# Patient Record
Sex: FEMALE | Race: WHITE | Employment: OTHER | URBAN - NONMETROPOLITAN AREA
[De-identification: names, ages, dates, MRNs, and addresses within clinical notes are randomized per-mention and may not be internally consistent; named-entity substitution may affect disease eponyms.]

---

## 2017-01-16 ENCOUNTER — OFFICE VISIT (OUTPATIENT)
Dept: PRIMARY CARE CLINIC | Age: 37
End: 2017-01-16
Payer: COMMERCIAL

## 2017-01-16 VITALS
WEIGHT: 228 LBS | HEIGHT: 66 IN | OXYGEN SATURATION: 97 % | TEMPERATURE: 97.3 F | DIASTOLIC BLOOD PRESSURE: 90 MMHG | BODY MASS INDEX: 36.64 KG/M2 | RESPIRATION RATE: 16 BRPM | SYSTOLIC BLOOD PRESSURE: 160 MMHG | HEART RATE: 106 BPM

## 2017-01-16 DIAGNOSIS — R10.9 ABDOMINAL PAIN, UNSPECIFIED LOCATION: Primary | ICD-10-CM

## 2017-01-16 DIAGNOSIS — R50.9 FEVER, UNSPECIFIED FEVER CAUSE: ICD-10-CM

## 2017-01-16 DIAGNOSIS — R11.0 NAUSEA: ICD-10-CM

## 2017-01-16 LAB
ALBUMIN SERPL-MCNC: 4.7 G/DL (ref 3.5–5.2)
ALP BLD-CCNC: 98 U/L (ref 35–104)
ALT SERPL-CCNC: 22 U/L (ref 5–33)
AMYLASE: 44 U/L (ref 28–100)
ANION GAP SERPL CALCULATED.3IONS-SCNC: 16 MMOL/L (ref 7–19)
AST SERPL-CCNC: 18 U/L (ref 5–32)
BASOPHILS ABSOLUTE: 0 K/UL (ref 0–0.2)
BASOPHILS RELATIVE PERCENT: 0.5 % (ref 0–1)
BILIRUB SERPL-MCNC: 0.5 MG/DL (ref 0.2–1.2)
BILIRUBIN, POC: NORMAL
BLOOD URINE, POC: NORMAL
BUN BLDV-MCNC: 16 MG/DL (ref 6–20)
CALCIUM SERPL-MCNC: 9.5 MG/DL (ref 8.6–10)
CHLORIDE BLD-SCNC: 107 MMOL/L (ref 98–111)
CLARITY, POC: CLEAR
CO2: 19 MMOL/L (ref 22–29)
COLOR, POC: YELLOW
CREAT SERPL-MCNC: 0.8 MG/DL (ref 0.5–0.9)
EOSINOPHILS ABSOLUTE: 0.1 K/UL (ref 0–0.6)
EOSINOPHILS RELATIVE PERCENT: 2 % (ref 0–5)
GFR NON-AFRICAN AMERICAN: >60
GLOBULIN: 2.4 G/DL
GLUCOSE BLD-MCNC: 102 MG/DL (ref 74–109)
GLUCOSE URINE, POC: NORMAL
HCT VFR BLD CALC: 38 % (ref 37–47)
HEMOGLOBIN: 13.4 G/DL (ref 12–16)
KETONES, POC: NORMAL
LEUKOCYTE EST, POC: NORMAL
LIPASE: 40 U/L (ref 13–60)
LYMPHOCYTES ABSOLUTE: 0.3 K/UL (ref 1.1–4.5)
LYMPHOCYTES RELATIVE PERCENT: 6 % (ref 20–40)
MCH RBC QN AUTO: 32 PG (ref 27–31)
MCHC RBC AUTO-ENTMCNC: 35.3 G/DL (ref 33–37)
MCV RBC AUTO: 90.7 FL (ref 81–99)
MONOCYTES ABSOLUTE: 0.4 K/UL (ref 0–0.9)
MONOCYTES RELATIVE PERCENT: 7.6 % (ref 0–10)
NEUTROPHILS ABSOLUTE: 4.7 K/UL (ref 1.5–7.5)
NEUTROPHILS RELATIVE PERCENT: 83.9 % (ref 50–65)
NITRITE, POC: NORMAL
PDW BLD-RTO: 13.1 % (ref 11.5–14.5)
PH, POC: 5
PLATELET # BLD: 288 K/UL (ref 130–400)
PMV BLD AUTO: 9.8 FL (ref 7.4–10.4)
POTASSIUM SERPL-SCNC: 3.9 MMOL/L (ref 3.5–5)
PROTEIN, POC: NORMAL
RBC # BLD: 4.19 M/UL (ref 4.2–5.4)
SODIUM BLD-SCNC: 142 MMOL/L (ref 136–145)
SPECIFIC GRAVITY, POC: 1010
TOTAL PROTEIN: 7.1 G/DL (ref 6.6–8.7)
UROBILINOGEN, POC: 0.2
WBC # BLD: 5.5 K/UL (ref 4.8–10.8)

## 2017-01-16 PROCEDURE — 81002 URINALYSIS NONAUTO W/O SCOPE: CPT | Performed by: NURSE PRACTITIONER

## 2017-01-16 PROCEDURE — G8419 CALC BMI OUT NRM PARAM NOF/U: HCPCS | Performed by: NURSE PRACTITIONER

## 2017-01-16 PROCEDURE — 1036F TOBACCO NON-USER: CPT | Performed by: NURSE PRACTITIONER

## 2017-01-16 PROCEDURE — G8484 FLU IMMUNIZE NO ADMIN: HCPCS | Performed by: NURSE PRACTITIONER

## 2017-01-16 PROCEDURE — G8427 DOCREV CUR MEDS BY ELIG CLIN: HCPCS | Performed by: NURSE PRACTITIONER

## 2017-01-16 PROCEDURE — 99214 OFFICE O/P EST MOD 30 MIN: CPT | Performed by: NURSE PRACTITIONER

## 2017-01-16 PROCEDURE — 96372 THER/PROPH/DIAG INJ SC/IM: CPT | Performed by: NURSE PRACTITIONER

## 2017-01-16 PROCEDURE — 36415 COLL VENOUS BLD VENIPUNCTURE: CPT | Performed by: NURSE PRACTITIONER

## 2017-01-16 RX ORDER — SUCRALFATE 1 G/1
1 TABLET ORAL 3 TIMES DAILY
COMMUNITY
End: 2017-06-09

## 2017-01-16 RX ORDER — DICYCLOMINE HYDROCHLORIDE 10 MG/1
10 CAPSULE ORAL
COMMUNITY
End: 2017-06-09

## 2017-01-16 RX ORDER — PROMETHAZINE HYDROCHLORIDE 25 MG/ML
25 INJECTION, SOLUTION INTRAMUSCULAR; INTRAVENOUS ONCE
Status: COMPLETED | OUTPATIENT
Start: 2017-01-16 | End: 2017-01-16

## 2017-01-16 RX ORDER — KETOROLAC TROMETHAMINE 30 MG/ML
60 INJECTION, SOLUTION INTRAMUSCULAR; INTRAVENOUS ONCE
Status: COMPLETED | OUTPATIENT
Start: 2017-01-16 | End: 2017-01-16

## 2017-01-16 RX ORDER — PANTOPRAZOLE SODIUM 40 MG/1
40 TABLET, DELAYED RELEASE ORAL DAILY
COMMUNITY
End: 2017-06-09

## 2017-01-16 RX ADMIN — PROMETHAZINE HYDROCHLORIDE 25 MG: 25 INJECTION, SOLUTION INTRAMUSCULAR; INTRAVENOUS at 15:23

## 2017-01-16 RX ADMIN — KETOROLAC TROMETHAMINE 60 MG: 30 INJECTION, SOLUTION INTRAMUSCULAR; INTRAVENOUS at 15:22

## 2017-01-17 ASSESSMENT — ENCOUNTER SYMPTOMS
BLOOD IN STOOL: 0
ABDOMINAL PAIN: 1
RECTAL PAIN: 0
DIARRHEA: 1
ANAL BLEEDING: 0
NAUSEA: 1
CONSTIPATION: 0
ABDOMINAL DISTENTION: 0
VOMITING: 0

## 2017-02-13 ENCOUNTER — OFFICE VISIT (OUTPATIENT)
Dept: UROLOGY | Facility: CLINIC | Age: 37
End: 2017-02-13

## 2017-02-13 VITALS — WEIGHT: 223.8 LBS | HEIGHT: 66 IN | TEMPERATURE: 97.9 F | BODY MASS INDEX: 35.97 KG/M2

## 2017-02-13 DIAGNOSIS — R35.0 URINARY FREQUENCY: ICD-10-CM

## 2017-02-13 DIAGNOSIS — N31.9 NEUROGENIC BLADDER: Primary | ICD-10-CM

## 2017-02-13 LAB
BILIRUB BLD-MCNC: NEGATIVE MG/DL
CLARITY, POC: CLEAR
COLOR UR: YELLOW
GLUCOSE UR STRIP-MCNC: NEGATIVE MG/DL
KETONES UR QL: NEGATIVE
LEUKOCYTE EST, POC: NEGATIVE
NITRITE UR-MCNC: NEGATIVE MG/ML
PH UR: 7 [PH] (ref 5–8)
PROT UR STRIP-MCNC: NEGATIVE MG/DL
RBC # UR STRIP: NEGATIVE /UL
SP GR UR: 1.02 (ref 1–1.03)
UROBILINOGEN UR QL: NORMAL

## 2017-02-13 PROCEDURE — 51798 US URINE CAPACITY MEASURE: CPT | Performed by: UROLOGY

## 2017-02-13 PROCEDURE — 99213 OFFICE O/P EST LOW 20 MIN: CPT | Performed by: UROLOGY

## 2017-02-13 PROCEDURE — 81003 URINALYSIS AUTO W/O SCOPE: CPT | Performed by: UROLOGY

## 2017-02-13 NOTE — PROGRESS NOTES
Ms. Iniguez is 36 y.o. female    Chief Complaint   Patient presents with   • Urinary Frequency       Urinary Frequency    This is a chronic problem. The current episode started more than 1 year ago. The problem has been gradually worsening. The pain is mild. There has been no fever. There is no history of pyelonephritis. Associated symptoms include frequency. Pertinent negatives include no chills, flank pain or hematuria. She has tried nothing for the symptoms. The treatment provided no relief. Her past medical history is significant for kidney stones. There is no history of recurrent UTIs.       The following portions of the patient's history were reviewed and updated as appropriate: allergies, current medications, past family history, past medical history, past social history, past surgical history and problem list.    Review of Systems   Constitutional: Negative for chills and fever.   Gastrointestinal: Negative for abdominal pain, anal bleeding and blood in stool.   Genitourinary: Positive for frequency. Negative for flank pain and hematuria.         Current Outpatient Prescriptions:   •  acyclovir (ZOVIRAX) 400 MG tablet, Take 400 mg by mouth 2 (two) times a day. Take no more than 5 doses a day., Disp: , Rfl:   •  atorvastatin (LIPITOR) 10 MG tablet, Take 10 mg by mouth daily., Disp: , Rfl:   •  cholecalciferol (VITAMIN D3) 1000 UNITS tablet, Take 5,000 Units by mouth Daily., Disp: , Rfl:   •  cloNIDine (CATAPRES) 0.1 MG tablet, Take 0.1 mg by mouth as needed for high blood pressure., Disp: , Rfl:   •  Corticotropin (ACTHAR HP IJ), Inject  as directed., Disp: , Rfl:   •  DULoxetine (CYMBALTA) 60 MG capsule, Take 60 mg by mouth daily., Disp: , Rfl:   •  gabapentin (NEURONTIN) 600 MG tablet, Take 600 mg by mouth 2 (two) times a day., Disp: , Rfl:   •  metaxalone (SKELAXIN) 800 MG tablet, Take 800 mg by mouth 3 (three) times a day as needed for muscle spasms., Disp: , Rfl:   •  naproxen sodium (ALEVE) 220 MG  "tablet, Take 220 mg by mouth 2 (two) times a day as needed for mild pain (1-3)., Disp: , Rfl:   •  phenazopyridine (PYRIDIUM) 100 MG tablet, Take 100 mg by mouth 3 (three) times a day as needed for bladder spasms., Disp: , Rfl:   •  SUMAtriptan (IMITREX) 20 MG/ACT nasal spray, 1 spray into each nostril every 2 (two) hours as needed for migraine., Disp: , Rfl:   •  tamsulosin (FLOMAX) 0.4 MG capsule 24 hr capsule, Take 1 capsule by mouth every night., Disp: , Rfl:   •  tiZANidine (ZANAFLEX) 4 MG tablet, Take 4 mg by mouth at night as needed for muscle spasms., Disp: , Rfl:   •  topiramate (TOPAMAX) 100 MG tablet, Take 100 mg by mouth 2 (two) times a day., Disp: , Rfl:   •  vitamin B-12 (CYANOCOBALAMIN) 1000 MCG tablet, Take 1,000 mcg by mouth every 30 (thirty) days., Disp: , Rfl:     Past Medical History   Diagnosis Date   • Depression    • Hyperlipidemia    • MS (multiple sclerosis)    • POTS (postural orthostatic tachycardia syndrome)        Past Surgical History   Procedure Laterality Date   • Hysterectomy     • Appendectomy     • Cholecystectomy     • Back surgery         Social History     Social History   • Marital status:      Spouse name: N/A   • Number of children: N/A   • Years of education: N/A     Social History Main Topics   • Smoking status: Never Smoker   • Smokeless tobacco: None   • Alcohol use No   • Drug use: No   • Sexual activity: Not Asked     Other Topics Concern   • None     Social History Narrative       Family History   Problem Relation Age of Onset   • No Known Problems Father    • No Known Problems Mother        Objective    Visit Vitals   • Temp 97.9 °F (36.6 °C)   • Ht 66\" (167.6 cm)   • Wt 223 lb 12.8 oz (102 kg)   • BMI 36.12 kg/m2       Physical Exam    Office Visit on 12/28/2016   Component Date Value Ref Range Status   • Color 12/28/2016 Yellow  Yellow, Straw, Dark Yellow, Mary Final   • Clarity, UA 12/28/2016 Clear  Clear Final   • Glucose, UA 12/28/2016 Negative  Negative, " 1000 mg/dL (3+) mg/dL Final   • Bilirubin 12/28/2016 Negative  Negative Final   • Ketones, UA 12/28/2016 Negative  Negative Final   • Specific Gravity  12/28/2016 1.025  1.005 - 1.030 Final   • Blood, UA 12/28/2016 Trace* Negative Final   • pH, Urine 12/28/2016 7.0  5.0 - 8.0 Final   • Protein, POC 12/28/2016 Negative  Negative mg/dL Final   • Urobilinogen, UA 12/28/2016 Normal  Normal Final   • Leukocytes 12/28/2016 Negative  Negative Final   • Nitrite, UA 12/28/2016 Negative  Negative Final       Results for orders placed or performed in visit on 02/13/17   POC Urinalysis Dipstick, Automated   Result Value Ref Range    Color Yellow Yellow, Straw, Dark Yellow, Mary    Clarity, UA Clear Clear    Glucose, UA Negative Negative, 1000 mg/dL (3+) mg/dL    Bilirubin Negative Negative    Ketones, UA Negative Negative    Specific Gravity  1.025 1.005 - 1.030    Blood, UA Negative Negative    pH, Urine 7.0 5.0 - 8.0    Protein, POC Negative Negative mg/dL    Urobilinogen, UA Normal Normal    Leukocytes Negative Negative    Nitrite, UA Negative Negative     Estimation of residual urine via abdominal ultrasound  Residual Urine: 0 ml  Indication: neurogenic bladder  Position: Supine  Examination: Incremental scanning of the suprapubic area using 3 MHz transducer using copious amounts of acoustic gel.   Findings: An anechoic area was demonstrated which represented the bladder, with measurement of residual urine as noted. I inspected this myself. In that the residual urine was stable or insignificant, no treatment will be necessary at this time.             Assessment and Plan    Nurys was seen today for urinary frequency.    Diagnoses and all orders for this visit:    Neurogenic bladder  -     POC Urinalysis Dipstick, Automated    Urinary frequency  -     Urine Culture   she states that she did initially have some improvement when stopping the bethanechol.  However this was short-lived and she is having a relapsing episode  her multiple sclerosis.  She is currently stating that she is having more frequency.  In addition she has some lower abdominal pressure.  I sent a urine culture today.  Her urine does not appear infected.  She is emptying her bladder well today with a postvoid residual of 0.  I think some of her symptoms may be due to a an acute relapse of multiple sclerosis.  We discussed starting a medication at this point I will not start this during this acute flare.  I will see her back in a few months.  If she has no improvement over the next few months and I think she will need to be started on anticholinergic.    EMR Dragon/Transcription disclaimer:  Much of this encounter note is an electronic transcription/translation of spoken language to printed text. The electronic translation of spoken language may permit erroneous, or at times, nonsensical words or phrases to be inadvertently transcribed; although I have reviewed the note for such errors, some may still exist.     Bladder scan    Post void residuEstimation of residual urine via abdominal ultrasound  Residual Urine: 0 ml  Indication: Lower urinary tract symptoms  Position: Supine  Examination: Incremental scanning of the suprapubic area using 3 MHz transducer using copious amounts of acoustic gel.   Findings: An anechoic area was demonstrated which represented the bladder, with measurement of residual urine as noted. I inspected this myself. In that the residual urine was stable or insignificant, no treatment will be necessary at this time.

## 2017-02-15 ENCOUNTER — TELEPHONE (OUTPATIENT)
Dept: UROLOGY | Facility: CLINIC | Age: 37
End: 2017-02-15

## 2017-02-15 LAB
BACTERIA UR CULT: NORMAL
BACTERIA UR CULT: NORMAL

## 2017-02-15 NOTE — TELEPHONE ENCOUNTER
----- Message from Alok Albarran MD sent at 2/15/2017  6:31 AM CST -----  Please let her know her urine culture was negative    ----- Message -----     From: Shelby Salmeron MA     Sent: 2/13/2017   9:42 AM       To: Alok Albarran MD

## 2017-02-16 ENCOUNTER — TRANSCRIBE ORDERS (OUTPATIENT)
Dept: LAB | Facility: HOSPITAL | Age: 37
End: 2017-02-16

## 2017-02-16 ENCOUNTER — LAB (OUTPATIENT)
Dept: LAB | Facility: HOSPITAL | Age: 37
End: 2017-02-16
Attending: PEDIATRICS

## 2017-02-16 DIAGNOSIS — G35 MULTIPLE SCLEROSIS (HCC): Primary | ICD-10-CM

## 2017-02-16 DIAGNOSIS — G35 MULTIPLE SCLEROSIS (HCC): ICD-10-CM

## 2017-02-16 LAB
ALBUMIN SERPL-MCNC: 5.1 G/DL (ref 3.5–5)
ALBUMIN/GLOB SERPL: 1.3 G/DL (ref 1.1–2.5)
ALP SERPL-CCNC: 102 U/L (ref 24–120)
ALT SERPL W P-5'-P-CCNC: 46 U/L (ref 0–54)
ANION GAP SERPL CALCULATED.3IONS-SCNC: 17 MMOL/L (ref 4–13)
AST SERPL-CCNC: 24 U/L (ref 7–45)
AUTO MIXED CELLS #: 0.7 10*3/UL (ref 0.1–2.6)
AUTO MIXED CELLS %: 9.2 % (ref 0.1–24)
BILIRUB SERPL-MCNC: 0.7 MG/DL (ref 0.1–1)
BILIRUB UR QL STRIP: NEGATIVE
BUN BLD-MCNC: 22 MG/DL (ref 5–21)
BUN/CREAT SERPL: 21.2
CALCIUM SPEC-SCNC: 9.3 MG/DL (ref 8.4–10.4)
CHLORIDE SERPL-SCNC: 103 MMOL/L (ref 98–110)
CLARITY UR: CLEAR
CO2 SERPL-SCNC: 25 MMOL/L (ref 24–31)
COLOR UR: YELLOW
CREAT BLD-MCNC: 1.04 MG/DL (ref 0.5–1.4)
ERYTHROCYTE [DISTWIDTH] IN BLOOD BY AUTOMATED COUNT: 12.8 % (ref 12–15)
GFR SERPL CREATININE-BSD FRML MDRD: 59 ML/MIN/1.73
GLOBULIN UR ELPH-MCNC: 3.9 GM/DL
GLUCOSE BLD-MCNC: 92 MG/DL (ref 70–100)
GLUCOSE UR STRIP-MCNC: NEGATIVE MG/DL
HCT VFR BLD AUTO: 43 % (ref 37–47)
HGB BLD-MCNC: 15.3 G/DL (ref 12–16)
HGB UR QL STRIP.AUTO: NEGATIVE
KETONES UR QL STRIP: NEGATIVE
LEUKOCYTE ESTERASE UR QL STRIP.AUTO: NEGATIVE
LYMPHOCYTES # BLD AUTO: 0.8 10*3/MM3 (ref 0.8–7)
LYMPHOCYTES NFR BLD AUTO: 10.9 % (ref 15–45)
MCH RBC QN AUTO: 31.5 PG (ref 28–32)
MCHC RBC AUTO-ENTMCNC: 35.6 G/DL (ref 33–36)
MCV RBC AUTO: 88.7 FL (ref 82–98)
NEUTROPHILS # BLD AUTO: 6.2 10*3/MM3 (ref 1.5–8.3)
NEUTROPHILS NFR BLD AUTO: 79.9 % (ref 39–78)
NITRITE UR QL STRIP: NEGATIVE
PH UR STRIP.AUTO: 6 [PH] (ref 5–8)
PLATELET # BLD AUTO: 326 10*3/MM3 (ref 130–400)
PMV BLD AUTO: 8.6 FL (ref 6–12)
POTASSIUM BLD-SCNC: 4.1 MMOL/L (ref 3.5–5.3)
PROT SERPL-MCNC: 9 G/DL (ref 6.3–8.7)
PROT UR QL STRIP: NEGATIVE
RBC # BLD AUTO: 4.85 10*6/MM3 (ref 4.2–5.4)
SODIUM BLD-SCNC: 145 MMOL/L (ref 135–145)
SP GR UR STRIP: 1.02 (ref 1–1.03)
UROBILINOGEN UR QL STRIP: NORMAL
WBC NRBC COR # BLD: 7.7 10*3/MM3 (ref 4.8–10.8)

## 2017-02-16 PROCEDURE — 81003 URINALYSIS AUTO W/O SCOPE: CPT

## 2017-02-16 PROCEDURE — 36415 COLL VENOUS BLD VENIPUNCTURE: CPT

## 2017-02-16 PROCEDURE — 85025 COMPLETE CBC W/AUTO DIFF WBC: CPT

## 2017-02-16 PROCEDURE — 80053 COMPREHEN METABOLIC PANEL: CPT

## 2017-04-21 PROBLEM — R07.9 CHEST PAIN: Status: ACTIVE | Noted: 2017-04-21

## 2017-04-24 ENCOUNTER — OFFICE VISIT (OUTPATIENT)
Dept: CARDIOLOGY | Facility: CLINIC | Age: 37
End: 2017-04-24

## 2017-04-24 VITALS
HEIGHT: 66 IN | DIASTOLIC BLOOD PRESSURE: 100 MMHG | BODY MASS INDEX: 37.03 KG/M2 | WEIGHT: 230.4 LBS | SYSTOLIC BLOOD PRESSURE: 150 MMHG | HEART RATE: 96 BPM

## 2017-04-24 DIAGNOSIS — R07.2 PRECORDIAL PAIN: Primary | ICD-10-CM

## 2017-04-24 DIAGNOSIS — R60.9 SWELLING: ICD-10-CM

## 2017-04-24 DIAGNOSIS — R06.02 SHORTNESS OF BREATH: ICD-10-CM

## 2017-04-24 DIAGNOSIS — I10 ESSENTIAL HYPERTENSION: ICD-10-CM

## 2017-04-24 PROBLEM — G90.A POTS (POSTURAL ORTHOSTATIC TACHYCARDIA SYNDROME): Status: ACTIVE | Noted: 2017-04-24

## 2017-04-24 PROCEDURE — 99214 OFFICE O/P EST MOD 30 MIN: CPT | Performed by: NURSE PRACTITIONER

## 2017-04-24 PROCEDURE — 93000 ELECTROCARDIOGRAM COMPLETE: CPT | Performed by: NURSE PRACTITIONER

## 2017-04-24 RX ORDER — METOPROLOL TARTRATE 50 MG/1
50 TABLET, FILM COATED ORAL 2 TIMES DAILY
COMMUNITY
End: 2017-04-27

## 2017-04-24 RX ORDER — LEFLUNOMIDE 20 MG/1
20 TABLET ORAL DAILY
COMMUNITY
End: 2017-09-22

## 2017-04-24 RX ORDER — DALFAMPRIDINE 10 MG/1
10 TABLET, FILM COATED, EXTENDED RELEASE ORAL 2 TIMES DAILY
COMMUNITY
End: 2017-09-22

## 2017-04-24 RX ORDER — SUMATRIPTAN 6 MG/.5ML
6 INJECTION, SOLUTION SUBCUTANEOUS AS NEEDED
COMMUNITY

## 2017-04-24 RX ORDER — PANTOPRAZOLE SODIUM 40 MG/1
40 TABLET, DELAYED RELEASE ORAL DAILY
COMMUNITY
End: 2017-04-27

## 2017-04-24 RX ORDER — LISINOPRIL 10 MG/1
10 TABLET ORAL DAILY
Qty: 30 TABLET | Refills: 11 | Status: SHIPPED | OUTPATIENT
Start: 2017-04-24 | End: 2017-05-30

## 2017-04-24 NOTE — PROGRESS NOTES
Subjective:     Encounter Date:04/24/2017      Patient ID: Nurys Iniguez is a 36 y.o. female.    Chief Complaint:  History of Present Illness  Patient is here today for chest pain, elevated blood pressure and swelling. Patient is followed by Dr. Valdivia for POTS and HTN. Patient for the last couple months has been experiencing some persistently elevated blood pressure. A review of daily blood pressures reveals few low end blood pressures and significantly elevated blood pressures. Patient int he past has taken Clonidine for fluctuating high blood pressures. She recently saw her neurologist who told patient to take Clonidine twice a day and as needed and increased her metoprolol. Despite this patient continues to have elevated blood pressures. Patient quite frequently experiences headaches. Patient complaints of swelling in legs, face and abdomen. Complains of shortness of breath. States no recent syncopal episodes. Chest pain comes and goes and is in the center of chest. Radiates in to back at times.   The following portions of the patient's history were reviewed and updated as appropriate: allergies, current medications, past family history, past medical history, past social history, past surgical history and problem list.   Past Medical History:   Diagnosis Date   • Depression    • Hyperlipidemia    • MS (multiple sclerosis)    • POTS (postural orthostatic tachycardia syndrome)      Allergies   Allergen Reactions   • Ropinirole Hcl    • Simvastatin      Prior to Admission medications    Medication Sig Start Date End Date Taking? Authorizing Provider   acyclovir (ZOVIRAX) 400 MG tablet Take 400 mg by mouth 2 (two) times a day. Take no more than 5 doses a day.   Yes Historical Provider, MD   Ascorbic Acid (VITAMIN C ER PO) Take  by mouth.   Yes Historical Provider, MD   atorvastatin (LIPITOR) 10 MG tablet Take 10 mg by mouth daily.   Yes Historical Provider, MD   cholecalciferol (VITAMIN D3) 1000 UNITS  tablet Take 5,000 Units by mouth Daily.   Yes Historical Provider, MD   cloNIDine (CATAPRES) 0.1 MG tablet Take 0.1 mg by mouth as needed for high blood pressure.   Yes Historical Provider, MD   Corticotropin (ACTHAR HP IJ) Inject  as directed.   Yes Historical Provider, MD   Dalfampridine ER (AMPYRA) 10 MG tablet sustained-release 12 hour Take 10 mg by mouth 2 (Two) Times a Day.   Yes Historical Provider, MD   DULoxetine (CYMBALTA) 60 MG capsule Take 60 mg by mouth daily.   Yes Historical Provider, MD   gabapentin (NEURONTIN) 600 MG tablet Take 600 mg by mouth 2 (two) times a day.   Yes Historical Provider, MD   leflunomide (ARAVA) 20 MG tablet Take 20 mg by mouth Daily.   Yes Historical Provider, MD   metoprolol tartrate (LOPRESSOR) 50 MG tablet Take 50 mg by mouth 2 (Two) Times a Day.   Yes Historical Provider, MD   naproxen sodium (ALEVE) 220 MG tablet Take 220 mg by mouth 2 (two) times a day as needed for mild pain (1-3).   Yes Historical Provider, MD   pantoprazole (PROTONIX) 40 MG EC tablet Take 40 mg by mouth Daily.   Yes Historical Provider, MD   phenazopyridine (PYRIDIUM) 100 MG tablet Take 100 mg by mouth 3 (three) times a day as needed for bladder spasms.   Yes Historical Provider, MD   SUMAtriptan (IMITREX) 6 MG/0.5ML solution injection Inject 6 mg under the skin 1 (One) Time.   Yes Historical Provider, MD   tamsulosin (FLOMAX) 0.4 MG capsule 24 hr capsule Take 1 capsule by mouth every night.   Yes Historical Provider, MD   tiZANidine (ZANAFLEX) 4 MG tablet Take 4 mg by mouth at night as needed for muscle spasms.   Yes Historical Provider, MD   topiramate (TOPAMAX) 100 MG tablet Take 100 mg by mouth 2 (two) times a day.   Yes Historical Provider, MD   vitamin B-12 (CYANOCOBALAMIN) 1000 MCG tablet Take 1,000 mcg by mouth every 30 (thirty) days.   Yes Historical Provider, MD   lisinopril (PRINIVIL,ZESTRIL) 10 MG tablet Take 1 tablet by mouth Daily. 4/24/17   SID Sharma   metaxalone (SKELAXIN)  800 MG tablet Take 800 mg by mouth 3 (three) times a day as needed for muscle spasms.  4/24/17  Historical Provider, MD   SUMAtriptan (IMITREX) 20 MG/ACT nasal spray 1 spray into each nostril every 2 (two) hours as needed for migraine.  4/24/17  Historical Provider, MD     Allergies   Allergen Reactions   • Ropinirole Hcl    • Simvastatin          Review of Systems   Constitution: Positive for malaise/fatigue. Negative for chills, decreased appetite, fever, weight gain and weight loss.   HENT: Negative for headaches and nosebleeds.    Eyes: Negative for visual disturbance.   Cardiovascular: Positive for chest pain, leg swelling and palpitations. Negative for dyspnea on exertion, near-syncope, orthopnea, paroxysmal nocturnal dyspnea and syncope.   Respiratory: Positive for shortness of breath. Negative for cough, hemoptysis and snoring.    Endocrine: Negative for cold intolerance and heat intolerance.   Hematologic/Lymphatic: Negative for bleeding problem. Does not bruise/bleed easily.   Skin: Negative for rash.   Musculoskeletal: Negative for back pain and falls.   Gastrointestinal: Negative for abdominal pain, constipation, diarrhea, heartburn, melena, nausea and vomiting.   Genitourinary: Negative for hematuria.   Neurological: Negative for dizziness and light-headedness.   Psychiatric/Behavioral: Negative for altered mental status.   Allergic/Immunologic: Negative for persistent infections.         ECG 12 Lead  Date/Time: 4/24/2017 2:13 PM  Performed by: ARJUN HERNANDEZ  Authorized by: ARJUN HERNANDEZ   Comparison: compared with previous ECG from 6/20/2016  Similar to previous ECG  Rhythm: sinus tachycardia               Objective:     Physical Exam   Constitutional: She is oriented to person, place, and time. She appears well-developed and well-nourished.   HENT:   Head: Normocephalic and atraumatic.   Eyes: Pupils are equal, round, and reactive to light.   Neck: Normal range of motion. Neck supple. No JVD  "present. Carotid bruit is not present.   Cardiovascular: Normal rate, regular rhythm, normal heart sounds and intact distal pulses.    Pulmonary/Chest: Effort normal and breath sounds normal.   Abdominal: Soft. Bowel sounds are normal.   Musculoskeletal: Normal range of motion.   Neurological: She is alert and oriented to person, place, and time. She has normal reflexes.   Skin: Skin is warm and dry.   Psychiatric: She has a normal mood and affect. Her behavior is normal. Judgment and thought content normal.     Blood pressure 150/100, pulse 96, height 66\" (167.6 cm), weight 230 lb 6.4 oz (105 kg).      Lab Review:       Assessment:          Diagnosis Plan   1. Precordial pain  Adult Stress Echo Only    Adult Transthoracic Echo Complete With Contrast   2. Essential hypertension  lisinopril (PRINIVIL,ZESTRIL) 10 MG tablet   3. Swelling  Adult Stress Echo Only   4. Shortness of breath  Adult Stress Echo Only    Adult Transthoracic Echo Complete With Contrast          Plan:       1. Patient complains of chest pain. Will check dobutamine stress echo.  2. Blood Pressures running high, added low dose lisinopril. Patient to hold if blood pressure low. Patient was told to take Clonidine twice a day by her neurologist but she is uncomfortable with this because it drops her blood pressure. Discussed risks of worsening POTS but patient is an agreeance for the need for better blood pressure control. If becomes symptomatic she is to call and stop taking Lisinopril   3. Swelling to legs and some shortness of breath. Will check an echo.  4. Will have patient follow up in 1 month.          "

## 2017-04-27 ENCOUNTER — HOSPITAL ENCOUNTER (EMERGENCY)
Facility: HOSPITAL | Age: 37
Discharge: HOME | End: 2017-04-27
Admitting: EMERGENCY MEDICINE

## 2017-04-27 ENCOUNTER — APPOINTMENT (OUTPATIENT)
Dept: CARDIOLOGY | Facility: HOSPITAL | Age: 37
End: 2017-04-27

## 2017-04-27 ENCOUNTER — APPOINTMENT (OUTPATIENT)
Dept: GENERAL RADIOLOGY | Facility: HOSPITAL | Age: 37
End: 2017-04-27

## 2017-04-27 VITALS
HEIGHT: 66 IN | BODY MASS INDEX: 35.36 KG/M2 | OXYGEN SATURATION: 96 % | TEMPERATURE: 97 F | RESPIRATION RATE: 16 BRPM | SYSTOLIC BLOOD PRESSURE: 164 MMHG | DIASTOLIC BLOOD PRESSURE: 88 MMHG | HEART RATE: 94 BPM | WEIGHT: 220 LBS

## 2017-04-27 DIAGNOSIS — R07.9 CHEST PAIN, UNSPECIFIED TYPE: Primary | ICD-10-CM

## 2017-04-27 DIAGNOSIS — R00.0 TACHYCARDIA: ICD-10-CM

## 2017-04-27 LAB
ALBUMIN SERPL-MCNC: 4.3 G/DL (ref 3.5–5)
ALBUMIN/GLOB SERPL: 1.3 G/DL (ref 1.1–2.5)
ALP SERPL-CCNC: 117 U/L (ref 24–120)
ALT SERPL W P-5'-P-CCNC: 50 U/L (ref 0–54)
ANION GAP SERPL CALCULATED.3IONS-SCNC: 15 MMOL/L (ref 4–13)
AST SERPL-CCNC: 44 U/L (ref 7–45)
BASOPHILS # BLD AUTO: 0.04 10*3/MM3 (ref 0–0.2)
BASOPHILS NFR BLD AUTO: 0.8 % (ref 0–2)
BH CV STRESS BP STAGE 1: NORMAL
BH CV STRESS BP STAGE 2: NORMAL
BH CV STRESS DOSE DOBUTAMINE STAGE 1: 10
BH CV STRESS DOSE DOBUTAMINE STAGE 2: 20
BH CV STRESS DURATION MIN STAGE 1: 3
BH CV STRESS DURATION MIN STAGE 2: 3
BH CV STRESS DURATION SEC STAGE 1: 0
BH CV STRESS DURATION SEC STAGE 2: 24
BH CV STRESS HR STAGE 1: 131
BH CV STRESS HR STAGE 2: 153
BH CV STRESS PROTOCOL 1: NORMAL
BH CV STRESS RECOVERY BP: NORMAL MMHG
BH CV STRESS RECOVERY HR: 109 BPM
BH CV STRESS STAGE 1: 1
BH CV STRESS STAGE 2: 2
BILIRUB SERPL-MCNC: 0.9 MG/DL (ref 0.1–1)
BUN BLD-MCNC: 10 MG/DL (ref 5–21)
BUN/CREAT SERPL: 11.9 (ref 7–25)
CALCIUM SPEC-SCNC: 9.7 MG/DL (ref 8.4–10.4)
CHLORIDE SERPL-SCNC: 107 MMOL/L (ref 98–110)
CO2 SERPL-SCNC: 20 MMOL/L (ref 24–31)
CREAT BLD-MCNC: 0.84 MG/DL (ref 0.5–1.4)
D DIMER PPP FEU-MCNC: 0.3 MG/L (FEU) (ref 0–0.5)
DEPRECATED RDW RBC AUTO: 43.2 FL (ref 40–54)
EOSINOPHIL # BLD AUTO: 0.15 10*3/MM3 (ref 0–0.7)
EOSINOPHIL NFR BLD AUTO: 2.9 % (ref 0–4)
ERYTHROCYTE [DISTWIDTH] IN BLOOD BY AUTOMATED COUNT: 13.6 % (ref 12–15)
GFR SERPL CREATININE-BSD FRML MDRD: 76 ML/MIN/1.73
GLOBULIN UR ELPH-MCNC: 3.2 GM/DL
GLUCOSE BLD-MCNC: 119 MG/DL (ref 70–100)
HCT VFR BLD AUTO: 41.3 % (ref 37–47)
HGB BLD-MCNC: 14.6 G/DL (ref 12–16)
IMM GRANULOCYTES # BLD: 0.01 10*3/MM3 (ref 0–0.03)
IMM GRANULOCYTES NFR BLD: 0.2 % (ref 0–5)
LV EF 2D ECHO EST: 55 %
LYMPHOCYTES # BLD AUTO: 0.77 10*3/MM3 (ref 0.72–4.86)
LYMPHOCYTES NFR BLD AUTO: 14.9 % (ref 15–45)
MAXIMAL PREDICTED HEART RATE: 182 BPM
MCH RBC QN AUTO: 31 PG (ref 28–32)
MCHC RBC AUTO-ENTMCNC: 35.4 G/DL (ref 33–36)
MCV RBC AUTO: 87.7 FL (ref 82–98)
MONOCYTES # BLD AUTO: 0.6 10*3/MM3 (ref 0.19–1.3)
MONOCYTES NFR BLD AUTO: 11.6 % (ref 4–12)
NEUTROPHILS # BLD AUTO: 3.6 10*3/MM3 (ref 1.87–8.4)
NEUTROPHILS NFR BLD AUTO: 69.6 % (ref 39–78)
NT-PROBNP SERPL-MCNC: 40.5 PG/ML (ref 0–450)
PERCENT MAX PREDICTED HR: 84.07 %
PLATELET # BLD AUTO: 264 10*3/MM3 (ref 130–400)
PMV BLD AUTO: 10 FL (ref 6–12)
POTASSIUM BLD-SCNC: 3.9 MMOL/L (ref 3.5–5.3)
PROT SERPL-MCNC: 7.5 G/DL (ref 6.3–8.7)
RBC # BLD AUTO: 4.71 10*6/MM3 (ref 4.2–5.4)
SODIUM BLD-SCNC: 142 MMOL/L (ref 135–145)
STRESS BASELINE BP: NORMAL MMHG
STRESS BASELINE HR: 102 BPM
STRESS PERCENT HR: 99 %
STRESS POST EXERCISE DUR MIN: 6 MIN
STRESS POST EXERCISE DUR SEC: 24 SEC
STRESS POST PEAK BP: NORMAL MMHG
STRESS POST PEAK HR: 153 BPM
STRESS TARGET HR: 155 BPM
T3FREE SERPL-MCNC: 3.71 PG/ML (ref 2.77–5.27)
T4 FREE SERPL-MCNC: 0.89 NG/DL (ref 0.78–2.19)
TROPONIN I SERPL-MCNC: 0 NG/ML (ref 0–0.07)
TROPONIN I SERPL-MCNC: 0 NG/ML (ref 0–0.07)
TSH SERPL DL<=0.05 MIU/L-ACNC: 1.31 MIU/ML (ref 0.47–4.68)
WBC NRBC COR # BLD: 5.17 10*3/MM3 (ref 4.8–10.8)

## 2017-04-27 PROCEDURE — 84481 FREE ASSAY (FT-3): CPT | Performed by: PHYSICIAN ASSISTANT

## 2017-04-27 PROCEDURE — 80053 COMPREHEN METABOLIC PANEL: CPT | Performed by: PHYSICIAN ASSISTANT

## 2017-04-27 PROCEDURE — 93017 CV STRESS TEST TRACING ONLY: CPT

## 2017-04-27 PROCEDURE — 99284 EMERGENCY DEPT VISIT MOD MDM: CPT

## 2017-04-27 PROCEDURE — 93010 ELECTROCARDIOGRAM REPORT: CPT | Performed by: INTERNAL MEDICINE

## 2017-04-27 PROCEDURE — 93005 ELECTROCARDIOGRAM TRACING: CPT | Performed by: PHYSICIAN ASSISTANT

## 2017-04-27 PROCEDURE — 85379 FIBRIN DEGRADATION QUANT: CPT | Performed by: PHYSICIAN ASSISTANT

## 2017-04-27 PROCEDURE — 96374 THER/PROPH/DIAG INJ IV PUSH: CPT

## 2017-04-27 PROCEDURE — 84439 ASSAY OF FREE THYROXINE: CPT | Performed by: PHYSICIAN ASSISTANT

## 2017-04-27 PROCEDURE — 93350 STRESS TTE ONLY: CPT | Performed by: INTERNAL MEDICINE

## 2017-04-27 PROCEDURE — 96361 HYDRATE IV INFUSION ADD-ON: CPT

## 2017-04-27 PROCEDURE — 25010000002 ONDANSETRON PER 1 MG: Performed by: PHYSICIAN ASSISTANT

## 2017-04-27 PROCEDURE — 84484 ASSAY OF TROPONIN QUANT: CPT

## 2017-04-27 PROCEDURE — 96375 TX/PRO/DX INJ NEW DRUG ADDON: CPT

## 2017-04-27 PROCEDURE — 71010 HC CHEST PA OR AP: CPT

## 2017-04-27 PROCEDURE — 84443 ASSAY THYROID STIM HORMONE: CPT | Performed by: PHYSICIAN ASSISTANT

## 2017-04-27 PROCEDURE — C8928 TTE W OR W/O FOL W/CON,STRES: HCPCS

## 2017-04-27 PROCEDURE — 85025 COMPLETE CBC W/AUTO DIFF WBC: CPT | Performed by: PHYSICIAN ASSISTANT

## 2017-04-27 PROCEDURE — 93005 ELECTROCARDIOGRAM TRACING: CPT

## 2017-04-27 PROCEDURE — 25010000003 DOBUTAMINE PER 250 MG: Performed by: INTERNAL MEDICINE

## 2017-04-27 PROCEDURE — 93018 CV STRESS TEST I&R ONLY: CPT | Performed by: INTERNAL MEDICINE

## 2017-04-27 PROCEDURE — 93352 ADMIN ECG CONTRAST AGENT: CPT | Performed by: INTERNAL MEDICINE

## 2017-04-27 PROCEDURE — 25010000002 PERFLUTREN (DEFINITY) 8.476 MG IN SODIUM CHLORIDE 10 ML INJECTION: Performed by: INTERNAL MEDICINE

## 2017-04-27 PROCEDURE — 83880 ASSAY OF NATRIURETIC PEPTIDE: CPT | Performed by: PHYSICIAN ASSISTANT

## 2017-04-27 RX ORDER — DOBUTAMINE HYDROCHLORIDE 100 MG/100ML
10-50 INJECTION INTRAVENOUS
Status: DISCONTINUED | OUTPATIENT
Start: 2017-04-27 | End: 2017-04-27 | Stop reason: HOSPADM

## 2017-04-27 RX ORDER — METOPROLOL TARTRATE 5 MG/5ML
5 INJECTION INTRAVENOUS ONCE
Status: COMPLETED | OUTPATIENT
Start: 2017-04-27 | End: 2017-04-27

## 2017-04-27 RX ORDER — PROMETHAZINE HYDROCHLORIDE 25 MG/1
25 TABLET ORAL EVERY 6 HOURS PRN
COMMUNITY
End: 2022-03-28

## 2017-04-27 RX ORDER — GABAPENTIN 300 MG/1
100 CAPSULE ORAL
COMMUNITY

## 2017-04-27 RX ORDER — SODIUM CHLORIDE 9 MG/ML
125 INJECTION, SOLUTION INTRAVENOUS CONTINUOUS
Status: DISCONTINUED | OUTPATIENT
Start: 2017-04-27 | End: 2017-04-27 | Stop reason: HOSPADM

## 2017-04-27 RX ORDER — CYANOCOBALAMIN 1000 UG/ML
1000 INJECTION, SOLUTION INTRAMUSCULAR; SUBCUTANEOUS
COMMUNITY
End: 2020-09-03

## 2017-04-27 RX ORDER — GABAPENTIN 300 MG/1
600 CAPSULE ORAL 2 TIMES DAILY
COMMUNITY
End: 2017-05-03 | Stop reason: SDUPTHER

## 2017-04-27 RX ORDER — ONDANSETRON 2 MG/ML
4 INJECTION INTRAMUSCULAR; INTRAVENOUS ONCE
Status: COMPLETED | OUTPATIENT
Start: 2017-04-27 | End: 2017-04-27

## 2017-04-27 RX ORDER — ASPIRIN 325 MG
325 TABLET ORAL ONCE
Status: COMPLETED | OUTPATIENT
Start: 2017-04-27 | End: 2017-04-27

## 2017-04-27 RX ORDER — NITROGLYCERIN 0.4 MG/1
0.4 TABLET SUBLINGUAL
Status: DISCONTINUED | OUTPATIENT
Start: 2017-04-27 | End: 2017-04-27 | Stop reason: HOSPADM

## 2017-04-27 RX ORDER — ASCORBIC ACID 500 MG
500 TABLET ORAL DAILY
COMMUNITY
End: 2019-09-25

## 2017-04-27 RX ORDER — LIDOCAINE 50 MG/G
1 PATCH TOPICAL DAILY PRN
COMMUNITY
End: 2017-09-22

## 2017-04-27 RX ADMIN — SODIUM CHLORIDE 10 ML: 9 INJECTION INTRAMUSCULAR; INTRAVENOUS; SUBCUTANEOUS at 15:24

## 2017-04-27 RX ADMIN — DOBUTAMINE HYDROCHLORIDE 20 MCG/KG/MIN: 100 INJECTION INTRAVENOUS at 15:25

## 2017-04-27 RX ADMIN — SODIUM CHLORIDE 125 ML/HR: 9 INJECTION, SOLUTION INTRAVENOUS at 11:18

## 2017-04-27 RX ADMIN — METOROPROLOL TARTRATE 5 MG: 5 INJECTION, SOLUTION INTRAVENOUS at 11:28

## 2017-04-27 RX ADMIN — ONDANSETRON HYDROCHLORIDE 4 MG: 2 SOLUTION INTRAMUSCULAR; INTRAVENOUS at 16:17

## 2017-04-27 RX ADMIN — NITROGLYCERIN 0.4 MG: 0.4 TABLET SUBLINGUAL at 11:33

## 2017-04-27 RX ADMIN — SODIUM CHLORIDE 500 ML: 0.9 INJECTION, SOLUTION INTRAVENOUS at 11:39

## 2017-04-27 RX ADMIN — ASPIRIN 325 MG: 325 TABLET, COATED ORAL at 11:39

## 2017-04-27 RX ADMIN — NITROGLYCERIN 0.4 MG: 0.4 TABLET SUBLINGUAL at 11:27

## 2017-05-01 ENCOUNTER — APPOINTMENT (OUTPATIENT)
Dept: CARDIOLOGY | Facility: HOSPITAL | Age: 37
End: 2017-05-01

## 2017-05-01 ENCOUNTER — HOSPITAL ENCOUNTER (OUTPATIENT)
Dept: CARDIOLOGY | Facility: HOSPITAL | Age: 37
Discharge: HOME | End: 2017-05-01
Admitting: NURSE PRACTITIONER

## 2017-05-01 LAB
BH CV ECHO MEAS - AO MAX PG (FULL): -0.01 MMHG
BH CV ECHO MEAS - AO MAX PG: 12.5 MMHG
BH CV ECHO MEAS - AO MEAN PG (FULL): -2.7 MMHG
BH CV ECHO MEAS - AO MEAN PG: 7.3 MMHG
BH CV ECHO MEAS - AO ROOT AREA (BSA CORRECTED): 1.5
BH CV ECHO MEAS - AO ROOT AREA: 8 CM^2
BH CV ECHO MEAS - AO ROOT DIAM: 3.2 CM
BH CV ECHO MEAS - AO V2 MAX: 177 CM/SEC
BH CV ECHO MEAS - AO V2 MEAN: 130 CM/SEC
BH CV ECHO MEAS - AO V2 VTI: 34.8 CM
BH CV ECHO MEAS - AVA(I,A): 3.2 CM^2
BH CV ECHO MEAS - AVA(I,D): 3.2 CM^2
BH CV ECHO MEAS - AVA(V,A): 3.1 CM^2
BH CV ECHO MEAS - AVA(V,D): 3.1 CM^2
BH CV ECHO MEAS - BSA(HAYCOCK): 2.2 M^2
BH CV ECHO MEAS - BSA: 2.1 M^2
BH CV ECHO MEAS - BZI_BMI: 35.5 KILOGRAMS/M^2
BH CV ECHO MEAS - BZI_METRIC_HEIGHT: 167.6 CM
BH CV ECHO MEAS - BZI_METRIC_WEIGHT: 99.8 KG
BH CV ECHO MEAS - CONTRAST EF 4CH: 64.1 ML/M^2
BH CV ECHO MEAS - EDV(MOD-SP4): 70.1 ML
BH CV ECHO MEAS - EF(MOD-SP4): 0 %
BH CV ECHO MEAS - ESV(MOD-SP4): 25.2 ML
BH CV ECHO MEAS - LA DIMENSION: 3.4 CM
BH CV ECHO MEAS - LA/AO: 1.1
BH CV ECHO MEAS - LV DIASTOLIC VOL/BSA (35-75): 33.7 ML/M^2
BH CV ECHO MEAS - LV MAX PG: 12.5 MMHG
BH CV ECHO MEAS - LV MEAN PG: 10 MMHG
BH CV ECHO MEAS - LV SYSTOLIC VOL/BSA (12-30): 12.1 ML/M^2
BH CV ECHO MEAS - LV V1 MAX: 177 CM/SEC
BH CV ECHO MEAS - LV V1 MEAN: 157 CM/SEC
BH CV ECHO MEAS - LV V1 VTI: 35.4 CM
BH CV ECHO MEAS - LVLD AP4: 6.8 CM
BH CV ECHO MEAS - LVLS AP4: 5.6 CM
BH CV ECHO MEAS - LVOT AREA (M): 3.1 CM^2
BH CV ECHO MEAS - LVOT AREA: 3.1 CM^2
BH CV ECHO MEAS - LVOT DIAM: 2 CM
BH CV ECHO MEAS - MV A MAX VEL: 96.1 CM/SEC
BH CV ECHO MEAS - MV DEC TIME: 0.17 SEC
BH CV ECHO MEAS - MV E MAX VEL: 59.4 CM/SEC
BH CV ECHO MEAS - MV E/A: 0.62
BH CV ECHO MEAS - RAP SYSTOLE: 10 MMHG
BH CV ECHO MEAS - RVSP: 16.6 MMHG
BH CV ECHO MEAS - SI(AO): 134.5 ML/M^2
BH CV ECHO MEAS - SI(LVOT): 53.4 ML/M^2
BH CV ECHO MEAS - SI(MOD-SP4): 21.6 ML/M^2
BH CV ECHO MEAS - SV(AO): 280.1 ML
BH CV ECHO MEAS - SV(LVOT): 111.2 ML
BH CV ECHO MEAS - SV(MOD-SP4): 44.9 ML
BH CV ECHO MEAS - TR MAX VEL: 128 CM/SEC
E/E' RATIO: 7.3
LEFT ATRIUM VOLUME INDEX: 13.6 ML/M2
LEFT ATRIUM VOLUME: 28.2 CM3

## 2017-05-01 PROCEDURE — 25010000002 PERFLUTREN (DEFINITY) 8.476 MG IN SODIUM CHLORIDE 10 ML INJECTION: Performed by: INTERNAL MEDICINE

## 2017-05-01 PROCEDURE — 93306 TTE W/DOPPLER COMPLETE: CPT | Performed by: INTERNAL MEDICINE

## 2017-05-01 PROCEDURE — C8929 TTE W OR WO FOL WCON,DOPPLER: HCPCS

## 2017-05-01 RX ADMIN — SODIUM CHLORIDE 5 ML: 9 INJECTION INTRAMUSCULAR; INTRAVENOUS; SUBCUTANEOUS at 08:45

## 2017-05-03 ENCOUNTER — OFFICE VISIT (OUTPATIENT)
Dept: CARDIOLOGY | Facility: CLINIC | Age: 37
End: 2017-05-03

## 2017-05-03 VITALS
WEIGHT: 228 LBS | HEART RATE: 130 BPM | HEIGHT: 66 IN | DIASTOLIC BLOOD PRESSURE: 90 MMHG | BODY MASS INDEX: 36.64 KG/M2 | SYSTOLIC BLOOD PRESSURE: 160 MMHG | RESPIRATION RATE: 20 BRPM

## 2017-05-03 DIAGNOSIS — R07.9 CHEST PAIN, UNSPECIFIED TYPE: ICD-10-CM

## 2017-05-03 DIAGNOSIS — E78.2 MIXED HYPERLIPIDEMIA: ICD-10-CM

## 2017-05-03 DIAGNOSIS — I10 ESSENTIAL HYPERTENSION: ICD-10-CM

## 2017-05-03 DIAGNOSIS — G90.A POTS (POSTURAL ORTHOSTATIC TACHYCARDIA SYNDROME): Primary | ICD-10-CM

## 2017-05-03 DIAGNOSIS — R06.02 SHORTNESS OF BREATH: ICD-10-CM

## 2017-05-03 DIAGNOSIS — R00.0 SINUS TACHYCARDIA: ICD-10-CM

## 2017-05-03 PROCEDURE — 93000 ELECTROCARDIOGRAM COMPLETE: CPT | Performed by: PHYSICIAN ASSISTANT

## 2017-05-03 PROCEDURE — 99214 OFFICE O/P EST MOD 30 MIN: CPT | Performed by: PHYSICIAN ASSISTANT

## 2017-05-03 RX ORDER — METOPROLOL SUCCINATE 50 MG/1
50 TABLET, EXTENDED RELEASE ORAL DAILY
COMMUNITY
End: 2017-05-03 | Stop reason: SDUPTHER

## 2017-05-03 RX ORDER — METOPROLOL SUCCINATE 50 MG/1
100 TABLET, EXTENDED RELEASE ORAL DAILY
Start: 2017-05-03 | End: 2020-09-03

## 2017-05-04 ENCOUNTER — TELEPHONE (OUTPATIENT)
Dept: CARDIOLOGY | Facility: CLINIC | Age: 37
End: 2017-05-04

## 2017-05-17 ENCOUNTER — OFFICE VISIT (OUTPATIENT)
Dept: UROLOGY | Facility: CLINIC | Age: 37
End: 2017-05-17

## 2017-05-17 VITALS
HEIGHT: 66 IN | BODY MASS INDEX: 36.77 KG/M2 | TEMPERATURE: 97.1 F | DIASTOLIC BLOOD PRESSURE: 84 MMHG | WEIGHT: 228.8 LBS | SYSTOLIC BLOOD PRESSURE: 115 MMHG

## 2017-05-17 DIAGNOSIS — R35.0 URINARY FREQUENCY: Primary | ICD-10-CM

## 2017-05-17 LAB
BILIRUB BLD-MCNC: NEGATIVE MG/DL
CLARITY, POC: CLEAR
COLOR UR: YELLOW
GLUCOSE UR STRIP-MCNC: NEGATIVE MG/DL
KETONES UR QL: NEGATIVE
LEUKOCYTE EST, POC: NEGATIVE
NITRITE UR-MCNC: NEGATIVE MG/ML
PH UR: 5 [PH] (ref 5–8)
PROT UR STRIP-MCNC: NEGATIVE MG/DL
RBC # UR STRIP: NEGATIVE /UL
SP GR UR: 1.02 (ref 1–1.03)
UROBILINOGEN UR QL: NORMAL

## 2017-05-17 PROCEDURE — 81003 URINALYSIS AUTO W/O SCOPE: CPT | Performed by: UROLOGY

## 2017-05-17 PROCEDURE — 51798 US URINE CAPACITY MEASURE: CPT | Performed by: UROLOGY

## 2017-05-17 PROCEDURE — 99213 OFFICE O/P EST LOW 20 MIN: CPT | Performed by: UROLOGY

## 2017-05-30 ENCOUNTER — OFFICE VISIT (OUTPATIENT)
Dept: CARDIOLOGY | Facility: CLINIC | Age: 37
End: 2017-05-30

## 2017-05-30 VITALS
SYSTOLIC BLOOD PRESSURE: 212 MMHG | BODY MASS INDEX: 36.96 KG/M2 | WEIGHT: 230 LBS | HEIGHT: 66 IN | DIASTOLIC BLOOD PRESSURE: 100 MMHG | HEART RATE: 106 BPM

## 2017-05-30 DIAGNOSIS — I10 ESSENTIAL HYPERTENSION: ICD-10-CM

## 2017-05-30 DIAGNOSIS — G90.A POTS (POSTURAL ORTHOSTATIC TACHYCARDIA SYNDROME): Primary | ICD-10-CM

## 2017-05-30 PROCEDURE — 99213 OFFICE O/P EST LOW 20 MIN: CPT | Performed by: INTERNAL MEDICINE

## 2017-05-30 PROCEDURE — 93000 ELECTROCARDIOGRAM COMPLETE: CPT | Performed by: INTERNAL MEDICINE

## 2017-05-30 RX ORDER — CLONIDINE HYDROCHLORIDE 0.1 MG/1
0.1 TABLET ORAL EVERY 12 HOURS SCHEDULED
Status: DISCONTINUED | OUTPATIENT
Start: 2017-05-30 | End: 2017-06-07 | Stop reason: HOSPADM

## 2017-06-06 ENCOUNTER — HOSPITAL ENCOUNTER (OUTPATIENT)
Facility: HOSPITAL | Age: 37
Setting detail: OBSERVATION
Discharge: HOME | End: 2017-06-07
Attending: FAMILY MEDICINE | Admitting: INTERNAL MEDICINE

## 2017-06-06 DIAGNOSIS — R07.9 CHEST PAIN, UNSPECIFIED TYPE: Primary | ICD-10-CM

## 2017-06-06 DIAGNOSIS — R13.13 PHARYNGEAL DYSPHAGIA: ICD-10-CM

## 2017-06-06 PROBLEM — R07.89 ATYPICAL CHEST PAIN: Status: ACTIVE | Noted: 2017-06-06

## 2017-06-06 LAB
HOLD SPECIMEN: NORMAL
HOLD SPECIMEN: NORMAL
TROPONIN I SERPL-MCNC: 0 NG/ML (ref 0–0.07)
WHOLE BLOOD HOLD SPECIMEN: NORMAL
WHOLE BLOOD HOLD SPECIMEN: NORMAL

## 2017-06-06 PROCEDURE — 93010 ELECTROCARDIOGRAM REPORT: CPT | Performed by: INTERNAL MEDICINE

## 2017-06-06 PROCEDURE — 99285 EMERGENCY DEPT VISIT HI MDM: CPT

## 2017-06-06 PROCEDURE — G0378 HOSPITAL OBSERVATION PER HR: HCPCS

## 2017-06-06 PROCEDURE — 93005 ELECTROCARDIOGRAM TRACING: CPT

## 2017-06-06 PROCEDURE — 84484 ASSAY OF TROPONIN QUANT: CPT

## 2017-06-06 RX ORDER — ONDANSETRON 2 MG/ML
4 INJECTION INTRAMUSCULAR; INTRAVENOUS EVERY 6 HOURS PRN
Status: DISCONTINUED | OUTPATIENT
Start: 2017-06-06 | End: 2017-06-07 | Stop reason: HOSPADM

## 2017-06-06 RX ORDER — ASPIRIN 81 MG/1
81 TABLET ORAL DAILY
Status: DISCONTINUED | OUTPATIENT
Start: 2017-06-07 | End: 2017-06-07 | Stop reason: HOSPADM

## 2017-06-06 RX ORDER — IPRATROPIUM BROMIDE AND ALBUTEROL SULFATE 2.5; .5 MG/3ML; MG/3ML
3 SOLUTION RESPIRATORY (INHALATION) EVERY 4 HOURS PRN
Status: DISCONTINUED | OUTPATIENT
Start: 2017-06-06 | End: 2017-06-07 | Stop reason: HOSPADM

## 2017-06-06 RX ORDER — ACETAMINOPHEN 325 MG/1
650 TABLET ORAL EVERY 6 HOURS PRN
Status: DISCONTINUED | OUTPATIENT
Start: 2017-06-06 | End: 2017-06-07 | Stop reason: HOSPADM

## 2017-06-06 RX ORDER — DULOXETIN HYDROCHLORIDE 60 MG/1
60 CAPSULE, DELAYED RELEASE ORAL DAILY
COMMUNITY
End: 2017-09-22

## 2017-06-06 RX ORDER — NITROGLYCERIN 0.4 MG/1
0.4 TABLET SUBLINGUAL
Status: DISCONTINUED | OUTPATIENT
Start: 2017-06-06 | End: 2017-06-07 | Stop reason: HOSPADM

## 2017-06-06 RX ORDER — MAGNESIUM HYDROXIDE/ALUMINUM HYDROXICE/SIMETHICONE 120; 1200; 1200 MG/30ML; MG/30ML; MG/30ML
30 SUSPENSION ORAL ONCE
Status: COMPLETED | OUTPATIENT
Start: 2017-06-06 | End: 2017-06-06

## 2017-06-06 RX ORDER — PANTOPRAZOLE SODIUM 40 MG/1
40 TABLET, DELAYED RELEASE ORAL
Status: DISCONTINUED | OUTPATIENT
Start: 2017-06-07 | End: 2017-06-07 | Stop reason: HOSPADM

## 2017-06-06 RX ORDER — MAGNESIUM HYDROXIDE/ALUMINUM HYDROXICE/SIMETHICONE 120; 1200; 1200 MG/30ML; MG/30ML; MG/30ML
30 SUSPENSION ORAL EVERY 6 HOURS PRN
Status: DISCONTINUED | OUTPATIENT
Start: 2017-06-06 | End: 2017-06-07 | Stop reason: HOSPADM

## 2017-06-06 RX ADMIN — ALUMINUM HYDROXIDE, MAGNESIUM HYDROXIDE, AND SIMETHICONE 30 ML: 200; 200; 20 SUSPENSION ORAL at 22:26

## 2017-06-06 RX ADMIN — LIDOCAINE HYDROCHLORIDE 15 ML: 20 SOLUTION ORAL; TOPICAL at 22:26

## 2017-06-07 ENCOUNTER — APPOINTMENT (OUTPATIENT)
Dept: GENERAL RADIOLOGY | Facility: HOSPITAL | Age: 37
End: 2017-06-07

## 2017-06-07 VITALS
RESPIRATION RATE: 18 BRPM | DIASTOLIC BLOOD PRESSURE: 93 MMHG | SYSTOLIC BLOOD PRESSURE: 146 MMHG | BODY MASS INDEX: 35.44 KG/M2 | HEIGHT: 66 IN | TEMPERATURE: 98 F | HEART RATE: 71 BPM | OXYGEN SATURATION: 96 % | WEIGHT: 220.5 LBS

## 2017-06-07 LAB
ALBUMIN SERPL-MCNC: 3.9 G/DL (ref 3.5–5)
ALBUMIN/GLOB SERPL: 1.4 G/DL (ref 1.1–2.5)
ALP SERPL-CCNC: 94 U/L (ref 24–120)
ALT SERPL W P-5'-P-CCNC: 63 U/L (ref 0–54)
ANION GAP SERPL CALCULATED.3IONS-SCNC: 11 MMOL/L (ref 4–13)
APTT PPP: 25.8 SECONDS (ref 24.1–34.8)
AST SERPL-CCNC: 47 U/L (ref 7–45)
BACTERIA UR QL AUTO: ABNORMAL /HPF
BILIRUB SERPL-MCNC: 0.7 MG/DL (ref 0.1–1)
BILIRUB UR QL STRIP: NEGATIVE
BUN BLD-MCNC: 14 MG/DL (ref 5–21)
BUN/CREAT SERPL: 15.9 (ref 7–25)
CALCIUM SPEC-SCNC: 9 MG/DL (ref 8.4–10.4)
CHLORIDE SERPL-SCNC: 111 MMOL/L (ref 98–110)
CLARITY UR: CLEAR
CO2 SERPL-SCNC: 22 MMOL/L (ref 24–31)
COLOR UR: ABNORMAL
CREAT BLD-MCNC: 0.88 MG/DL (ref 0.5–1.4)
DEPRECATED RDW RBC AUTO: 44.8 FL (ref 40–54)
ERYTHROCYTE [DISTWIDTH] IN BLOOD BY AUTOMATED COUNT: 13.6 % (ref 12–15)
GFR SERPL CREATININE-BSD FRML MDRD: 72 ML/MIN/1.73
GLOBULIN UR ELPH-MCNC: 2.8 GM/DL
GLUCOSE BLD-MCNC: 89 MG/DL (ref 70–100)
GLUCOSE UR STRIP-MCNC: NEGATIVE MG/DL
HCT VFR BLD AUTO: 37.8 % (ref 37–47)
HGB BLD-MCNC: 12.8 G/DL (ref 12–16)
HGB UR QL STRIP.AUTO: NEGATIVE
HYALINE CASTS UR QL AUTO: ABNORMAL /LPF
INR PPP: 0.93 (ref 0.91–1.09)
KETONES UR QL STRIP: NEGATIVE
LEUKOCYTE ESTERASE UR QL STRIP.AUTO: ABNORMAL
MCH RBC QN AUTO: 30.7 PG (ref 28–32)
MCHC RBC AUTO-ENTMCNC: 33.9 G/DL (ref 33–36)
MCV RBC AUTO: 90.6 FL (ref 82–98)
NITRITE UR QL STRIP: POSITIVE
PH UR STRIP.AUTO: <=5 [PH] (ref 5–8)
PLATELET # BLD AUTO: 208 10*3/MM3 (ref 130–400)
PMV BLD AUTO: 10.2 FL (ref 6–12)
POTASSIUM BLD-SCNC: 4 MMOL/L (ref 3.5–5.3)
PROT SERPL-MCNC: 6.7 G/DL (ref 6.3–8.7)
PROT UR QL STRIP: NEGATIVE
PROTHROMBIN TIME: 12.7 SECONDS (ref 11.9–14.6)
RBC # BLD AUTO: 4.17 10*6/MM3 (ref 4.2–5.4)
RBC # UR: ABNORMAL /HPF
REF LAB TEST METHOD: ABNORMAL
SODIUM BLD-SCNC: 144 MMOL/L (ref 135–145)
SP GR UR STRIP: >1.03 (ref 1–1.03)
SQUAMOUS #/AREA URNS HPF: ABNORMAL /HPF
TROPONIN I SERPL-MCNC: <0.012 NG/ML (ref 0–0.03)
TSH SERPL DL<=0.05 MIU/L-ACNC: 4.06 MIU/ML (ref 0.47–4.68)
UROBILINOGEN UR QL STRIP: ABNORMAL
WBC NRBC COR # BLD: 3.91 10*3/MM3 (ref 4.8–10.8)
WBC UR QL AUTO: ABNORMAL /HPF

## 2017-06-07 PROCEDURE — 80053 COMPREHEN METABOLIC PANEL: CPT | Performed by: INTERNAL MEDICINE

## 2017-06-07 PROCEDURE — 85610 PROTHROMBIN TIME: CPT | Performed by: INTERNAL MEDICINE

## 2017-06-07 PROCEDURE — 84484 ASSAY OF TROPONIN QUANT: CPT | Performed by: INTERNAL MEDICINE

## 2017-06-07 PROCEDURE — 87186 SC STD MICRODIL/AGAR DIL: CPT | Performed by: INTERNAL MEDICINE

## 2017-06-07 PROCEDURE — G0378 HOSPITAL OBSERVATION PER HR: HCPCS

## 2017-06-07 PROCEDURE — 81001 URINALYSIS AUTO W/SCOPE: CPT | Performed by: INTERNAL MEDICINE

## 2017-06-07 PROCEDURE — 87086 URINE CULTURE/COLONY COUNT: CPT | Performed by: INTERNAL MEDICINE

## 2017-06-07 PROCEDURE — G8996 SWALLOW CURRENT STATUS: HCPCS | Performed by: SPEECH-LANGUAGE PATHOLOGIST

## 2017-06-07 PROCEDURE — 84443 ASSAY THYROID STIM HORMONE: CPT | Performed by: INTERNAL MEDICINE

## 2017-06-07 PROCEDURE — 25010000002 ENOXAPARIN PER 10 MG: Performed by: INTERNAL MEDICINE

## 2017-06-07 PROCEDURE — G8997 SWALLOW GOAL STATUS: HCPCS | Performed by: SPEECH-LANGUAGE PATHOLOGIST

## 2017-06-07 PROCEDURE — 25010000002 ONDANSETRON PER 1 MG: Performed by: INTERNAL MEDICINE

## 2017-06-07 PROCEDURE — 74230 X-RAY XM SWLNG FUNCJ C+: CPT

## 2017-06-07 PROCEDURE — 96372 THER/PROPH/DIAG INJ SC/IM: CPT

## 2017-06-07 PROCEDURE — 94799 UNLISTED PULMONARY SVC/PX: CPT

## 2017-06-07 PROCEDURE — 85027 COMPLETE CBC AUTOMATED: CPT | Performed by: INTERNAL MEDICINE

## 2017-06-07 PROCEDURE — 87077 CULTURE AEROBIC IDENTIFY: CPT | Performed by: INTERNAL MEDICINE

## 2017-06-07 PROCEDURE — 25010000002 MORPHINE SULFATE (PF) 2 MG/ML SOLUTION: Performed by: INTERNAL MEDICINE

## 2017-06-07 PROCEDURE — 92611 MOTION FLUOROSCOPY/SWALLOW: CPT | Performed by: SPEECH-LANGUAGE PATHOLOGIST

## 2017-06-07 PROCEDURE — 96376 TX/PRO/DX INJ SAME DRUG ADON: CPT

## 2017-06-07 PROCEDURE — 85730 THROMBOPLASTIN TIME PARTIAL: CPT | Performed by: INTERNAL MEDICINE

## 2017-06-07 PROCEDURE — 96374 THER/PROPH/DIAG INJ IV PUSH: CPT

## 2017-06-07 PROCEDURE — 92610 EVALUATE SWALLOWING FUNCTION: CPT

## 2017-06-07 RX ORDER — MORPHINE SULFATE 2 MG/ML
2 INJECTION, SOLUTION INTRAMUSCULAR; INTRAVENOUS EVERY 4 HOURS PRN
Status: DISCONTINUED | OUTPATIENT
Start: 2017-06-07 | End: 2017-06-07 | Stop reason: HOSPADM

## 2017-06-07 RX ORDER — SODIUM CHLORIDE 0.9 % (FLUSH) 0.9 %
1-10 SYRINGE (ML) INJECTION AS NEEDED
Status: DISCONTINUED | OUTPATIENT
Start: 2017-06-07 | End: 2017-06-07 | Stop reason: HOSPADM

## 2017-06-07 RX ORDER — METOPROLOL SUCCINATE 100 MG/1
100 TABLET, EXTENDED RELEASE ORAL DAILY
Status: DISCONTINUED | OUTPATIENT
Start: 2017-06-07 | End: 2017-06-07 | Stop reason: HOSPADM

## 2017-06-07 RX ORDER — TOPIRAMATE 100 MG/1
100 TABLET, FILM COATED ORAL EVERY 12 HOURS SCHEDULED
Status: DISCONTINUED | OUTPATIENT
Start: 2017-06-07 | End: 2017-06-07 | Stop reason: HOSPADM

## 2017-06-07 RX ORDER — ATORVASTATIN CALCIUM 10 MG/1
10 TABLET, FILM COATED ORAL NIGHTLY
Status: DISCONTINUED | OUTPATIENT
Start: 2017-06-07 | End: 2017-06-07 | Stop reason: HOSPADM

## 2017-06-07 RX ORDER — DULOXETIN HYDROCHLORIDE 30 MG/1
60 CAPSULE, DELAYED RELEASE ORAL DAILY
Status: DISCONTINUED | OUTPATIENT
Start: 2017-06-07 | End: 2017-06-07

## 2017-06-07 RX ORDER — GABAPENTIN 300 MG/1
600 CAPSULE ORAL 2 TIMES DAILY
Status: DISCONTINUED | OUTPATIENT
Start: 2017-06-07 | End: 2017-06-07 | Stop reason: HOSPADM

## 2017-06-07 RX ADMIN — ONDANSETRON 4 MG: 2 SOLUTION INTRAMUSCULAR; INTRAVENOUS at 12:38

## 2017-06-07 RX ADMIN — MORPHINE SULFATE 2 MG: 2 INJECTION, SOLUTION INTRAMUSCULAR; INTRAVENOUS at 08:54

## 2017-06-07 RX ADMIN — MORPHINE SULFATE 2 MG: 2 INJECTION, SOLUTION INTRAMUSCULAR; INTRAVENOUS at 13:05

## 2017-06-07 RX ADMIN — METOPROLOL SUCCINATE 100 MG: 100 TABLET, FILM COATED, EXTENDED RELEASE ORAL at 08:48

## 2017-06-07 RX ADMIN — MORPHINE SULFATE 2 MG: 2 INJECTION, SOLUTION INTRAMUSCULAR; INTRAVENOUS at 04:36

## 2017-06-07 RX ADMIN — TOPIRAMATE 100 MG: 100 TABLET, FILM COATED ORAL at 08:49

## 2017-06-07 RX ADMIN — GABAPENTIN 600 MG: 300 CAPSULE ORAL at 08:49

## 2017-06-07 RX ADMIN — ENOXAPARIN SODIUM 40 MG: 40 INJECTION SUBCUTANEOUS at 08:48

## 2017-06-07 RX ADMIN — ASPIRIN 81 MG: 81 TABLET ORAL at 08:48

## 2017-06-07 NOTE — THERAPY EVALUATION
Acute Care - Speech Language Pathology   Swallow Initial Evaluation [facility]     Patient Name: Nurys Iniguez  : 1980  MRN: [id number]  Today's Date: 2017        Referring Physician: Dr. Ghotra      Admit Date: 2017    SPEECH-LANGUAGE PATHOLOGY EVALUATION - SWALLOW  Subjective: The patient was seen on this date for a Clinical Swallow evaluation.  Patient was alert and cooperative.    Objective: Textures given included thin liquid, nectar thick liquid, honey thick liquid, puree consistency and regular consistency.  Assessment: Difficulties were noted with thin liquid, nectar thick liquid, honey thick liquid, puree consistency and regular consistency. Pt presented with a full range of consistencies excetpt for mechanical soft solids. Pt was noted to exhbit 1x delayed and 1x immediate throat clear following trials of pureed consistencies. 2x delayed throat clear exhibited with honey thick and nectar thick liquids. 3x immediate throat clears with thin liquids. ST notes 1x throat clear was rather significant with questionable vocal quality change. Pt completed regular solid trials with adequate mastication, a timely swallow, and bolus clearance. However, pt once again exhibited a 2x delayed throat clear. ST cannot fully r/o aspiration with PO trials completed. .  SLP Findings:  Patient presents with moderate to severe pharyngeal dysphagia, without esophageal component.   Recommendations: Diet Textures: NPO.  Medications should be taken whole with puree. May have water and ice between meals after oral care, under staff or family supervision and with the recommended strategies for safe swallowing.   Recommended Strategies: Upright for PO. Oral care before breakfast, after all meals and PRN.  Other Recommended Evaluations: VFSS    Dysphagia therapy is recommended.   Papito Ortega MS, CFY-SLP 2017 2:19 PM    Visit Dx:     ICD-10-CM ICD-9-CM   1. Chest pain, unspecified type R07.9 786.50   2.  Pharyngeal dysphagia R13.13 787.23     Patient Active Problem List   Diagnosis   • Right kidney stone   • Urinary frequency   • Urinary incontinence   • Chest pain   • POTS (postural orthostatic tachycardia syndrome)   • Essential hypertension   • Swelling   • Shortness of breath   • Mixed hyperlipidemia   • Sinus tachycardia   • Atypical chest pain     Past Medical History:   Diagnosis Date   • Depression    • Hyperlipidemia    • Hypertension    • MS (multiple sclerosis)    • POTS (postural orthostatic tachycardia syndrome)      Past Surgical History:   Procedure Laterality Date   • APPENDECTOMY     • BACK SURGERY     • CHOLECYSTECTOMY     • HYSTERECTOMY            SWALLOW EVALUATION (last 72 hours)      Swallow Evaluation       06/07/17 0812                Rehab Evaluation    Document Type evaluation  -CS        Subjective Information agree to therapy;complains of;pain  -CS        General Information    Patient Profile Review yes  -CS        Onset of Illness/Injury 06/06/17  -CS        Referring Physician Dr. Ghotra  -        Pertinent History Of Current Problem Multiple sclerosis  -CS        Current Diet Limitations NPO  -CS        Precautions/Limitations, Vision WFL  -CS        Precautions/Limitations, Hearing WFL  -CS        Prior Level of Function- Communication functional in all spheres  -CS        Prior Level of Function- Swallowing no diet consistency restrictions  -CS        Plans/Goals Discussed With patient  -CS        Barriers to Rehab none identified  -CS        Clinical Impression    Patient's Goals For Discharge patient did not state  -CS        SLP Swallowing Diagnosis moderate dysphagia;pharyngeal dysfunction;severe dysphagia  -CS        Rehab Potential/Prognosis, Swallowing good, to achieve stated therapy goals  -CS        Criteria for Skilled Therapeutic Interventions Met skilled criteria for dysphagia intervention met  -CS        FCM, Swallowing 2-->Level 2  -CS        Therapy Frequency 3-5  times/wk  -CS        Predicted Duration Therapy Interv (days) until discharge  -CS        Expected Duration Therapy Session (min) 15-30 minutes  -CS        SLP Diet Recommendation NPO: unsafe for food/liquid intake  -CS        Recommended Diagnostics VFSS (MBS)  -CS        Recommended Feeding/Eating Techniques alternate between small bites and sips of food/liquid;maintain upright posture during/after eating for 30 mins  -CS        SLP Rec. for Method of Medication Administration meds whole in pudding/applesauce  -CS        Monitor For Signs Of Aspiration cough;gurgly voice;throat clearing  -CS        Anticipated Discharge Disposition home  -CS        Demonstrates Need for Referral to Another Service physical therapy  -CS        Pain Assessment    Pain Assessment 0-10  -CS        Pain Score 5  -CS        Oral Motor Structure and Function    Oral Motor Anatomy and Physiology patient demonstrates anatomy and physiology that is WNL  -CS        Dentition Assessment present and adequate  -CS        Secretion Management WNL/WFL  -CS        Mucosal Quality moist, healthy  -CS        Velar Elevation WFL (within functional limits)  -CS        Volitional Swallow no difficulties initiating volitional swallow  -CS        Volitional Cough no difficulties initiating volitional cough  -CS        Oral Musculature General Assessment WFL (within functional limits)  -CS        Clinical Swallow Exam    Mode of Presentation self fed;spoon;straw  -CS        Oral Phase Results intact oral phase without signs of dysfunction  -CS        Pharyngeal Phase Results throat clear;multiple swallows;susupected impaired pharyngeal phase of swallowing  -CS        Summary of Clinical Exam Pt presented with a full range of consistencies excetpt for mechanical soft solids. Pt was noted to exhbit 1x delayed and 1x immediate throat clear following trials of pureed consistencies. 2x delayed throat clear exhibited with honey thick and nectar thick liquids.  3x immediate throat clears with thin liquids. ST notes 1x throat clear was rather significant with questionable vocal quality change. Pt completed regular solid trials with adequate mastication, a timely swallow, and bolus clearance. However, pt once again exhibited a 2x delayed throat clear. ST cannot fully r/o aspiration with PO trials completed.   -CS        Swallow Recommendations    Eating Assistance no assistance needed with self eating  -CS        Oral Care oral care with toothbrush and dentifrice BID and PRN  -CS        Other Recommendations water after oral care;meds whole;instrumental exam  -CS        Recommended Diet NPO: unsafe for food/liquid intake  -CS          User Key  (r) = Recorded By, (t) = Taken By, (c) = Cosigned By    Initials Name Effective Dates    CS Papito Ortega MS, CFY-SLP 08/02/16 -         EDUCATION  The patient has been educated in the following areas:   Dysphagia (Swallowing Impairment).    SLP Recommendation and Plan  SLP Swallowing Diagnosis: moderate dysphagia, pharyngeal dysfunction, severe dysphagia  SLP Diet Recommendation: NPO: unsafe for food/liquid intake  Recommended Feeding/Eating Techniques: alternate between small bites and sips of food/liquid, maintain upright posture during/after eating for 30 mins  SLP Rec. for Method of Medication Administration: meds whole in pudding/applesauce  Monitor For Signs Of Aspiration: cough, gurgly voice, throat clearing  Recommended Diagnostics: VFSS (MBS)  Criteria for Skilled Therapeutic Interventions Met: skilled criteria for dysphagia intervention met  Anticipated Discharge Disposition: home  Rehab Potential/Prognosis, Swallowing: good, to achieve stated therapy goals  Therapy Frequency: 3-5 times/wk        Demonstrates Need for Referral to Another Service: physical therapy    Plan of Care Review  Plan Of Care Reviewed With: patient  Progress: no change (Initial evaluation)  Outcome Summary/Follow up Plan: CBSE completed. ST  recommends a VFSS to be completed to objectively determine the safest form of PO intake. Pt ok for water and meds to be administered whole in pudding/applesauce until VFSS completed.          IP SLP Goals       06/07/17 0911          Safely Consume Diet    Safely Consume Diet- SLP, Date Established 06/07/17  -CS      Safely Consume Diet- SLP, Time to Achieve by discharge  -CS      Safely Consume Diet- SLP, Additional Goal Pt will tolerate recommended diet as well as trials of upgraded diet consistencies w/o any overt s/s of aspiration.  -CS      Safely Consume Diet- SLP, Outcome goal ongoing  -CS        User Key  (r) = Recorded By, (t) = Taken By, (c) = Cosigned By    Initials Name Provider Type    ALISSA Ortega MS, CFY-SLP Speech and Language Pathologist             SLP Outcome Measures (last 72 hours)      SLP Outcome Measures       06/07/17 0900          SLP Outcome Measures    Outcome Measure Used? Adult NOMS  -CS      OTHER    SLP Diagnoses --  -CS      FCM Scores    FCM Chosen --  -CS      Swallowing FCM Score --  -CS        User Key  (r) = Recorded By, (t) = Taken By, (c) = Cosigned By    Initials Name Effective Dates     Papito Ortega MS, CFY-SLP 08/02/16 -            Time Calculation:         Time Calculation- SLP       06/07/17 0917          Time Calculation- SLP    SLP Start Time 0812  -      SLP Stop Time 0917  -      SLP Time Calculation (min) 65 min  -      SLP Received On 06/07/17  -      SLP Goal Re-Cert Due Date 06/17/17  -        User Key  (r) = Recorded By, (t) = Taken By, (c) = Cosigned By    Initials Name Provider Type    ALISSA Ortega MS, CFY-SLP Speech and Language Pathologist          Therapy Charges for Today     Code Description Service Date Service Provider Modifiers Qty    39114429652 HC ST EVAL ORAL PHARYNG SWALLOW 4 6/7/2017 Papito Ortega MS, EMMANUEL-SLP GN 1               Papito Ortega MS, EMMANUEL-SLP  6/7/2017

## 2017-06-07 NOTE — PLAN OF CARE
Problem: Patient Care Overview (Adult)  Goal: Plan of Care Review  Outcome: Ongoing (interventions implemented as appropriate)    Problem: Acute Coronary Syndrome (ACS) (Adult)  Goal: Signs and Symptoms of Listed Potential Problems Will be Absent or Manageable (Acute Coronary Syndrome)  Outcome: Ongoing (interventions implemented as appropriate)    06/07/17 1433   Acute Coronary Syndrome (ACS)   Problems Assessed (Acute Coronary Syndrome (ACS)) all   Problems Present (Acute Coronary Syndrome (ACS)) chest pain (angina)

## 2017-06-07 NOTE — ED PROVIDER NOTES
Subjective   Patient is a 36 y.o. female presenting with chest pain.   Chest Pain   Pain location:  Substernal area  Pain quality: dull    Pain radiates to:  Does not radiate  Pain severity:  Mild  Onset quality:  Gradual  Duration:  3 days  Timing:  Intermittent  Progression:  Worsening  Chronicity:  New  Context: at rest    Context: not breathing    Relieved by: dilaudid.  Worsened by:  Movement  Ineffective treatments:  Rest  Associated symptoms: abdominal pain, anxiety, cough, dysphagia (for 1 week, choking episodes), fatigue, heartburn, nausea, numbness (left arm for 1 or 2 weeks) and shortness of breath    Associated symptoms: no anorexia, no back pain, no claudication, no diaphoresis, no dizziness, no fever, no headache, no lower extremity edema, no near-syncope, no orthopnea, no palpitations, no PND, no syncope, no vomiting and no weakness        Review of Systems   Constitutional: Positive for fatigue. Negative for activity change, appetite change, chills, diaphoresis, fever and unexpected weight change.   HENT: Positive for trouble swallowing (for 1 week, choking episodes). Negative for congestion, dental problem, ear pain, hearing loss, mouth sores, nosebleeds, postnasal drip, rhinorrhea, sinus pressure, sneezing, sore throat and voice change.    Eyes: Negative for photophobia, pain, redness and visual disturbance.   Respiratory: Positive for cough and shortness of breath.    Cardiovascular: Positive for chest pain. Negative for palpitations, orthopnea, claudication, leg swelling, syncope, PND and near-syncope.   Gastrointestinal: Positive for abdominal pain, heartburn and nausea. Negative for abdominal distention, anorexia, blood in stool, constipation and vomiting.   Endocrine: Negative for cold intolerance and heat intolerance.   Genitourinary: Negative for decreased urine volume, difficulty urinating, dysuria, flank pain, hematuria, pelvic pain and urgency.   Musculoskeletal: Negative for arthralgias,  back pain, neck pain and neck stiffness.   Skin: Negative for color change, pallor, rash and wound.   Allergic/Immunologic: Negative for environmental allergies.   Neurological: Positive for numbness (left arm for 1 or 2 weeks). Negative for dizziness, seizures, syncope, facial asymmetry, speech difficulty, weakness, light-headedness and headaches.   Hematological: Negative for adenopathy. Does not bruise/bleed easily.   Psychiatric/Behavioral: Negative for confusion and sleep disturbance. The patient is not nervous/anxious.    All other systems reviewed and are negative.      Past Medical History:   Diagnosis Date   • Depression    • Hyperlipidemia    • Hypertension    • MS (multiple sclerosis)    • POTS (postural orthostatic tachycardia syndrome)        Allergies   Allergen Reactions   • Ambien [Zolpidem] Mental Status Change   • Ropinirole Hcl Mental Status Change   • Simvastatin      cramps       Past Surgical History:   Procedure Laterality Date   • APPENDECTOMY     • BACK SURGERY     • CHOLECYSTECTOMY     • HYSTERECTOMY         Family History   Problem Relation Age of Onset   • Heart disease Father    • Hypertension Father    • Kidney disease Father    • Diabetes type II Mother    • Hypertension Mother    • Hypertension Sister    • Hypertension Brother        Social History     Social History   • Marital status:      Spouse name: N/A   • Number of children: N/A   • Years of education: N/A     Social History Main Topics   • Smoking status: Never Smoker   • Smokeless tobacco: Never Used   • Alcohol use No   • Drug use: No   • Sexual activity: Defer     Other Topics Concern   • None     Social History Narrative               Objective   Physical Exam   Constitutional: She is oriented to person, place, and time. She appears well-developed and well-nourished.   HENT:   Head: Normocephalic.   Nose: Nose normal.   Mouth/Throat: Oropharynx is clear and moist.   Eyes: Conjunctivae and EOM are normal.  Pupils are equal, round, and reactive to light.   Neck: Normal range of motion. Neck supple. No JVD present. No thyromegaly present.   Cardiovascular: Normal rate, regular rhythm, normal heart sounds and intact distal pulses.    Pulmonary/Chest: Effort normal and breath sounds normal.   Abdominal: Soft. Bowel sounds are normal. She exhibits no distension and no mass. There is tenderness. There is no rebound and no guarding.       Musculoskeletal: Normal range of motion.   Lymphadenopathy:     She has no cervical adenopathy.   Neurological: She is alert and oriented to person, place, and time.   Skin: Skin is warm and dry. No rash noted. No erythema.   Psychiatric: She has a normal mood and affect. Her behavior is normal. Judgment and thought content normal.   Nursing note and vitals reviewed.      Procedures         ED Course  ED Course            HEART Score  History: Moderately suspicious (+1)  ECG: Normal (+0)  Age: Less than 45 (+0)  Risk Factors: 1 - 2 risk factors (+1)  Troponin: Normal limit or lower (+0)  Total: 2         MDM  Number of Diagnoses or Management Options  Chest pain, unspecified type: new and requires workup     Amount and/or Complexity of Data Reviewed  Clinical lab tests: ordered and reviewed  Tests in the radiology section of CPT®: reviewed  Decide to obtain previous medical records or to obtain history from someone other than the patient: yes  Obtain history from someone other than the patient: yes  Review and summarize past medical records: yes  Discuss the patient with other providers: yes  Independent visualization of images, tracings, or specimens: yes    Risk of Complications, Morbidity, and/or Mortality  Presenting problems: high  Diagnostic procedures: high  Management options: high    Patient Progress  Patient progress: improved      Final diagnoses:   Chest pain, unspecified type            Fernando Portillo MD  06/06/17 1498

## 2017-06-07 NOTE — H&P
[facility] Medicine Services  DISCHARGE SUMMARY       Date of Admission: 6/6/2017  Date of Discharge:  6/7/2017  Primary Care Physician: Toyin De La Vega MD    Discharge Diagnoses:  Hospital Problem List     Chest pain    Atypical chest pain   Dysphagia  Multiple sclerosis  History of HTN  Abnormal urinalysis - no symptoms related to UTI  An episode of urinary incontinence - ? Progression of MS         Presenting Problem/History of Present Illness:  Atypical chest pain [R07.89]  Chest pain, unspecified type [R07.9]         Hospital Course  36-year-old  woman who has known history of multiple sclerosis diagnosed approximately 5 years ago admitted last night by Dr. Ghotra for further evaluation of atypical chest pain midsternal in location.  Serial troponins were negative.  EKG normal sinus rhythm with no acute ST-T wave changes.  He had normal dobutamine stress echocardiogram without echocardiographic evidence of myocardial ischemia in April 2017.    She was recently started few months back on Arava.  She is on Ampyra.  Both are used for her multiple sclerosis.  I reviewed with her that in about 5-8% GI symptoms such as abdominal pain, dyspepsia can occur.  I did not particularly see any dysphagia among the side effects of medications mentioned above.  I am uncertain if MS has something to do with her dysphagia at this time.    She underwent dysphgia study.  The ST cleared her for a regular diet with thin liquid.  I cautioned her n aspiration.  I mentioned to her I know what she williamson not have but could not point exactly to a diagnosis.   I am requesting record from transferring facility.  I saw a CD of Chest CT which was not mentioned in Dr. Ghotra's H&P.  Overall, patient is doing better.    I reviewed the CT scan scan of her chest with contrast this was read as minimal left lower lobe atelectasis.  Otherwise unremarkable examination with no evidence of pulmonary embolism or  other acute abnormality.  Procedures Performed:   Dysphagia study      Consults:   Speech therapist      Pertinent Test Results:   Lab Results (last 24 hours)     Procedure Component Value Units Date/Time    POC Troponin, Rapid [[id number]]  (Normal) Collected:  06/06/17 2156    Specimen:  Blood Updated:  06/06/17 2210     Troponin I 0.00 ng/mL       Serial Number: [id number]    : [id number]       [place] Draw [[id number]] Collected:  06/06/17 2153    Specimen:  Blood Updated:  06/06/17 2303    Narrative:       The following orders were created for panel order [place] Draw.  Procedure                               Abnormality         Status                     ---------                               -----------         ------                     Light Blue Top[[id number]]                                   Final result               Green Top (Gel)[[id number]]                                  Final result               Lavender Top[[id number]]                                     Final result               Red Top[[id number]]                                          Final result               Green Top (No Gel)[[id number]]                                                            Please view results for these tests on the individual orders.    Light Blue Top [[id number]] Collected:  06/06/17 2153    Specimen:  Blood Updated:  06/06/17 2303     Extra Tube hold for add-on      Auto resulted       Green Top (Gel) [[id number]] Collected:  06/06/17 2153    Specimen:  Blood Updated:  06/06/17 2303     Extra Tube Hold for add-ons.      Auto resulted.       Lavender Top [[id number]] Collected:  06/06/17 2153    Specimen:  Blood Updated:  06/06/17 2303     Extra Tube hold for add-on      Auto resulted       Red Top [[id number]] Collected:  06/06/17 2153    Specimen:  Blood Updated:  06/06/17 2303     Extra Tube Hold for add-ons.      Auto resulted.       Troponin [[id number]]  (Normal) Collected:  06/07/17 0008    Specimen:  Blood  Updated:  06/07/17 0040     Troponin I <0.012 ng/mL     CBC (No Diff) [[id number]]  (Abnormal) Collected:  06/07/17 0530    Specimen:  Blood Updated:  06/07/17 0545     WBC 3.91 (L) 10*3/mm3      RBC 4.17 (L) 10*6/mm3      Hemoglobin 12.8 g/dL      Hematocrit 37.8 %      MCV 90.6 fL      MCH 30.7 pg      MCHC 33.9 g/dL      RDW 13.6 %      RDW-SD 44.8 fl      MPV 10.2 fL      Platelets 208 10*3/mm3     Protime-INR [[id number]]  (Normal) Collected:  06/07/17 0530    Specimen:  Blood Updated:  06/07/17 0549     Protime 12.7 Seconds      INR 0.93    aPTT [[id number]]  (Normal) Collected:  06/07/17 0530    Specimen:  Blood Updated:  06/07/17 0549     PTT 25.8 seconds     Comprehensive Metabolic Panel [[id number]]  (Abnormal) Collected:  06/07/17 0530    Specimen:  Blood Updated:  06/07/17 0553     Glucose 89 mg/dL      BUN 14 mg/dL      Creatinine 0.88 mg/dL      Sodium 144 mmol/L      Potassium 4.0 mmol/L      Chloride 111 (H) mmol/L      CO2 22.0 (L) mmol/L      Calcium 9.0 mg/dL      Total Protein 6.7 g/dL      Albumin 3.90 g/dL      ALT (SGPT) 63 (H) U/L      AST (SGOT) 47 (H) U/L      Alkaline Phosphatase 94 U/L      Total Bilirubin 0.7 mg/dL      eGFR Non African Amer 72 mL/min/1.73      Globulin 2.8 gm/dL      A/G Ratio 1.4 g/dL      BUN/Creatinine Ratio 15.9     Anion Gap 11.0 mmol/L     Troponin [[id number]]  (Normal) Collected:  06/07/17 0530    Specimen:  Blood Updated:  06/07/17 0605     Troponin I <0.012 ng/mL     TSH [[id number]]  (Normal) Collected:  06/07/17 0530    Specimen:  Blood Updated:  06/07/17 0623     TSH 4.060 mIU/mL     Urine Culture [[id number]] Collected:  06/07/17 0905    Specimen:  Urine from Urine, Clean Catch Updated:  06/07/17 0915    Urinalysis With / Culture If Indicated [[id number]]  (Abnormal) Collected:  06/07/17 0905    Specimen:  Urine from Urine, Clean Catch Updated:  06/07/17 0926     Color, UA Dark Yellow (A)     Appearance, UA Clear     pH, UA <=5.0     Specific Gravity, UA >1.030  "(H)     Glucose, UA Negative     Ketones, UA Negative     Bilirubin, UA Negative     Blood, UA Negative     Protein, UA Negative     Leuk Esterase, UA Trace (A)     Nitrite, UA Positive (A)     Urobilinogen, UA 0.2 E.U./dL    Urinalysis, Microscopic Only [[id number]]  (Abnormal) Collected:  06/07/17 0905    Specimen:  Urine from Urine, Clean Catch Updated:  06/07/17 0926     RBC, UA 3-5 (A) /HPF      WBC, UA 13-20 (A) /HPF      Bacteria, UA 4+ (A) /HPF      Squamous Epithelial Cells, UA 0-2 /HPF      Hyaline Casts, UA 3-6 /LPF      Methodology Automated Microscopy    Troponin [[id number]]  (Normal) Collected:  06/07/17 1023    Specimen:  Blood Updated:  06/07/17 1103     Troponin I <0.012 ng/mL         Imaging Results (last 72 hours)     Procedure Component Value Units Date/Time    FL Video Swallow With Speech [[id number]] Collected:  06/07/17 1349     Updated:  06/07/17 1349    Narrative:       HISTORY: Dysphagia.     VIDEO SWALLOW: The patient swallow is observed in lateral projection  under fluoroscopy utilizing different consistencies of barium mixed  material.     This study is performed in conjunction with speech pathology.     Fluoroscopy time 2.2 minutes.     There is a delay in initiation of the swallow reflux with retention of  the barium contents in the oral cavity. With swallowing, no laryngeal  penetration or aspiration is identified. There is no stasis of material  within the hypopharynx after swallowing. There is inversion of the  epiglottis.     Please refer to speech pathology report for further details.  This report was finalized on  by Dr. Rody Quinteros MD.        Condition on Discharge:  Stable    Physical Exam on Discharge:  /93 (BP Location: Right arm, Patient Position: Lying)  Pulse 71  Temp 98 °F (36.7 °C) (Temporal Artery )   Resp 18  Ht 66\" (167.6 cm)  Wt 220 lb 8 oz (100 kg)  SpO2 96%  BMI 35.59 kg/m2  Physical Exam   Constitutional: She is oriented to person, place, and time. " She appears well-developed and well-nourished.   HENT:   Head: Normocephalic and atraumatic.   Nose: Nose normal.   Mouth/Throat: Oropharynx is clear and moist. No oropharyngeal exudate.   Eyes: EOM are normal. Pupils are equal, round, and reactive to light. No scleral icterus.   Neck: Normal range of motion. Neck supple. No JVD present. No tracheal deviation present. No thyromegaly present.   Cardiovascular: Normal rate, regular rhythm and normal heart sounds.    Pulmonary/Chest: Effort normal and breath sounds normal.   Abdominal: Soft. Bowel sounds are normal.   Musculoskeletal: She exhibits no edema.   Lymphadenopathy:     She has no cervical adenopathy.   Neurological: She is alert and oriented to person, place, and time.   Skin: Skin is warm and dry.   Psychiatric: She has a normal mood and affect. Her behavior is normal. Judgment and thought content normal.   Vitals reviewed.        Discharge Disposition: Home or Self Care    Discharge Medications:   Nurys Iniguez   Home Medication Instructions ARCHIE:[id number]    Printed on:06/07/17 2996   Medication Information                      acyclovir (ZOVIRAX) 400 MG tablet  Take 400 mg by mouth 2 (two) times a day. Take no more than 5 doses a day.             atorvastatin (LIPITOR) 10 MG tablet  Take 10 mg by mouth Every Night.             Cholecalciferol 5000 UNITS tablet  Take 1-2 tablets by mouth Daily.             cyanocobalamin 1000 MCG/ML injection  Inject 1,000 mcg into the shoulder, thigh, or buttocks Every 30 (Thirty) Days.             Dalfampridine ER (AMPYRA) 10 MG tablet sustained-release 12 hour  Take 10 mg by mouth 2 (Two) Times a Day.             DULoxetine (CYMBALTA) 60 MG capsule  Take 60 mg by mouth Daily.             gabapentin (NEURONTIN) 300 MG capsule  Take 600 mg by mouth 2 (Two) Times a Day. 600 mg BID and 900mg at bedtime             leflunomide (ARAVA) 20 MG tablet  Take 20 mg by mouth Daily.             lidocaine (LIDODERM) 5  %  Place 1 patch on the skin Daily As Needed for Mild Pain (1-3). Remove & Discard patch within 12 hours or as directed by MD             metoprolol succinate XL (TOPROL-XL) 50 MG 24 hr tablet  Take 2 tablets by mouth Daily.             naproxen sodium (ALEVE) 220 MG tablet  Take 220 mg by mouth As Needed for Mild Pain (1-3).             phenazopyridine (PYRIDIUM) 100 MG tablet  Take 100 mg by mouth As Needed for bladder spasms.             promethazine (PHENERGAN) 25 MG tablet  Take 25 mg by mouth Every 6 (Six) Hours As Needed for Nausea or Vomiting.             SUMAtriptan (IMITREX) 6 MG/0.5ML solution injection  Inject 6 mg under the skin 1 (One) Time As Needed for migraine.             tiZANidine (ZANAFLEX) 4 MG tablet  Take 4 mg by mouth Take As Directed.             topiramate (TOPAMAX) 100 MG tablet  Take 100 mg by mouth 2 (two) times a day.             vitamin C (ASCORBIC ACID) 500 MG tablet  Take 500 mg by mouth Daily.                 Discharge Diet:   Diet Instructions     Diet: Regular; Thin Liquids, No Restrictions       Discharge Diet:  Regular   Fluid Consistency:  Thin Liquids, No Restrictions                 Discharge Care Plan / Instructions:  Call me tomorrow to follow culture result.    Activity at Discharge:   Activity Instructions     Activity as Tolerated                     Follow-up Appointments: PCP in 1 wk; keep follow up appt. with Neurologist   Test Results Pending at Discharge:      Chip Locke MD  06/07/17  5:18 PM    Time: > 30 mins    Please note that portions of this note may have been completed with a voice recognition program. Efforts were made to edit the dictations, but occasionally words are mistranscribed.

## 2017-06-07 NOTE — PLAN OF CARE
Problem: Patient Care Overview (Adult)  Goal: Plan of Care Review  Outcome: Ongoing (interventions implemented as appropriate)    06/07/17 0911   Coping/Psychosocial Response Interventions   Plan Of Care Reviewed With patient   Patient Care Overview   Progress no change  (Initial evaluation)   Outcome Evaluation   Outcome Summary/Follow up Plan CBSE completed. ST recommends a VFSS to be completed to objectively determine the safest form of PO intake. Pt ok for water and meds to be administered whole in pudding/applesauce until VFSS completed.         Problem: Inpatient SLP  Goal: Dysphagia- Patient will safely consume diet as per recommendation with no signs/symptoms of aspiration  Outcome: Ongoing (interventions implemented as appropriate)    06/07/17 0911   Safely Consume Diet   Safely Consume Diet- SLP, Date Established 06/07/17   Safely Consume Diet- SLP, Time to Achieve by discharge   Safely Consume Diet- SLP, Additional Goal Pt will tolerate recommended diet as well as trials of upgraded diet consistencies w/o any overt s/s of aspiration.   Safely Consume Diet- SLP, Outcome goal ongoing

## 2017-06-07 NOTE — PLAN OF CARE
Problem: Patient Care Overview (Adult)  Goal: Plan of Care Review  Outcome: Ongoing (interventions implemented as appropriate)    06/07/17 0543   Coping/Psychosocial Response Interventions   Plan Of Care Reviewed With patient   Patient Care Overview   Progress no change   Outcome Evaluation   Outcome Summary/Follow up Plan Patient A &O x , stable on RA. Patient put on fall precautions due to Hx. of falls, uses cane to ambulate , HX. Of MS. Patient admits to having difficulty with swallowing foods/ liquids that has gotten worse as of late. MD. Ordered bedside Dysphagia screen, patient failed. NPO, speech to see in AM. Patient C/O of CP in the LT. Chest that radiates to back. PRN medication ordered and given.         Problem: Acute Coronary Syndrome (ACS) (Adult)  Goal: Signs and Symptoms of Listed Potential Problems Will be Absent or Manageable (Acute Coronary Syndrome)  Outcome: Ongoing (interventions implemented as appropriate)    Problem: Fall Risk (Adult)  Goal: Absence of Falls  Outcome: Ongoing (interventions implemented as appropriate)

## 2017-06-07 NOTE — MBS/VFSS/FEES
Acute Care - Speech Language Pathology   Swallow Dysphagia Study [facility]     Patient Name: Nurys Iniguez  : 1980  MRN: [id number]  Today's Date: 2017        Referring Physician: Dr. Ghotra      Admit Date: 2017  SPEECH-LANGUAGE PATHOLOGY EVALUTION - VFSS  Subjective: The patient was seen on this date for a VFSS(Videofluoroscopic Swallowing Study).  Patient was alert and cooperative.    The patient's history is significant for concerns with swallow and failed swallow screening.  Objective: Risks/benefits were reviewed with the patient, and consent was obtained. The study was completed with SLP and Radiologist present. The patient was seen in lateral view(s). Textures given included thin liquid, nectar thick liquid, honey thick liquid, puree consistency, mechanical soft consistency and regular consistency.  Assessment: Difficulties were noted with none of the above consistencies, characterized by no penetration, aspiration, or pharyngeal residue.  Patient did display oral holding and attempts to swallow.  Swallow attempts resulted in pharyngeal movement but no movement of bolus.  Maintained good control of bolus.  Suspect her behavior not related to any physiological deficit.  She reports it is inconsistent.       Silent Thin Nectar Honey Puree Fork Mashed Mech Soft Regular Liquid Wash   Penetration             Aspiration               Residue              SLP Findings: Patient presents with functional swallow.   Comments: Results discussed with patient and RN.  Recommendations: Diet Textures: thin liquid, regular consistency food. Medications should be taken whole with thin liquids. May have water and Ice between meals after oral care, under staff or family supervision and with the recommended strategies for safe swallowing.  Recommended Strategies: Upright for PO and small bites and sips. Oral care before breakfast, after all meals and PRN.  Other Recommended Evaluations: none     Dysphagia therapy is recommended.   Inga Girard, MS CCC-SLP 6/7/2017 3:49 PM    Visit Dx:     ICD-10-CM ICD-9-CM   1. Chest pain, unspecified type R07.9 786.50   2. Pharyngeal dysphagia R13.13 787.23     Patient Active Problem List   Diagnosis   • Right kidney stone   • Urinary frequency   • Urinary incontinence   • Chest pain   • POTS (postural orthostatic tachycardia syndrome)   • Essential hypertension   • Swelling   • Shortness of breath   • Mixed hyperlipidemia   • Sinus tachycardia   • Atypical chest pain     Past Medical History:   Diagnosis Date   • Depression    • Hyperlipidemia    • Hypertension    • MS (multiple sclerosis)    • POTS (postural orthostatic tachycardia syndrome)      Past Surgical History:   Procedure Laterality Date   • APPENDECTOMY     • BACK SURGERY     • CHOLECYSTECTOMY     • HYSTERECTOMY            SWALLOW EVALUATION (last 72 hours)      Swallow Evaluation       06/07/17 1245 06/07/17 0812             Rehab Evaluation    Document Type evaluation  -KW evaluation  -CS       Subjective Information no complaints  -KW agree to therapy;complains of;pain  -CS       General Information    Patient Profile Review yes  -KW yes  -CS       Onset of Illness/Injury  06/06/17  -CS       Referring Physician  Dr. Ghotra  -CS       Pertinent History Of Current Problem  Multiple sclerosis  -CS       Current Diet Limitations regular solid;thin liquids  -KW NPO  -CS       Precautions/Limitations, Vision WFL  -KW WFL  -CS       Precautions/Limitations, Hearing WFL  -KW WFL  -CS       Prior Level of Function- Communication functional in all spheres  -KW functional in all spheres  -CS       Prior Level of Function- Swallowing no diet consistency restrictions  -KW no diet consistency restrictions  -CS       Plans/Goals Discussed With patient  -KW patient  -CS       Barriers to Rehab none identified  -KW none identified  -CS       Clinical Impression    Patient's Goals For Discharge return home  -KW patient  did not state  -CS       SLP Swallowing Diagnosis mild dysphagia  -KW moderate dysphagia;pharyngeal dysfunction;severe dysphagia  -CS       Rehab Potential/Prognosis, Swallowing good, to achieve stated therapy goals  -KW good, to achieve stated therapy goals  -CS       Criteria for Skilled Therapeutic Interventions Met demonstrates skilled criteria for intervention  -KW skilled criteria for dysphagia intervention met  -CS       FCM, Swallowing 6-->Level 6  -KW 2-->Level 2  -CS       Therapy Frequency PRN  -KW 3-5 times/wk  -CS       Predicted Duration Therapy Interv (days) until discharge  -KW until discharge  -CS       Expected Duration Therapy Session (min) 15-30 minutes  -KW 15-30 minutes  -CS       SLP Diet Recommendation regular textures;thin liquids  -KW NPO: unsafe for food/liquid intake  -CS       Recommended Diagnostics  VFSS (MBS)  -CS       Recommended Feeding/Eating Techniques maintain upright posture during/after eating for 30 mins;small sips/bites  -KW alternate between small bites and sips of food/liquid;maintain upright posture during/after eating for 30 mins  -CS       SLP Rec. for Method of Medication Administration meds whole with thin liquid  -KW meds whole in pudding/applesauce  -CS       Monitor For Signs Of Aspiration  cough;gurgly voice;throat clearing  -CS       Anticipated Discharge Disposition home  -KW home  -CS       Demonstrates Need for Referral to Another Service  physical therapy  -CS       Pain Assessment    Pain Assessment No/denies pain  -KW 0-10  -CS       Pain Score 0  -KW 5  -CS       Oral Motor Structure and Function    Oral Motor Anatomy and Physiology  patient demonstrates anatomy and physiology that is WNL  -CS       Dentition Assessment present and adequate  -KW present and adequate  -CS       Secretion Management WNL/WFL  -KW WNL/WFL  -CS       Mucosal Quality moist, healthy  -KW moist, healthy  -CS       Velar Elevation WFL (within functional limits)  -KW WFL (within  functional limits)  -CS       Gag Response WFL (within functional limits)  -KW        Volitional Swallow no difficulties initiating volitional swallow  -KW no difficulties initiating volitional swallow  -CS       Volitional Cough no difficulties initiating volitional cough  -KW no difficulties initiating volitional cough  -CS       Oral Musculature General Assessment WFL (within functional limits)  -KW WFL (within functional limits)  -CS       Clinical Swallow Exam    Mode of Presentation  self fed;spoon;straw  -CS       Oral Phase Results  intact oral phase without signs of dysfunction  -CS       Pharyngeal Phase Results  throat clear;multiple swallows;susupected impaired pharyngeal phase of swallowing  -CS       Summary of Clinical Exam  Pt presented with a full range of consistencies excetpt for mechanical soft solids. Pt was noted to exhbit 1x delayed and 1x immediate throat clear following trials of pureed consistencies. 2x delayed throat clear exhibited with honey thick and nectar thick liquids. 3x immediate throat clears with thin liquids. ST notes 1x throat clear was rather significant with questionable vocal quality change. Pt completed regular solid trials with adequate mastication, a timely swallow, and bolus clearance. However, pt once again exhibited a 2x delayed throat clear. ST cannot fully r/o aspiration with PO trials completed.   -CS       VFSS    Mode of Presentation thin:;nectar:;straw;honey:;pudding:;mixed:;cohesive solid:;spoon  -KW        Oral Transit Impairment prolonged oral transit time  -KW        Pharyngeal Phase Results swallow WNL, no aspiration, penetration, or residue  -KW        Summary of VFSS No penetration or aspiration seen with full range of consistencies.  Patient did have oral holding of honey and pudding consistencies and acted like she was swallowing however would not initiate swallow.  Swallow eventually initiated.  No penetration or aspiration seen.  Feel oral holding was  more related to psychogenic deficit and not swallow dysfunction.  Ok for regulare diet with thin liquids.  ST will follow up x1 to ensure safety and d/c.  -KW        VFSS Swallow Recommendations    Recommended Diet regular textures;thin liquids  -KW        Swallow Recommendations    Eating Assistance  no assistance needed with self eating  -CS       Oral Care  oral care with toothbrush and dentifrice BID and PRN  -CS       Other Recommendations  water after oral care;meds whole;instrumental exam  -CS       Recommended Diet  NPO: unsafe for food/liquid intake  -CS         User Key  (r) = Recorded By, (t) = Taken By, (c) = Cosigned By    Initials Name Effective Dates    ESTEFANY Inga Girard, MS [facility]-SLP 08/02/16 -     CS Papito Ortega MS, CFY-SLP 08/02/16 -         EDUCATION  The patient has been educated in the following areas:   Dysphagia (Swallowing Impairment).    SLP Recommendation and Plan  SLP Swallowing Diagnosis: mild dysphagia  SLP Diet Recommendation: regular textures, thin liquids  Recommended Feeding/Eating Techniques: maintain upright posture during/after eating for 30 mins, small sips/bites  SLP Rec. for Method of Medication Administration: meds whole with thin liquid        Criteria for Skilled Therapeutic Interventions Met: demonstrates skilled criteria for intervention  Anticipated Discharge Disposition: home  Rehab Potential/Prognosis, Swallowing: good, to achieve stated therapy goals  Therapy Frequency: PRN                        IP SLP Goals       06/07/17 0911          Safely Consume Diet    Safely Consume Diet- SLP, Date Established 06/07/17  -CS      Safely Consume Diet- SLP, Time to Achieve by discharge  -CS      Safely Consume Diet- SLP, Additional Goal Pt will tolerate recommended diet as well as trials of upgraded diet consistencies w/o any overt s/s of aspiration.  -CS      Safely Consume Diet- SLP, Outcome goal ongoing  -CS        User Key  (r) = Recorded By, (t) = Taken By, (c) = Cosigned By     Initials Name Provider Type    ALISSA Ortega MS, CFY-SLP Speech and Language Pathologist             SLP Outcome Measures (last 72 hours)      SLP Outcome Measures       06/07/17 0900          SLP Outcome Measures    Outcome Measure Used? Adult NOMS  -CS      OTHER    SLP Diagnoses --  -CS      FCM Scores    FCM Chosen --  -CS      Swallowing FCM Score --  -CS        User Key  (r) = Recorded By, (t) = Taken By, (c) = Cosigned By    Initials Name Effective Dates    CS Papito Ortega MS, CFY-SLP 08/02/16 -            Time Calculation:         Time Calculation- SLP       06/07/17 1544 06/07/17 0917       Time Calculation- SLP    SLP Start Time 1245  -KW 0812  -CS     SLP Stop Time 1415  -KW 0917  -CS     SLP Time Calculation (min) 90 min  -KW 65 min  -CS     SLP Received On 06/07/17  -KW 06/07/17  -CS     SLP Goal Re-Cert Due Date  06/17/17  -CS       User Key  (r) = Recorded By, (t) = Taken By, (c) = Cosigned By    Initials Name Provider Type    ESTEFANY Girard MS CCC-SLP Speech and Language Pathologist     Papito Ortega MS, CFY-SLP Speech and Language Pathologist          Therapy Charges for Today     Code Description Service Date Service Provider Modifiers Qty    40865754296 HC ST SWALLOWING CURRENT STATUS 6/7/2017 Inga Girard MS CCC-SLP GN, CI 1    92488967607 HC ST SWALLOWING PROJECTED 6/7/2017 Inga Girard MS CCC-SLP GN, CH 1    33891620098 HC ST MOTION FLUORO EVAL SWALLOW 6 6/7/2017 Inga Girard MS CCC-SLP GN 1          SLP G-Codes  SLP NOMS Used?: Yes  Functional Limitations: Swallowing  Swallow Current Status (): At least 1 percent but less than 20 percent impaired, limited or restricted  Swallow Goal Status (): 0 percent impaired, limited or restricted    Inga Girard MS CCC-SLP  6/7/2017

## 2017-06-07 NOTE — H&P
TIME: 3:58 a.m.     PRIMARY CARE PHYSICIAN: Toyin De La Vega MD    HISTORY OF PRESENT ILLNESS: Mrs. Iniguez is a 36-year-old  female who was transferred from [facility] due to a chief complaint of chest pain. Mrs. Iniguez describes her chest pain as a squeezing sensation which tends to localize to the left side of her chest. She relates the pain tends to radiate to her back. She describes the intensity of her pain as a 5/10. Her symptoms started abruptly at 3 p.m. yesterday afternoon while she was resting. In addition to above she also relates experiencing sweats and questionable  shortness of breath. Mrs. Iniguez has a history of multiple sclerosis. Her MS was diagnosed approximately 5 years ago. She relates that she has been experiencing worsening dysphagia and speculates that her chest pain may be due to such. She relates that she has difficulty with thin liquids. She failed a bedside swallow evaluation. Otherwise, she is resting comfortably at this time, is in no apparent distress.     REVIEW OF SYSTEMS: Otherwise unremarkable from a cardiovascular, pulmonary, gastrointestinal, genitourinary, neurologic, psychiatric, metabolic, and constitutional standpoint except as noted. She relates no unusual fatigue or weakness. Of note, she has some residual left-sided weakness and uses a cane to ambulate. She has had no definite fevers or chills. As noted, she has had sweats. Her appetite is good and her weight is stable. She has had no chest pain except as noted. She has had no chest palpitations. She is uncertain regarding shortness of breath. She has had no lower extremity edema, orthopnea, cough, wheezing, or hemoptysis. She has had no abdominal pain. She relates experiencing some nausea but no vomiting. She relates recent difficulty with diarrhea. She has had no constipation. As noted, she has had some difficulty swallowing, especially with thin liquids. She has had no odynophagia. She  has had no hematemesis, hematochezia, or melena. She has had no flank pain, pelvic pain, hematuria, or dysuria. She has had no arthralgias, myalgias, swollen joints or skin rashes. She has had no unusual headaches, confusion, memory deficits, or loss of consciousness. She has had no recent changes in her vision or hearing. She has had no acute motor, sensory, or gait deficits. She relates an episode of dizziness earlier today.     PAST MEDICAL HISTORY:  1.  Hypertension.   2.  Dyslipidemia.   3.  Multiple sclerosis.   4.  Migraine headache.   5.  Fatty liver.   6.  Gait impairment due to MS.  7.  Degenerative disk disease.     PAST SURGICAL HISTORY:  1.  Status post cholecystectomy.   2.  Status post appendectomy.   3.  Status post hysterectomy.   4.  Status post lumbosacral laminectomy with fusion.     ALLERGIES:  1.  AMBIEN.    2.  ROPINIROLE.    3.  SIMVASTATIN.     USUAL HOME MEDICATIONS:  1.  Acyclovir 400 mg p.o. b.i.d.   2.  Lipitor 10 mg p.o. daily.   3.  Vitamin D 5000 units p.o. daily.   4.  Catapres 0.1 mg p.o. b.i.d.   5.  Vitamin B12 at 1 mcg IM monthly.   6.  Ampyra 10 mg p.o. b.i.d.   7.  Neurontin 600 mg p.o. b.i.d. and 900 mg p.o. at bedtime.   8.  Arava 20 mg p.o. daily.   9.  Lidoderm topical patch applied as directed.   10.  Metoprolol XL 50 mg p.o. b.i.d.   11.  Naprosyn 220 mg p.o. p.r.n. for pain.   12.  Imitrex 6 mg subcutaneous p.r.n. for migraine headache.   13.  Topamax 100 mg p.o. b.i.d.   14.  Zanaflex 4 mg p.o. t.i.d. p.r.n. for muscle spasm.     The medical record indicates she also uses duloxetine; however, the patient denies such.  Vincent medications.     SOCIAL HISTORY: Significant for being a resident of [place], [place]. She is . Has a daughter in good health. She is disabled. She has a high school education. She is a registered nurse. She has no history of tobacco, alcohol, or drug use. She is [employer]ism. She has no recent history of travel outside this region.     CODE  STATUS: She is a FULL CODE.     She designates her , Khoa, to serve as a SURROGATE FOR HEALTHCARE MATTERS should such become necessary.     FAMILY HISTORY: Significant for having a twin sister in good health. She has a brother in good health, although he has a history of seizure and hypertension. Her father has a history of coronary artery disease and vascular disease. Her mother has a history of hypertension and diabetes. Her parents appear to be doing well at this time.     PHYSICAL EXAMINATION:  VITAL SIGNS: Temperature is 98.1, pulse 75, respirations are 18 and unlabored, blood pressure is 127/69, O2 saturation is 98% breathing ambient air, weight is 220 pounds.     GENERAL: This is a 36-year-old  female appearing her documented age. She is resting comfortably in bed. She is in no apparent distress. She is articulate in her speech. She is interactive and cooperative. She proves to be a good historian.     HEAD AND NECK: Essentially unremarkable except as noted. I see no signs of acute trauma. Eyes, nose, and throat appear grossly unremarkable. Sclerae are clear. There is no discharge from the nostrils. Mucous membranes are moist. Neck appears supple. She has no cervical or clavicular adenopathy. She has no definite carotid bruits. There are no masses of the head or neck. Neck veins do not appear pathologically distended.     CARDIAC: Reveals S1 and S2 with a regular rhythm. She has no definite murmurs, rubs, or gallops.     LUNGS: Reveals bilateral breath sounds that are clear to auscultation throughout. She has no rales, wheezes, or rhonchi.     ABDOMEN: Reveals bowel sounds to be present. Her abdomen is nontender, nondistended, soft, and obese.     EXTREMITIES: Reveals no lower extremity edema, erythema, or calf tenderness.     NEUROLOGIC: Reveals the patient to be awake and alert. Cranial nerves II through XII appear grossly intact. She exhibits no definite focal, motor, or sensory deficits.  She seems able to move all extremities without difficulty and at will. Her gait was not tested.     PSYCHIATRIC: Reveals her mood to be stable. Affect seems appropriate. Thought processes are organized in that she is able to answer questions appropriately and provide a coherent history. Speech is fluent. There is no flight of ideas. There are no obvious short-term or long-term memory deficits.     DIAGNOSTIC DATA: Sodium 142, potassium 3.5, chloride 106, CO2 of 24.7, BUN 12, creatinine 0.9, glucose 113, total calcium 8.8, albumin 3.7.     CBC demonstrates a white blood cell count of 4.3, hemoglobin 14.0, hematocrit 41.3, platelet count of 256,000.     Troponin level is less than 0.02.     EKG demonstrates sinus rhythm of 92 beats per minute.     IMPRESSION:  1.  Atypical chest pain of uncertain etiology.   2.  Dysphagia.   3.  Multiple sclerosis.   4.  Hypertension.   5.  Dyslipidemia.    PLAN: At this time Mrs. Iniguez will be admitted to [facility] for further evaluation and treatment. Her admitting diagnoses are as noted. Her condition at this time is judged to be stable. She will be placed on telemetry.     I have asked the nursing staff to obtain vital signs per protocol. She will be confined to bedrest. Her allergies are as noted above. I have asked the nursing staff to monitor input and output. Daily weights will be obtained. She will be held n.p.o. IV fluids will be saline locked. Oxygen will be used as needed to maintain her O2 saturations greater than 92%. She is a FULL CODE. Fall precautions are to be instituted. I will consult Speech Therapy for formal swallow evaluation.     INITIAL ADMITTING MEDICATIONS:   1.  Aspirin 81 mg p.o. daily.    2.  Lipitor 10 mg p.o. daily.   3.  Lovenox 40 mg subcutaneous daily.   4.  Neurontin 600 mg p.o. b.i.d.   5.  Metoprolol  mg p.o. daily.   6.  Protonix 40 mg p.o. daily.   7.  Topamax 100 mg p.o. q.12 h.     Additional p.r.n. medications will  include Tylenol, Mylanta, DuoNeb, Nitrostat, and Zofran.     As noted above, her oral medications will be placed on hold pending a swallow evaluation in the morning.     Followup troponin levels are pending.     I will continue to follow Mrs. Iniguez closely through the night pending return of the hospitalist team in the morning. Nursing staff may call should they have any questions or concerns. Please refer to the medical record for additional information, orders, and/or comments.     cc:  JASMIN BLAS M.D. [id number]  D:  06/07/2017 05:12:02(Eastern Time)  T:  06/07/2017 05:54:26(Eastern Time)  Voice ID:  [id number]/Document ID:  [id number]

## 2017-06-07 NOTE — PLAN OF CARE
Problem: Fall Risk (Adult)  Goal: Identify Related Risk Factors and Signs and Symptoms  Outcome: Outcome(s) achieved Date Met:  06/07/17 06/07/17 0259   Fall Risk   Fall Risk: Related Risk Factors gait/mobility problems;history of falls;environment unfamiliar  (Patient has MS)   Fall Risk: Signs and Symptoms presence of risk factors

## 2017-06-08 ENCOUNTER — CLINICAL SUPPORT (OUTPATIENT)
Dept: FAMILY MEDICINE CLINIC | Facility: CLINIC | Age: 37
End: 2017-06-08

## 2017-06-08 VITALS — DIASTOLIC BLOOD PRESSURE: 100 MMHG | HEART RATE: 90 BPM | SYSTOLIC BLOOD PRESSURE: 152 MMHG

## 2017-06-08 NOTE — PROGRESS NOTES
Patient presents today for orthostatics for Dr. Valdivia. First measurement was done at the hospital. Second in office and recorded in vitals information.

## 2017-06-09 ENCOUNTER — CLINICAL SUPPORT (OUTPATIENT)
Dept: FAMILY MEDICINE CLINIC | Facility: CLINIC | Age: 37
End: 2017-06-09

## 2017-06-09 ENCOUNTER — OFFICE VISIT (OUTPATIENT)
Dept: PRIMARY CARE CLINIC | Age: 37
End: 2017-06-09
Payer: COMMERCIAL

## 2017-06-09 VITALS — SYSTOLIC BLOOD PRESSURE: 150 MMHG | HEART RATE: 96 BPM | DIASTOLIC BLOOD PRESSURE: 104 MMHG

## 2017-06-09 VITALS
SYSTOLIC BLOOD PRESSURE: 138 MMHG | HEART RATE: 121 BPM | BODY MASS INDEX: 36.48 KG/M2 | WEIGHT: 227 LBS | TEMPERATURE: 97.7 F | HEIGHT: 66 IN | DIASTOLIC BLOOD PRESSURE: 80 MMHG | OXYGEN SATURATION: 98 %

## 2017-06-09 DIAGNOSIS — R00.0 TACHYCARDIA: ICD-10-CM

## 2017-06-09 DIAGNOSIS — R10.11 RIGHT UPPER QUADRANT ABDOMINAL PAIN: ICD-10-CM

## 2017-06-09 DIAGNOSIS — G35 MS (MULTIPLE SCLEROSIS) (HCC): Primary | ICD-10-CM

## 2017-06-09 DIAGNOSIS — N30.01 ACUTE CYSTITIS WITH HEMATURIA: ICD-10-CM

## 2017-06-09 LAB
ALBUMIN SERPL-MCNC: 4.7 G/DL (ref 3.5–5.2)
ALP BLD-CCNC: 115 U/L (ref 35–104)
ALT SERPL-CCNC: 109 U/L (ref 5–33)
AMYLASE: 47 U/L (ref 28–100)
AST SERPL-CCNC: 60 U/L (ref 5–32)
BACTERIA SPEC AEROBE CULT: ABNORMAL
BILIRUB SERPL-MCNC: 0.4 MG/DL (ref 0.2–1.2)
BILIRUBIN DIRECT: 0.1 MG/DL (ref 0–0.3)
BILIRUBIN, INDIRECT: 0.3 MG/DL (ref 0.1–1)
LIPASE: 44 U/L (ref 13–60)
TOTAL PROTEIN: 8 G/DL (ref 6.6–8.7)

## 2017-06-09 PROCEDURE — 1036F TOBACCO NON-USER: CPT | Performed by: FAMILY MEDICINE

## 2017-06-09 PROCEDURE — G8427 DOCREV CUR MEDS BY ELIG CLIN: HCPCS | Performed by: FAMILY MEDICINE

## 2017-06-09 PROCEDURE — 99214 OFFICE O/P EST MOD 30 MIN: CPT | Performed by: FAMILY MEDICINE

## 2017-06-09 PROCEDURE — 36415 COLL VENOUS BLD VENIPUNCTURE: CPT | Performed by: FAMILY MEDICINE

## 2017-06-09 PROCEDURE — G8417 CALC BMI ABV UP PARAM F/U: HCPCS | Performed by: FAMILY MEDICINE

## 2017-06-09 RX ORDER — PROMETHAZINE HYDROCHLORIDE 25 MG/1
25 TABLET ORAL
COMMUNITY

## 2017-06-09 RX ORDER — METOPROLOL SUCCINATE 50 MG/1
100 TABLET, EXTENDED RELEASE ORAL 2 TIMES DAILY
COMMUNITY
Start: 2017-05-03 | End: 2017-10-24 | Stop reason: CLARIF

## 2017-06-09 RX ORDER — NAPROXEN SODIUM 220 MG
220 TABLET ORAL
Status: ON HOLD | COMMUNITY
End: 2018-01-29 | Stop reason: ALTCHOICE

## 2017-06-09 RX ORDER — ASCORBIC ACID 500 MG
500 TABLET ORAL
COMMUNITY
End: 2019-08-09

## 2017-06-09 RX ORDER — LEFLUNOMIDE 20 MG/1
20 TABLET ORAL
COMMUNITY
End: 2017-07-25

## 2017-06-09 RX ORDER — DALFAMPRIDINE 10 MG/1
10 TABLET, FILM COATED, EXTENDED RELEASE ORAL
COMMUNITY
End: 2017-07-25

## 2017-06-09 RX ORDER — SULFAMETHOXAZOLE AND TRIMETHOPRIM 800; 160 MG/1; MG/1
1 TABLET ORAL 2 TIMES DAILY
Qty: 20 TABLET | Refills: 0 | Status: SHIPPED | OUTPATIENT
Start: 2017-06-09 | End: 2017-06-19

## 2017-06-09 RX ORDER — CLONIDINE HYDROCHLORIDE 0.1 MG/1
0.1 TABLET ORAL PRN
COMMUNITY
Start: 2017-03-24

## 2017-06-09 RX ORDER — METOPROLOL TARTRATE 50 MG/1
100 TABLET, FILM COATED ORAL 2 TIMES DAILY
COMMUNITY
Start: 2017-04-25 | End: 2017-06-09

## 2017-06-09 NOTE — PROGRESS NOTES
Patient presents for orthostatic BP for Dr. Valdivia to refer to [facility]. Patient  Had no complaints or concerns.

## 2017-06-10 ASSESSMENT — ENCOUNTER SYMPTOMS
SHORTNESS OF BREATH: 1
ABDOMINAL PAIN: 1
BACK PAIN: 1

## 2017-06-12 ENCOUNTER — TELEPHONE (OUTPATIENT)
Dept: PRIMARY CARE CLINIC | Age: 37
End: 2017-06-12

## 2017-06-14 ENCOUNTER — TELEPHONE (OUTPATIENT)
Dept: PRIMARY CARE CLINIC | Age: 37
End: 2017-06-14

## 2017-06-14 DIAGNOSIS — D70.2 OTHER DRUG-INDUCED NEUTROPENIA (HCC): ICD-10-CM

## 2017-06-14 DIAGNOSIS — M54.9 UPPER BACK PAIN: ICD-10-CM

## 2017-06-14 DIAGNOSIS — R07.89 ATYPICAL CHEST PAIN: Primary | ICD-10-CM

## 2017-06-14 DIAGNOSIS — R05.9 COUGH: Primary | ICD-10-CM

## 2017-06-14 DIAGNOSIS — R06.02 SOB (SHORTNESS OF BREATH): ICD-10-CM

## 2017-06-14 DIAGNOSIS — R09.81 SINUS CONGESTION: ICD-10-CM

## 2017-06-15 NOTE — THERAPY DISCHARGE NOTE
Acute Care - Speech Language Pathology Discharge Summary  [facility]       Patient Name: Nurys Iniguez  : 1980  MRN: [id number]    Today's Date: 6/15/2017          Referring Physician: Dr. Ghotra        Admit Date: 2017      SLP Recommendation and Plan  Regular solids and thin liquids    Visit Dx:    ICD-10-CM ICD-9-CM   1. Chest pain, unspecified type R07.9 786.50   2. Pharyngeal dysphagia R13.13 787.23                     IP SLP Goals       06/15/17 1405 17 0911       Safely Consume Diet    Safely Consume Diet- SLP, Date Established  17  -CS     Safely Consume Diet- SLP, Time to Achieve  by discharge  -CS     Safely Consume Diet- SLP, Additional Goal  Pt will tolerate recommended diet as well as trials of upgraded diet consistencies w/o any overt s/s of aspiration.  -CS     Safely Consume Diet- SLP, Outcome goal partially met  -MB goal ongoing  -CS     Safely Consume Diet- SLP, Reason Goal Not Met discharged from facility  -MB        User Key  (r) = Recorded By, (t) = Taken By, (c) = Cosigned By    Initials Name Provider Type    JACINTO Flores, CCC-SLP Speech and Language Pathologist    CS Papito Ortega MS, CFY-SLP Speech and Language Pathologist                  SLP Discharge Summary  Anticipated Discharge Disposition: home  Reason for Discharge: Discharge from facility  Outcomes Achieved: Patient able to partially acheive established goals  Discharge Destination: Home      Darin Flores CCC-SLP  6/15/2017

## 2017-06-15 NOTE — PLAN OF CARE
Problem: Inpatient SLP  Goal: Dysphagia- Patient will safely consume diet as per recommendation with no signs/symptoms of aspiration  Outcome: Unable to achieve outcome(s) by discharge Date Met:  06/15/17    06/07/17 0911 06/15/17 1405   Safely Consume Diet   Safely Consume Diet- SLP, Date Established 06/07/17 --    Safely Consume Diet- SLP, Time to Achieve by discharge --    Safely Consume Diet- SLP, Additional Goal Pt will tolerate recommended diet as well as trials of upgraded diet consistencies w/o any overt s/s of aspiration. --    Safely Consume Diet- SLP, Outcome --  goal partially met   Safely Consume Diet- SLP, Reason Goal Not Met --  discharged from facility

## 2017-06-27 ENCOUNTER — NURSE ONLY (OUTPATIENT)
Dept: PRIMARY CARE CLINIC | Age: 37
End: 2017-06-27
Payer: COMMERCIAL

## 2017-06-27 DIAGNOSIS — R30.0 DYSURIA: Primary | ICD-10-CM

## 2017-06-27 LAB
BILIRUBIN, POC: NORMAL
BLOOD URINE, POC: NORMAL
CLARITY, POC: CLEAR
COLOR, POC: YELLOW
GLUCOSE URINE, POC: NORMAL
KETONES, POC: NORMAL
LEUKOCYTE EST, POC: NORMAL
NITRITE, POC: NORMAL
PH, POC: 7.5
PROTEIN, POC: NORMAL
SPECIFIC GRAVITY, POC: 1
UROBILINOGEN, POC: 0.2

## 2017-06-27 PROCEDURE — 81002 URINALYSIS NONAUTO W/O SCOPE: CPT | Performed by: FAMILY MEDICINE

## 2017-07-07 ENCOUNTER — NURSE ONLY (OUTPATIENT)
Dept: PRIMARY CARE CLINIC | Age: 37
End: 2017-07-07
Payer: COMMERCIAL

## 2017-07-07 DIAGNOSIS — R79.89 ELEVATED LIVER FUNCTION TESTS: Primary | ICD-10-CM

## 2017-07-07 LAB
ALBUMIN SERPL-MCNC: 4.1 G/DL (ref 3.5–5.2)
ALP BLD-CCNC: 77 U/L (ref 35–104)
ALT SERPL-CCNC: 55 U/L (ref 5–33)
ANION GAP SERPL CALCULATED.3IONS-SCNC: 15 MMOL/L (ref 7–19)
AST SERPL-CCNC: 40 U/L (ref 5–32)
BILIRUB SERPL-MCNC: 0.3 MG/DL (ref 0.2–1.2)
BUN BLDV-MCNC: 12 MG/DL (ref 6–20)
CALCIUM SERPL-MCNC: 8.9 MG/DL (ref 8.6–10)
CHLORIDE BLD-SCNC: 108 MMOL/L (ref 98–111)
CO2: 20 MMOL/L (ref 22–29)
CREAT SERPL-MCNC: 0.9 MG/DL (ref 0.5–0.9)
GFR NON-AFRICAN AMERICAN: >60
GLUCOSE BLD-MCNC: 97 MG/DL (ref 74–109)
POTASSIUM SERPL-SCNC: 4.4 MMOL/L (ref 3.5–5)
SODIUM BLD-SCNC: 143 MMOL/L (ref 136–145)
TOTAL PROTEIN: 7.1 G/DL (ref 6.6–8.7)

## 2017-07-07 PROCEDURE — 36415 COLL VENOUS BLD VENIPUNCTURE: CPT | Performed by: FAMILY MEDICINE

## 2017-07-25 ENCOUNTER — OFFICE VISIT (OUTPATIENT)
Dept: OBGYN | Age: 37
End: 2017-07-25
Payer: COMMERCIAL

## 2017-07-25 VITALS
DIASTOLIC BLOOD PRESSURE: 103 MMHG | SYSTOLIC BLOOD PRESSURE: 152 MMHG | HEART RATE: 89 BPM | TEMPERATURE: 98 F | WEIGHT: 230 LBS | BODY MASS INDEX: 36.96 KG/M2 | HEIGHT: 66 IN

## 2017-07-25 DIAGNOSIS — Z12.31 ENCOUNTER FOR SCREENING MAMMOGRAM FOR BREAST CANCER: Primary | ICD-10-CM

## 2017-07-25 DIAGNOSIS — N64.4 BREAST PAIN, RIGHT: ICD-10-CM

## 2017-07-25 DIAGNOSIS — Z12.72 SCREENING FOR VAGINAL CANCER: ICD-10-CM

## 2017-07-25 PROCEDURE — 99395 PREV VISIT EST AGE 18-39: CPT | Performed by: NURSE PRACTITIONER

## 2017-07-25 RX ORDER — GLATIRAMER ACETATE 40 MG/ML
INJECTION, SOLUTION SUBCUTANEOUS
COMMUNITY
Start: 2017-07-05 | End: 2018-01-09

## 2017-07-25 ASSESSMENT — ENCOUNTER SYMPTOMS
NAUSEA: 0
WHEEZING: 0
SHORTNESS OF BREATH: 0
CONSTIPATION: 0
DIARRHEA: 0
ABDOMINAL PAIN: 0
APNEA: 0
SORE THROAT: 0
TROUBLE SWALLOWING: 0

## 2017-07-31 DIAGNOSIS — Z12.31 ENCOUNTER FOR SCREENING MAMMOGRAM FOR BREAST CANCER: ICD-10-CM

## 2017-08-02 ENCOUNTER — HOSPITAL ENCOUNTER (OUTPATIENT)
Dept: WOMENS IMAGING | Age: 37
Discharge: HOME | End: 2017-08-02
Payer: COMMERCIAL

## 2017-08-02 DIAGNOSIS — N64.4 BREAST PAIN: ICD-10-CM

## 2017-08-02 DIAGNOSIS — Z12.31 ENCOUNTER FOR SCREENING MAMMOGRAM FOR BREAST CANCER: ICD-10-CM

## 2017-08-02 PROCEDURE — 77063 BREAST TOMOSYNTHESIS BI: CPT

## 2017-08-03 ENCOUNTER — TELEPHONE (OUTPATIENT)
Dept: WOMENS IMAGING | Age: 37
End: 2017-08-03

## 2017-08-08 ENCOUNTER — HOSPITAL ENCOUNTER (OUTPATIENT)
Dept: WOMENS IMAGING | Age: 37
Discharge: HOME | End: 2017-08-08
Payer: COMMERCIAL

## 2017-08-08 DIAGNOSIS — N63.0 BREAST NODULE: ICD-10-CM

## 2017-08-08 DIAGNOSIS — R92.8 ABNORMAL FINDING ON MAMMOGRAPHY: Primary | ICD-10-CM

## 2017-08-08 PROCEDURE — 76642 ULTRASOUND BREAST LIMITED: CPT

## 2017-08-14 ENCOUNTER — TELEPHONE (OUTPATIENT)
Dept: CARDIOLOGY | Facility: CLINIC | Age: 37
End: 2017-08-14

## 2017-08-22 ENCOUNTER — TELEPHONE (OUTPATIENT)
Dept: CARDIOLOGY | Facility: CLINIC | Age: 37
End: 2017-08-22

## 2017-08-23 DIAGNOSIS — R55 SYNCOPE, UNSPECIFIED SYNCOPE TYPE: ICD-10-CM

## 2017-08-23 DIAGNOSIS — G90.A POTS (POSTURAL ORTHOSTATIC TACHYCARDIA SYNDROME): Primary | ICD-10-CM

## 2017-09-22 ENCOUNTER — APPOINTMENT (OUTPATIENT)
Dept: CT IMAGING | Facility: HOSPITAL | Age: 37
End: 2017-09-22

## 2017-09-22 ENCOUNTER — HOSPITAL ENCOUNTER (EMERGENCY)
Facility: HOSPITAL | Age: 37
Discharge: HOME | End: 2017-09-22
Attending: EMERGENCY MEDICINE | Admitting: EMERGENCY MEDICINE

## 2017-09-22 ENCOUNTER — OFFICE VISIT (OUTPATIENT)
Dept: FAMILY MEDICINE CLINIC | Facility: CLINIC | Age: 37
End: 2017-09-22

## 2017-09-22 VITALS
BODY MASS INDEX: 36.65 KG/M2 | WEIGHT: 220 LBS | TEMPERATURE: 98.2 F | OXYGEN SATURATION: 98 % | HEIGHT: 65 IN | HEART RATE: 74 BPM | SYSTOLIC BLOOD PRESSURE: 136 MMHG | RESPIRATION RATE: 18 BRPM | DIASTOLIC BLOOD PRESSURE: 80 MMHG

## 2017-09-22 VITALS
HEART RATE: 87 BPM | HEIGHT: 65 IN | WEIGHT: 220 LBS | OXYGEN SATURATION: 97 % | RESPIRATION RATE: 19 BRPM | DIASTOLIC BLOOD PRESSURE: 102 MMHG | TEMPERATURE: 97.6 F | SYSTOLIC BLOOD PRESSURE: 159 MMHG | BODY MASS INDEX: 36.65 KG/M2

## 2017-09-22 DIAGNOSIS — M54.41 ACUTE BILATERAL LOW BACK PAIN WITH BILATERAL SCIATICA: ICD-10-CM
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
## 2017-09-22 DIAGNOSIS — M54.42 ACUTE BILATERAL LOW BACK PAIN WITH BILATERAL SCIATICA: ICD-10-CM

## 2017-09-22 DIAGNOSIS — R10.84 GENERALIZED ABDOMINAL PAIN: ICD-10-CM

## 2017-09-22 DIAGNOSIS — G43.101 MIGRAINE WITH AURA AND WITH STATUS MIGRAINOSUS, NOT INTRACTABLE: ICD-10-CM
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
## 2017-09-22 DIAGNOSIS — G35 MS (MULTIPLE SCLEROSIS) (HCC): ICD-10-CM

## 2017-09-22 LAB
ALBUMIN SERPL-MCNC: 4.8 G/DL (ref 3.5–5)
ALBUMIN/GLOB SERPL: 1.5 G/DL (ref 1.1–2.5)
ALP SERPL-CCNC: 97 U/L (ref 24–120)
ALT SERPL W P-5'-P-CCNC: 40 U/L (ref 0–54)
AMYLASE SERPL-CCNC: 60 U/L (ref 30–110)
ANION GAP SERPL CALCULATED.3IONS-SCNC: 16 MMOL/L (ref 4–13)
AST SERPL-CCNC: 31 U/L (ref 7–45)
B-HCG UR QL: NEGATIVE
BASOPHILS # BLD AUTO: 0.04 10*3/MM3 (ref 0–0.2)
BASOPHILS NFR BLD AUTO: 0.6 % (ref 0–2)
BILIRUB SERPL-MCNC: 0.8 MG/DL (ref 0.1–1)
BILIRUB UR QL STRIP: ABNORMAL
BUN BLD-MCNC: 29 MG/DL (ref 5–21)
BUN/CREAT SERPL: 27.6 (ref 7–25)
CALCIUM SPEC-SCNC: 9.7 MG/DL (ref 8.4–10.4)
CHLORIDE SERPL-SCNC: 107 MMOL/L (ref 98–110)
CLARITY UR: ABNORMAL
CO2 SERPL-SCNC: 21 MMOL/L (ref 24–31)
COLOR UR: ABNORMAL
CREAT BLD-MCNC: 1.05 MG/DL (ref 0.5–1.4)
DEPRECATED RDW RBC AUTO: 40 FL (ref 40–54)
EOSINOPHIL # BLD AUTO: 0.12 10*3/MM3 (ref 0–0.7)
EOSINOPHIL NFR BLD AUTO: 1.8 % (ref 0–4)
ERYTHROCYTE [DISTWIDTH] IN BLOOD BY AUTOMATED COUNT: 12.5 % (ref 12–15)
GFR SERPL CREATININE-BSD FRML MDRD: 59 ML/MIN/1.73
GLOBULIN UR ELPH-MCNC: 3.3 GM/DL
GLUCOSE BLD-MCNC: 87 MG/DL (ref 70–100)
GLUCOSE UR STRIP-MCNC: NEGATIVE MG/DL
HCT VFR BLD AUTO: 38.2 % (ref 37–47)
HGB BLD-MCNC: 13.4 G/DL (ref 12–16)
HGB UR QL STRIP.AUTO: NEGATIVE
IMM GRANULOCYTES # BLD: 0.02 10*3/MM3 (ref 0–0.03)
IMM GRANULOCYTES NFR BLD: 0.3 % (ref 0–5)
KETONES UR QL STRIP: ABNORMAL
LEUKOCYTE ESTERASE UR QL STRIP.AUTO: NEGATIVE
LIPASE SERPL-CCNC: 93 U/L (ref 23–203)
LYMPHOCYTES # BLD AUTO: 0.73 10*3/MM3 (ref 0.72–4.86)
LYMPHOCYTES NFR BLD AUTO: 11 % (ref 15–45)
MCH RBC QN AUTO: 30.7 PG (ref 28–32)
MCHC RBC AUTO-ENTMCNC: 35.1 G/DL (ref 33–36)
MCV RBC AUTO: 87.6 FL (ref 82–98)
MONOCYTES # BLD AUTO: 0.48 10*3/MM3 (ref 0.19–1.3)
MONOCYTES NFR BLD AUTO: 7.3 % (ref 4–12)
NEUTROPHILS # BLD AUTO: 5.22 10*3/MM3 (ref 1.87–8.4)
NEUTROPHILS NFR BLD AUTO: 79 % (ref 39–78)
NITRITE UR QL STRIP: NEGATIVE
PH UR STRIP.AUTO: <=5 [PH] (ref 5–8)
PLATELET # BLD AUTO: 258 10*3/MM3 (ref 130–400)
PMV BLD AUTO: 10.2 FL (ref 6–12)
POTASSIUM BLD-SCNC: 4.4 MMOL/L (ref 3.5–5.3)
PROT SERPL-MCNC: 8.1 G/DL (ref 6.3–8.7)
PROT UR QL STRIP: NEGATIVE
RBC # BLD AUTO: 4.36 10*6/MM3 (ref 4.2–5.4)
SODIUM BLD-SCNC: 144 MMOL/L (ref 135–145)
SP GR UR STRIP: >1.03 (ref 1–1.03)
UROBILINOGEN UR QL STRIP: ABNORMAL
WBC NRBC COR # BLD: 6.61 10*3/MM3 (ref 4.8–10.8)

## 2017-09-22 PROCEDURE — 83690 ASSAY OF LIPASE: CPT | Performed by: EMERGENCY MEDICINE

## 2017-09-22 PROCEDURE — 25010000002 ONDANSETRON PER 1 MG: Performed by: EMERGENCY MEDICINE

## 2017-09-22 PROCEDURE — 80053 COMPREHEN METABOLIC PANEL: CPT | Performed by: EMERGENCY MEDICINE

## 2017-09-22 PROCEDURE — 70450 CT HEAD/BRAIN W/O DYE: CPT

## 2017-09-22 PROCEDURE — 99284 EMERGENCY DEPT VISIT MOD MDM: CPT

## 2017-09-22 PROCEDURE — 96374 THER/PROPH/DIAG INJ IV PUSH: CPT

## 2017-09-22 PROCEDURE — 81003 URINALYSIS AUTO W/O SCOPE: CPT | Performed by: FAMILY MEDICINE

## 2017-09-22 PROCEDURE — 99205 OFFICE O/P NEW HI 60 MIN: CPT | Performed by: NURSE PRACTITIONER

## 2017-09-22 PROCEDURE — 82150 ASSAY OF AMYLASE: CPT | Performed by: EMERGENCY MEDICINE

## 2017-09-22 PROCEDURE — 85025 COMPLETE CBC W/AUTO DIFF WBC: CPT | Performed by: EMERGENCY MEDICINE

## 2017-09-22 PROCEDURE — 96375 TX/PRO/DX INJ NEW DRUG ADDON: CPT

## 2017-09-22 PROCEDURE — 81025 URINE PREGNANCY TEST: CPT | Performed by: EMERGENCY MEDICINE

## 2017-09-22 PROCEDURE — 25010000002 HYDROMORPHONE PER 4 MG: Performed by: EMERGENCY MEDICINE

## 2017-09-22 RX ORDER — CLONIDINE HYDROCHLORIDE 0.1 MG/1
0.1 TABLET ORAL AS NEEDED
COMMUNITY

## 2017-09-22 RX ORDER — OXYCODONE AND ACETAMINOPHEN 7.5; 325 MG/1; MG/1
1 TABLET ORAL ONCE
Status: COMPLETED | OUTPATIENT
Start: 2017-09-22 | End: 2017-09-22

## 2017-09-22 RX ORDER — ONDANSETRON 2 MG/ML
4 INJECTION INTRAMUSCULAR; INTRAVENOUS ONCE
Status: COMPLETED | OUTPATIENT
Start: 2017-09-22 | End: 2017-09-22

## 2017-09-22 RX ORDER — OXYCODONE AND ACETAMINOPHEN 10; 325 MG/1; MG/1
1 TABLET ORAL EVERY 4 HOURS PRN
Qty: 21 TABLET | Refills: 0 | Status: SHIPPED | OUTPATIENT
Start: 2017-09-22 | End: 2018-09-11

## 2017-09-22 RX ADMIN — HYDROMORPHONE HYDROCHLORIDE 1 MG: 1 INJECTION, SOLUTION INTRAMUSCULAR; INTRAVENOUS; SUBCUTANEOUS at 18:28

## 2017-09-22 RX ADMIN — ONDANSETRON 4 MG: 2 INJECTION, SOLUTION INTRAMUSCULAR; INTRAVENOUS at 18:28

## 2017-09-22 RX ADMIN — OXYCODONE HYDROCHLORIDE AND ACETAMINOPHEN 1 TABLET: 7.5; 325 TABLET ORAL at 17:00

## 2017-09-22 NOTE — DISCHARGE INSTRUCTIONS
Advil 200 mg take 2 tablets every 6 hours.  Percocet to be taken only for pain when necessary maximum 4 per day.  Ice packs to the affected area.  Follow-up with PMD.  If symptoms increase or surgeries change return back to the ER.
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
## 2017-09-22 NOTE — ED NOTES
PT PROVIDED NURSE WITH RESULTS FROM [facility].  COPIES MADE & PLACED IN CHART.     Amparo Hunter, SCOTTY  09/22/17 [id number]
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
## 2017-10-02 ENCOUNTER — PATIENT MESSAGE (OUTPATIENT)
Dept: PRIMARY CARE CLINIC | Age: 37
End: 2017-10-02
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
## 2017-10-24 ENCOUNTER — OFFICE VISIT (OUTPATIENT)
Dept: PRIMARY CARE CLINIC | Age: 37
End: 2017-10-24
Payer: COMMERCIAL

## 2017-10-24 VITALS
RESPIRATION RATE: 20 BRPM | BODY MASS INDEX: 34.55 KG/M2 | DIASTOLIC BLOOD PRESSURE: 90 MMHG | SYSTOLIC BLOOD PRESSURE: 142 MMHG | WEIGHT: 215 LBS | HEIGHT: 66 IN | TEMPERATURE: 98.9 F | HEART RATE: 88 BPM

## 2017-10-24 DIAGNOSIS — R03.0 ELEVATED BLOOD PRESSURE READING WITHOUT DIAGNOSIS OF HYPERTENSION: ICD-10-CM

## 2017-10-24 DIAGNOSIS — Z23 NEED FOR INFLUENZA VACCINATION: ICD-10-CM

## 2017-10-24 DIAGNOSIS — H53.9 VISUAL CHANGES: ICD-10-CM

## 2017-10-24 DIAGNOSIS — G44.89 OTHER HEADACHE SYNDROME: Primary | ICD-10-CM

## 2017-10-24 DIAGNOSIS — R09.89 RIGHT CAROTID BRUIT: ICD-10-CM

## 2017-10-24 PROCEDURE — 90688 IIV4 VACCINE SPLT 0.5 ML IM: CPT | Performed by: FAMILY MEDICINE

## 2017-10-24 PROCEDURE — 99213 OFFICE O/P EST LOW 20 MIN: CPT | Performed by: FAMILY MEDICINE

## 2017-10-24 PROCEDURE — 90471 IMMUNIZATION ADMIN: CPT | Performed by: FAMILY MEDICINE

## 2017-10-24 RX ORDER — BACLOFEN 20 MG/1
20 TABLET ORAL 2 TIMES DAILY
COMMUNITY
End: 2022-02-11

## 2017-10-24 RX ORDER — TAMSULOSIN HYDROCHLORIDE 0.4 MG/1
0.4 CAPSULE ORAL DAILY
COMMUNITY
End: 2018-10-19 | Stop reason: ALTCHOICE

## 2017-10-24 ASSESSMENT — ENCOUNTER SYMPTOMS
RESPIRATORY NEGATIVE: 1
GASTROINTESTINAL NEGATIVE: 1

## 2017-10-25 ENCOUNTER — TELEPHONE (OUTPATIENT)
Dept: PRIMARY CARE CLINIC | Age: 37
End: 2017-10-25

## 2017-10-25 DIAGNOSIS — H53.9 VISUAL CHANGES: ICD-10-CM

## 2017-10-25 DIAGNOSIS — R51.9 SCALP PAIN: ICD-10-CM

## 2017-10-25 DIAGNOSIS — S06.0X0D CONCUSSION WITHOUT LOSS OF CONSCIOUSNESS, SUBSEQUENT ENCOUNTER: Primary | ICD-10-CM
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
## 2017-11-02 ENCOUNTER — HOSPITAL ENCOUNTER (OUTPATIENT)
Dept: MRI IMAGING | Age: 37
Discharge: HOME | End: 2017-11-02
Payer: COMMERCIAL

## 2017-11-02 DIAGNOSIS — R51.9 SCALP PAIN: ICD-10-CM

## 2017-11-02 DIAGNOSIS — S06.0X0D CONCUSSION WITHOUT LOSS OF CONSCIOUSNESS, SUBSEQUENT ENCOUNTER: ICD-10-CM

## 2017-11-02 DIAGNOSIS — H53.9 VISUAL CHANGES: ICD-10-CM

## 2017-11-02 PROCEDURE — 70551 MRI BRAIN STEM W/O DYE: CPT

## 2017-11-06 ENCOUNTER — TELEPHONE (OUTPATIENT)
Dept: PRIMARY CARE CLINIC | Age: 37
End: 2017-11-06

## 2017-11-08 ENCOUNTER — HOSPITAL ENCOUNTER (OUTPATIENT)
Dept: NON INVASIVE DIAGNOSTICS | Age: 37
Discharge: HOME | End: 2017-11-08
Payer: COMMERCIAL

## 2017-11-08 DIAGNOSIS — R09.89 RIGHT CAROTID BRUIT: ICD-10-CM

## 2017-11-08 PROCEDURE — 93880 EXTRACRANIAL BILAT STUDY: CPT

## 2017-11-10 ENCOUNTER — HOSPITAL ENCOUNTER (OUTPATIENT)
Dept: ULTRASOUND IMAGING | Age: 37
Discharge: HOME | End: 2017-11-10
Payer: COMMERCIAL

## 2017-11-10 ENCOUNTER — OFFICE VISIT (OUTPATIENT)
Dept: OBGYN | Age: 37
End: 2017-11-10
Payer: COMMERCIAL

## 2017-11-10 ENCOUNTER — HOSPITAL ENCOUNTER (OUTPATIENT)
Dept: WOMENS IMAGING | Age: 37
Discharge: HOME | End: 2017-11-10
Payer: COMMERCIAL

## 2017-11-10 VITALS
SYSTOLIC BLOOD PRESSURE: 144 MMHG | DIASTOLIC BLOOD PRESSURE: 84 MMHG | HEART RATE: 101 BPM | BODY MASS INDEX: 34.39 KG/M2 | HEIGHT: 66 IN | WEIGHT: 214 LBS

## 2017-11-10 DIAGNOSIS — N63.0 BREAST LUMP: ICD-10-CM

## 2017-11-10 DIAGNOSIS — N64.4 BREAST TENDERNESS IN FEMALE: ICD-10-CM

## 2017-11-10 DIAGNOSIS — N63.15 BREAST LUMP ON RIGHT SIDE AT 9 O'CLOCK POSITION: Primary | ICD-10-CM

## 2017-11-10 DIAGNOSIS — N63.15 BREAST LUMP ON RIGHT SIDE AT 9 O'CLOCK POSITION: ICD-10-CM

## 2017-11-10 PROCEDURE — 99213 OFFICE O/P EST LOW 20 MIN: CPT | Performed by: NURSE PRACTITIONER

## 2017-11-10 PROCEDURE — 76642 ULTRASOUND BREAST LIMITED: CPT

## 2017-11-10 PROCEDURE — G8417 CALC BMI ABV UP PARAM F/U: HCPCS | Performed by: NURSE PRACTITIONER

## 2017-11-10 PROCEDURE — G8484 FLU IMMUNIZE NO ADMIN: HCPCS | Performed by: NURSE PRACTITIONER

## 2017-11-10 PROCEDURE — G8427 DOCREV CUR MEDS BY ELIG CLIN: HCPCS | Performed by: NURSE PRACTITIONER

## 2017-11-10 PROCEDURE — G0279 TOMOSYNTHESIS, MAMMO: HCPCS

## 2017-11-10 PROCEDURE — 1036F TOBACCO NON-USER: CPT | Performed by: NURSE PRACTITIONER

## 2017-11-10 ASSESSMENT — ENCOUNTER SYMPTOMS
EYES NEGATIVE: 1
GASTROINTESTINAL NEGATIVE: 1
RESPIRATORY NEGATIVE: 1

## 2017-11-10 NOTE — PATIENT INSTRUCTIONS
Patient Education        Breast Pain: Care Instructions  Your Care Instructions  Breast tenderness and pain may come and go with your monthly periods (cyclic), or it may not follow any pattern (noncyclic). Breast pain is rarely caused by a serious health problem. You may need tests to find the cause. Follow-up care is a key part of your treatment and safety. Be sure to make and go to all appointments, and call your doctor if you are having problems. Its also a good idea to know your test results and keep a list of the medicines you take. How can you care for yourself at home? · If your doctor gave you medicine, take it exactly as prescribed. Call your doctor if you think you are having a problem with your medicine. · Take an over-the-counter pain medicine, such as acetaminophen (Tylenol), ibuprofen (Advil, Motrin), or naproxen (Aleve), to relieve pain and swelling. Read and follow all instructions on the label. · Do not take two or more pain medicines at the same time unless the doctor told you to. Many pain medicines have acetaminophen, which is Tylenol. Too much acetaminophen (Tylenol) can be harmful. · Wear a supportive bra, such as a sports bra or a jog bra. · Cut down on the amount of fat in your diet. If you need help planning healthy meals, see a dietitian. · Get at least 30 minutes of exercise on most days of the week. Walking is a good choice. You also may want to do other activities, such as running, swimming, cycling, or playing tennis or team sports. · Keep a healthy sleep pattern. Go to bed at the same time every night, and get up at the same time every day. When should you call for help? Call your doctor now or seek immediate medical care if:  · You have new changes in a breast, such as:  ¨ A lump or thickening in your breast or armpit. ¨ A change in the breast's size or shape. ¨ Skin changes, such as dimples or puckers. ¨ Nipple discharge.   ¨ A change in the color or feel of the skin of

## 2017-11-10 NOTE — PROGRESS NOTES
Jumana Briggs is a 43 y.o. female who presents today for her medical conditions/ complaints as noted below. Jumana Briggs is c/o of Breast Problem        HPI  Pt here for what seems to be a new lump to right breast. Her right breast is more tender as well. She is nervous about it. Had mammogram and bilateral US in August and is to have repeat right breast US in February. Right breast had a \"pimple\" burst about 1 week ago. Last mammogram :   Last pap : 2017  Contraception : Hysterectomy  Last bone density : none  Last colonoscopy : none    No LMP recorded. Patient has had a hysterectomy.       Past Medical History:   Diagnosis Date    B12 deficiency     Cauda equina syndrome (HCC)     Complicated migraine     Depression     Hematuria     wih abdominal pain    Hyperlipidemia     Hypertension     MS (multiple sclerosis) (HCC)     Myofascial pain     Nerve damage     Pelvic floor dysfunction     Postmenopausal HRT (hormone replacement therapy)     POTS (postural orthostatic tachycardia syndrome)      Past Surgical History:   Procedure Laterality Date    APPENDECTOMY      BACK SURGERY      CHOLECYSTECTOMY, LAPAROSCOPIC      HYSTERECTOMY       Family History   Problem Relation Age of Onset    Diabetes Mother     High Blood Pressure Mother      Social History   Substance Use Topics    Smoking status: Never Smoker    Smokeless tobacco: Never Used    Alcohol use No       Current Outpatient Prescriptions   Medication Sig Dispense Refill    baclofen (LIORESAL) 20 MG tablet Take 20 mg by mouth 2 times daily      tamsulosin (FLOMAX) 0.4 MG capsule Take 0.4 mg by mouth daily      COPAXONE 40 MG/ML SOSY       promethazine (PHENERGAN) 25 MG tablet Take 25 mg by mouth      naproxen sodium (ANAPROX) 220 MG tablet Take 220 mg by mouth      Ascorbic Acid (VITAMIN C) 500 MG tablet Take 500 mg by mouth      cloNIDine (CATAPRES) 0.1 MG tablet 0.1 mg as needed       atorvastatin (LIPITOR) 10 MG tablet 1 tablet by mouth at bedtime for high cholesterol 90 tablet 3    SUMAtriptan Succinate 6 MG/0.5ML SOAJ       acyclovir (ZOVIRAX) 400 MG tablet       cyanocobalamin 1000 MCG/ML injection INJECT 1 ML INTRAMUSCULARLY EVERY MONTH 10 vial 1    phenazopyridine (PYRIDIUM) 200 MG tablet Take 1 tablet by mouth 3 times daily as needed for Pain. 24 tablet 2    Cholecalciferol (VITAMIN D-3) 5000 UNITS TABS Take 5,000 Units by mouth daily.  topiramate (TOPAMAX) 100 MG tablet Take 1 tablet by mouth 2 times daily. 180 tablet 3    tiZANidine (ZANAFLEX) 4 MG tablet Take 4 mg by mouth every 8 hours as needed. No current facility-administered medications for this visit. Allergies   Allergen Reactions    Ropinirole Hcl     Simvastatin     Zolpidem Other (See Comments)     Vitals:    11/10/17 1044   BP: (!) 144/84   Pulse: 101     Body mass index is 34.54 kg/m². Review of Systems   Constitutional: Negative. HENT: Negative. Eyes: Negative. Respiratory: Negative. Cardiovascular: Negative. Gastrointestinal: Negative. Genitourinary: Negative. Negative for dysuria, frequency, menstrual problem, pelvic pain, urgency and vaginal discharge. Pt c/o bilateral breast pain   Skin: Negative. Neurological: Negative. Psychiatric/Behavioral: Negative. Physical Exam   Constitutional: She is oriented to person, place, and time. She appears well-developed and well-nourished. HENT:   Head: Normocephalic and atraumatic. Eyes: Pupils are equal, round, and reactive to light. Neck: Normal range of motion. Pulmonary/Chest:       Fibrocystic changes noted to right breast as well   Musculoskeletal: Normal range of motion. Neurological: She is alert and oriented to person, place, and time. Skin: Skin is warm and dry. Psychiatric: She has a normal mood and affect. Her behavior is normal.   Nursing note and vitals reviewed.       1. Breast lump on right side at 9 sports. · Keep a healthy sleep pattern. Go to bed at the same time every night, and get up at the same time every day. When should you call for help? Call your doctor now or seek immediate medical care if:  · You have new changes in a breast, such as:  ¨ A lump or thickening in your breast or armpit. ¨ A change in the breast's size or shape. ¨ Skin changes, such as dimples or puckers. ¨ Nipple discharge. ¨ A change in the color or feel of the skin of your breast or the darker area around the nipple (areola). ¨ A change in the shape of the nipple (it may look like it's being pulled into the breast). · You have symptoms of a breast infection, such as:  ¨ Increased pain, swelling, redness, or warmth around a breast.  ¨ Red streaks extending from the breast.  ¨ Pus draining from a breast.  ¨ A fever. Watch closely for changes in your health, and be sure to contact your doctor if:  · Your breast pain does not get better after 1 week. · You have a lump or thickening in your breast or armpit. Where can you learn more? Go to [email or web address]://[email or web address].[employer]. org and sign in to your [employer] account. Enter G768 in the W[employer]Mail box to learn more about \"Breast Pain: Care Instructions. \"     If you do not have an account, please click on the \"Sign Up Now\" link. Current as of: March 20, 2017  Content Version: 11.3  © 6778-9161 [employer]. Care instructions adapted under license by [facility] ([facility]). If you have questions about a medical condition or this instruction, always ask your healthcare professional. [place] [address] any warranty or liability for your use of this information.

## 2017-11-13 ENCOUNTER — TELEPHONE (OUTPATIENT)
Dept: OBGYN | Age: 37
End: 2017-11-13

## 2017-11-13 DIAGNOSIS — N63.10 BREAST MASS, RIGHT: Primary | ICD-10-CM

## 2017-11-13 NOTE — TELEPHONE ENCOUNTER
Called pt and informed her she needs a follow up diagnostic mammogram of her right breast.  She was told of her results, and will make an appointment for mid February.   Order is in [facility]

## 2017-11-17 ENCOUNTER — TELEPHONE (OUTPATIENT)
Dept: PRIMARY CARE CLINIC | Age: 37
End: 2017-11-17

## 2017-11-17 RX ORDER — AMOXICILLIN AND CLAVULANATE POTASSIUM 875; 125 MG/1; MG/1
TABLET, FILM COATED ORAL
Qty: 20 TABLET | Refills: 0 | Status: SHIPPED | OUTPATIENT
Start: 2017-11-17 | End: 2017-12-07

## 2017-11-27 ENCOUNTER — OFFICE VISIT (OUTPATIENT)
Dept: PRIMARY CARE CLINIC | Age: 37
End: 2017-11-27
Payer: COMMERCIAL

## 2017-11-27 VITALS
OXYGEN SATURATION: 98 % | HEART RATE: 86 BPM | WEIGHT: 207 LBS | HEIGHT: 66 IN | DIASTOLIC BLOOD PRESSURE: 86 MMHG | SYSTOLIC BLOOD PRESSURE: 144 MMHG | TEMPERATURE: 97.5 F | BODY MASS INDEX: 33.27 KG/M2

## 2017-11-27 DIAGNOSIS — G35 MS (MULTIPLE SCLEROSIS) (HCC): ICD-10-CM

## 2017-11-27 DIAGNOSIS — R53.83 FATIGUE, UNSPECIFIED TYPE: ICD-10-CM

## 2017-11-27 DIAGNOSIS — I10 ESSENTIAL HYPERTENSION: ICD-10-CM

## 2017-11-27 DIAGNOSIS — R10.13 EPIGASTRIC PAIN: ICD-10-CM

## 2017-11-27 DIAGNOSIS — R07.9 CHEST PAIN, UNSPECIFIED TYPE: Primary | ICD-10-CM

## 2017-11-27 DIAGNOSIS — R82.998 DARK URINE: ICD-10-CM

## 2017-11-27 LAB
ALBUMIN SERPL-MCNC: 4.8 G/DL (ref 3.5–5.2)
ALP BLD-CCNC: 84 U/L (ref 35–104)
ALT SERPL-CCNC: 15 U/L (ref 5–33)
ANION GAP SERPL CALCULATED.3IONS-SCNC: 14 MMOL/L (ref 7–19)
AST SERPL-CCNC: 19 U/L (ref 5–32)
BASOPHILS ABSOLUTE: 0.1 K/UL (ref 0–0.2)
BASOPHILS RELATIVE PERCENT: 1 % (ref 0–1)
BILIRUB SERPL-MCNC: 0.7 MG/DL (ref 0.2–1.2)
BILIRUBIN, POC: NORMAL
BLOOD URINE, POC: NORMAL
BUN BLDV-MCNC: 22 MG/DL (ref 6–20)
C-REACTIVE PROTEIN: 0.09 MG/DL (ref 0–0.5)
CALCIUM SERPL-MCNC: 9.6 MG/DL (ref 8.6–10)
CHLORIDE BLD-SCNC: 106 MMOL/L (ref 98–111)
CLARITY, POC: NORMAL
CO2: 22 MMOL/L (ref 22–29)
COLOR, POC: NORMAL
CREAT SERPL-MCNC: 0.9 MG/DL (ref 0.5–0.9)
EOSINOPHILS ABSOLUTE: 0.1 K/UL (ref 0–0.6)
EOSINOPHILS RELATIVE PERCENT: 2.5 % (ref 0–5)
GFR NON-AFRICAN AMERICAN: >60
GLUCOSE BLD-MCNC: 87 MG/DL (ref 74–109)
GLUCOSE URINE, POC: NORMAL
HCT VFR BLD CALC: 43.6 % (ref 37–47)
HEMOGLOBIN: 14.5 G/DL (ref 12–16)
KETONES, POC: NORMAL
LEUKOCYTE EST, POC: NORMAL
LYMPHOCYTES ABSOLUTE: 0.8 K/UL (ref 1.1–4.5)
LYMPHOCYTES RELATIVE PERCENT: 17.4 % (ref 20–40)
MCH RBC QN AUTO: 30.3 PG (ref 27–31)
MCHC RBC AUTO-ENTMCNC: 33.3 G/DL (ref 33–37)
MCV RBC AUTO: 91 FL (ref 81–99)
MONOCYTES ABSOLUTE: 0.3 K/UL (ref 0–0.9)
MONOCYTES RELATIVE PERCENT: 7 % (ref 0–10)
NEUTROPHILS ABSOLUTE: 3.5 K/UL (ref 1.5–7.5)
NEUTROPHILS RELATIVE PERCENT: 71.7 % (ref 50–65)
NITRITE, POC: NORMAL
PDW BLD-RTO: 13.2 % (ref 11.5–14.5)
PH, POC: NORMAL
PLATELET # BLD: 257 K/UL (ref 130–400)
PMV BLD AUTO: 10.3 FL (ref 9.4–12.3)
POTASSIUM SERPL-SCNC: 4.6 MMOL/L (ref 3.5–5)
PROTEIN, POC: NORMAL
RBC # BLD: 4.79 M/UL (ref 4.2–5.4)
SEDIMENTATION RATE, ERYTHROCYTE: 12 MM/HR (ref 0–20)
SODIUM BLD-SCNC: 142 MMOL/L (ref 136–145)
SPECIFIC GRAVITY, POC: 1.03
TOTAL PROTEIN: 7.6 G/DL (ref 6.6–8.7)
TSH SERPL DL<=0.05 MIU/L-ACNC: 2.5 UIU/ML (ref 0.27–4.2)
UROBILINOGEN, POC: 0.2
WBC # BLD: 4.8 K/UL (ref 4.8–10.8)

## 2017-11-27 PROCEDURE — 1036F TOBACCO NON-USER: CPT | Performed by: FAMILY MEDICINE

## 2017-11-27 PROCEDURE — 81002 URINALYSIS NONAUTO W/O SCOPE: CPT | Performed by: FAMILY MEDICINE

## 2017-11-27 PROCEDURE — G8427 DOCREV CUR MEDS BY ELIG CLIN: HCPCS | Performed by: FAMILY MEDICINE

## 2017-11-27 PROCEDURE — G8484 FLU IMMUNIZE NO ADMIN: HCPCS | Performed by: FAMILY MEDICINE

## 2017-11-27 PROCEDURE — 93000 ELECTROCARDIOGRAM COMPLETE: CPT | Performed by: FAMILY MEDICINE

## 2017-11-27 PROCEDURE — G8417 CALC BMI ABV UP PARAM F/U: HCPCS | Performed by: FAMILY MEDICINE

## 2017-11-27 PROCEDURE — 36415 COLL VENOUS BLD VENIPUNCTURE: CPT | Performed by: FAMILY MEDICINE

## 2017-11-27 PROCEDURE — 99214 OFFICE O/P EST MOD 30 MIN: CPT | Performed by: FAMILY MEDICINE

## 2017-11-27 RX ORDER — GABAPENTIN 300 MG/1
600 CAPSULE ORAL DAILY
COMMUNITY
Start: 2017-11-21 | End: 2018-02-02 | Stop reason: DRUGHIGH

## 2017-11-30 ASSESSMENT — ENCOUNTER SYMPTOMS
VOMITING: 0
ABDOMINAL PAIN: 1
EYE DISCHARGE: 0
BACK PAIN: 0
COUGH: 0
NAUSEA: 0
COLOR CHANGE: 0
DIARRHEA: 1
WHEEZING: 0

## 2017-11-30 NOTE — PROGRESS NOTES
Subjective:      Patient ID: Michelle Diez is a 42 y.o. female. HPI   Patient presents today for complaints of just not feeling well. She states that she's been feeling bad for a couple months and is very tired and rundown from it. She does have a history of MS and season MS doctor in [facility]. She states that she saw them a few weeks ago and they told her that they thought she had a viral illness which was causing a flare of her MS but did not change anything. They encouraged her to make sure that she is drinking plenty of fluids but did not change any of her medications. She states that at the time she was having some nausea and vomiting but that has since resolved. She does have a chronic history of pain. She states that she sees Dr. Triana Feeling in [facility] but she feels like they haven't gotten anywhere with her abdominal pain. She states that she has a chronic epigastric pain. She states that there is nothing that really seems to make it better or worse. She states that it is nagging. She denies any hematemesis. She does have some occasional nausea and vomiting. She states that she is also had some intermittent chest pain over the last few months. She states that it will last for a few minutes and then will go away on its own. She states that she is concerned because there is a family history of heart issues. She denies any shortness of breath with the chest pain.   She has never had a stress test.    Past Medical History:   Diagnosis Date    B12 deficiency     Cauda equina syndrome (HCC)     Complicated migraine     Depression     Hematuria     wih abdominal pain    Hyperlipidemia     Hypertension     MS (multiple sclerosis) (HCC)     Myofascial pain     Nerve damage     Pelvic floor dysfunction     Postmenopausal HRT (hormone replacement therapy)     POTS (postural orthostatic tachycardia syndrome)      Current Outpatient Prescriptions on File Prior to Visit   Medication Sig Dispense Refill    baclofen (LIORESAL) 20 MG tablet Take 20 mg by mouth 2 times daily      tamsulosin (FLOMAX) 0.4 MG capsule Take 0.4 mg by mouth daily      COPAXONE 40 MG/ML SOSY       promethazine (PHENERGAN) 25 MG tablet Take 25 mg by mouth      naproxen sodium (ANAPROX) 220 MG tablet Take 220 mg by mouth      Ascorbic Acid (VITAMIN C) 500 MG tablet Take 500 mg by mouth      cloNIDine (CATAPRES) 0.1 MG tablet 0.1 mg as needed       atorvastatin (LIPITOR) 10 MG tablet 1 tablet by mouth at bedtime for high cholesterol 90 tablet 3    SUMAtriptan Succinate 6 MG/0.5ML SOAJ       acyclovir (ZOVIRAX) 400 MG tablet       cyanocobalamin 1000 MCG/ML injection INJECT 1 ML INTRAMUSCULARLY EVERY MONTH 10 vial 1    phenazopyridine (PYRIDIUM) 200 MG tablet Take 1 tablet by mouth 3 times daily as needed for Pain. 24 tablet 2    Cholecalciferol (VITAMIN D-3) 5000 UNITS TABS Take 5,000 Units by mouth daily.  topiramate (TOPAMAX) 100 MG tablet Take 1 tablet by mouth 2 times daily. 180 tablet 3    tiZANidine (ZANAFLEX) 4 MG tablet Take 4 mg by mouth every 8 hours as needed.  amoxicillin-clavulanate (AUGMENTIN) 875-125 MG per tablet 1 tablet by mouth with food twice a day for sinus infection 20 tablet 0    [DISCONTINUED] bethanechol (URECHOLINE) 25 MG tablet Take 1 tablet by mouth 3 times daily. 270 tablet 3     No current facility-administered medications on file prior to visit. Allergies   Allergen Reactions    Ropinirole Hcl     Simvastatin     Zolpidem Other (See Comments)       Review of Systems   Constitutional: Positive for fatigue. Negative for activity change, appetite change and fever. HENT: Negative for congestion and nosebleeds. Eyes: Negative for discharge. Respiratory: Negative for cough and wheezing. Cardiovascular: Positive for chest pain. Negative for leg swelling. Gastrointestinal: Positive for abdominal pain and diarrhea. Negative for nausea and vomiting. Genitourinary: Negative for difficulty urinating, frequency and urgency. Dark urine     Musculoskeletal: Negative for back pain and gait problem. Skin: Negative for color change and rash. Neurological: Positive for weakness and numbness (left sided). Negative for dizziness and headaches. Hematological: Does not bruise/bleed easily. Psychiatric/Behavioral: Negative for sleep disturbance and suicidal ideas. Objective:   Physical Exam   Constitutional: She is oriented to person, place, and time. She appears well-developed and well-nourished. HENT:   Head: Normocephalic and atraumatic. Neck: Normal range of motion. Neck supple. Carotid bruit is not present. No thyroid mass and no thyromegaly present. Cardiovascular: Normal rate, regular rhythm and intact distal pulses. Exam reveals no gallop and no friction rub. No murmur heard. Pulmonary/Chest: Effort normal and breath sounds normal. No respiratory distress. Lymphadenopathy:     She has no cervical adenopathy. Neurological: She is alert and oriented to person, place, and time. Skin: Skin is warm and dry. No rash noted. Psychiatric: She has a normal mood and affect. Her behavior is normal. Judgment and thought content normal.     BP (!) 144/86 (Site: Right Arm)   Pulse 86   Temp 97.5 °F (36.4 °C) (Temporal)   Ht 5' 6\" (1.676 m)   Wt 207 lb (93.9 kg)   SpO2 98%   BMI 33.41 kg/m²     Assessment:        ICD-10-CM ICD-9-CM    1. Chest pain, unspecified type R07.9 786.50 EKG 12 Lead      CBC Auto Differential      Comprehensive Metabolic Panel      TSH without Reflex      Sedimentation Rate      C-Reactive Protein   2. Essential hypertension I10 401.9 CBC Auto Differential      Comprehensive Metabolic Panel      TSH without Reflex      Sedimentation Rate      C-Reactive Protein   3.  Fatigue, unspecified type R53.83 780.79 CBC Auto Differential      Comprehensive Metabolic Panel      TSH without Reflex      Sedimentation Rate

## 2017-12-05 ENCOUNTER — PATIENT MESSAGE (OUTPATIENT)
Dept: PRIMARY CARE CLINIC | Age: 37
End: 2017-12-05

## 2017-12-05 NOTE — TELEPHONE ENCOUNTER
From: Trevin Ortez  To: Shankar Garzon MD  Sent: 12/5/2017 1:57 PM CST  Subject: Non-Urgent Medical Question    Dr Lamin Sears office was going to fax some labs to your office from my monthly [employer] monitoring. They wanted you all to address the UTI. Im having burning, pain, and frequency. I feel terrible overall. [address]   [address] please.

## 2017-12-06 ENCOUNTER — PATIENT MESSAGE (OUTPATIENT)
Dept: PRIMARY CARE CLINIC | Age: 37
End: 2017-12-06

## 2017-12-06 RX ORDER — CIPROFLOXACIN 250 MG/1
TABLET, FILM COATED ORAL
Qty: 14 TABLET | Refills: 0 | Status: SHIPPED | OUTPATIENT
Start: 2017-12-06 | End: 2018-01-09

## 2017-12-06 NOTE — TELEPHONE ENCOUNTER
From: Myesha Wright  To: Soolmon Espinoza MD  Sent: 12/6/2017 8:12 AM CST  Subject: Non-Urgent Medical Question    I know they probably did not culture it but you can call and check. I have to come to [place] on Friday for an appointment I can stop by for a urinalysis. ----- Message -----  From: Solomon Espinoza MD  Sent: 12/6/2017 8:44 AM EST  To: Leslie Zavala  Subject: RE: Non-Urgent Medical Question  I cannot read what they sent. We will call them. I hope they cultured it because there are so many drug resistant bacteria out there that I give you may not treat it. Is very you could come in for a urinalysis here are somewhat have one to send for culture? I just hate for you to wait several more days and then we find out it was a wrong antibiotic    ----- Message -----   From: Leslie Zavala   Sent: 12/5/2017 1:57 PM CST   To: Solomon Espinoza MD  Subject: Non-Urgent Medical Question    Dr Dylon Huertas office was going to fax some labs to your office from my monthly [employer] monitoring. They wanted you all to address the UTI. Im having burning, pain, and frequency. I feel terrible overall. [address]   [address] please.

## 2017-12-07 ENCOUNTER — OFFICE VISIT (OUTPATIENT)
Dept: PRIMARY CARE CLINIC | Age: 37
End: 2017-12-07
Payer: COMMERCIAL

## 2017-12-07 VITALS
WEIGHT: 207.2 LBS | HEIGHT: 66 IN | TEMPERATURE: 96.9 F | BODY MASS INDEX: 33.3 KG/M2 | DIASTOLIC BLOOD PRESSURE: 82 MMHG | SYSTOLIC BLOOD PRESSURE: 112 MMHG | HEART RATE: 64 BPM | RESPIRATION RATE: 16 BRPM

## 2017-12-07 DIAGNOSIS — R42 DIZZINESS: ICD-10-CM

## 2017-12-07 DIAGNOSIS — R55 VASOVAGAL ATTACK: Primary | ICD-10-CM

## 2017-12-07 DIAGNOSIS — K92.1 BLOOD IN STOOL: ICD-10-CM

## 2017-12-07 DIAGNOSIS — R68.2 DRY MOUTH: ICD-10-CM

## 2017-12-07 LAB
GLUCOSE BLD-MCNC: 136 MG/DL
HCT VFR BLD CALC: 41.2 % (ref 37–47)
HEMOGLOBIN: 13.6 G/DL (ref 12–16)
MCH RBC QN AUTO: 30.6 PG (ref 27–31)
MCHC RBC AUTO-ENTMCNC: 33 G/DL (ref 33–37)
MCV RBC AUTO: 92.6 FL (ref 81–99)
PDW BLD-RTO: 13.2 % (ref 11.5–14.5)
PLATELET # BLD: 254 K/UL (ref 130–400)
PMV BLD AUTO: 10.2 FL (ref 9.4–12.3)
RBC # BLD: 4.45 M/UL (ref 4.2–5.4)
WBC # BLD: 4.9 K/UL (ref 4.8–10.8)

## 2017-12-07 PROCEDURE — G8484 FLU IMMUNIZE NO ADMIN: HCPCS | Performed by: FAMILY MEDICINE

## 2017-12-07 PROCEDURE — 36415 COLL VENOUS BLD VENIPUNCTURE: CPT | Performed by: FAMILY MEDICINE

## 2017-12-07 PROCEDURE — 1036F TOBACCO NON-USER: CPT | Performed by: FAMILY MEDICINE

## 2017-12-07 PROCEDURE — 82962 GLUCOSE BLOOD TEST: CPT | Performed by: FAMILY MEDICINE

## 2017-12-07 PROCEDURE — 99214 OFFICE O/P EST MOD 30 MIN: CPT | Performed by: FAMILY MEDICINE

## 2017-12-07 PROCEDURE — G8417 CALC BMI ABV UP PARAM F/U: HCPCS | Performed by: FAMILY MEDICINE

## 2017-12-07 PROCEDURE — G8427 DOCREV CUR MEDS BY ELIG CLIN: HCPCS | Performed by: FAMILY MEDICINE

## 2017-12-07 ASSESSMENT — ENCOUNTER SYMPTOMS
BLOOD IN STOOL: 1
ABDOMINAL PAIN: 0
EYE DISCHARGE: 0
EYE REDNESS: 0
VOMITING: 0
EYE ITCHING: 0
COUGH: 0
NAUSEA: 0
SINUS PRESSURE: 0
RHINORRHEA: 0
WHEEZING: 0
COLOR CHANGE: 0
DIARRHEA: 0
CHEST TIGHTNESS: 0

## 2017-12-07 NOTE — PROGRESS NOTES
mouth daily      COPAXONE 40 MG/ML SOSY       promethazine (PHENERGAN) 25 MG tablet Take 25 mg by mouth      naproxen sodium (ANAPROX) 220 MG tablet Take 220 mg by mouth      Ascorbic Acid (VITAMIN C) 500 MG tablet Take 500 mg by mouth      cloNIDine (CATAPRES) 0.1 MG tablet 0.1 mg as needed       atorvastatin (LIPITOR) 10 MG tablet 1 tablet by mouth at bedtime for high cholesterol 90 tablet 3    SUMAtriptan Succinate 6 MG/0.5ML SOAJ       acyclovir (ZOVIRAX) 400 MG tablet       phenazopyridine (PYRIDIUM) 200 MG tablet Take 1 tablet by mouth 3 times daily as needed for Pain. 24 tablet 2    Cholecalciferol (VITAMIN D-3) 5000 UNITS TABS Take 5,000 Units by mouth daily.  topiramate (TOPAMAX) 100 MG tablet Take 1 tablet by mouth 2 times daily. 180 tablet 3    tiZANidine (ZANAFLEX) 4 MG tablet Take 4 mg by mouth every 8 hours as needed.  [DISCONTINUED] bethanechol (URECHOLINE) 25 MG tablet Take 1 tablet by mouth 3 times daily. 270 tablet 3     No current facility-administered medications on file prior to visit. Allergies   Allergen Reactions    Ropinirole Hcl     Simvastatin     Zolpidem Other (See Comments)       Review of Systems   Constitutional: Negative for activity change, appetite change, chills and fever. HENT: Negative for congestion, nosebleeds, postnasal drip, rhinorrhea and sinus pressure. Eyes: Negative for discharge, redness and itching. Respiratory: Negative for cough, chest tightness and wheezing. Cardiovascular: Negative for chest pain. Gastrointestinal: Positive for blood in stool (on toilet paper). Negative for abdominal pain, diarrhea, nausea and vomiting. Genitourinary: Negative for decreased urine volume and difficulty urinating. Skin: Negative for color change and rash. Neurological: Negative for dizziness and headaches. Hematological: Does not bruise/bleed easily.        Objective:   Physical Exam   Constitutional: She is oriented to person, place, and time. She appears well-developed and well-nourished. HENT:   Head: Normocephalic and atraumatic. Neck: Normal range of motion. Neck supple. Cardiovascular: Normal rate, regular rhythm and intact distal pulses. Exam reveals no gallop and no friction rub. No murmur heard. Pulmonary/Chest: Effort normal and breath sounds normal. No respiratory distress. Lymphadenopathy:     She has no cervical adenopathy. Neurological: She is alert and oriented to person, place, and time. Skin: Skin is warm and dry. No rash noted. Psychiatric: She has a normal mood and affect. Her behavior is normal. Judgment and thought content normal.     /82 (Site: Left Arm, Position: Sitting, Cuff Size: Medium Adult)   Pulse 64   Temp 96.9 °F (36.1 °C) (Temporal)   Resp 16   Ht 5' 6\" (1.676 m)   Wt 207 lb 3.2 oz (94 kg)   BMI 33.44 kg/m²     Assessment:        ICD-10-CM ICD-9-CM    1. Vasovagal attack R55 780.2    2. Dry mouth R68.2 527.7 POCT glucose      CBC   3. Dizziness R42 780.4 POCT glucose      CBC   4. Blood in stool K92.1 578.1 CBC            Plan:    see orders. We'll notify results. Discussed that this is likely a vasovagal attack. Encouraged her to make sure she is drinking plenty of fluids and to take it easy today. Follow-up if she has another episode like this.   Orders Placed This Encounter   Procedures    CBC    POCT glucose

## 2017-12-11 ENCOUNTER — PATIENT MESSAGE (OUTPATIENT)
Dept: PRIMARY CARE CLINIC | Age: 37
End: 2017-12-11

## 2017-12-11 NOTE — TELEPHONE ENCOUNTER
From: Nelda Valerio  To: Maite Mac MD  Sent: 12/11/2017 7:10 AM CST  Subject: Visit Follow-Up Question    Sorry to bother you but I wanted to let you know that I had 2 episodes of the same near fainting . I had walked up about 10 stairs with one episode and walked up an incline with the other. I didnt have a way to check my blood pressure during the episodes but I know I could not hear anything and the room was spinning both times.

## 2017-12-14 ENCOUNTER — NURSE ONLY (OUTPATIENT)
Dept: PRIMARY CARE CLINIC | Age: 37
End: 2017-12-14
Payer: COMMERCIAL

## 2017-12-14 ENCOUNTER — PATIENT MESSAGE (OUTPATIENT)
Dept: PRIMARY CARE CLINIC | Age: 37
End: 2017-12-14

## 2017-12-14 DIAGNOSIS — R05.9 COUGH: ICD-10-CM

## 2017-12-14 DIAGNOSIS — M54.50 LOW BACK PAIN WITHOUT SCIATICA, UNSPECIFIED BACK PAIN LATERALITY, UNSPECIFIED CHRONICITY: ICD-10-CM

## 2017-12-14 DIAGNOSIS — M54.9 UPPER BACK PAIN: ICD-10-CM

## 2017-12-14 DIAGNOSIS — R52 BODY ACHES: Primary | ICD-10-CM

## 2017-12-14 LAB
BILIRUBIN, POC: NORMAL
BLOOD URINE, POC: NORMAL
CLARITY, POC: CLEAR
COLOR, POC: YELLOW
GLUCOSE URINE, POC: NORMAL
INFLUENZA A ANTIBODY: NORMAL
INFLUENZA B ANTIBODY: NORMAL
KETONES, POC: NORMAL
LEUKOCYTE EST, POC: NORMAL
NITRITE, POC: NORMAL
PH, POC: 5
PROTEIN, POC: NORMAL
SPECIFIC GRAVITY, POC: 1.02
UROBILINOGEN, POC: NORMAL

## 2017-12-14 PROCEDURE — 81002 URINALYSIS NONAUTO W/O SCOPE: CPT | Performed by: FAMILY MEDICINE

## 2017-12-14 PROCEDURE — 87804 INFLUENZA ASSAY W/OPTIC: CPT | Performed by: FAMILY MEDICINE

## 2017-12-15 ENCOUNTER — PATIENT MESSAGE (OUTPATIENT)
Dept: PRIMARY CARE CLINIC | Age: 37
End: 2017-12-15

## 2017-12-16 LAB — URINE CULTURE, ROUTINE: NORMAL

## 2017-12-29 ENCOUNTER — PATIENT MESSAGE (OUTPATIENT)
Dept: PRIMARY CARE CLINIC | Age: 37
End: 2017-12-29

## 2017-12-29 NOTE — TELEPHONE ENCOUNTER
From: Mauricio Blake  To: Lenard Barthel, MD  Sent: 12/29/2017 1:57 AM CST  Subject: Non-Urgent Medical Question    *Dr Temo Pond, I saw Dr Emily Philip at [facility] on [address]. she started me on low-dose Inderal to TRY to stabilize my BPs. She requested I have a rental artery ultrasound due to my history and my dads current vascular situation with 2 aneurysms with 1 pressing on the renal artery. She said she could have ordered the Ultrasound but I wanted to have it done locally (either [facility] or [place]). Thank you!

## 2018-01-03 RX ORDER — SCOLOPAMINE TRANSDERMAL SYSTEM 1 MG/1
1 PATCH, EXTENDED RELEASE TRANSDERMAL
Qty: 5 PATCH | Refills: 0 | Status: SHIPPED | OUTPATIENT
Start: 2018-01-03 | End: 2018-01-09

## 2018-01-04 DIAGNOSIS — I10 UNCONTROLLED HYPERTENSION: Primary | ICD-10-CM

## 2018-01-09 ENCOUNTER — OFFICE VISIT (OUTPATIENT)
Dept: GASTROENTEROLOGY | Age: 38
End: 2018-01-09
Payer: COMMERCIAL

## 2018-01-09 VITALS
BODY MASS INDEX: 33.46 KG/M2 | DIASTOLIC BLOOD PRESSURE: 80 MMHG | HEART RATE: 66 BPM | HEIGHT: 66 IN | WEIGHT: 208.2 LBS | SYSTOLIC BLOOD PRESSURE: 120 MMHG | OXYGEN SATURATION: 98 %

## 2018-01-09 DIAGNOSIS — R11.0 NAUSEA: ICD-10-CM

## 2018-01-09 DIAGNOSIS — K76.89 HEPATIC CYST: ICD-10-CM

## 2018-01-09 DIAGNOSIS — R63.4 UNINTENTIONAL WEIGHT LOSS: ICD-10-CM

## 2018-01-09 DIAGNOSIS — R10.13 EPIGASTRIC PAIN: Primary | ICD-10-CM

## 2018-01-09 DIAGNOSIS — R19.7 DIARRHEA, UNSPECIFIED TYPE: ICD-10-CM

## 2018-01-09 DIAGNOSIS — Z90.49 HISTORY OF CHOLECYSTECTOMY: ICD-10-CM

## 2018-01-09 PROCEDURE — G8417 CALC BMI ABV UP PARAM F/U: HCPCS | Performed by: NURSE PRACTITIONER

## 2018-01-09 PROCEDURE — 99215 OFFICE O/P EST HI 40 MIN: CPT | Performed by: NURSE PRACTITIONER

## 2018-01-09 PROCEDURE — G8427 DOCREV CUR MEDS BY ELIG CLIN: HCPCS | Performed by: NURSE PRACTITIONER

## 2018-01-09 PROCEDURE — G8484 FLU IMMUNIZE NO ADMIN: HCPCS | Performed by: NURSE PRACTITIONER

## 2018-01-09 PROCEDURE — 1036F TOBACCO NON-USER: CPT | Performed by: NURSE PRACTITIONER

## 2018-01-09 RX ORDER — PROPRANOLOL HYDROCHLORIDE 60 MG/1
60 TABLET ORAL 3 TIMES DAILY
COMMUNITY
End: 2019-03-18 | Stop reason: DRUGHIGH

## 2018-01-09 ASSESSMENT — ENCOUNTER SYMPTOMS
SHORTNESS OF BREATH: 0
SORE THROAT: 0
BLOOD IN STOOL: 0
VOMITING: 0
DIARRHEA: 1
VOICE CHANGE: 0
ABDOMINAL PAIN: 1
COUGH: 0
CHEST TIGHTNESS: 0
CONSTIPATION: 0
BACK PAIN: 0
NAUSEA: 1
ABDOMINAL DISTENTION: 0
RECTAL PAIN: 0

## 2018-01-09 NOTE — PATIENT INSTRUCTIONS
Patient Education        Upper GI Endoscopy: Before Your Procedure  What is an upper GI endoscopy? An upper gastrointestinal (or GI) endoscopy is a test that allows your doctor to look at the inside of your esophagus, stomach, and the first part of your small intestine, called the duodenum. The esophagus is the tube that carries food to your stomach. The doctor uses a thin, lighted tube that bends. It is called an endoscope, or scope. The doctor puts the tip of the scope in your mouth and gently moves it down your throat. The scope is a flexible video camera. The doctor looks at a monitor (like a TV set or a computer screen) as he or she moves the scope. A doctor may do this test, which is also called a procedure, to look for ulcers, tumors, infection, or bleeding. It also can be used to look for signs of acid backing up into your esophagus. This is called gastroesophageal reflux disease, or GERD. The doctor can use the scope to take a sample of tissue for study (a biopsy). The doctor also can use the scope to take out growths or stop bleeding. Follow-up care is a key part of your treatment and safety. Be sure to make and go to all appointments, and call your doctor if you are having problems. It's also a good idea to know your test results and keep a list of the medicines you take. What happens before the procedure? ?Preparing for the procedure  ? · Understand exactly what procedure is planned, along with the risks, benefits, and other options. · Tell your doctors ALL the medicines, vitamins, supplements, and herbal remedies you take. Some of these can increase the risk of bleeding or interact with anesthesia. ? · If you take blood thinners, such as warfarin (Coumadin), clopidogrel (Plavix), or aspirin, be sure to talk to your doctor. He or she will tell you if you should stop taking these medicines before your procedure.  Make sure that you understand exactly what your doctor wants you to do.   ? · Your doctor will tell you which medicines to take or stop before your procedure. You may need to stop taking certain medicines a week or more before the procedure. So talk to your doctor as soon as you can.   ? · If you have an advance directive, let your doctor know. It may include a living will and a durable power of  for health care. Bring a copy to the hospital. If you don't have one, you may want to prepare one. It lets your doctor and loved ones know your health care wishes. Doctors advise that everyone prepare these papers before any type of surgery or procedure. Procedures can be stressful. This information will help you understand what you can expect. And it will help you safely prepare for your procedure. What happens on the day of the procedure? · Follow the instructions exactly about when to stop eating and drinking. If you don't, your procedure may be canceled. If your doctor told you to take your medicines on the day of the procedure, take them with only a sip of water. ? · Take a bath or shower before you come in for your procedure. Do not apply lotions, perfumes, deodorants, or nail polish. ? · Take off all jewelry and piercings. And take out contact lenses, if you wear them. ? At the hospital or surgery center   · Bring a picture ID. ? · The test may take 15 to 30 minutes. ? · The doctor may spray medicine on the back of your throat to numb it. You also will get medicine to prevent pain and to relax you. ? · You will lie on your left side. The doctor will put the scope in your mouth and toward the back of your throat. The doctor will tell you when to swallow. This helps the scope move down your throat. You will be able to breathe normally. The doctor will move the scope down your esophagus into your stomach. The doctor also may look at the duodenum.    ? · If your doctor wants to take a sample of tissue for a biopsy, he or she may use small surgical tools, which are put into the scope, to cut off some tissue. You will not feel a biopsy, if one is taken. The doctor also can use the tools to stop bleeding or to do other treatments, if needed. ? · You will stay at the hospital or surgery center for 1 to 2 hours until the medicine you were given wears off. Going home   · Be sure you have someone to drive you home. Anesthesia and pain medicine make it unsafe for you to drive. ? · You will be given more specific instructions about recovering from your procedure. They will cover things like diet, wound care, follow-up care, driving, and getting back to your normal routine. When should you call your doctor? · You have questions or concerns. ? · You don't understand how to prepare for your procedure. ? · You become ill before the procedure (such as fever, flu, or a cold). ? · You need to reschedule or have changed your mind about having the procedure. Where can you learn more? Go to https://[email or web address].FFWD. org and sign in to your [employer] account. Enter P790 in the ImpactFlo box to learn more about \"Upper GI Endoscopy: Before Your Procedure. \"     If you do not have an account, please click on the \"Sign Up Now\" link. Current as of: May 12, 2017  Content Version: 11.5  © 3327-3549 Healthwise, Incorporated. Care instructions adapted under license by [facility] If you have questions about a medical condition or this instruction, always ask your healthcare professional. [place] [address] any warranty or liability for your use of this information.

## 2018-01-09 NOTE — PROGRESS NOTES
POTS (postural orthostatic tachycardia syndrome)       Past Surgical History:   Procedure Laterality Date    APPENDECTOMY      BACK SURGERY      CHOLECYSTECTOMY, LAPAROSCOPIC      HYSTERECTOMY        Family History   Problem Relation Age of Onset    Diabetes Mother     High Blood Pressure Mother     Colon Polyps Mother     Colon Cancer Neg Hx     Liver Cancer Neg Hx     Liver Disease Neg Hx     Esophageal Cancer Neg Hx     Rectal Cancer Neg Hx     Stomach Cancer Neg Hx       Current Outpatient Prescriptions   Medication Sig Dispense Refill    propranolol (INDERAL) 10 MG tablet Take 10 mg by mouth 3 times daily      Probiotic Product (PROBIOTIC DAILY PO) Take by mouth      gabapentin (NEURONTIN) 300 MG capsule Take 600 mg by mouth daily once in the morning and 3 at bedtime      baclofen (LIORESAL) 20 MG tablet Take 20 mg by mouth 2 times daily      tamsulosin (FLOMAX) 0.4 MG capsule Take 0.4 mg by mouth daily      promethazine (PHENERGAN) 25 MG tablet Take 25 mg by mouth      naproxen sodium (ANAPROX) 220 MG tablet Take 220 mg by mouth      Ascorbic Acid (VITAMIN C) 500 MG tablet Take 500 mg by mouth      atorvastatin (LIPITOR) 10 MG tablet 1 tablet by mouth at bedtime for high cholesterol 90 tablet 3    SUMAtriptan Succinate 6 MG/0.5ML SOAJ       acyclovir (ZOVIRAX) 400 MG tablet       phenazopyridine (PYRIDIUM) 200 MG tablet Take 1 tablet by mouth 3 times daily as needed for Pain. 24 tablet 2    Cholecalciferol (VITAMIN D-3) 5000 UNITS TABS Take 5,000 Units by mouth daily.  topiramate (TOPAMAX) 100 MG tablet Take 1 tablet by mouth 2 times daily. 180 tablet 3    tiZANidine (ZANAFLEX) 4 MG tablet Take 4 mg by mouth every 8 hours as needed.  cloNIDine (CATAPRES) 0.1 MG tablet 0.1 mg as needed        No current facility-administered medications for this visit.        Allergies   Allergen Reactions    Ropinirole Hcl     Simvastatin     Zolpidem Other (See Comments)      Social

## 2018-01-19 ENCOUNTER — HOSPITAL ENCOUNTER (OUTPATIENT)
Dept: MRI IMAGING | Age: 38
Discharge: HOME | End: 2018-01-19
Payer: COMMERCIAL

## 2018-01-19 DIAGNOSIS — K76.89 HEPATIC CYST: ICD-10-CM

## 2018-01-19 DIAGNOSIS — Z90.49 HISTORY OF CHOLECYSTECTOMY: ICD-10-CM

## 2018-01-19 DIAGNOSIS — R63.4 UNINTENTIONAL WEIGHT LOSS: ICD-10-CM

## 2018-01-19 DIAGNOSIS — R11.0 NAUSEA: ICD-10-CM

## 2018-01-19 PROCEDURE — A9577 INJ MULTIHANCE: HCPCS | Performed by: NURSE PRACTITIONER

## 2018-01-19 PROCEDURE — 6360000004 HC RX CONTRAST MEDICATION: Performed by: NURSE PRACTITIONER

## 2018-01-19 PROCEDURE — 74183 MRI ABD W/O CNTR FLWD CNTR: CPT

## 2018-01-19 RX ADMIN — GADOBENATE DIMEGLUMINE 20 ML: 529 INJECTION, SOLUTION INTRAVENOUS at 15:02

## 2018-01-29 ENCOUNTER — ANESTHESIA (OUTPATIENT)
Dept: ENDOSCOPY | Age: 38
End: 2018-01-29
Payer: COMMERCIAL

## 2018-01-29 ENCOUNTER — ANESTHESIA EVENT (OUTPATIENT)
Dept: ENDOSCOPY | Age: 38
End: 2018-01-29
Payer: COMMERCIAL

## 2018-01-29 ENCOUNTER — HOSPITAL ENCOUNTER (OUTPATIENT)
Age: 38
Setting detail: OUTPATIENT SURGERY
Discharge: HOME | End: 2018-01-29
Attending: INTERNAL MEDICINE | Admitting: INTERNAL MEDICINE
Payer: COMMERCIAL

## 2018-01-29 VITALS
WEIGHT: 198 LBS | RESPIRATION RATE: 18 BRPM | OXYGEN SATURATION: 100 % | TEMPERATURE: 97.6 F | HEART RATE: 68 BPM | SYSTOLIC BLOOD PRESSURE: 112 MMHG | HEIGHT: 66 IN | DIASTOLIC BLOOD PRESSURE: 73 MMHG | BODY MASS INDEX: 31.82 KG/M2

## 2018-01-29 VITALS
RESPIRATION RATE: 17 BRPM | OXYGEN SATURATION: 92 % | SYSTOLIC BLOOD PRESSURE: 118 MMHG | DIASTOLIC BLOOD PRESSURE: 80 MMHG

## 2018-01-29 PROCEDURE — 7100000011 HC PHASE II RECOVERY - ADDTL 15 MIN: Performed by: INTERNAL MEDICINE

## 2018-01-29 PROCEDURE — 3609012400 HC EGD TRANSORAL BIOPSY SINGLE/MULTIPLE: Performed by: INTERNAL MEDICINE

## 2018-01-29 PROCEDURE — 3700000000 HC ANESTHESIA ATTENDED CARE: Performed by: INTERNAL MEDICINE

## 2018-01-29 PROCEDURE — 88305 TISSUE EXAM BY PATHOLOGIST: CPT

## 2018-01-29 PROCEDURE — 43239 EGD BIOPSY SINGLE/MULTIPLE: CPT | Performed by: INTERNAL MEDICINE

## 2018-01-29 PROCEDURE — 2500000003 HC RX 250 WO HCPCS: Performed by: NURSE ANESTHETIST, CERTIFIED REGISTERED

## 2018-01-29 PROCEDURE — 3700000001 HC ADD 15 MINUTES (ANESTHESIA): Performed by: INTERNAL MEDICINE

## 2018-01-29 PROCEDURE — 2580000003 HC RX 258: Performed by: INTERNAL MEDICINE

## 2018-01-29 PROCEDURE — 7100000010 HC PHASE II RECOVERY - FIRST 15 MIN: Performed by: INTERNAL MEDICINE

## 2018-01-29 PROCEDURE — 6360000002 HC RX W HCPCS: Performed by: NURSE ANESTHETIST, CERTIFIED REGISTERED

## 2018-01-29 PROCEDURE — 88312 SPECIAL STAINS GROUP 1: CPT

## 2018-01-29 RX ORDER — LIDOCAINE HYDROCHLORIDE 10 MG/ML
1 INJECTION, SOLUTION EPIDURAL; INFILTRATION; INTRACAUDAL; PERINEURAL ONCE
Status: DISCONTINUED | OUTPATIENT
Start: 2018-01-29 | End: 2018-01-29 | Stop reason: HOSPADM

## 2018-01-29 RX ORDER — LIDOCAINE HYDROCHLORIDE 10 MG/ML
INJECTION, SOLUTION INFILTRATION; PERINEURAL PRN
Status: DISCONTINUED | OUTPATIENT
Start: 2018-01-29 | End: 2018-01-29 | Stop reason: SDUPTHER

## 2018-01-29 RX ORDER — MIDAZOLAM HYDROCHLORIDE 1 MG/ML
INJECTION INTRAMUSCULAR; INTRAVENOUS PRN
Status: DISCONTINUED | OUTPATIENT
Start: 2018-01-29 | End: 2018-01-29 | Stop reason: SDUPTHER

## 2018-01-29 RX ORDER — PROPOFOL 10 MG/ML
INJECTION, EMULSION INTRAVENOUS PRN
Status: DISCONTINUED | OUTPATIENT
Start: 2018-01-29 | End: 2018-01-29 | Stop reason: SDUPTHER

## 2018-01-29 RX ORDER — SODIUM CHLORIDE, SODIUM LACTATE, POTASSIUM CHLORIDE, CALCIUM CHLORIDE 600; 310; 30; 20 MG/100ML; MG/100ML; MG/100ML; MG/100ML
INJECTION, SOLUTION INTRAVENOUS CONTINUOUS
Status: DISCONTINUED | OUTPATIENT
Start: 2018-01-29 | End: 2018-01-29 | Stop reason: HOSPADM

## 2018-01-29 RX ADMIN — SODIUM CHLORIDE, SODIUM LACTATE, POTASSIUM CHLORIDE, AND CALCIUM CHLORIDE: 600; 310; 30; 20 INJECTION, SOLUTION INTRAVENOUS at 09:43

## 2018-01-29 RX ADMIN — LIDOCAINE HYDROCHLORIDE 50 MG: 10 INJECTION, SOLUTION INFILTRATION; PERINEURAL at 09:49

## 2018-01-29 RX ADMIN — PROPOFOL 200 MG: 10 INJECTION, EMULSION INTRAVENOUS at 09:49

## 2018-01-29 RX ADMIN — MIDAZOLAM HYDROCHLORIDE 2 MG: 1 INJECTION, SOLUTION INTRAMUSCULAR; INTRAVENOUS at 09:46

## 2018-01-29 ASSESSMENT — PAIN SCALES - GENERAL
PAINLEVEL_OUTOF10: 0
PAINLEVEL_OUTOF10: 0

## 2018-01-29 ASSESSMENT — PAIN - FUNCTIONAL ASSESSMENT: PAIN_FUNCTIONAL_ASSESSMENT: 0-10

## 2018-02-02 ENCOUNTER — OFFICE VISIT (OUTPATIENT)
Dept: PRIMARY CARE CLINIC | Age: 38
End: 2018-02-02
Payer: COMMERCIAL

## 2018-02-02 VITALS
RESPIRATION RATE: 20 BRPM | DIASTOLIC BLOOD PRESSURE: 78 MMHG | OXYGEN SATURATION: 99 % | WEIGHT: 199 LBS | SYSTOLIC BLOOD PRESSURE: 110 MMHG | HEIGHT: 66 IN | HEART RATE: 68 BPM | BODY MASS INDEX: 31.98 KG/M2 | TEMPERATURE: 98.3 F

## 2018-02-02 DIAGNOSIS — G83.4 CAUDA EQUINA SYNDROME (HCC): ICD-10-CM

## 2018-02-02 DIAGNOSIS — J06.9 URI WITH COUGH AND CONGESTION: Primary | ICD-10-CM

## 2018-02-02 PROCEDURE — G8417 CALC BMI ABV UP PARAM F/U: HCPCS | Performed by: FAMILY MEDICINE

## 2018-02-02 PROCEDURE — 1036F TOBACCO NON-USER: CPT | Performed by: FAMILY MEDICINE

## 2018-02-02 PROCEDURE — 99213 OFFICE O/P EST LOW 20 MIN: CPT | Performed by: FAMILY MEDICINE

## 2018-02-02 PROCEDURE — 96372 THER/PROPH/DIAG INJ SC/IM: CPT | Performed by: FAMILY MEDICINE

## 2018-02-02 PROCEDURE — G8484 FLU IMMUNIZE NO ADMIN: HCPCS | Performed by: FAMILY MEDICINE

## 2018-02-02 PROCEDURE — G8427 DOCREV CUR MEDS BY ELIG CLIN: HCPCS | Performed by: FAMILY MEDICINE

## 2018-02-02 RX ORDER — GABAPENTIN 300 MG/1
CAPSULE ORAL
Qty: 90 CAPSULE | Refills: 3 | Status: SHIPPED | OUTPATIENT
Start: 2018-02-02 | End: 2018-08-08 | Stop reason: SDUPTHER

## 2018-02-02 RX ORDER — AMOXICILLIN AND CLAVULANATE POTASSIUM 875; 125 MG/1; MG/1
TABLET, FILM COATED ORAL
Qty: 20 TABLET | Refills: 0 | Status: SHIPPED | OUTPATIENT
Start: 2018-02-02 | End: 2018-07-27 | Stop reason: ALTCHOICE

## 2018-02-02 RX ORDER — FLUCONAZOLE 150 MG/1
TABLET ORAL
Qty: 1 TABLET | Refills: 1 | Status: SHIPPED | OUTPATIENT
Start: 2018-02-02 | End: 2018-07-27 | Stop reason: ALTCHOICE

## 2018-02-02 RX ORDER — TRIAMCINOLONE ACETONIDE 40 MG/ML
40 INJECTION, SUSPENSION INTRA-ARTICULAR; INTRAMUSCULAR ONCE
Status: COMPLETED | OUTPATIENT
Start: 2018-02-02 | End: 2018-02-02

## 2018-02-02 RX ADMIN — TRIAMCINOLONE ACETONIDE 40 MG: 40 INJECTION, SUSPENSION INTRA-ARTICULAR; INTRAMUSCULAR at 11:30

## 2018-02-04 ASSESSMENT — ENCOUNTER SYMPTOMS
COUGH: 1
BACK PAIN: 1
RHINORRHEA: 1
SINUS PRESSURE: 1
SINUS PAIN: 1

## 2018-02-04 NOTE — PROGRESS NOTES
Subjective:      Patient ID: Willi Powell is a 42 y.o. female who comes in complaining of increasing head and chest congestion for 2 weeks. HPI. This started while she was on a cruise with her . She developed a clear runny nose and then it moved down into her chest. Now she has a deep cough. She says she just isn't getting better. She has tried over-the-counter medications. She is running a fever at night. She would also like me to start prescribing her gabapentin which she uses for her cauda equina syndrome. She has been seeing Dr. Paul Fam in [place] but it's very difficult to get down there to get the medication. She is not abusing it. Review of Systems   Constitutional: Positive for fatigue. Negative for chills, diaphoresis and fever. HENT: Positive for congestion, postnasal drip, rhinorrhea, sinus pain and sinus pressure. Respiratory: Positive for cough. Musculoskeletal: Positive for back pain and myalgias. Neurological: Positive for numbness. Hematological: Negative. Objective:   Physical Exam   Constitutional: She is oriented to person, place, and time. She appears well-developed and well-nourished. No distress. She sounds very congested. HENT:   Head: Normocephalic. Right Ear: Tympanic membrane, external ear and ear canal normal.   Left Ear: Tympanic membrane, external ear and ear canal normal.   Mouth/Throat: Oropharynx is clear and moist.   Oral pharynx is erythematous with whitish postnasal drainage. Left TM is dull. Eyes: Conjunctivae are normal. Pupils are equal, round, and reactive to light. Neck: Normal range of motion. Neck supple. Carotid bruit is not present. Cardiovascular: Normal rate, regular rhythm and normal heart sounds. No murmur heard. Pulmonary/Chest: Effort normal and breath sounds normal. No respiratory distress. Musculoskeletal: Normal range of motion. She exhibits edema (trace edema in lower legs).    Lymphadenopathy: She has no cervical adenopathy. Neurological: She is alert and oriented to person, place, and time. Skin: Skin is warm, dry and intact. Psychiatric: She has a normal mood and affect. Her speech is normal and behavior is normal. Judgment and thought content normal. Cognition and memory are normal.   Vitals reviewed. Assessment:      1. URI with cough and congestion    2. Cauda equina syndrome (HCC)            Plan:      MEDICATIONS:  Orders Placed This Encounter   Medications    triamcinolone acetonide (KENALOG-40) injection 40 mg    amoxicillin-clavulanate (AUGMENTIN) 875-125 MG per tablet     Si tablet by mouth with a meal twice a day for infection     Dispense:  20 tablet     Refill:  0    fluconazole (DIFLUCAN) 150 MG tablet     Si tablet by mouth 1 dose for yeast infection     Dispense:  1 tablet     Refill:  1    gabapentin (NEURONTIN) 300 MG capsule     Sig: 3 capsules by mouth at bedtime for chronic pain. Dispense:  90 capsule     Refill:  3       ORDERS:  No orders of the defined types were placed in this encounter. Rest, increase fluids. I will start riding her gabapentin that she needs to sign a medication agreement. She is aware of this. Follow-up:  Return if symptoms worsen or fail to improve. PATIENT INSTRUCTIONS:  Patient Instructions       Patient Education        Cough: Care Instructions  Your Care Instructions    A cough is your body's response to something that bothers your throat or airways. Many things can cause a cough. You might cough because of a cold or the flu, bronchitis, or asthma. Smoking, postnasal drip, allergies, and stomach acid that backs up into your throat also can cause coughs. A cough is a symptom, not a disease. Most coughs stop when the cause, such as a cold, goes away. You can take a few steps at home to cough less and feel better. Follow-up care is a key part of your treatment and safety.  Be sure to make and go to all appointments, and call your doctor if you are having problems. It's also a good idea to know your test results and keep a list of the medicines you take. How can you care for yourself at home? · Drink lots of water and other fluids. This helps thin the mucus and soothes a dry or sore throat. Honey or lemon juice in hot water or tea may ease a dry cough. · Take cough medicine as directed by your doctor. · Prop up your head on pillows to help you breathe and ease a dry cough. · Try cough drops to soothe a dry or sore throat. Cough drops don't stop a cough. Medicine-flavored cough drops are no better than candy-flavored drops or hard candy. · Do not smoke. Avoid secondhand smoke. If you need help quitting, talk to your doctor about stop-smoking programs and medicines. These can increase your chances of quitting for good. When should you call for help? Call 911 anytime you think you may need emergency care. For example, call if:  ? · You have severe trouble breathing. ?Call your doctor now or seek immediate medical care if:  ? · You cough up blood. ? · You have new or worse trouble breathing. ? · You have a new or higher fever. ? · You have a new rash. ? Watch closely for changes in your health, and be sure to contact your doctor if:  ? · You cough more deeply or more often, especially if you notice more mucus or a change in the color of your mucus. ? · You have new symptoms, such as a sore throat, an earache, or sinus pain. ? · You do not get better as expected. Where can you learn more? Go to https://AtheroMedterenceAURSOS.GroundedPower. org and sign in to your [employer] account. Enter D279 in the TurboTranslations box to learn more about \"Cough: Care Instructions. \"     If you do not have an account, please click on the \"Sign Up Now\" link. Current as of: May 12, 2017  Content Version: 11.5  © 4153-8753 Healthwise, Incorporated. Care instructions adapted under license by [facility] ([facility]).  If you have questions about

## 2018-02-05 ENCOUNTER — HOSPITAL ENCOUNTER (OUTPATIENT)
Dept: WOMENS IMAGING | Age: 38
Discharge: HOME | End: 2018-02-05
Payer: COMMERCIAL

## 2018-02-05 DIAGNOSIS — R93.89 ABNORMAL ULTRASOUND: ICD-10-CM

## 2018-02-05 DIAGNOSIS — N63.10 BREAST MASS, RIGHT: ICD-10-CM

## 2018-02-05 PROCEDURE — 77065 DX MAMMO INCL CAD UNI: CPT

## 2018-02-05 PROCEDURE — 76642 ULTRASOUND BREAST LIMITED: CPT

## 2018-02-07 ENCOUNTER — OFFICE VISIT (OUTPATIENT)
Dept: VASCULAR SURGERY | Age: 38
End: 2018-02-07
Payer: COMMERCIAL

## 2018-02-07 VITALS
RESPIRATION RATE: 18 BRPM | HEART RATE: 78 BPM | TEMPERATURE: 96 F | DIASTOLIC BLOOD PRESSURE: 94 MMHG | SYSTOLIC BLOOD PRESSURE: 144 MMHG

## 2018-02-07 DIAGNOSIS — I10 ESSENTIAL HYPERTENSION: Primary | ICD-10-CM

## 2018-02-07 PROCEDURE — G8427 DOCREV CUR MEDS BY ELIG CLIN: HCPCS | Performed by: NURSE PRACTITIONER

## 2018-02-07 PROCEDURE — G8484 FLU IMMUNIZE NO ADMIN: HCPCS | Performed by: NURSE PRACTITIONER

## 2018-02-07 PROCEDURE — G8417 CALC BMI ABV UP PARAM F/U: HCPCS | Performed by: NURSE PRACTITIONER

## 2018-02-07 PROCEDURE — 99203 OFFICE O/P NEW LOW 30 MIN: CPT | Performed by: NURSE PRACTITIONER

## 2018-02-07 PROCEDURE — 1036F TOBACCO NON-USER: CPT | Performed by: NURSE PRACTITIONER

## 2018-02-07 NOTE — PROGRESS NOTES
dose for yeast infection 1 tablet 1    gabapentin (NEURONTIN) 300 MG capsule 3 capsules by mouth at bedtime for chronic pain. 90 capsule 3    propranolol (INDERAL) 10 MG tablet Take 10 mg by mouth 3 times daily      Probiotic Product (PROBIOTIC DAILY PO) Take by mouth      baclofen (LIORESAL) 20 MG tablet Take 20 mg by mouth 2 times daily      tamsulosin (FLOMAX) 0.4 MG capsule Take 0.4 mg by mouth daily      promethazine (PHENERGAN) 25 MG tablet Take 25 mg by mouth      Ascorbic Acid (VITAMIN C) 500 MG tablet Take 500 mg by mouth      cloNIDine (CATAPRES) 0.1 MG tablet 0.1 mg as needed       atorvastatin (LIPITOR) 10 MG tablet 1 tablet by mouth at bedtime for high cholesterol 90 tablet 3    SUMAtriptan Succinate 6 MG/0.5ML SOAJ       phenazopyridine (PYRIDIUM) 200 MG tablet Take 1 tablet by mouth 3 times daily as needed for Pain. 24 tablet 2    Cholecalciferol (VITAMIN D-3) 5000 UNITS TABS Take 5,000 Units by mouth daily.  topiramate (TOPAMAX) 100 MG tablet Take 1 tablet by mouth 2 times daily. 180 tablet 3    tiZANidine (ZANAFLEX) 4 MG tablet Take 4 mg by mouth every 8 hours as needed. No current facility-administered medications for this visit.       Allergies: Ropinirole hcl; Simvastatin; and Zolpidem  Past Medical History:   Diagnosis Date    B12 deficiency     Cauda equina syndrome (HCC)     Complicated migraine     Depression     Hematuria     wih abdominal pain    Hyperlipidemia     Hypertension     MS (multiple sclerosis) (HCC)     Myofascial pain     Nerve damage     Pelvic floor dysfunction     Postmenopausal HRT (hormone replacement therapy)     POTS (postural orthostatic tachycardia syndrome)      Past Surgical History:   Procedure Laterality Date    APPENDECTOMY      BACK SURGERY      CHOLECYSTECTOMY, LAPAROSCOPIC      HYSTERECTOMY      NM EGD TRANSORAL BIOPSY SINGLE/MULTIPLE N/A 1/29/2018    Dr Angelo Abdul-Active duodenitis, gastritis/gastropathy     Family History Right external ear canal appears normal.  Left external ear canal appears normal.  Septum appears midline. Eyes  conjunctiva normal.  EOMS normal.  No exudate. No icterus. Neck- ROM appears normal, no tracheal deviation. Cardiovascular  Regular rate and rhythm. Heart sounds are normal.  No murmur, rub, or gallop. Carotid pulses are 2+ to palpation bilaterally without bruit. Extremities - Radial and brachial pulses are 2+ to palpation bilaterally. Right femoral pulse: present 2+; Right popliteal pulse: absent Right DP: absent; Right PT absent; Left femoral pulse: present 2+; Left popliteal pulse: absent; Left DP: absent; Left PT: absent No cyanosis, clubbing, or significant edema. No signs atheroembolic event. Pulmonary  effort appears normal.  No respiratory distress. Lungs - Breath sounds normal. No wheezes or rales. GI - Abdomen  soft, non tender, bowel sounds X 4 quadrants. No guarding or rebound tenderness. No distension or palpable mass. Genitourinary  deferred. Musculoskeletal  ROM appears normal.  No significant edema. Neurologic  alert and oriented X 3. Physiologic. Skin  warm, dry, and intact. No rash, erythema, or pallor. Psychiatric  mood, affect, and behavior appear normal.  Judgment and thought processes appear normal.    CT reviewed from 2 years prior and no stenosis identified    Options have been discussed with the patient including continued medical management. Patient has opted to proceed with continued medical management. Assessment    1.  Essential hypertension          Plan    Will get renal u/s with doppler - shows normal kidney size bilaterally and no signs of renal artery stenosis  Recommend continued follow up with Dr. Raudel Fishman

## 2018-02-08 ENCOUNTER — HOSPITAL ENCOUNTER (OUTPATIENT)
Dept: ULTRASOUND IMAGING | Age: 38
Discharge: HOME | End: 2018-02-08
Payer: COMMERCIAL

## 2018-02-08 DIAGNOSIS — I10 ESSENTIAL HYPERTENSION: ICD-10-CM

## 2018-02-08 PROCEDURE — 93976 VASCULAR STUDY: CPT

## 2018-03-26 ENCOUNTER — NURSE ONLY (OUTPATIENT)
Dept: PRIMARY CARE CLINIC | Age: 38
End: 2018-03-26
Payer: COMMERCIAL

## 2018-03-26 DIAGNOSIS — M54.9 BACK PAIN, UNSPECIFIED BACK LOCATION, UNSPECIFIED BACK PAIN LATERALITY, UNSPECIFIED CHRONICITY: ICD-10-CM

## 2018-03-26 DIAGNOSIS — R30.0 BURNING WITH URINATION: Primary | ICD-10-CM

## 2018-03-26 LAB
BILIRUBIN, POC: NORMAL
BLOOD URINE, POC: NORMAL
CLARITY, POC: CLEAR
COLOR, POC: YELLOW
GLUCOSE URINE, POC: NORMAL
KETONES, POC: NORMAL
LEUKOCYTE EST, POC: NORMAL
NITRITE, POC: NORMAL
PH, POC: 5
PROTEIN, POC: NORMAL
SPECIFIC GRAVITY, POC: 1.02
UROBILINOGEN, POC: 0.2

## 2018-03-26 PROCEDURE — 81002 URINALYSIS NONAUTO W/O SCOPE: CPT | Performed by: FAMILY MEDICINE

## 2018-03-26 RX ORDER — ATORVASTATIN CALCIUM 10 MG/1
TABLET, FILM COATED ORAL
Qty: 90 TABLET | Refills: 3 | Status: SHIPPED | OUTPATIENT
Start: 2018-03-26 | End: 2019-03-20 | Stop reason: SDUPTHER

## 2018-04-24 ENCOUNTER — OFFICE VISIT (OUTPATIENT)
Dept: UROLOGY | Facility: CLINIC | Age: 38
End: 2018-04-24

## 2018-04-24 VITALS
HEIGHT: 65 IN | DIASTOLIC BLOOD PRESSURE: 82 MMHG | WEIGHT: 220 LBS | BODY MASS INDEX: 36.65 KG/M2 | SYSTOLIC BLOOD PRESSURE: 124 MMHG

## 2018-04-24 DIAGNOSIS — N39.0 URINARY TRACT INFECTION WITHOUT HEMATURIA, SITE UNSPECIFIED: ICD-10-CM

## 2018-04-24 DIAGNOSIS — R30.0 DYSURIA: Primary | ICD-10-CM

## 2018-04-24 LAB
BILIRUB BLD-MCNC: NEGATIVE MG/DL
CLARITY, POC: CLEAR
COLOR UR: YELLOW
GLUCOSE UR STRIP-MCNC: NEGATIVE MG/DL
KETONES UR QL: NEGATIVE
LEUKOCYTE EST, POC: ABNORMAL
NITRITE UR-MCNC: NEGATIVE MG/ML
PH UR: 7 [PH] (ref 5–8)
PROT UR STRIP-MCNC: NEGATIVE MG/DL
RBC # UR STRIP: NEGATIVE /UL
SP GR UR: 1.02 (ref 1–1.03)
UROBILINOGEN UR QL: NORMAL

## 2018-04-24 PROCEDURE — 99213 OFFICE O/P EST LOW 20 MIN: CPT | Performed by: NURSE PRACTITIONER

## 2018-04-24 PROCEDURE — 87086 URINE CULTURE/COLONY COUNT: CPT | Performed by: NURSE PRACTITIONER

## 2018-04-24 PROCEDURE — 81001 URINALYSIS AUTO W/SCOPE: CPT | Performed by: NURSE PRACTITIONER

## 2018-04-24 PROCEDURE — 51701 INSERT BLADDER CATHETER: CPT | Performed by: NURSE PRACTITIONER

## 2018-04-24 RX ORDER — BACLOFEN 20 MG/1
TABLET ORAL NIGHTLY
COMMUNITY
Start: 2017-11-27 | End: 2021-10-26

## 2018-04-24 RX ORDER — GLATIRAMER 40 MG/ML
40 INJECTION, SOLUTION SUBCUTANEOUS
COMMUNITY
End: 2019-09-25

## 2018-04-24 RX ORDER — PROPRANOLOL HYDROCHLORIDE 60 MG/1
60 TABLET ORAL DAILY
COMMUNITY
Start: 2018-03-16

## 2018-04-24 RX ORDER — TAMSULOSIN HYDROCHLORIDE 0.4 MG/1
CAPSULE ORAL
COMMUNITY
Start: 2018-01-16 | End: 2018-10-02

## 2018-04-24 NOTE — PATIENT INSTRUCTIONS

## 2018-04-24 NOTE — PROGRESS NOTES
Subjective    Ms. Iniguez is 37 y.o. female    Chief Complaint: UTI    History of Present Illness    Dysuria    This is a chronic problem. The current episode started more than 1 year ago. The problem has been rapidly improving. The patient is experiencing no pain. There has been no fever. There is no history of pyelonephritis. Associated symptoms include nausea. Pertinent negatives include no chills, flank pain, or hematuria . She has tried Pyridium that provided minimal relief. Her past medical history is significant for kidney stones and MS. There is no history of recurrent UTIs.     The following portions of the patient's history were reviewed and updated as appropriate: allergies, current medications, past family history, past medical history, past social history, past surgical history and problem list.    Review of Systems   Constitutional: Negative for chills and fever.   Gastrointestinal: Negative for abdominal pain, anal bleeding and blood in stool.   Genitourinary: Positive for dysuria, flank pain, frequency and urgency. Negative for hematuria.         Current Outpatient Prescriptions:   •  atorvastatin (LIPITOR) 10 MG tablet, Take 10 mg by mouth Every Night., Disp: , Rfl:   •  baclofen (LIORESAL) 20 MG tablet, , Disp: , Rfl:   •  Cholecalciferol 5000 UNITS tablet, Take 1-2 tablets by mouth Daily., Disp: , Rfl:   •  CloNIDine (CATAPRES) 0.1 MG tablet, Take 0.1 mg by mouth As Needed for High Blood Pressure., Disp: , Rfl:   •  cyanocobalamin 1000 MCG/ML injection, Inject 1,000 mcg into the shoulder, thigh, or buttocks Every 30 (Thirty) Days., Disp: , Rfl:   •  gabapentin (NEURONTIN) 300 MG capsule, Take 600 mg by mouth 2 (Two) Times a Day. 600 mg BID and 900mg at bedtime, Disp: , Rfl:   •  phenazopyridine (PYRIDIUM) 100 MG tablet, Take 100 mg by mouth As Needed for bladder spasms., Disp: , Rfl:   •  promethazine (PHENERGAN) 25 MG tablet, Take 25 mg by mouth Every 6 (Six) Hours As Needed for Nausea or  Vomiting., Disp: , Rfl:   •  SUMAtriptan (IMITREX) 6 MG/0.5ML solution injection, Inject 6 mg under the skin 1 (One) Time As Needed for migraine., Disp: , Rfl:   •  tiZANidine (ZANAFLEX) 4 MG tablet, Take 4 mg by mouth Take As Directed., Disp: , Rfl:   •  topiramate (TOPAMAX) 100 MG tablet, Take 100 mg by mouth 2 (two) times a day., Disp: , Rfl:   •  vitamin C (ASCORBIC ACID) 500 MG tablet, Take 500 mg by mouth Daily., Disp: , Rfl:   •  acyclovir (ZOVIRAX) 400 MG tablet, Take 400 mg by mouth 2 (two) times a day. Take no more than 5 doses a day., Disp: , Rfl:   •  Glatiramer Acetate 40 MG/ML solution prefilled syringe, Inject 40 mg under the skin., Disp: , Rfl:   •  metoprolol succinate XL (TOPROL-XL) 50 MG 24 hr tablet, Take 2 tablets by mouth Daily. (Patient taking differently: Take 50 mg by mouth 2 (Two) Times a Day.), Disp: , Rfl:   •  naproxen sodium (ALEVE) 220 MG tablet, Take 220 mg by mouth As Needed for Mild Pain (1-3)., Disp: , Rfl:   •  oxyCODONE-acetaminophen (PERCOCET)  MG per tablet, Take 1 tablet by mouth Every 4 (Four) Hours As Needed for Moderate Pain ., Disp: 21 tablet, Rfl: 0  •  propranolol (INDERAL) 10 MG tablet, , Disp: , Rfl:   •  tamsulosin (FLOMAX) 0.4 MG capsule 24 hr capsule, , Disp: , Rfl:     Past Medical History:   Diagnosis Date   • Depression    • Hyperlipidemia    • Hypertension    • Migraine    • MS (multiple sclerosis)    • POTS (postural orthostatic tachycardia syndrome)        Past Surgical History:   Procedure Laterality Date   • APPENDECTOMY     • BACK SURGERY     • CHOLECYSTECTOMY     • HYSTERECTOMY         Social History     Social History   • Marital status:      Social History Main Topics   • Smoking status: Never Smoker   • Smokeless tobacco: Never Used   • Alcohol use No   • Drug use: No   • Sexual activity: Defer     Other Topics Concern   • Not on file     Social History Narrative           Family History   Problem Relation Age of Onset   • Heart  "disease Father    • Hypertension Father    • Kidney disease Father    • Diabetes type II Mother    • Hypertension Mother    • Hypertension Sister    • Hypertension Brother        Objective    /82   Ht 165.1 cm (65\")   Wt 99.8 kg (220 lb)   BMI 36.61 kg/m²     Physical Exam   Constitutional: She is oriented to person, place, and time. She appears well-developed and well-nourished.   Pulmonary/Chest: Effort normal.   Abdominal: Soft. She exhibits no distension and no mass. There is no tenderness. There is no rebound and no guarding. No hernia.   Genitourinary: Pelvic exam was performed with patient supine. There is no rash, tenderness, lesion or injury on the right labia. There is no rash, tenderness, lesion or injury on the left labia.   Neurological: She is alert and oriented to person, place, and time.   Skin: Skin is warm and dry.   Psychiatric: She has a normal mood and affect.   Vitals reviewed.          Results for orders placed or performed in visit on 04/24/18   Urine Culture - Urine, Urine, Catheter   Result Value Ref Range    Urine Culture No growth at 2 days    POC Urinalysis Dipstick, Automated   Result Value Ref Range    Color Yellow Yellow, Straw, Dark Yellow, Mary    Clarity, UA Clear Clear    Glucose, UA Negative Negative, 1000 mg/dL (3+) mg/dL    Bilirubin Negative Negative    Ketones, UA Negative Negative    Specific Gravity  1.025 1.005 - 1.030    Blood, UA Negative Negative    pH, Urine 7.0 5.0 - 8.0    Protein, POC Negative Negative mg/dL    Urobilinogen, UA Normal Normal    Leukocytes Small (1+) (A) Negative    Nitrite, UA Negative Negative     Assessment and Plan    Nurys was seen today for urinary tract infection.    Diagnoses and all orders for this visit:    Dysuria  -     Urine Culture - Urine, Urine, Catheter    Urinary tract infection without hematuria, site unspecified  -     POC Urinalysis Dipstick, Automated  -     Urine Culture - Urine, Urine, Catheter          She is " not having trouble urinating.  she has had urological symptoms in the past due to multiple sclerosis.  Her symptoms include urgency, frequency and dysuria.  Ua showed small leukocytes and with the continued symptoms after pyridium I will send culture per in and cath performed by me for culture for further evaluation.  Culture was negative, symptoms are most likely due to her multiple sclerosis.    Procedure note for in and out catheterization  She is placed in the dorsal lithotomy position.  She is carefully prepped with Betadine around the urethra.  A pediatric catheterization kit is used which contains an approximate 5 [place] catheter.this is introduced into the urinary bladder.  Approximately 30 mL of urine is obtained and sent to the lab for culture and sensitivity.

## 2018-04-26 LAB — BACTERIA SPEC AEROBE CULT: NORMAL

## 2018-05-16 LAB — MISCELLANEOUS LAB TEST ORDER: NORMAL

## 2018-06-06 LAB
Lab: NORMAL
REPORT: NORMAL
THIS TEST SENT TO: NORMAL

## 2018-07-09 ENCOUNTER — TELEPHONE (OUTPATIENT)
Dept: GASTROENTEROLOGY | Age: 38
End: 2018-07-09

## 2018-07-09 NOTE — TELEPHONE ENCOUNTER
Last OV with RUBENS nam on 1-9-18. Egd with  dated 1-29-18. No Colonoscopy here. History of MS and follows a Doctor in [place] and will be there to see him from 7-18-/7-20.  is her PCP    The patient called here states she has MS and has to be in [place] to see her Doctor there for this from 7-18/7-20, she said she has pretty bad diarrhea about 3-4 times in a 24 hour period, has noticed BR blood and this seems more frequent lately, she also has mentioned some weight loss and not eating very much, she is worried about being in [place] with this diarrhea, I was going to schedule her an OV with BG nam but the first available is 8-8-18, she said she needs something before she goes to [place] and wants to see if the APRN here can make any suggestions, she is willing to schedule an OV but it will be after she returns from [place]. Please advise.   nury

## 2018-07-09 NOTE — TELEPHONE ENCOUNTER
I spoke to the patient, she will try the OTC Imodium and if she feels more urgent care is necessary she will call PCP and or go to Urgent Care.  I have the patient scheduled to see BG nam on 8-8-18 @ 9:00 am. Raúl arrington

## 2018-07-16 ENCOUNTER — OFFICE VISIT (OUTPATIENT)
Dept: PRIMARY CARE CLINIC | Age: 38
End: 2018-07-16
Payer: COMMERCIAL

## 2018-07-16 VITALS
SYSTOLIC BLOOD PRESSURE: 125 MMHG | HEIGHT: 66 IN | TEMPERATURE: 97.2 F | WEIGHT: 191 LBS | DIASTOLIC BLOOD PRESSURE: 84 MMHG | RESPIRATION RATE: 18 BRPM | HEART RATE: 74 BPM | OXYGEN SATURATION: 98 % | BODY MASS INDEX: 30.7 KG/M2

## 2018-07-16 DIAGNOSIS — M25.50 ARTHRALGIA, UNSPECIFIED JOINT: ICD-10-CM

## 2018-07-16 DIAGNOSIS — R19.7 DIARRHEA, UNSPECIFIED TYPE: Primary | ICD-10-CM

## 2018-07-16 DIAGNOSIS — R10.13 EPIGASTRIC PAIN: ICD-10-CM

## 2018-07-16 LAB
AMYLASE: 48 U/L (ref 28–100)
C-REACTIVE PROTEIN: 0.38 MG/DL (ref 0–0.5)
LIPASE: 41 U/L (ref 13–60)
RHEUMATOID FACTOR: <10 IU/ML
SEDIMENTATION RATE, ERYTHROCYTE: 20 MM/HR (ref 0–20)
TSH SERPL DL<=0.05 MIU/L-ACNC: 2.96 UIU/ML (ref 0.27–4.2)

## 2018-07-16 PROCEDURE — 1036F TOBACCO NON-USER: CPT | Performed by: FAMILY MEDICINE

## 2018-07-16 PROCEDURE — G8417 CALC BMI ABV UP PARAM F/U: HCPCS | Performed by: FAMILY MEDICINE

## 2018-07-16 PROCEDURE — 99214 OFFICE O/P EST MOD 30 MIN: CPT | Performed by: FAMILY MEDICINE

## 2018-07-16 PROCEDURE — G8427 DOCREV CUR MEDS BY ELIG CLIN: HCPCS | Performed by: FAMILY MEDICINE

## 2018-07-16 ASSESSMENT — PATIENT HEALTH QUESTIONNAIRE - PHQ9
SUM OF ALL RESPONSES TO PHQ9 QUESTIONS 1 & 2: 0
1. LITTLE INTEREST OR PLEASURE IN DOING THINGS: 0
SUM OF ALL RESPONSES TO PHQ QUESTIONS 1-9: 0
2. FEELING DOWN, DEPRESSED OR HOPELESS: 0

## 2018-07-18 DIAGNOSIS — R19.7 DIARRHEA, UNSPECIFIED TYPE: Primary | ICD-10-CM

## 2018-07-18 LAB
C DIFFICILE TOXIN, EIA: NORMAL
LYME, EIA: 0.38 LIV (ref 0–1.2)

## 2018-07-19 LAB
ANA IGG, ELISA: NORMAL
EHRLICHIA CHAFFEENSIS AB, IGG: NORMAL
EHRLICHIA CHAFFEENSIS AB, IGM: NORMAL
ROCKY MOUNTAIN SPOTTED FEVER AB IGM,: NORMAL
ROCKY MOUNTAIN SPOTTED FEVER ANTIBODY IGG: NORMAL

## 2018-07-21 LAB
CULTURE, STOOL: NORMAL
E COLI SHIGA TOXIN ASSAY: NORMAL

## 2018-07-24 ENCOUNTER — NURSE ONLY (OUTPATIENT)
Dept: PRIMARY CARE CLINIC | Age: 38
End: 2018-07-24
Payer: COMMERCIAL

## 2018-07-24 DIAGNOSIS — R31.9 URINARY TRACT INFECTION WITH HEMATURIA, SITE UNSPECIFIED: Primary | ICD-10-CM

## 2018-07-24 DIAGNOSIS — R31.9 HEMATURIA, UNSPECIFIED TYPE: ICD-10-CM

## 2018-07-24 DIAGNOSIS — R30.0 BURNING WITH URINATION: ICD-10-CM

## 2018-07-24 DIAGNOSIS — N39.0 URINARY TRACT INFECTION WITH HEMATURIA, SITE UNSPECIFIED: Primary | ICD-10-CM

## 2018-07-24 DIAGNOSIS — R30.9 PAIN WITH URINATION: Primary | ICD-10-CM

## 2018-07-24 DIAGNOSIS — R39.89 SENSATION OF PRESSURE IN BLADDER AREA: ICD-10-CM

## 2018-07-24 LAB
BILIRUBIN, POC: ABNORMAL
BLOOD URINE, POC: ABNORMAL
CLARITY, POC: ABNORMAL
COLOR, POC: YELLOW
GLUCOSE URINE, POC: ABNORMAL
KETONES, POC: ABNORMAL
LEUKOCYTE EST, POC: ABNORMAL
NITRITE, POC: ABNORMAL
PH, POC: 6
PROTEIN, POC: ABNORMAL
SPECIFIC GRAVITY, POC: 1030
UROBILINOGEN, POC: 0.2

## 2018-07-24 PROCEDURE — 81002 URINALYSIS NONAUTO W/O SCOPE: CPT | Performed by: FAMILY MEDICINE

## 2018-07-25 ENCOUNTER — TELEPHONE (OUTPATIENT)
Dept: PRIMARY CARE CLINIC | Age: 38
End: 2018-07-25

## 2018-07-25 NOTE — TELEPHONE ENCOUNTER
Abnormal but urine culture is pending. Can she wait till tomorrow when get the culture back so we know what antibiotic to use? If not we can call in something that we may have to change it tomorrow.

## 2018-07-25 NOTE — TELEPHONE ENCOUNTER
Pt notified,  She can wait but she is having a lot of pain. Pt is taking Pyridium.   She also needs something for yeast with the Antibiotic

## 2018-07-26 LAB — URINE CULTURE, ROUTINE: NORMAL

## 2018-07-27 ENCOUNTER — OFFICE VISIT (OUTPATIENT)
Dept: OBGYN | Age: 38
End: 2018-07-27
Payer: COMMERCIAL

## 2018-07-27 VITALS
BODY MASS INDEX: 31.34 KG/M2 | HEIGHT: 66 IN | SYSTOLIC BLOOD PRESSURE: 138 MMHG | HEART RATE: 88 BPM | DIASTOLIC BLOOD PRESSURE: 84 MMHG | WEIGHT: 195 LBS

## 2018-07-27 DIAGNOSIS — Z12.4 SCREENING FOR CERVICAL CANCER: ICD-10-CM

## 2018-07-27 DIAGNOSIS — Z01.419 ENCOUNTER FOR WELL WOMAN EXAM WITH ROUTINE GYNECOLOGICAL EXAM: Primary | ICD-10-CM

## 2018-07-27 DIAGNOSIS — R92.8 ABNORMAL MAMMOGRAM OF RIGHT BREAST: ICD-10-CM

## 2018-07-27 DIAGNOSIS — N64.4 MASTALGIA: ICD-10-CM

## 2018-07-27 PROCEDURE — 99395 PREV VISIT EST AGE 18-39: CPT | Performed by: NURSE PRACTITIONER

## 2018-07-27 ASSESSMENT — ENCOUNTER SYMPTOMS
BLOOD IN STOOL: 1
ALLERGIC/IMMUNOLOGIC NEGATIVE: 1
CONSTIPATION: 0
EYES NEGATIVE: 1
RESPIRATORY NEGATIVE: 1
DIARRHEA: 0

## 2018-07-27 NOTE — PROGRESS NOTES
Pt presents today for pap smear and breast exam.  She also complains of continued pain in the right breast-states it happens every day. Pt is having US and mammos q 6 months due to this.      Last mammogram:  2018  Last pap smear:  2017  Contraception:  Hysterectomy  :  1  Para:  1  AB:  0  Last bone density:  Never  Last colonoscopy: 2010

## 2018-07-27 NOTE — PROGRESS NOTES
Dominguez Briones is a 42 y.o. female who presents today for her medical conditions/ complaints as noted below. Dominguez Briones is c/o of Gynecologic Exam (Pt presents today for pap smear and breast exam )        HPI   Pt presents for annual exam and pap smear. Has been having mammogram and u/s every 6 months for persistent abnormality and pain in her right breast. Questions regarding if she should have any further testing. Last mammogram:  2018  Last pap smear:  2017  Contraception:  Hysterectomy  :  1  Para:  1  AB:  0  Last bone density:  Never  Last colonoscopy: 2010  No LMP recorded. Patient has had a hysterectomy.       Past Medical History:   Diagnosis Date    B12 deficiency     Cauda equina syndrome (HCC)     Complicated migraine     Depression     Hematuria     wih abdominal pain    Hyperlipidemia     Hypertension     MS (multiple sclerosis) (HCC)     Myofascial pain     Nerve damage     Pelvic floor dysfunction     Postmenopausal HRT (hormone replacement therapy)     POTS (postural orthostatic tachycardia syndrome)      Past Surgical History:   Procedure Laterality Date    APPENDECTOMY      BACK SURGERY      CHOLECYSTECTOMY, LAPAROSCOPIC      HYSTERECTOMY      [place] EGD TRANSORAL BIOPSY SINGLE/MULTIPLE N/A 2018    Dr Keren Abdul-Active duodenitis, gastritis/gastropathy     Family History   Problem Relation Age of Onset    Diabetes Mother     High Blood Pressure Mother     Colon Polyps Mother     Colon Cancer Neg Hx     Liver Cancer Neg Hx     Liver Disease Neg Hx     Esophageal Cancer Neg Hx     Rectal Cancer Neg Hx     Stomach Cancer Neg Hx      Social History   Substance Use Topics    Smoking status: Never Smoker    Smokeless tobacco: Never Used    Alcohol use No       Current Outpatient Prescriptions   Medication Sig Dispense Refill    atorvastatin (LIPITOR) 10 MG tablet TAKE 1 TABLET AT BEDTIME FOR HIGH CHOLESTEROL 90 tablet 3    doctor has checked your overall health and may have suggested ways to take good care of yourself. He or she also may have recommended tests. At home, you can help prevent illness with healthy eating, regular exercise, and other steps. Follow-up care is a key part of your treatment and safety. Be sure to make and go to all appointments, and call your doctor if you are having problems. It's also a good idea to know your test results and keep a list of the medicines you take. How can you care for yourself at home? · Reach and stay at a healthy weight. This will lower your risk for many problems, such as obesity, diabetes, heart disease, and high blood pressure. · Get at least 30 minutes of physical activity on most days of the week. Walking is a good choice. You also may want to do other activities, such as running, swimming, cycling, or playing tennis or team sports. Discuss any changes in your exercise program with your doctor. · Do not smoke or allow others to smoke around you. If you need help quitting, talk to your doctor about stop-smoking programs and medicines. These can increase your chances of quitting for good. · Talk to your doctor about whether you have any risk factors for sexually transmitted infections (STIs). Having one sex partner (who does not have STIs and does not have sex with anyone else) is a good way to avoid these infections. · Use birth control if you do not want to have children at this time. Talk with your doctor about the choices available and what might be best for you. · Protect your skin from too much sun. When you're outdoors from 10 a.m. to 4 p.m., stay in the shade or cover up with clothing and a hat with a wide brim. Wear sunglasses that block UV rays. Even when it's cloudy, put broad-spectrum sunscreen (SPF 30 or higher) on any exposed skin. · See a dentist one or two times a year for checkups and to have your teeth cleaned. · Wear a seat belt in the car.   · Drink alcohol in moderation, if at all. That means no more than 2 drinks a day for men and 1 drink a day for women. Follow your doctor's advice about when to have certain tests. These tests can spot problems early. For everyone  · Cholesterol. Have the fat (cholesterol) in your blood tested after age 21. Your doctor will tell you how often to have this done based on your age, family history, or other things that can increase your risk for heart disease. · Blood pressure. Have your blood pressure checked during a routine doctor visit. Your doctor will tell you how often to check your blood pressure based on your age, your blood pressure results, and other factors. · Vision. Talk with your doctor about how often to have a glaucoma test.  · Diabetes. Ask your doctor whether you should have tests for diabetes. · Colon cancer. Have a test for colon cancer at age 48. You may have one of several tests. If you are younger than 48, you may need a test earlier if you have any risk factors. Risk factors include whether you already had a precancerous polyp removed from your colon or whether your parent, brother, sister, or child has had colon cancer. For women  · Breast exam and mammogram. Talk to your doctor about when you should have a clinical breast exam and a mammogram. Medical experts differ on whether and how often women under 50 should have these tests. Your doctor can help you decide what is right for you. · Pap test and pelvic exam. Begin Pap tests at age 24. A Pap test is the best way to find cervical cancer. The test often is part of a pelvic exam. Ask how often to have this test.  · Tests for sexually transmitted infections (STIs). Ask whether you should have tests for STIs. You may be at risk if you have sex with more than one person, especially if your partners do not wear condoms. For men  · Tests for sexually transmitted infections (STIs). Ask whether you should have tests for STIs.  You may be at risk if you have sex

## 2018-07-30 DIAGNOSIS — R92.8 ABNORMAL MAMMOGRAM OF RIGHT BREAST: Primary | ICD-10-CM

## 2018-08-01 ASSESSMENT — ENCOUNTER SYMPTOMS
NAUSEA: 1
VOMITING: 0
COLOR CHANGE: 0
EYE DISCHARGE: 0
ABDOMINAL PAIN: 1
BACK PAIN: 0
COUGH: 0
DIARRHEA: 1
WHEEZING: 0

## 2018-08-03 ENCOUNTER — OFFICE VISIT (OUTPATIENT)
Dept: PRIMARY CARE CLINIC | Age: 38
End: 2018-08-03
Payer: COMMERCIAL

## 2018-08-03 VITALS
WEIGHT: 192.6 LBS | HEART RATE: 85 BPM | DIASTOLIC BLOOD PRESSURE: 82 MMHG | TEMPERATURE: 97.1 F | SYSTOLIC BLOOD PRESSURE: 112 MMHG | HEIGHT: 66 IN | OXYGEN SATURATION: 98 % | BODY MASS INDEX: 30.95 KG/M2 | RESPIRATION RATE: 18 BRPM

## 2018-08-03 DIAGNOSIS — M79.18 MYOFASCIAL PAIN: ICD-10-CM

## 2018-08-03 DIAGNOSIS — Z51.81 MEDICATION MONITORING ENCOUNTER: Primary | ICD-10-CM

## 2018-08-03 DIAGNOSIS — G83.4 CAUDA EQUINA SYNDROME (HCC): ICD-10-CM

## 2018-08-03 PROBLEM — E66.09 OTHER OBESITY DUE TO EXCESS CALORIES: Status: ACTIVE | Noted: 2018-05-01

## 2018-08-03 PROBLEM — E55.9 VITAMIN D DEFICIENCY: Status: ACTIVE | Noted: 2017-02-16

## 2018-08-03 PROBLEM — G47.00 INSOMNIA: Status: ACTIVE | Noted: 2017-02-16

## 2018-08-03 PROBLEM — G50.0 TRIGEMINAL NEURALGIA: Status: ACTIVE | Noted: 2017-02-16

## 2018-08-03 PROBLEM — R60.9 SWELLING: Status: ACTIVE | Noted: 2017-04-24

## 2018-08-03 LAB

## 2018-08-03 PROCEDURE — 80305 DRUG TEST PRSMV DIR OPT OBS: CPT | Performed by: FAMILY MEDICINE

## 2018-08-03 PROCEDURE — G8417 CALC BMI ABV UP PARAM F/U: HCPCS | Performed by: FAMILY MEDICINE

## 2018-08-03 PROCEDURE — G8427 DOCREV CUR MEDS BY ELIG CLIN: HCPCS | Performed by: FAMILY MEDICINE

## 2018-08-03 PROCEDURE — 1036F TOBACCO NON-USER: CPT | Performed by: FAMILY MEDICINE

## 2018-08-03 PROCEDURE — 99213 OFFICE O/P EST LOW 20 MIN: CPT | Performed by: FAMILY MEDICINE

## 2018-08-03 NOTE — PATIENT INSTRUCTIONS
dispose of used patches by folding them in half so that the sticky sides meet, and then flushing them down a toilet. They should not be placed in the household trash where children or pets can find them. · If you have any questions, ask your provider or pharmacist for more information. · Be sure to keep all appointments for provider visits or tests.

## 2018-08-04 ASSESSMENT — ENCOUNTER SYMPTOMS
RESPIRATORY NEGATIVE: 1
GASTROINTESTINAL NEGATIVE: 1

## 2018-08-04 NOTE — PROGRESS NOTES
Subjective:      Patient ID: Roberta Reilly is a 42 y.o. female who comes in today for her 6 month medication visit because she is on gabapentin, a controlled substance in [place]. HPI. She is not abusing this medication. She takes 2 capsules at bedtime for her chronic pain. She was actually given 100 mg to take in the morning but she says it makes her too sleepy. Sometimes she will take an extra 300 mg tablet during the day if she is in a lot of pain. She is seeing a neurologist, Dr. Jeni Vaz, in [place]. She says she just doesn't think he is helping. He placed her on a new drug for multiple sclerosis but she could not tell that it helped. She is having numbness in her face on the left side. The left side of her body will tingle at times. The last few months she says her bones just ache deep inside. She feels like she is getting weaker. She continues to have problems with labile blood pressure. She was placed on baclofen because of worsening spasticity    Review of Systems   Constitutional: Positive for activity change, diaphoresis and fatigue. HENT: Negative. Eyes: Negative for visual disturbance. Respiratory: Negative. Cardiovascular: Negative. Gastrointestinal: Negative. Musculoskeletal: Positive for arthralgias, gait problem and myalgias. Neurological: Positive for weakness, light-headedness, numbness and headaches. Hematological: Bruises/bleeds easily. Psychiatric/Behavioral: Positive for decreased concentration, dysphoric mood and sleep disturbance. Negative for self-injury and suicidal ideas. The patient is nervous/anxious. Objective:   Physical Exam   Constitutional: She is oriented to person, place, and time. She appears well-developed and well-nourished. No distress. HENT:   Head: Normocephalic. Eyes: Pupils are equal, round, and reactive to light. Neck: Normal range of motion. Neck supple. No thyromegaly present.    Cardiovascular: Normal rate, regular questions, ask your provider or pharmacist for more information. · Be sure to keep all appointments for provider visits or tests.

## 2018-08-07 LAB
HPV TYPE 16: NOT DETECTED
HPV TYPE 18: NOT DETECTED
INTERPRETATION: NORMAL
OTHER HIGH RISK HPV: NOT DETECTED
SOURCE: NORMAL

## 2018-08-08 ENCOUNTER — OFFICE VISIT (OUTPATIENT)
Dept: GASTROENTEROLOGY | Age: 38
End: 2018-08-08
Payer: COMMERCIAL

## 2018-08-08 VITALS
DIASTOLIC BLOOD PRESSURE: 60 MMHG | WEIGHT: 194.6 LBS | SYSTOLIC BLOOD PRESSURE: 95 MMHG | BODY MASS INDEX: 31.27 KG/M2 | HEART RATE: 61 BPM | OXYGEN SATURATION: 97 % | HEIGHT: 66 IN

## 2018-08-08 DIAGNOSIS — R10.11 RUQ ABDOMINAL PAIN: ICD-10-CM

## 2018-08-08 DIAGNOSIS — G89.29 OTHER CHRONIC PAIN: Primary | ICD-10-CM

## 2018-08-08 DIAGNOSIS — R63.4 UNINTENTIONAL WEIGHT LOSS: ICD-10-CM

## 2018-08-08 DIAGNOSIS — R63.0 DECREASED APPETITE: ICD-10-CM

## 2018-08-08 DIAGNOSIS — R19.7 DIARRHEA, UNSPECIFIED TYPE: Primary | ICD-10-CM

## 2018-08-08 PROCEDURE — 99214 OFFICE O/P EST MOD 30 MIN: CPT | Performed by: NURSE PRACTITIONER

## 2018-08-08 PROCEDURE — G8417 CALC BMI ABV UP PARAM F/U: HCPCS | Performed by: NURSE PRACTITIONER

## 2018-08-08 PROCEDURE — G8427 DOCREV CUR MEDS BY ELIG CLIN: HCPCS | Performed by: NURSE PRACTITIONER

## 2018-08-08 PROCEDURE — 1036F TOBACCO NON-USER: CPT | Performed by: NURSE PRACTITIONER

## 2018-08-08 RX ORDER — GABAPENTIN 300 MG/1
CAPSULE ORAL
Qty: 90 CAPSULE | Refills: 3 | Status: SHIPPED | OUTPATIENT
Start: 2018-08-08 | End: 2019-02-01 | Stop reason: SDUPTHER

## 2018-08-08 ASSESSMENT — ENCOUNTER SYMPTOMS
RECTAL PAIN: 0
DIARRHEA: 1
NAUSEA: 1
SHORTNESS OF BREATH: 0
VOICE CHANGE: 0
SORE THROAT: 0
CHEST TIGHTNESS: 0
ABDOMINAL PAIN: 1
ABDOMINAL DISTENTION: 0
VOMITING: 0
BLOOD IN STOOL: 1
CONSTIPATION: 0
BACK PAIN: 0
COUGH: 0

## 2018-08-10 ENCOUNTER — HOSPITAL ENCOUNTER (OUTPATIENT)
Dept: MRI IMAGING | Age: 38
Discharge: HOME | End: 2018-08-10
Payer: COMMERCIAL

## 2018-08-10 ENCOUNTER — ANESTHESIA EVENT (OUTPATIENT)
Dept: ENDOSCOPY | Age: 38
End: 2018-08-10
Payer: COMMERCIAL

## 2018-08-10 DIAGNOSIS — R92.8 ABNORMAL MAMMOGRAM OF RIGHT BREAST: ICD-10-CM

## 2018-08-10 PROCEDURE — C8908 MRI W/O FOL W/CONT, BREAST,: HCPCS

## 2018-08-10 PROCEDURE — 6360000004 HC RX CONTRAST MEDICATION: Performed by: NURSE PRACTITIONER

## 2018-08-10 PROCEDURE — A9577 INJ MULTIHANCE: HCPCS | Performed by: NURSE PRACTITIONER

## 2018-08-10 RX ADMIN — GADOBENATE DIMEGLUMINE 18 ML: 529 INJECTION, SOLUTION INTRAVENOUS at 09:21

## 2018-08-13 ENCOUNTER — HOSPITAL ENCOUNTER (OUTPATIENT)
Age: 38
Setting detail: OUTPATIENT SURGERY
Discharge: HOME | End: 2018-08-13
Attending: INTERNAL MEDICINE | Admitting: INTERNAL MEDICINE
Payer: COMMERCIAL

## 2018-08-13 ENCOUNTER — ANESTHESIA (OUTPATIENT)
Dept: ENDOSCOPY | Age: 38
End: 2018-08-13
Payer: COMMERCIAL

## 2018-08-13 VITALS
DIASTOLIC BLOOD PRESSURE: 72 MMHG | BODY MASS INDEX: 30.53 KG/M2 | HEART RATE: 57 BPM | TEMPERATURE: 97.6 F | SYSTOLIC BLOOD PRESSURE: 111 MMHG | WEIGHT: 190 LBS | RESPIRATION RATE: 16 BRPM | OXYGEN SATURATION: 97 % | HEIGHT: 66 IN

## 2018-08-13 VITALS
DIASTOLIC BLOOD PRESSURE: 72 MMHG | RESPIRATION RATE: 12 BRPM | OXYGEN SATURATION: 96 % | SYSTOLIC BLOOD PRESSURE: 112 MMHG

## 2018-08-13 PROCEDURE — 2500000003 HC RX 250 WO HCPCS: Performed by: NURSE ANESTHETIST, CERTIFIED REGISTERED

## 2018-08-13 PROCEDURE — 45380 COLONOSCOPY AND BIOPSY: CPT | Performed by: INTERNAL MEDICINE

## 2018-08-13 PROCEDURE — 7100000011 HC PHASE II RECOVERY - ADDTL 15 MIN: Performed by: INTERNAL MEDICINE

## 2018-08-13 PROCEDURE — 2709999900 HC NON-CHARGEABLE SUPPLY: Performed by: INTERNAL MEDICINE

## 2018-08-13 PROCEDURE — 3609010300 HC COLONOSCOPY W/BIOPSY SINGLE/MULTIPLE: Performed by: INTERNAL MEDICINE

## 2018-08-13 PROCEDURE — 2500000003 HC RX 250 WO HCPCS: Performed by: INTERNAL MEDICINE

## 2018-08-13 PROCEDURE — 2580000003 HC RX 258: Performed by: INTERNAL MEDICINE

## 2018-08-13 PROCEDURE — 3700000000 HC ANESTHESIA ATTENDED CARE: Performed by: INTERNAL MEDICINE

## 2018-08-13 PROCEDURE — 7100000010 HC PHASE II RECOVERY - FIRST 15 MIN: Performed by: INTERNAL MEDICINE

## 2018-08-13 PROCEDURE — 6360000002 HC RX W HCPCS: Performed by: NURSE ANESTHETIST, CERTIFIED REGISTERED

## 2018-08-13 PROCEDURE — 3700000001 HC ADD 15 MINUTES (ANESTHESIA): Performed by: INTERNAL MEDICINE

## 2018-08-13 PROCEDURE — 88305 TISSUE EXAM BY PATHOLOGIST: CPT

## 2018-08-13 RX ORDER — LIDOCAINE HYDROCHLORIDE 10 MG/ML
1 INJECTION, SOLUTION EPIDURAL; INFILTRATION; INTRACAUDAL; PERINEURAL ONCE
Status: COMPLETED | OUTPATIENT
Start: 2018-08-13 | End: 2018-08-13

## 2018-08-13 RX ORDER — MIDAZOLAM HYDROCHLORIDE 1 MG/ML
INJECTION INTRAMUSCULAR; INTRAVENOUS PRN
Status: DISCONTINUED | OUTPATIENT
Start: 2018-08-13 | End: 2018-08-13 | Stop reason: SDUPTHER

## 2018-08-13 RX ORDER — PROPOFOL 10 MG/ML
INJECTION, EMULSION INTRAVENOUS PRN
Status: DISCONTINUED | OUTPATIENT
Start: 2018-08-13 | End: 2018-08-13 | Stop reason: SDUPTHER

## 2018-08-13 RX ORDER — MONTELUKAST SODIUM 4 MG/1
2 TABLET, CHEWABLE ORAL 2 TIMES DAILY
Qty: 120 TABLET | Refills: 3 | Status: SHIPPED | OUTPATIENT
Start: 2018-08-13 | End: 2018-09-04 | Stop reason: CLARIF

## 2018-08-13 RX ORDER — LIDOCAINE HYDROCHLORIDE 20 MG/ML
INJECTION, SOLUTION INFILTRATION; PERINEURAL PRN
Status: DISCONTINUED | OUTPATIENT
Start: 2018-08-13 | End: 2018-08-13 | Stop reason: SDUPTHER

## 2018-08-13 RX ORDER — SODIUM CHLORIDE, SODIUM LACTATE, POTASSIUM CHLORIDE, CALCIUM CHLORIDE 600; 310; 30; 20 MG/100ML; MG/100ML; MG/100ML; MG/100ML
INJECTION, SOLUTION INTRAVENOUS CONTINUOUS
Status: DISCONTINUED | OUTPATIENT
Start: 2018-08-13 | End: 2018-08-13 | Stop reason: HOSPADM

## 2018-08-13 RX ORDER — FENTANYL CITRATE 50 UG/ML
INJECTION, SOLUTION INTRAMUSCULAR; INTRAVENOUS PRN
Status: DISCONTINUED | OUTPATIENT
Start: 2018-08-13 | End: 2018-08-13 | Stop reason: SDUPTHER

## 2018-08-13 RX ADMIN — PROPOFOL 220 MG: 10 INJECTION, EMULSION INTRAVENOUS at 08:39

## 2018-08-13 RX ADMIN — LIDOCAINE HYDROCHLORIDE 1 ML: 10 INJECTION, SOLUTION EPIDURAL; INFILTRATION; INTRACAUDAL; PERINEURAL at 08:15

## 2018-08-13 RX ADMIN — LIDOCAINE HYDROCHLORIDE 40 MG: 20 INJECTION, SOLUTION INFILTRATION; PERINEURAL at 08:39

## 2018-08-13 RX ADMIN — MIDAZOLAM 2 MG: 1 INJECTION INTRAMUSCULAR; INTRAVENOUS at 08:36

## 2018-08-13 RX ADMIN — FENTANYL CITRATE 50 MCG: 50 INJECTION INTRAMUSCULAR; INTRAVENOUS at 08:39

## 2018-08-13 RX ADMIN — SODIUM CHLORIDE, POTASSIUM CHLORIDE, SODIUM LACTATE AND CALCIUM CHLORIDE: 600; 310; 30; 20 INJECTION, SOLUTION INTRAVENOUS at 08:15

## 2018-08-13 ASSESSMENT — PAIN - FUNCTIONAL ASSESSMENT: PAIN_FUNCTIONAL_ASSESSMENT: 0-10

## 2018-08-13 NOTE — ANESTHESIA PRE PROCEDURE
Department of Anesthesiology  Preprocedure Note       Name:  Wendy Rodriguez   Age:  42 y.o.  :  1980                                          MRN:  [id number]         Date:  2018      Surgeon: Mally Iniguez):  Dawson Lindsay MD    Procedure: Procedure(s):  COLONOSCOPY DIAGNOSTIC OR SCREENING    Medications prior to admission:   Prior to Admission medications    Medication Sig Start Date End Date Taking? Authorizing Provider   Cyanocobalamin 1000 MCG/ML KIT 1,000 mcg   Yes Historical Provider, MD   phenazopyridine (PYRIDIUM) 200 MG tablet Take 1 tablet by mouth 3 times daily as needed for Pain. 10/14/14  Yes Tran Post MD   gabapentin (NEURONTIN) 300 MG capsule 3 capsules by mouth at bedtime for chronic pain. 18  Tran Post MD   atorvastatin (LIPITOR) 10 MG tablet TAKE 1 TABLET AT BEDTIME FOR HIGH CHOLESTEROL 3/26/18   Tran Post MD   Diphenhydramine-APAP, sleep, (TYLENOL PM EXTRA STRENGTH PO) Take by mouth    Historical Provider, MD   propranolol (INDERAL) 60 MG tablet Take 60 mg by mouth 3 times daily     Historical Provider, MD   Probiotic Product (PROBIOTIC DAILY PO) Take by mouth    Historical Provider, MD   baclofen (LIORESAL) 20 MG tablet Take 20 mg by mouth 2 times daily    Historical Provider, MD   tamsulosin (FLOMAX) 0.4 MG capsule Take 0.4 mg by mouth daily    Historical Provider, MD   promethazine (PHENERGAN) 25 MG tablet Take 25 mg by mouth    Historical Provider, MD   Ascorbic Acid (VITAMIN C) 500 MG tablet Take 500 mg by mouth    Historical Provider, MD   cloNIDine (CATAPRES) 0.1 MG tablet 0.1 mg as needed  3/24/17   Historical Provider, MD   SUMAtriptan Succinate 6 MG/0.5ML SOAJ as needed  16   Historical Provider, MD   Cholecalciferol (VITAMIN D-3) 5000 UNITS TABS Take 5,000 Units by mouth daily. Historical Provider, MD   topiramate (TOPAMAX) 100 MG tablet Take 1 tablet by mouth 2 times daily.  13   Tran Post MD   bethanechol (URECHOLINE) 25 MG

## 2018-08-13 NOTE — OP NOTE
from the bowel. The colonoscope was removed from the patient, and the procedure was terminated. Findings are listed below. Findings: The mucosa appeared normal throughout the entire examined colon and ileum. Random biopsies were obtained separately throughout the ileum, right colon, and left colon. Retroflexion in the rectum was normal and revealed no further abnormalities     Recommendations:  1. Repeat colonoscopy: pending pathology - age 46 for CRC screening  2. Await biopsy results-you will receive a letter with your results within 7-10 days    Findings and recommendations were discussed w/ the patient. A copy of the images was provided.     Paulina Leblanc MD  8/13/2018  8:57 AM

## 2018-08-13 NOTE — ANESTHESIA PRE PROCEDURE
times daily. 6/5/13 5/23/16  Dante Nath MD   tiZANidine (ZANAFLEX) 4 MG tablet Take 4 mg by mouth every 8 hours as needed. Historical Provider, MD       Current medications:    No current facility-administered medications for this visit. No current outpatient prescriptions on file. Facility-Administered Medications Ordered in Other Visits   Medication Dose Route Frequency Provider Last Rate Last Dose    lactated ringers infusion   Intravenous Continuous Alexandre Lovell  mL/hr at 08/13/18 0815         Allergies: Allergies   Allergen Reactions    Ropinirole Hcl     Simvastatin     Zolpidem Other (See Comments)       Problem List:    Patient Active Problem List   Diagnosis Code    Complicated migraine F62. 109    Hematuria     Postmenopausal HRT (hormone replacement therapy) Z79.890    Depression F32.9    Nerve damage T14. 8XXA    Cauda equina syndrome (HCC) G83.4    Other hyperlipidemia E78.4    Postural orthostatic tachycardia syndrome R00.0, I95.1    Vitamin B12 deficiency (non anemic) E53.8    Pelvic floor dysfunction M62.89    Myofascial pain M79.1    Relapsing-remitting multiple sclerosis (HCC) G35    Neutropenia (HCC) D70.9    Hypertensive disorder I10    Insomnia G47.00    Other obesity due to excess calories E66.09    Right kidney stone N20.0    Swelling R60.9    Trigeminal neuralgia G50.0    Urinary frequency R35.0    Urinary incontinence R32    Vitamin D deficiency E55.9    Diarrhea R19.7    Unintentional weight loss R63.4    RUQ abdominal pain R10.11    Decreased appetite R63.0       Past Medical History:        Diagnosis Date    B12 deficiency     Cauda equina syndrome (HCC)     Complicated migraine     Depression     GERD (gastroesophageal reflux disease)     Hematuria     wih abdominal pain    Hyperlipidemia     Hypertension     MS (multiple sclerosis) (HCC)     Myofascial pain     Nerve damage     Pelvic floor dysfunction     Postmenopausal HRT (hormone replacement therapy)     POTS (postural orthostatic tachycardia syndrome)        Past Surgical History:        Procedure Laterality Date    APPENDECTOMY      BACK SURGERY      CHOLECYSTECTOMY, LAPAROSCOPIC      COLONOSCOPY      Dr. Kedar Sterling 10 years ?  EGD      HYSTERECTOMY      [place] EGD TRANSORAL BIOPSY SINGLE/MULTIPLE N/A 1/29/2018    Dr Mikel Abdul-Active duodenitis, gastritis/gastropathy       Social History:    Social History   Substance Use Topics    Smoking status: Never Smoker    Smokeless tobacco: Never Used    Alcohol use No                                Counseling given: Not Answered      Vital Signs (Current): There were no vitals filed for this visit.                                            BP Readings from Last 3 Encounters:   08/13/18 125/84   08/08/18 95/60   08/03/18 112/82       NPO Status:                                                                                 BMI:   Wt Readings from Last 3 Encounters:   08/13/18 190 lb (86.2 kg)   08/08/18 194 lb 9.6 oz (88.3 kg)   08/03/18 192 lb 9.6 oz (87.4 kg)     There is no height or weight on file to calculate BMI.    CBC:   Lab Results   Component Value Date    WBC 4.9 12/07/2017    RBC 4.45 12/07/2017    HGB 13.6 12/07/2017    HCT 41.2 12/07/2017    HCT 40.6 12/11/2011    MCV 92.6 12/07/2017    RDW 13.2 12/07/2017     12/07/2017     12/11/2011       CMP:   Lab Results   Component Value Date     11/27/2017     12/11/2011    K 4.6 11/27/2017    K 4.4 12/11/2011     11/27/2017     12/11/2011    CO2 22 11/27/2017    BUN 22 11/27/2017    CREATININE 0.9 11/27/2017    CREATININE 0.8 12/11/2011    GFRAA 90 06/10/2016    LABGLOM >60 11/27/2017    GLUCOSE 136 12/07/2017    PROT 7.6 11/27/2017    CALCIUM 9.6 11/27/2017    BILITOT 0.7 11/27/2017    ALKPHOS 84 11/27/2017    AST 19 11/27/2017    ALT 15 11/27/2017       POC Tests: No results for input(s): POCGLU, POCNA, POCK, POCCL, POCBUN, POCHEMO, POCHCT in the last 72 hours. Coags: No results found for: PROTIME, INR, APTT    HCG (If Applicable): No results found for: PREGTESTUR, PREGSERUM, HCG, HCGQUANT     ABGs: No results found for: PHART, PO2ART, LKS0TOM, ACU4NEE, BEART, J0AOUEDY     Type & Screen (If Applicable):  No results found for: LABABO, [address]    Anesthesia Evaluation  Patient summary reviewed and Nursing notes reviewed no history of anesthetic complications:   Airway: Mallampati: II  TM distance: >3 FB   Neck ROM: full  Mouth opening: < 3 FB Dental: normal exam         Pulmonary:Negative Pulmonary ROS and normal exam                               Cardiovascular:    (+) hypertension:, hyperlipidemia         Beta Blocker:  Dose within 24 Hrs         Neuro/Psych:   (+) neuromuscular disease: multiple sclerosis, headaches:,             GI/Hepatic/Renal:   (+) bowel prep,      Morbid obesity: BMI 32. ROS comment: ABD pain. Endo/Other: Negative Endo/Other ROS                    Abdominal:           Vascular:                                          Anesthesia Plan      general and TIVA     ASA 3       Induction: intravenous. Anesthetic plan and risks discussed with patient.         Attending anesthesiologist reviewed and agrees with [address] [place] [address], APRN - CRNA   8/13/2018

## 2018-08-13 NOTE — ANESTHESIA POSTPROCEDURE EVALUATION
Department of Anesthesiology  Postprocedure Note    Patient: Dunia Gil  MRN: [id number]  YOB: 1980  Date of evaluation: 8/13/2018  Time:  9:04 AM     Procedure Summary     Date:  08/13/18 Room / Location:  [facility]  / [facility]    Anesthesia Start:  7820 Anesthesia Stop:      Procedure:  COLONOSCOPY WITH BIOPSY (N/A ) Diagnosis:  (WT LOSS-RB-RUQ ABD PAIN)    Surgeon:  Danielle Bruce MD Responsible Provider:  DEVEN Roland CRNA    Anesthesia Type:  MAC ASA Status:  2          Anesthesia Type: MAC    Rebecca Phase I: Rebecca Score: 10    Rebecca Phase II:      Last vitals: Reviewed and per EMR flowsheets.        Anesthesia Post Evaluation    Patient location during evaluation: bedside  Patient participation: complete - patient participated  Level of consciousness: sleepy but conscious  Pain score: 0  Airway patency: patent  Nausea & Vomiting: no nausea and no vomiting  Complications: no  Cardiovascular status: hemodynamically stable  Respiratory status: acceptable and nasal cannula  Hydration status: stable

## 2018-08-30 ENCOUNTER — TELEPHONE (OUTPATIENT)
Dept: UROLOGY | Facility: CLINIC | Age: 38
End: 2018-08-30

## 2018-09-04 ENCOUNTER — TRANSCRIBE ORDERS (OUTPATIENT)
Dept: ADMINISTRATIVE | Facility: HOSPITAL | Age: 38
End: 2018-09-04

## 2018-09-04 ENCOUNTER — OFFICE VISIT (OUTPATIENT)
Dept: PRIMARY CARE CLINIC | Age: 38
End: 2018-09-04
Payer: COMMERCIAL

## 2018-09-04 VITALS
HEIGHT: 66 IN | SYSTOLIC BLOOD PRESSURE: 122 MMHG | TEMPERATURE: 98.1 F | BODY MASS INDEX: 30.22 KG/M2 | OXYGEN SATURATION: 98 % | DIASTOLIC BLOOD PRESSURE: 80 MMHG | HEART RATE: 67 BPM | WEIGHT: 188 LBS

## 2018-09-04 DIAGNOSIS — R30.0 DYSURIA: ICD-10-CM

## 2018-09-04 DIAGNOSIS — R35.0 FREQUENT URINATION: ICD-10-CM

## 2018-09-04 DIAGNOSIS — R10.9 FLANK PAIN: Primary | ICD-10-CM

## 2018-09-04 DIAGNOSIS — R35.0 FREQUENCY OF URINATION: ICD-10-CM

## 2018-09-04 DIAGNOSIS — R39.15 URGENCY OF URINATION: ICD-10-CM

## 2018-09-04 DIAGNOSIS — N39.0 FREQUENT UTI: ICD-10-CM

## 2018-09-04 LAB
APPEARANCE FLUID: NORMAL
BILIRUBIN, POC: NORMAL
BLOOD URINE, POC: NORMAL
CLARITY, POC: CLEAR
COLOR, POC: NORMAL
GLUCOSE URINE, POC: NORMAL
KETONES, POC: NORMAL
LEUKOCYTE EST, POC: NORMAL
NITRITE, POC: NORMAL
PH, POC: 5
PROTEIN, POC: NORMAL
SPECIFIC GRAVITY, POC: 1.02
UROBILINOGEN, POC: 0.2

## 2018-09-04 PROCEDURE — 99213 OFFICE O/P EST LOW 20 MIN: CPT | Performed by: FAMILY MEDICINE

## 2018-09-04 PROCEDURE — 81002 URINALYSIS NONAUTO W/O SCOPE: CPT | Performed by: FAMILY MEDICINE

## 2018-09-04 PROCEDURE — G8427 DOCREV CUR MEDS BY ELIG CLIN: HCPCS | Performed by: FAMILY MEDICINE

## 2018-09-04 PROCEDURE — G8417 CALC BMI ABV UP PARAM F/U: HCPCS | Performed by: FAMILY MEDICINE

## 2018-09-04 PROCEDURE — 1036F TOBACCO NON-USER: CPT | Performed by: FAMILY MEDICINE

## 2018-09-05 ASSESSMENT — ENCOUNTER SYMPTOMS
WHEEZING: 0
COLOR CHANGE: 0
EYE DISCHARGE: 0
NAUSEA: 0
VOMITING: 0
DIARRHEA: 0
ABDOMINAL PAIN: 0
BACK PAIN: 0
COUGH: 0

## 2018-09-06 LAB — URINE CULTURE, ROUTINE: NORMAL

## 2018-09-07 ENCOUNTER — HOSPITAL ENCOUNTER (OUTPATIENT)
Dept: CT IMAGING | Facility: HOSPITAL | Age: 38
Discharge: HOME | End: 2018-09-07
Admitting: FAMILY MEDICINE

## 2018-09-07 DIAGNOSIS — R10.9 FLANK PAIN: ICD-10-CM

## 2018-09-07 DIAGNOSIS — R35.0 FREQUENT URINATION: ICD-10-CM

## 2018-09-07 DIAGNOSIS — R39.15 URGENCY OF URINATION: ICD-10-CM

## 2018-09-07 DIAGNOSIS — R30.0 DYSURIA: ICD-10-CM

## 2018-09-07 LAB — CREAT BLDA-MCNC: 0.9 MG/DL (ref 0.6–1.3)

## 2018-09-07 PROCEDURE — 25010000002 IOPAMIDOL 61 % SOLUTION: Performed by: FAMILY MEDICINE

## 2018-09-07 PROCEDURE — 82565 ASSAY OF CREATININE: CPT

## 2018-09-07 PROCEDURE — 74178 CT ABD&PLV WO CNTR FLWD CNTR: CPT

## 2018-09-07 RX ADMIN — IOPAMIDOL 100 ML: 612 INJECTION, SOLUTION INTRAVENOUS at 09:23

## 2018-09-10 DIAGNOSIS — Z12.39 SCREENING FOR BREAST CANCER: Primary | ICD-10-CM

## 2018-09-11 ENCOUNTER — OFFICE VISIT (OUTPATIENT)
Dept: UROLOGY | Facility: CLINIC | Age: 38
End: 2018-09-11

## 2018-09-11 VITALS — WEIGHT: 185 LBS | HEIGHT: 65 IN | TEMPERATURE: 98.7 F | BODY MASS INDEX: 30.82 KG/M2

## 2018-09-11 DIAGNOSIS — R35.0 URINARY FREQUENCY: Primary | ICD-10-CM

## 2018-09-11 DIAGNOSIS — N31.9 NEUROGENIC BLADDER: ICD-10-CM

## 2018-09-11 DIAGNOSIS — R10.2 PELVIC PAIN: ICD-10-CM

## 2018-09-11 LAB
BILIRUB BLD-MCNC: ABNORMAL MG/DL
CLARITY, POC: CLEAR
COLOR UR: YELLOW
GLUCOSE UR STRIP-MCNC: NEGATIVE MG/DL
KETONES UR QL: ABNORMAL
LEUKOCYTE EST, POC: NEGATIVE
NITRITE UR-MCNC: NEGATIVE MG/ML
PH UR: 5.5 [PH] (ref 5–8)
PROT UR STRIP-MCNC: NEGATIVE MG/DL
RBC # UR STRIP: NEGATIVE /UL
SP GR UR: 1.03 (ref 1–1.03)
UROBILINOGEN UR QL: NORMAL

## 2018-09-11 PROCEDURE — 81001 URINALYSIS AUTO W/SCOPE: CPT | Performed by: UROLOGY

## 2018-09-11 PROCEDURE — 51798 US URINE CAPACITY MEASURE: CPT | Performed by: UROLOGY

## 2018-09-11 PROCEDURE — 99214 OFFICE O/P EST MOD 30 MIN: CPT | Performed by: UROLOGY

## 2018-09-11 NOTE — PROGRESS NOTES
Ms. Iniguez is 37 y.o. female    Chief Complaint   Patient presents with   • Pelvic Pain   • Back Pain       Pelvic Pain   The patient's primary symptoms include pelvic pain. The patient's pertinent negatives include no vaginal discharge. This is a new problem. The current episode started 1 to 4 weeks ago. The problem occurs constantly. The problem has been unchanged. Associated symptoms include back pain, dysuria, frequency and urgency. Pertinent negatives include no abdominal pain, chills, fever, flank pain or hematuria. Nothing aggravates the symptoms. Treatments tried: pyridium. The treatment provided no relief.   Back Pain   This is a new problem. The current episode started 1 to 4 weeks ago. The problem occurs constantly. The problem is unchanged. The pain is present in the lumbar spine. The pain is moderate. The symptoms are aggravated by position. Associated symptoms include dysuria and pelvic pain. Pertinent negatives include no abdominal pain or fever.       The following portions of the patient's history were reviewed and updated as appropriate: allergies, current medications, past family history, past medical history, past social history, past surgical history and problem list.    Review of Systems   Constitutional: Negative for chills and fever.   Gastrointestinal: Negative for abdominal pain, anal bleeding and blood in stool.   Genitourinary: Positive for difficulty urinating, dysuria, frequency, pelvic pain, urgency and vaginal pain. Negative for decreased urine volume, dyspareunia, enuresis, flank pain, genital sores, hematuria, menstrual problem, vaginal bleeding and vaginal discharge.   Musculoskeletal: Positive for back pain.         Current Outpatient Prescriptions:   •  atorvastatin (LIPITOR) 10 MG tablet, Take 10 mg by mouth Every Night., Disp: , Rfl:   •  baclofen (LIORESAL) 20 MG tablet, , Disp: , Rfl:   •  BIOTIN PO, Take  by mouth., Disp: , Rfl:   •  Cholecalciferol 5000 UNITS tablet,  Take 1 tablet by mouth., Disp: , Rfl:   •  CloNIDine (CATAPRES) 0.1 MG tablet, Take 0.1 mg by mouth As Needed for High Blood Pressure., Disp: , Rfl:   •  cyanocobalamin 1000 MCG/ML injection, Inject 1,000 mcg into the shoulder, thigh, or buttocks Every 30 (Thirty) Days., Disp: , Rfl:   •  gabapentin (NEURONTIN) 300 MG capsule, Take 600 mg by mouth 2 (Two) Times a Day. 600 mg BID and 900mg at bedtime, Disp: , Rfl:   •  phenazopyridine (PYRIDIUM) 100 MG tablet, Take 100 mg by mouth As Needed for bladder spasms., Disp: , Rfl:   •  Probiotic Product (PROBIOTIC-10 PO), Take  by mouth., Disp: , Rfl:   •  promethazine (PHENERGAN) 25 MG tablet, Take 25 mg by mouth Every 6 (Six) Hours As Needed for Nausea or Vomiting., Disp: , Rfl:   •  propranolol (INDERAL) 10 MG tablet, 60 mg. At night, Disp: , Rfl:   •  SUMAtriptan (IMITREX) 6 MG/0.5ML solution injection, Inject 6 mg under the skin 1 (One) Time As Needed for migraine., Disp: , Rfl:   •  tamsulosin (FLOMAX) 0.4 MG capsule 24 hr capsule, , Disp: , Rfl:   •  tiZANidine (ZANAFLEX) 4 MG tablet, Take 4 mg by mouth Take As Directed., Disp: , Rfl:   •  topiramate (TOPAMAX) 100 MG tablet, Take 100 mg by mouth 2 (two) times a day., Disp: , Rfl:   •  vitamin C (ASCORBIC ACID) 500 MG tablet, Take 500 mg by mouth Daily., Disp: , Rfl:   •  Glatiramer Acetate 40 MG/ML solution prefilled syringe, Inject 40 mg under the skin., Disp: , Rfl:   •  metoprolol succinate XL (TOPROL-XL) 50 MG 24 hr tablet, Take 2 tablets by mouth Daily. (Patient taking differently: Take 50 mg by mouth 2 (Two) Times a Day.), Disp: , Rfl:     Past Medical History:   Diagnosis Date   • Depression    • Hyperlipidemia    • Hypertension    • Migraine    • MS (multiple sclerosis) (CMS/HCC)    • POTS (postural orthostatic tachycardia syndrome)        Past Surgical History:   Procedure Laterality Date   • APPENDECTOMY     • BACK SURGERY     • CHOLECYSTECTOMY     • HYSTERECTOMY         Social History     Social History  "  • Marital status:      Social History Main Topics   • Smoking status: Never Smoker   • Smokeless tobacco: Never Used   • Alcohol use No   • Drug use: No   • Sexual activity: Defer     Other Topics Concern   • Not on file     Social History Narrative           Family History   Problem Relation Age of Onset   • Heart disease Father    • Hypertension Father    • Kidney disease Father    • Diabetes type II Mother    • Hypertension Mother    • Hypertension Sister    • Hypertension Brother        Objective    Temp 98.7 °F (37.1 °C)   Ht 165.1 cm (65\")   Wt 83.9 kg (185 lb)   BMI 30.79 kg/m²     Physical Exam    Hospital Outpatient Visit on 09/07/2018   Component Date Value Ref Range Status   • Creatinine 09/07/2018 0.90  0.60 - 1.30 mg/dL Final    Serial Number: [id number]:  [id number]       Results for orders placed or performed in visit on 09/11/18   POC Urinalysis Dipstick, Multipro   Result Value Ref Range    Color Yellow Yellow, Straw, Dark Yellow, Mary    Clarity, UA Clear Clear    Glucose, UA Negative Negative, 1000 mg/dL (3+) mg/dL    Bilirubin Small (1+) (A) Negative    Ketones, UA Trace (A) Negative    Specific Gravity  1.030 1.005 - 1.030    Blood, UA Negative Negative    pH, Urine 5.5 5.0 - 8.0    Protein, POC Negative Negative mg/dL    Urobilinogen, UA Normal Normal    Nitrite, UA Negative Negative    Leukocytes Negative Negative   Bladder scan    Post void residuEstimation of residual urine via abdominal ultrasound  Residual Urine: 3 ml  Indication: Lower urinary tract symptoms  Position: Supine  Examination: Incremental scanning of the suprapubic area using 3 MHz transducer using copious amounts of acoustic gel.   Findings: An anechoic area was demonstrated which represented the bladder, with measurement of residual urine as noted. I inspected this myself. In that the residual urine was stable or insignificant, no treatment will be necessary at this time.     Assessment and " Branden Nuno was seen today for pelvic pain and back pain.    Diagnoses and all orders for this visit:    Urinary frequency  -     POC Urinalysis Dipstick, Multipro    Pelvic pain  -     POC Urinalysis Dipstick, Multipro    Neurogenic bladder    Other orders  -     Urodynamic Lab    Patient is well-known to me for a history of multiple sclerosis with urinary frequency and urgency.  She presents today with a several week history of severe back pain as well as pelvic pain.  I reviewed his CT scan done by primary care physician.  This does show bilateral renal stones but no obstructing ureteral stones.  In addition, I reviewed several urine cultures from her primary care physician, all which have been negative for infection.    I did discuss with Ms. Iniguez today that I do not think her back pain is related to anything urologic.  She does have a small punctate left stone with larger right-sided stones measuring up to 5 mm.  These are in the lower pole of the right kidney.  I discussed with her that nonobstructing renal stones do not cause pain.  I will defer back to her primary care physician for further workup for her back pain.  In terms of treatment of the stone, she would like to continue just to monitor the stones in her kidneys.    With regards to her pelvic pain, she does feel like she has infections.  Unfortunately, I do not think that her pain is related to infections.  All of her urine cultures have been negative.  I did offer her a cystoscopy, but at her age with a lack of true risk factors I think this would be low yield for her.  She does describe occasionally the feeling of spasms.  She has had a previous urodynamic study which showed a normal capacity bladder without significant overactivity.  However, with a history of multiple sclerosis her urinary symptoms can and probably will change over time.  I would like to do a repeat urodynamic study for her to see if there is any overactivity.  She  is emptying her bladder well with a postvoid residual today measured at 3 mL.  Unfortunately, even if her urodynamics study does show a overactivity, this is likely not related to the extreme pelvic pain that she is describing any.  I do not think this is urologic in nature either.  Again, left defer back to her gynecologist and primary care physician for further workup on this.  I will see her back with a urodynamic study.    CT independent review  The CT scan of the abdomen/pelvis done without contrast is available for me to review.  Treatment recommendations require an independent review.  First I scanned the liver, spleen, and bowel pattern.  The retroperitoneum including the major vessels and lymphatic packages are briefly reviewed.  This film as been reviewed by the radiologist to determine any non urologic abnormalities that are present.  The kidneys are closely inspected for size, symmetry, contour, parenchymal thickness, perinephric reaction, presence of calcifications, and intrarenal dilation of the collecting system.  The ureters are inspected for their course, caliber, and any calcifications.  The bladder is inspected for its thickness, size, and presence of any calcifications.  This scan shows:    The right kidney appears abnormal on this non contrasted CT scan.   Stones, 5 and 3 mm in the lower pole of the right kidney.  No hydronephrosis no masses    The left kidney appears abnormal on this non contrasted CT scan.   Punctate 1 mm stone nonobstructing no hydronephrosis no masses    The bladder appears normal on this non-contrasted CT scan.  The bladder appears normal in thickness.  There no masses or stones seen on this exam.

## 2018-09-17 ENCOUNTER — HOSPITAL ENCOUNTER (OUTPATIENT)
Dept: WOMENS IMAGING | Age: 38
Discharge: HOME | End: 2018-09-17
Payer: COMMERCIAL

## 2018-09-17 DIAGNOSIS — Z12.39 SCREENING FOR BREAST CANCER: ICD-10-CM

## 2018-09-17 PROCEDURE — 77063 BREAST TOMOSYNTHESIS BI: CPT

## 2018-09-18 ENCOUNTER — PROCEDURE VISIT (OUTPATIENT)
Dept: UROLOGY | Facility: CLINIC | Age: 38
End: 2018-09-18

## 2018-09-18 DIAGNOSIS — R35.0 URINARY FREQUENCY: Primary | ICD-10-CM

## 2018-09-18 PROCEDURE — 51729 CYSTOMETROGRAM W/VP&UP: CPT | Performed by: UROLOGY

## 2018-09-18 PROCEDURE — 51784 ANAL/URINARY MUSCLE STUDY: CPT | Performed by: UROLOGY

## 2018-09-18 PROCEDURE — 51797 INTRAABDOMINAL PRESSURE TEST: CPT | Performed by: UROLOGY

## 2018-09-18 PROCEDURE — 51741 ELECTRO-UROFLOWMETRY FIRST: CPT | Performed by: UROLOGY

## 2018-09-19 NOTE — PROGRESS NOTES
Urodynamic Study Interpretation    Indications:    Urinary frequency and Urinary urgency    Urinalysis:    Dip negative, patient denies symptoms    Patient and/or family expresses an understanding of need for procedure as well as risks.  Denies new symptoms.  Feeling reasonably well today    Technique    5 F dual lumen catheter placed per urethra  Vaginal balloon catheter for abdominal pressure measurement  Gel Patch electrodes placed in perineal area  Connected to 1000cc sterile water and connected to both transducers for simultaneous rectal and detrussor pressure.  Equipment calibrated    Position    Sitting    Infusion rate    80 ml/min    Supervising physician    Dr. Alok Albarran    Complex Uroflow    Voided volume :  421 ml  Average flow:  23 ml/sec  Maxium flox:  41 ml/sec  Shape of curve:  Bell Shape  Conclusion:  Normal voiding pattern    Complex CMG    First sensation: 42 ml    First Urge: 102 ml    Strong Urge: 311 ml    Maximum Cystometric Capacity: 400 ml    Involuntary Bladder Contraction    none    Conclusion    Normal filling curve, bladder capacity, without involuntary bladder contractions     Leak point pressures    Abdominal leak point pressure (ALPP)    Patient leaked with  cough    Volume tested: 400 ml  Lowest recorded positive leak point pressure: 138 cm H20    Detrusor leak point pressure (DLPP)    Detrusor leak point pressure was not reached during this study    Leak point pressure conclusion    Normal    Pressure Flow Voiding Study    Patient was able to void during this study    Maximum flow rate: 24 ml/sec  Pressure at max flow: 35 cm H20  Voided volume: 354 ml  Post void residual: 50 ml  Javier Schwartz nomogram : unobstructed    EMG    Relaxation of striated sphincter prior to voiding      Diagnosis    Normal urodynamic study. No OAB, normal storage pressures.  VITO at large volumes, mild

## 2018-09-24 DIAGNOSIS — N64.4 MASTALGIA: Primary | ICD-10-CM

## 2018-10-02 ENCOUNTER — OFFICE VISIT (OUTPATIENT)
Dept: UROLOGY | Facility: CLINIC | Age: 38
End: 2018-10-02

## 2018-10-02 VITALS — WEIGHT: 181 LBS | TEMPERATURE: 97.6 F | HEIGHT: 65 IN | BODY MASS INDEX: 30.16 KG/M2

## 2018-10-02 DIAGNOSIS — N20.0 RIGHT KIDNEY STONE: ICD-10-CM

## 2018-10-02 DIAGNOSIS — N31.9 NEUROGENIC BLADDER: Primary | ICD-10-CM

## 2018-10-02 DIAGNOSIS — R10.2 PELVIC PAIN: ICD-10-CM

## 2018-10-02 LAB
BILIRUB BLD-MCNC: ABNORMAL MG/DL
CLARITY, POC: CLEAR
COLOR UR: YELLOW
GLUCOSE UR STRIP-MCNC: NEGATIVE MG/DL
KETONES UR QL: ABNORMAL
LEUKOCYTE EST, POC: NEGATIVE
NITRITE UR-MCNC: NEGATIVE MG/ML
PH UR: 7 [PH] (ref 5–8)
PROT UR STRIP-MCNC: NEGATIVE MG/DL
RBC # UR STRIP: NEGATIVE /UL
SP GR UR: 1.02 (ref 1–1.03)
UROBILINOGEN UR QL: NORMAL

## 2018-10-02 PROCEDURE — 99213 OFFICE O/P EST LOW 20 MIN: CPT | Performed by: UROLOGY

## 2018-10-02 PROCEDURE — 81001 URINALYSIS AUTO W/SCOPE: CPT | Performed by: UROLOGY

## 2018-10-02 NOTE — PROGRESS NOTES
Ms. Iniguez is 37 y.o. female    Chief Complaint   Patient presents with   • Pelvic Pain       Pelvic Pain   The patient's primary symptoms include pelvic pain. The patient's pertinent negatives include no vaginal discharge. This is a new problem. The current episode started 1 to 4 weeks ago. The problem occurs constantly. The problem has been unchanged. Associated symptoms include back pain, dysuria, frequency and urgency. Pertinent negatives include no abdominal pain, chills, fever, flank pain or hematuria. Nothing aggravates the symptoms. Treatments tried: pyridium. The treatment provided no relief.   Back Pain   This is a new problem. The current episode started 1 to 4 weeks ago. The problem occurs constantly. The problem is unchanged. The pain is present in the lumbar spine. The pain is moderate. The symptoms are aggravated by position. Associated symptoms include dysuria and pelvic pain. Pertinent negatives include no abdominal pain or fever.       The following portions of the patient's history were reviewed and updated as appropriate: allergies, current medications, past family history, past medical history, past social history, past surgical history and problem list.    Review of Systems   Constitutional: Negative for chills and fever.   Gastrointestinal: Negative for abdominal pain, anal bleeding and blood in stool.   Genitourinary: Positive for difficulty urinating, dysuria, frequency, pelvic pain, urgency and vaginal pain. Negative for decreased urine volume, dyspareunia, enuresis, flank pain, genital sores, hematuria, menstrual problem, vaginal bleeding and vaginal discharge.   Musculoskeletal: Positive for back pain.         Current Outpatient Prescriptions:   •  atorvastatin (LIPITOR) 10 MG tablet, Take 10 mg by mouth Every Night., Disp: , Rfl:   •  baclofen (LIORESAL) 20 MG tablet, , Disp: , Rfl:   •  BIOTIN PO, Take  by mouth., Disp: , Rfl:   •  Cholecalciferol 5000 UNITS tablet, Take 1 tablet by  mouth., Disp: , Rfl:   •  CloNIDine (CATAPRES) 0.1 MG tablet, Take 0.1 mg by mouth As Needed for High Blood Pressure., Disp: , Rfl:   •  cyanocobalamin 1000 MCG/ML injection, Inject 1,000 mcg into the shoulder, thigh, or buttocks Every 30 (Thirty) Days., Disp: , Rfl:   •  gabapentin (NEURONTIN) 300 MG capsule, Take 600 mg by mouth 2 (Two) Times a Day. 600 mg BID and 900mg at bedtime, Disp: , Rfl:   •  Glatiramer Acetate 40 MG/ML solution prefilled syringe, Inject 40 mg under the skin., Disp: , Rfl:   •  metoprolol succinate XL (TOPROL-XL) 50 MG 24 hr tablet, Take 2 tablets by mouth Daily. (Patient taking differently: Take 50 mg by mouth 2 (Two) Times a Day.), Disp: , Rfl:   •  phenazopyridine (PYRIDIUM) 100 MG tablet, Take 100 mg by mouth As Needed for bladder spasms., Disp: , Rfl:   •  Probiotic Product (PROBIOTIC-10 PO), Take  by mouth., Disp: , Rfl:   •  promethazine (PHENERGAN) 25 MG tablet, Take 25 mg by mouth Every 6 (Six) Hours As Needed for Nausea or Vomiting., Disp: , Rfl:   •  propranolol (INDERAL) 10 MG tablet, 60 mg. At night, Disp: , Rfl:   •  SUMAtriptan (IMITREX) 6 MG/0.5ML solution injection, Inject 6 mg under the skin 1 (One) Time As Needed for migraine., Disp: , Rfl:   •  tiZANidine (ZANAFLEX) 4 MG tablet, Take 4 mg by mouth Take As Directed., Disp: , Rfl:   •  topiramate (TOPAMAX) 100 MG tablet, Take 100 mg by mouth 2 (two) times a day., Disp: , Rfl:   •  vitamin C (ASCORBIC ACID) 500 MG tablet, Take 500 mg by mouth Daily., Disp: , Rfl:     Past Medical History:   Diagnosis Date   • Depression    • Hyperlipidemia    • Hypertension    • Migraine    • MS (multiple sclerosis) (CMS/HCC)    • POTS (postural orthostatic tachycardia syndrome)        Past Surgical History:   Procedure Laterality Date   • APPENDECTOMY     • BACK SURGERY     • CHOLECYSTECTOMY     • HYSTERECTOMY         Social History     Social History   • Marital status:      Social History Main Topics   • Smoking status: Never  "Smoker   • Smokeless tobacco: Never Used   • Alcohol use No   • Drug use: No   • Sexual activity: Defer     Other Topics Concern   • Not on file     Social History Narrative           Family History   Problem Relation Age of Onset   • Heart disease Father    • Hypertension Father    • Kidney disease Father    • Diabetes type II Mother    • Hypertension Mother    • Hypertension Sister    • Hypertension Brother        Objective    Temp 97.6 °F (36.4 °C)   Ht 165.1 cm (65\")   Wt 82.1 kg (181 lb)   BMI 30.12 kg/m²     Physical Exam    Office Visit on 09/11/2018   Component Date Value Ref Range Status   • Color 09/11/2018 Yellow  Yellow, Straw, Dark Yellow, Mary Final   • Clarity, UA 09/11/2018 Clear  Clear Final   • Glucose, UA 09/11/2018 Negative  Negative, 1000 mg/dL (3+) mg/dL Final   • Bilirubin 09/11/2018 Small (1+)* Negative Final   • Ketones, UA 09/11/2018 Trace* Negative Final   • Specific Gravity  09/11/2018 1.030  1.005 - 1.030 Final   • Blood, UA 09/11/2018 Negative  Negative Final   • pH, Urine 09/11/2018 5.5  5.0 - 8.0 Final   • Protein, POC 09/11/2018 Negative  Negative mg/dL Final   • Urobilinogen, UA 09/11/2018 Normal  Normal Final   • Nitrite, UA 09/11/2018 Negative  Negative Final   • Leukocytes 09/11/2018 Negative  Negative Final       Results for orders placed or performed in visit on 10/02/18   POC Urinalysis Dipstick, Multipro   Result Value Ref Range    Color Yellow Yellow, Straw, Dark Yellow, Mary    Clarity, UA Clear Clear    Glucose, UA Negative Negative, 1000 mg/dL (3+) mg/dL    Bilirubin Small (1+) (A) Negative    Ketones, UA Trace (A) Negative    Specific Gravity  1.020 1.005 - 1.030    Blood, UA Negative Negative    pH, Urine 7.0 5.0 - 8.0    Protein, POC Negative Negative mg/dL    Urobilinogen, UA Normal Normal    Nitrite, UA Negative Negative    Leukocytes Negative Negative     Assessment and Plan    Nurys was seen today for pelvic pain.    Diagnoses and all orders for " this visit:    Neurogenic bladder  -     POC Urinalysis Dipstick, Multipro    Pelvic pain  -     POC Urinalysis Dipstick, Multipro    Right kidney stone  -     US Renal Bilateral; Future    I reviewed with her the findings on a urodynamic study.  She does have normal bladder storage pressures, normal filling and emptying.  No significant overactivity.    Unfortunately, I do not have a reason for her continued pelvic and back pain.  I do not think this is urologic at this time and did discuss that she is to further have this evaluated with her primary care physician or gynecologist.  She does relate the filling of spasms in her pelvis.  Again I discussed with her that there are no involuntary bladder contractions noted on her urodynamics study.  However, I did offer her a trial of an anticholinergic to see if this would help.  She is not interested in this time given the findings on the urodynamic study.    She does have known kidney stones and this point is continuing to monitor these.  These are nonobstructing and not her source of pain.  I will see her back in a year with renal ultrasound to assess stone burden.  She will call sooner if she is having an obstructive stone episode.

## 2018-10-19 ENCOUNTER — OFFICE VISIT (OUTPATIENT)
Dept: OBGYN | Age: 38
End: 2018-10-19
Payer: COMMERCIAL

## 2018-10-19 VITALS
HEART RATE: 82 BPM | BODY MASS INDEX: 29.57 KG/M2 | HEIGHT: 66 IN | DIASTOLIC BLOOD PRESSURE: 80 MMHG | WEIGHT: 184 LBS | SYSTOLIC BLOOD PRESSURE: 134 MMHG

## 2018-10-19 DIAGNOSIS — R87.625 PAP SMEAR OF VAGINA UNSATISFACTORY: Primary | ICD-10-CM

## 2018-10-19 PROCEDURE — G8417 CALC BMI ABV UP PARAM F/U: HCPCS | Performed by: NURSE PRACTITIONER

## 2018-10-19 PROCEDURE — G8484 FLU IMMUNIZE NO ADMIN: HCPCS | Performed by: NURSE PRACTITIONER

## 2018-10-19 PROCEDURE — 1036F TOBACCO NON-USER: CPT | Performed by: NURSE PRACTITIONER

## 2018-10-19 PROCEDURE — G8427 DOCREV CUR MEDS BY ELIG CLIN: HCPCS | Performed by: NURSE PRACTITIONER

## 2018-10-19 PROCEDURE — 99213 OFFICE O/P EST LOW 20 MIN: CPT | Performed by: NURSE PRACTITIONER

## 2018-10-19 ASSESSMENT — ENCOUNTER SYMPTOMS
EYES NEGATIVE: 1
DIARRHEA: 0
RESPIRATORY NEGATIVE: 1
GASTROINTESTINAL NEGATIVE: 1
CONSTIPATION: 0

## 2018-11-16 ENCOUNTER — OFFICE VISIT (OUTPATIENT)
Dept: PRIMARY CARE CLINIC | Age: 38
End: 2018-11-16
Payer: COMMERCIAL

## 2018-11-16 VITALS
TEMPERATURE: 98 F | WEIGHT: 186.8 LBS | SYSTOLIC BLOOD PRESSURE: 122 MMHG | HEART RATE: 71 BPM | DIASTOLIC BLOOD PRESSURE: 94 MMHG | RESPIRATION RATE: 16 BRPM | OXYGEN SATURATION: 99 % | BODY MASS INDEX: 30.15 KG/M2

## 2018-11-16 DIAGNOSIS — M79.604 BILATERAL LEG PAIN: ICD-10-CM

## 2018-11-16 DIAGNOSIS — R94.31 ABNORMAL FINDING ON EKG: Primary | ICD-10-CM

## 2018-11-16 DIAGNOSIS — E83.52 HYPERCALCEMIA: ICD-10-CM

## 2018-11-16 DIAGNOSIS — M79.605 BILATERAL LEG PAIN: ICD-10-CM

## 2018-11-16 LAB
ANION GAP SERPL CALCULATED.3IONS-SCNC: 12 MMOL/L (ref 7–19)
BUN BLDV-MCNC: 15 MG/DL (ref 6–20)
CALCIUM SERPL-MCNC: 9.7 MG/DL (ref 8.6–10)
CHLORIDE BLD-SCNC: 110 MMOL/L (ref 98–111)
CO2: 22 MMOL/L (ref 22–29)
CREAT SERPL-MCNC: 0.8 MG/DL (ref 0.5–0.9)
GFR NON-AFRICAN AMERICAN: >60
GLUCOSE BLD-MCNC: 113 MG/DL (ref 74–109)
POTASSIUM SERPL-SCNC: 4.1 MMOL/L (ref 3.5–5)
SEDIMENTATION RATE, ERYTHROCYTE: 18 MM/HR (ref 0–20)
SODIUM BLD-SCNC: 144 MMOL/L (ref 136–145)

## 2018-11-16 PROCEDURE — 1036F TOBACCO NON-USER: CPT | Performed by: FAMILY MEDICINE

## 2018-11-16 PROCEDURE — 99213 OFFICE O/P EST LOW 20 MIN: CPT | Performed by: FAMILY MEDICINE

## 2018-11-16 PROCEDURE — 93000 ELECTROCARDIOGRAM COMPLETE: CPT | Performed by: FAMILY MEDICINE

## 2018-11-16 PROCEDURE — G8417 CALC BMI ABV UP PARAM F/U: HCPCS | Performed by: FAMILY MEDICINE

## 2018-11-16 PROCEDURE — G8427 DOCREV CUR MEDS BY ELIG CLIN: HCPCS | Performed by: FAMILY MEDICINE

## 2018-11-16 PROCEDURE — G8484 FLU IMMUNIZE NO ADMIN: HCPCS | Performed by: FAMILY MEDICINE

## 2018-11-17 ASSESSMENT — ENCOUNTER SYMPTOMS
RESPIRATORY NEGATIVE: 1
GASTROINTESTINAL NEGATIVE: 1
BACK PAIN: 1

## 2018-11-17 NOTE — PROGRESS NOTES
Subjective:      Patient ID: Star Malave is a 42 y.o. female who comes in today because of an abnormal EKG. HPI. She is getting ready to start another drug for her MS with Dr. Marlene Ferreira, her neurologist in [place], and he did an EKG. He felt that she might have an incomplete right bundle branch block. He told her to follow-up with us. Kassy has had normal EKGs here. She denies any chest pain or shortness of breath. She is also complaining of severe pain in her left tibia and fibula. She says it feels like bone pain. Both lower legs hurt for several months but the left leg is much worse. She describes it is terrible pain. It does not radiate. She did have a borderline calcium level. Review of Systems   Constitutional: Positive for activity change and fatigue. HENT: Negative. Respiratory: Negative. Cardiovascular: Negative. Gastrointestinal: Negative. Musculoskeletal: Positive for arthralgias, back pain and myalgias. Neurological: Positive for weakness and numbness. Hematological: Negative. Psychiatric/Behavioral: Negative. Objective:   Physical Exam   Constitutional: She is oriented to person, place, and time. She appears well-developed and well-nourished. No distress. HENT:   Head: Normocephalic. Right Ear: Tympanic membrane, external ear and ear canal normal.   Left Ear: Tympanic membrane, external ear and ear canal normal.   Mouth/Throat: Oropharynx is clear and moist.   Eyes: Pupils are equal, round, and reactive to light. Conjunctivae are normal.   Neck: Normal range of motion. Neck supple. No JVD present. Carotid bruit is not present. Cardiovascular: Normal rate, regular rhythm and normal heart sounds. No murmur heard. Pulmonary/Chest: Effort normal and breath sounds normal. No respiratory distress. Musculoskeletal: She exhibits tenderness. She exhibits no edema or deformity. Lymphadenopathy:     She has no cervical adenopathy.    Neurological: Shady Parmar MD

## 2018-11-19 DIAGNOSIS — M79.605 LEFT LEG PAIN: Primary | ICD-10-CM

## 2018-11-20 ENCOUNTER — TELEPHONE (OUTPATIENT)
Dept: PRIMARY CARE CLINIC | Age: 38
End: 2018-11-20

## 2018-11-21 ENCOUNTER — HOSPITAL ENCOUNTER (OUTPATIENT)
Dept: GENERAL RADIOLOGY | Age: 38
Discharge: HOME | End: 2018-11-21
Payer: COMMERCIAL

## 2018-11-21 DIAGNOSIS — M79.605 LEFT LEG PAIN: ICD-10-CM

## 2018-11-21 PROCEDURE — 73590 X-RAY EXAM OF LOWER LEG: CPT

## 2018-12-06 ENCOUNTER — PROCEDURE VISIT (OUTPATIENT)
Dept: OBGYN | Age: 38
End: 2018-12-06
Payer: COMMERCIAL

## 2018-12-06 VITALS
HEART RATE: 69 BPM | BODY MASS INDEX: 29.41 KG/M2 | WEIGHT: 183 LBS | HEIGHT: 66 IN | TEMPERATURE: 98.5 F | DIASTOLIC BLOOD PRESSURE: 90 MMHG | SYSTOLIC BLOOD PRESSURE: 132 MMHG

## 2018-12-06 DIAGNOSIS — R87.620 ATYPICAL SQUAMOUS CELL CHANGES OF UNDETERMINED SIGNIFICANCE (ASCUS) ON VAGINAL CYTOLOGY: Primary | ICD-10-CM

## 2018-12-06 DIAGNOSIS — N95.2 ATROPHIC VAGINITIS: ICD-10-CM

## 2018-12-06 PROCEDURE — 1036F TOBACCO NON-USER: CPT | Performed by: OBSTETRICS & GYNECOLOGY

## 2018-12-06 PROCEDURE — G8484 FLU IMMUNIZE NO ADMIN: HCPCS | Performed by: OBSTETRICS & GYNECOLOGY

## 2018-12-06 PROCEDURE — G8427 DOCREV CUR MEDS BY ELIG CLIN: HCPCS | Performed by: OBSTETRICS & GYNECOLOGY

## 2018-12-06 PROCEDURE — 99213 OFFICE O/P EST LOW 20 MIN: CPT | Performed by: OBSTETRICS & GYNECOLOGY

## 2018-12-06 PROCEDURE — G8417 CALC BMI ABV UP PARAM F/U: HCPCS | Performed by: OBSTETRICS & GYNECOLOGY

## 2018-12-06 ASSESSMENT — ENCOUNTER SYMPTOMS
GASTROINTESTINAL NEGATIVE: 1
RESPIRATORY NEGATIVE: 1
EYES NEGATIVE: 1

## 2019-02-01 DIAGNOSIS — G89.29 OTHER CHRONIC PAIN: ICD-10-CM

## 2019-02-01 RX ORDER — GABAPENTIN 300 MG/1
CAPSULE ORAL
Qty: 90 CAPSULE | Refills: 2 | Status: SHIPPED | OUTPATIENT
Start: 2019-02-01 | End: 2019-02-06 | Stop reason: SDUPTHER

## 2019-02-06 ENCOUNTER — OFFICE VISIT (OUTPATIENT)
Dept: PRIMARY CARE CLINIC | Age: 39
End: 2019-02-06
Payer: COMMERCIAL

## 2019-02-06 VITALS
HEART RATE: 62 BPM | BODY MASS INDEX: 29.73 KG/M2 | WEIGHT: 185 LBS | OXYGEN SATURATION: 99 % | TEMPERATURE: 97.2 F | SYSTOLIC BLOOD PRESSURE: 134 MMHG | HEIGHT: 66 IN | DIASTOLIC BLOOD PRESSURE: 86 MMHG

## 2019-02-06 DIAGNOSIS — R53.82 CHRONIC FATIGUE: ICD-10-CM

## 2019-02-06 DIAGNOSIS — G89.29 OTHER CHRONIC PAIN: ICD-10-CM

## 2019-02-06 DIAGNOSIS — Z13.220 SCREENING FOR LIPID DISORDERS: ICD-10-CM

## 2019-02-06 DIAGNOSIS — G50.0 TRIGEMINAL NEURALGIA: ICD-10-CM

## 2019-02-06 DIAGNOSIS — G35 RELAPSING-REMITTING MULTIPLE SCLEROSIS (HCC): ICD-10-CM

## 2019-02-06 DIAGNOSIS — G83.4 CAUDA EQUINA SYNDROME (HCC): ICD-10-CM

## 2019-02-06 DIAGNOSIS — T14.8XXA NERVE DAMAGE: Primary | ICD-10-CM

## 2019-02-06 LAB
ALBUMIN SERPL-MCNC: 4.7 G/DL (ref 3.5–5.2)
ALP BLD-CCNC: 85 U/L (ref 35–104)
ALT SERPL-CCNC: 10 U/L (ref 5–33)
ANION GAP SERPL CALCULATED.3IONS-SCNC: 14 MMOL/L (ref 7–19)
AST SERPL-CCNC: 14 U/L (ref 5–32)
BILIRUB SERPL-MCNC: 0.4 MG/DL (ref 0.2–1.2)
BUN BLDV-MCNC: 17 MG/DL (ref 6–20)
CALCIUM SERPL-MCNC: 9.5 MG/DL (ref 8.6–10)
CHLORIDE BLD-SCNC: 108 MMOL/L (ref 98–111)
CHOLESTEROL, TOTAL: 163 MG/DL (ref 160–199)
CO2: 21 MMOL/L (ref 22–29)
CREAT SERPL-MCNC: 0.9 MG/DL (ref 0.5–0.9)
GFR NON-AFRICAN AMERICAN: >60
GLUCOSE BLD-MCNC: 90 MG/DL (ref 74–109)
HCT VFR BLD CALC: 40.6 % (ref 37–47)
HDLC SERPL-MCNC: 42 MG/DL (ref 65–121)
HEMOGLOBIN: 13.5 G/DL (ref 12–16)
LDL CHOLESTEROL CALCULATED: 85 MG/DL
MCH RBC QN AUTO: 30.7 PG (ref 27–31)
MCHC RBC AUTO-ENTMCNC: 33.3 G/DL (ref 33–37)
MCV RBC AUTO: 92.3 FL (ref 81–99)
PDW BLD-RTO: 12.8 % (ref 11.5–14.5)
PLATELET # BLD: 256 K/UL (ref 130–400)
PMV BLD AUTO: 10.6 FL (ref 9.4–12.3)
POTASSIUM SERPL-SCNC: 4.5 MMOL/L (ref 3.5–5)
RBC # BLD: 4.4 M/UL (ref 4.2–5.4)
SODIUM BLD-SCNC: 143 MMOL/L (ref 136–145)
T4 TOTAL: 9.7 UG/DL (ref 4.5–11.7)
TOTAL PROTEIN: 7.8 G/DL (ref 6.6–8.7)
TRIGL SERPL-MCNC: 182 MG/DL (ref 0–149)
TSH SERPL DL<=0.05 MIU/L-ACNC: 2.33 UIU/ML (ref 0.27–4.2)
WBC # BLD: 5.4 K/UL (ref 4.8–10.8)

## 2019-02-06 PROCEDURE — 99214 OFFICE O/P EST MOD 30 MIN: CPT | Performed by: FAMILY MEDICINE

## 2019-02-06 PROCEDURE — 36415 COLL VENOUS BLD VENIPUNCTURE: CPT | Performed by: FAMILY MEDICINE

## 2019-02-06 PROCEDURE — G8484 FLU IMMUNIZE NO ADMIN: HCPCS | Performed by: FAMILY MEDICINE

## 2019-02-06 RX ORDER — GABAPENTIN 300 MG/1
CAPSULE ORAL
Qty: 90 CAPSULE | Refills: 2 | Status: SHIPPED | OUTPATIENT
Start: 2019-02-06 | End: 2019-02-06 | Stop reason: SDUPTHER

## 2019-02-08 RX ORDER — GABAPENTIN 300 MG/1
CAPSULE ORAL
Qty: 90 CAPSULE | Refills: 2 | Status: SHIPPED | OUTPATIENT
Start: 2019-02-08 | End: 2019-06-18 | Stop reason: SDUPTHER

## 2019-02-09 ASSESSMENT — ENCOUNTER SYMPTOMS
BACK PAIN: 1
SHORTNESS OF BREATH: 1
GASTROINTESTINAL NEGATIVE: 1

## 2019-03-18 RX ORDER — PROPRANOLOL HCL 60 MG
CAPSULE, EXTENDED RELEASE 24HR ORAL
Qty: 90 CAPSULE | Refills: 3 | Status: SHIPPED | OUTPATIENT
Start: 2019-03-18 | End: 2019-04-01 | Stop reason: SDUPTHER

## 2019-03-21 RX ORDER — ATORVASTATIN CALCIUM 10 MG/1
TABLET, FILM COATED ORAL
Qty: 90 TABLET | Refills: 3 | Status: SHIPPED | OUTPATIENT
Start: 2019-03-21 | End: 2020-03-16

## 2019-03-27 ENCOUNTER — TRANSCRIBE ORDERS (OUTPATIENT)
Dept: ADMINISTRATIVE | Facility: HOSPITAL | Age: 39
End: 2019-03-27

## 2019-03-27 DIAGNOSIS — M54.16 LUMBAR RADICULOPATHY: ICD-10-CM

## 2019-03-27 DIAGNOSIS — M54.5 LOW BACK PAIN, UNSPECIFIED BACK PAIN LATERALITY, UNSPECIFIED CHRONICITY, WITH SCIATICA PRESENCE UNSPECIFIED: Primary | ICD-10-CM

## 2019-04-01 ENCOUNTER — HOSPITAL ENCOUNTER (OUTPATIENT)
Dept: MRI IMAGING | Facility: HOSPITAL | Age: 39
Discharge: HOME | End: 2019-04-01

## 2019-04-01 ENCOUNTER — HOSPITAL ENCOUNTER (OUTPATIENT)
Dept: CT IMAGING | Facility: HOSPITAL | Age: 39
Discharge: HOME | End: 2019-04-01
Admitting: PHYSICIAN ASSISTANT

## 2019-04-01 DIAGNOSIS — M54.16 LUMBAR RADICULOPATHY: ICD-10-CM

## 2019-04-01 DIAGNOSIS — M54.5 LOW BACK PAIN, UNSPECIFIED BACK PAIN LATERALITY, UNSPECIFIED CHRONICITY, WITH SCIATICA PRESENCE UNSPECIFIED: ICD-10-CM

## 2019-04-01 PROCEDURE — 72131 CT LUMBAR SPINE W/O DYE: CPT

## 2019-04-01 PROCEDURE — 72148 MRI LUMBAR SPINE W/O DYE: CPT

## 2019-04-01 RX ORDER — PROPRANOLOL HCL 60 MG
CAPSULE, EXTENDED RELEASE 24HR ORAL
Qty: 90 CAPSULE | Refills: 3 | Status: SHIPPED | OUTPATIENT
Start: 2019-04-01 | End: 2020-03-09

## 2019-08-02 ENCOUNTER — TELEPHONE (OUTPATIENT)
Dept: OBGYN | Age: 39
End: 2019-08-02

## 2019-08-02 ENCOUNTER — OFFICE VISIT (OUTPATIENT)
Dept: OBGYN | Age: 39
End: 2019-08-02
Payer: COMMERCIAL

## 2019-08-02 VITALS
WEIGHT: 186 LBS | HEIGHT: 65 IN | DIASTOLIC BLOOD PRESSURE: 83 MMHG | HEART RATE: 59 BPM | BODY MASS INDEX: 30.99 KG/M2 | SYSTOLIC BLOOD PRESSURE: 118 MMHG

## 2019-08-02 DIAGNOSIS — Z12.39 SCREENING FOR BREAST CANCER: ICD-10-CM

## 2019-08-02 DIAGNOSIS — Z12.72 SCREENING FOR VAGINAL CANCER: ICD-10-CM

## 2019-08-02 DIAGNOSIS — Z01.419 WOMEN'S ANNUAL ROUTINE GYNECOLOGICAL EXAMINATION: Primary | ICD-10-CM

## 2019-08-02 DIAGNOSIS — N64.4 BREAST PAIN: Primary | ICD-10-CM

## 2019-08-02 DIAGNOSIS — N64.4 BREAST TENDERNESS: ICD-10-CM

## 2019-08-02 PROCEDURE — 99396 PREV VISIT EST AGE 40-64: CPT | Performed by: NURSE PRACTITIONER

## 2019-08-02 ASSESSMENT — ENCOUNTER SYMPTOMS
DIARRHEA: 0
GASTROINTESTINAL NEGATIVE: 1
EYES NEGATIVE: 1
ALLERGIC/IMMUNOLOGIC NEGATIVE: 1
RESPIRATORY NEGATIVE: 1
CONSTIPATION: 0

## 2019-08-09 ENCOUNTER — OFFICE VISIT (OUTPATIENT)
Dept: PRIMARY CARE CLINIC | Age: 39
End: 2019-08-09
Payer: COMMERCIAL

## 2019-08-09 VITALS
HEART RATE: 61 BPM | WEIGHT: 187.2 LBS | DIASTOLIC BLOOD PRESSURE: 78 MMHG | HEIGHT: 66 IN | BODY MASS INDEX: 30.08 KG/M2 | OXYGEN SATURATION: 98 % | RESPIRATION RATE: 18 BRPM | SYSTOLIC BLOOD PRESSURE: 123 MMHG | TEMPERATURE: 98.6 F

## 2019-08-09 DIAGNOSIS — Z51.81 MEDICATION MONITORING ENCOUNTER: ICD-10-CM

## 2019-08-09 DIAGNOSIS — M79.605 LEFT LEG PAIN: ICD-10-CM

## 2019-08-09 DIAGNOSIS — G35 RELAPSING-REMITTING MULTIPLE SCLEROSIS (HCC): ICD-10-CM

## 2019-08-09 DIAGNOSIS — R30.0 BURNING WITH URINATION: Primary | ICD-10-CM

## 2019-08-09 DIAGNOSIS — R10.9 FLANK PAIN: ICD-10-CM

## 2019-08-09 DIAGNOSIS — R20.2 NUMBNESS AND TINGLING OF BOTH LOWER EXTREMITIES: ICD-10-CM

## 2019-08-09 DIAGNOSIS — R20.0 NUMBNESS AND TINGLING OF BOTH LOWER EXTREMITIES: ICD-10-CM

## 2019-08-09 LAB
APPEARANCE FLUID: CLEAR
BILIRUBIN, POC: NORMAL
BLOOD URINE, POC: NORMAL
CLARITY, POC: CLEAR
COLOR, POC: YELLOW
GLUCOSE URINE, POC: NORMAL
KETONES, POC: NORMAL
LEUKOCYTE EST, POC: NORMAL
NITRITE, POC: NORMAL
PH, POC: 5
PROTEIN, POC: NORMAL
SPECIFIC GRAVITY, POC: 1020
UROBILINOGEN, POC: 0.2

## 2019-08-09 PROCEDURE — 99214 OFFICE O/P EST MOD 30 MIN: CPT | Performed by: FAMILY MEDICINE

## 2019-08-09 PROCEDURE — 81002 URINALYSIS NONAUTO W/O SCOPE: CPT | Performed by: FAMILY MEDICINE

## 2019-08-09 PROCEDURE — 1036F TOBACCO NON-USER: CPT | Performed by: FAMILY MEDICINE

## 2019-08-09 PROCEDURE — G8427 DOCREV CUR MEDS BY ELIG CLIN: HCPCS | Performed by: FAMILY MEDICINE

## 2019-08-09 PROCEDURE — G8417 CALC BMI ABV UP PARAM F/U: HCPCS | Performed by: FAMILY MEDICINE

## 2019-08-09 ASSESSMENT — ENCOUNTER SYMPTOMS
BACK PAIN: 1
GASTROINTESTINAL NEGATIVE: 1
RESPIRATORY NEGATIVE: 1

## 2019-08-09 NOTE — PROGRESS NOTES
Postmenopausal HRT (hormone replacement therapy)     POTS (postural orthostatic tachycardia syndrome)       reports that she has never smoked. She has never used smokeless tobacco. She reports that she does not drink alcohol or use drugs. Past Surgical History:   Procedure Laterality Date    APPENDECTOMY      BACK SURGERY      [facility] Lot COLONOSCOPY      Dr. Annmarie Mcconnell 10 years ?  COLONOSCOPY N/A 8/13/2018    Dr Kings Pickens Ankit Lopez yr recall    EGD      HYSTERECTOMY      MA EGD TRANSORAL BIOPSY SINGLE/MULTIPLE N/A 1/29/2018    Dr Kings Pickens Abdul-Active duodenitis, gastritis/gastropathy         Review of Systems   Constitutional: Positive for activity change and fatigue. Eyes: Negative for visual disturbance. Respiratory: Negative. Cardiovascular: Negative. Gastrointestinal: Negative. Genitourinary: Positive for dysuria and frequency. Musculoskeletal: Positive for arthralgias, back pain and myalgias. Neurological: Positive for weakness, light-headedness and numbness. Negative for dizziness, facial asymmetry and headaches. Hematological: Bruises/bleeds easily. Psychiatric/Behavioral: Positive for decreased concentration, dysphoric mood and sleep disturbance. Negative for self-injury and suicidal ideas. The patient is nervous/anxious. Objective:   Physical Exam   Constitutional: She is oriented to person, place, and time. She appears well-developed and well-nourished. No distress. HENT:   Head: Normocephalic. Right Ear: Tympanic membrane, external ear and ear canal normal.   Left Ear: Tympanic membrane, external ear and ear canal normal.   Mouth/Throat: Oropharynx is clear and moist.   Eyes: Pupils are equal, round, and reactive to light. Conjunctivae are normal.   Neck: Normal range of motion. Neck supple. No JVD present. Carotid bruit is not present. No thyromegaly present. Cardiovascular: Normal rate, regular rhythm and normal heart sounds.    No murmur

## 2019-08-22 RX ORDER — TOPIRAMATE 100 MG/1
100 TABLET, FILM COATED ORAL 2 TIMES DAILY
Qty: 180 TABLET | Refills: 3 | Status: SHIPPED | OUTPATIENT
Start: 2019-08-22

## 2019-09-17 DIAGNOSIS — G89.29 OTHER CHRONIC PAIN: ICD-10-CM

## 2019-09-17 RX ORDER — GABAPENTIN 300 MG/1
CAPSULE ORAL
Qty: 90 CAPSULE | Refills: 2 | Status: SHIPPED | OUTPATIENT
Start: 2019-09-17 | End: 2019-10-22 | Stop reason: DRUGHIGH

## 2019-09-18 DIAGNOSIS — M79.605 CHRONIC PAIN OF LEFT LOWER EXTREMITY: Primary | ICD-10-CM

## 2019-09-18 DIAGNOSIS — G89.29 CHRONIC PAIN OF LEFT LOWER EXTREMITY: Primary | ICD-10-CM

## 2019-09-19 ENCOUNTER — TRANSCRIBE ORDERS (OUTPATIENT)
Dept: ADMINISTRATIVE | Facility: HOSPITAL | Age: 39
End: 2019-09-19

## 2019-09-19 DIAGNOSIS — N31.9 NEUROGENIC BLADDER: Primary | ICD-10-CM

## 2019-09-19 DIAGNOSIS — M79.605 LEFT LEG PAIN: Primary | ICD-10-CM

## 2019-09-20 ENCOUNTER — HOSPITAL ENCOUNTER (OUTPATIENT)
Dept: WOMENS IMAGING | Age: 39
Discharge: HOME | End: 2019-09-20
Payer: COMMERCIAL

## 2019-09-20 DIAGNOSIS — Z12.39 SCREENING FOR BREAST CANCER: ICD-10-CM

## 2019-09-20 PROCEDURE — 77063 BREAST TOMOSYNTHESIS BI: CPT

## 2019-09-23 NOTE — PROGRESS NOTES
Subjective    Ms. Iniguez is 38 y.o. female    Chief Complaint: Nerugenic bladder    History of Present Illness  38 year old female established patient here for annual visit for right nephrolithiasis and neurogenic bladder due to MS.  Location- right kidney. Quality- painless; radiation- none; associated symptoms- stable mild 0-1ppd urge incontinence.  She does not currently want anticholinergic therapy. Renal ultrasound done today reviewed with patient- stable appearing right lower pole stones compared to CT last year, no hydro or masses.     I independently visualized and reviewed the patient's prior imaging studies today in clinic and discussed the imaging findings with the patient.    I spent time today reviewing and summarizing old records last urology clinic visit with Dr. Albarran 10/2/18               The following portions of the patient's history were reviewed and updated as appropriate: allergies, current medications, past family history, past medical history, past social history, past surgical history and problem list.    Review of Systems   Constitutional: Negative for chills, fatigue and fever.   Respiratory: Negative for apnea, cough, shortness of breath and wheezing.    Cardiovascular: Negative for chest pain and palpitations.   Gastrointestinal: Negative for abdominal distention, anal bleeding, blood in stool and nausea.   Genitourinary: Positive for flank pain, frequency and urgency. Negative for difficulty urinating, dysuria and hematuria.   All other systems reviewed and are negative.        Current Outpatient Medications:   •  atorvastatin (LIPITOR) 10 MG tablet, Take 10 mg by mouth Every Night., Disp: , Rfl:   •  baclofen (LIORESAL) 20 MG tablet, , Disp: , Rfl:   •  BIOTIN PO, Take  by mouth., Disp: , Rfl:   •  Cholecalciferol 5000 UNITS tablet, Take 1 tablet by mouth., Disp: , Rfl:   •  CloNIDine (CATAPRES) 0.1 MG tablet, Take 0.1 mg by mouth As Needed for High Blood Pressure., Disp: , Rfl:    •  cyanocobalamin 1000 MCG/ML injection, Inject 1,000 mcg into the shoulder, thigh, or buttocks Every 30 (Thirty) Days., Disp: , Rfl:   •  gabapentin (NEURONTIN) 300 MG capsule, Take 600 mg by mouth 2 (Two) Times a Day. 600 mg BID and 900mg at bedtime, Disp: , Rfl:   •  Glatiramer Acetate 40 MG/ML solution prefilled syringe, Inject 40 mg under the skin., Disp: , Rfl:   •  metoprolol succinate XL (TOPROL-XL) 50 MG 24 hr tablet, Take 2 tablets by mouth Daily. (Patient taking differently: Take 50 mg by mouth 2 (Two) Times a Day.), Disp: , Rfl:   •  phenazopyridine (PYRIDIUM) 100 MG tablet, Take 100 mg by mouth As Needed for bladder spasms., Disp: , Rfl:   •  Probiotic Product (PROBIOTIC-10 PO), Take  by mouth., Disp: , Rfl:   •  promethazine (PHENERGAN) 25 MG tablet, Take 25 mg by mouth Every 6 (Six) Hours As Needed for Nausea or Vomiting., Disp: , Rfl:   •  propranolol (INDERAL) 10 MG tablet, 60 mg. At night, Disp: , Rfl:   •  SUMAtriptan (IMITREX) 6 MG/0.5ML solution injection, Inject 6 mg under the skin 1 (One) Time As Needed for migraine., Disp: , Rfl:   •  tiZANidine (ZANAFLEX) 4 MG tablet, Take 4 mg by mouth Take As Directed., Disp: , Rfl:   •  topiramate (TOPAMAX) 100 MG tablet, Take 100 mg by mouth 2 (two) times a day., Disp: , Rfl:   •  vitamin C (ASCORBIC ACID) 500 MG tablet, Take 500 mg by mouth Daily., Disp: , Rfl:     Past Medical History:   Diagnosis Date   • Depression    • Hyperlipidemia    • Hypertension    • Migraine    • MS (multiple sclerosis) (CMS/HCC)    • POTS (postural orthostatic tachycardia syndrome)        Past Surgical History:   Procedure Laterality Date   • APPENDECTOMY     • BACK SURGERY     • CHOLECYSTECTOMY     • HYSTERECTOMY         Social History     Socioeconomic History   • Marital status:      Spouse name: Not on file   • Number of children: Not on file   • Years of education: Not on file   • Highest education level: Not on file   Tobacco Use   • Smoking status: Never  Smoker   • Smokeless tobacco: Never Used   Substance and Sexual Activity   • Alcohol use: No   • Drug use: No   • Sexual activity: Defer   Social History Narrative           Family History   Problem Relation Age of Onset   • Heart disease Father    • Hypertension Father    • Kidney disease Father    • Diabetes type II Mother    • Hypertension Mother    • Hypertension Sister    • Hypertension Brother        Objective    There were no vitals taken for this visit.    Physical Exam  Constitutional: Well nourished, Well developed; No apparent distress; Vital reviewed as above  Psychiatric: Appropriate affect; Alert and oriented  Eyes: Unremarkable  Musculoskeletal: Normal gait and station  GI: Abdomen is soft, non-tender  Respiratory: No distress; Unlabored movement; No accessory musculature needed with symmetric movements  Skin: No pallor or diaphoresis  Lymphatic: No adenopathy neck or groin      Results for orders placed or performed in visit on 10/02/18   POC Urinalysis Dipstick, Multipro   Result Value Ref Range    Color Yellow Yellow, Straw, Dark Yellow, Mary    Clarity, UA Clear Clear    Glucose, UA Negative Negative, 1000 mg/dL (3+) mg/dL    Bilirubin Small (1+) (A) Negative    Ketones, UA Trace (A) Negative    Specific Gravity  1.020 1.005 - 1.030    Blood, UA Negative Negative    pH, Urine 7.0 5.0 - 8.0    Protein, POC Negative Negative mg/dL    Urobilinogen, UA Normal Normal    Nitrite, UA Negative Negative    Leukocytes Negative Negative     Patient's There is no height or weight on file to calculate BMI. BMI is above normal parameters. Recommendations include: educational material.    Bladder Scan interpretation  Estimation of residual urine via abdominal ultrasound  Residual Urine: 0 ml  Indication: Kidney Stone  Position: Supine  Examination: Incremental scanning of the suprapubic area using 3 MHz transducer using copious amounts of acoustic gel.   Findings: An anechoic area was demonstrated which  represented the bladder, with measurement of residual urine as noted. I inspected this myself. In that the residual urine was stable or insignificant, no treatment will be necessary at this time.       Assessment and Plan    Nurys was seen today for flank pain.    Diagnoses and all orders for this visit:    Neurogenic bladder  -     POC Urinalysis Dipstick, Multipro    Nephrolithiasis  -     XR abdomen kub; Future    Essential hypertension    Urinary frequency    Multiple sclerosis (CMS/HCC)    Stable currently nonbothersome mild urinary frequency with MS and normal US and PVR today. Right nephrolithasis.  F/u with me 1 month with KUB or sooner prn.

## 2019-09-25 ENCOUNTER — HOSPITAL ENCOUNTER (OUTPATIENT)
Dept: ULTRASOUND IMAGING | Facility: HOSPITAL | Age: 39
Discharge: HOME | End: 2019-09-25
Admitting: UROLOGY

## 2019-09-25 ENCOUNTER — OFFICE VISIT (OUTPATIENT)
Dept: UROLOGY | Facility: CLINIC | Age: 39
End: 2019-09-25

## 2019-09-25 VITALS
OXYGEN SATURATION: 98 % | HEIGHT: 65 IN | RESPIRATION RATE: 16 BRPM | WEIGHT: 184 LBS | BODY MASS INDEX: 30.66 KG/M2 | TEMPERATURE: 98.6 F | HEART RATE: 64 BPM

## 2019-09-25 DIAGNOSIS — N20.0 RIGHT KIDNEY STONE: ICD-10-CM

## 2019-09-25 DIAGNOSIS — I10 ESSENTIAL HYPERTENSION: ICD-10-CM

## 2019-09-25 DIAGNOSIS — R35.0 URINARY FREQUENCY: ICD-10-CM

## 2019-09-25 DIAGNOSIS — N20.0 NEPHROLITHIASIS: ICD-10-CM

## 2019-09-25 DIAGNOSIS — N31.9 NEUROGENIC BLADDER: Primary | ICD-10-CM

## 2019-09-25 DIAGNOSIS — G35 MULTIPLE SCLEROSIS (HCC): ICD-10-CM

## 2019-09-25 DIAGNOSIS — N31.9 NEUROGENIC BLADDER: ICD-10-CM

## 2019-09-25 LAB
BILIRUB BLD-MCNC: NEGATIVE MG/DL
CLARITY, POC: CLEAR
COLOR UR: YELLOW
GLUCOSE UR STRIP-MCNC: NEGATIVE MG/DL
KETONES UR QL: NEGATIVE
LEUKOCYTE EST, POC: ABNORMAL
NITRITE UR-MCNC: NEGATIVE MG/ML
PH UR: 5.5 [PH] (ref 5–8)
PROT UR STRIP-MCNC: NEGATIVE MG/DL
RBC # UR STRIP: NEGATIVE /UL
SP GR UR: 1.02 (ref 1–1.03)
UROBILINOGEN UR QL: NORMAL

## 2019-09-25 PROCEDURE — 76775 US EXAM ABDO BACK WALL LIM: CPT

## 2019-09-25 PROCEDURE — 81003 URINALYSIS AUTO W/O SCOPE: CPT | Performed by: UROLOGY

## 2019-09-25 PROCEDURE — 51798 US URINE CAPACITY MEASURE: CPT | Performed by: UROLOGY

## 2019-09-25 PROCEDURE — 99214 OFFICE O/P EST MOD 30 MIN: CPT | Performed by: UROLOGY

## 2019-09-25 RX ORDER — ACETAMINOPHEN,DIPHENHYDRAMINE HCL 500; 25 MG/1; MG/1
2 TABLET, FILM COATED ORAL NIGHTLY PRN
COMMUNITY

## 2019-09-25 RX ORDER — FINGOLIMOD HCL 0.5 MG/1
CAPSULE ORAL
COMMUNITY
Start: 2019-09-04 | End: 2021-08-19

## 2019-09-25 NOTE — PATIENT INSTRUCTIONS

## 2019-09-26 ENCOUNTER — APPOINTMENT (OUTPATIENT)
Dept: MRI IMAGING | Facility: HOSPITAL | Age: 39
End: 2019-09-26

## 2019-09-27 ENCOUNTER — HOSPITAL ENCOUNTER (OUTPATIENT)
Dept: MRI IMAGING | Facility: HOSPITAL | Age: 39
Discharge: HOME | End: 2019-09-27
Admitting: FAMILY MEDICINE

## 2019-09-27 DIAGNOSIS — M79.605 CHRONIC PAIN OF LEFT LOWER EXTREMITY: ICD-10-CM

## 2019-09-27 DIAGNOSIS — M79.605 LEFT LEG PAIN: ICD-10-CM

## 2019-09-27 DIAGNOSIS — G89.29 CHRONIC PAIN OF LEFT LOWER EXTREMITY: ICD-10-CM

## 2019-09-27 PROCEDURE — 73718 MRI LOWER EXTREMITY W/O DYE: CPT

## 2019-10-15 NOTE — PROGRESS NOTES
Subjective    Ms. Iniguez is 38 y.o. female    Chief Complaint: Nephrolithiasis    History of Present Illness    38 year old female established patient here to review KUB x-ray done for history of nephrolithiasis.  Location right kidney.  Quality dull achy right-sided back pain.  Radiation none.  Associated symptoms denies gross hematuria, dysuria or current flank pain.  Timing intermittent.  KUB did not done today reviewed with the patient shows 5 mm and 2 mm right lower pole stones.  No ureteral stone visible.  She hasneurogenic bladder due to MS.   she also has stable mild 0-1ppd urge incontinence.  She does not currently want anticholinergic therapy.     I independently visualized and reviewed the patient's prior imaging studies today in clinic and discussed the imaging findings with the patient.       The following portions of the patient's history were reviewed and updated as appropriate: allergies, current medications, past family history, past medical history, past social history, past surgical history and problem list.    Review of Systems   Constitutional: Negative for chills and fever.   Gastrointestinal: Negative for abdominal pain, anal bleeding and blood in stool.   Genitourinary: Positive for flank pain (right side). Negative for dysuria, frequency, hematuria and urgency.   All other systems reviewed and are negative.        Current Outpatient Medications:   •  atorvastatin (LIPITOR) 10 MG tablet, Take 10 mg by mouth Every Night., Disp: , Rfl:   •  baclofen (LIORESAL) 20 MG tablet, , Disp: , Rfl:   •  Cholecalciferol 5000 UNITS tablet, Take 1 tablet by mouth., Disp: , Rfl:   •  CloNIDine (CATAPRES) 0.1 MG tablet, Take 0.1 mg by mouth As Needed for High Blood Pressure., Disp: , Rfl:   •  conjugated estrogens (PREMARIN) 0.625 MG/GM vaginal cream, Insert 0.5 g into the vagina., Disp: , Rfl:   •  cyanocobalamin 1000 MCG/ML injection, Inject 1,000 mcg into the shoulder, thigh, or buttocks Every 30  (Thirty) Days., Disp: , Rfl:   •  diphenhydrAMINE-acetaminophen (TYLENOL PM)  MG tablet per tablet, Take 2 tablets by mouth Daily., Disp: , Rfl:   •  gabapentin (NEURONTIN) 300 MG capsule, Take 600 mg by mouth 2 (Two) Times a Day. 600 mg BID and 900mg at bedtime, Disp: , Rfl:   •  GILENYA 0.5 MG capsule, , Disp: , Rfl:   •  metoprolol succinate XL (TOPROL-XL) 50 MG 24 hr tablet, Take 2 tablets by mouth Daily. (Patient taking differently: Take 50 mg by mouth 2 (Two) Times a Day.), Disp: , Rfl:   •  phenazopyridine (PYRIDIUM) 100 MG tablet, Take 100 mg by mouth As Needed for bladder spasms., Disp: , Rfl:   •  Probiotic Product (PROBIOTIC-10 PO), Take  by mouth., Disp: , Rfl:   •  promethazine (PHENERGAN) 25 MG tablet, Take 25 mg by mouth Every 6 (Six) Hours As Needed for Nausea or Vomiting., Disp: , Rfl:   •  propranolol (INDERAL) 60 MG tablet, 60 mg. At night, Disp: , Rfl:   •  SUMAtriptan (IMITREX) 6 MG/0.5ML solution injection, Inject 6 mg under the skin 1 (One) Time As Needed for migraine., Disp: , Rfl:   •  tiZANidine (ZANAFLEX) 4 MG tablet, Take 4 mg by mouth Take As Directed., Disp: , Rfl:   •  topiramate (TOPAMAX) 100 MG tablet, Take 100 mg by mouth 2 (two) times a day., Disp: , Rfl:     Past Medical History:   Diagnosis Date   • Depression    • Hyperlipidemia    • Hypertension    • Kidney stone    • Migraine    • MS (multiple sclerosis) (CMS/HCC)    • POTS (postural orthostatic tachycardia syndrome)        Past Surgical History:   Procedure Laterality Date   • APPENDECTOMY     • BACK SURGERY     • CHOLECYSTECTOMY     • HYSTERECTOMY         Social History     Socioeconomic History   • Marital status:      Spouse name: Not on file   • Number of children: Not on file   • Years of education: Not on file   • Highest education level: Not on file   Tobacco Use   • Smoking status: Never Smoker   • Smokeless tobacco: Never Used   Substance and Sexual Activity   • Alcohol use: No   • Drug use: No   •  "Sexual activity: Defer   Social History Narrative           Family History   Problem Relation Age of Onset   • Heart disease Father    • Hypertension Father    • Kidney disease Father    • Diabetes type II Mother    • Hypertension Mother    • Hypertension Sister    • Hypertension Brother        Objective    Temp 97.2 °F (36.2 °C)   Ht 167.6 cm (66\")   Wt 82.6 kg (182 lb)   BMI 29.38 kg/m²     Physical Exam  Constitutional: Well nourished, Well developed; No apparent distress; Vital reviewed as above  Psychiatric: Appropriate affect; Alert and oriented  Eyes: Unremarkable  Musculoskeletal: Normal gait and station  GI: Abdomen is soft, non-tender  Respiratory: No distress; Unlabored movement; No accessory musculature needed with symmetric movements  Skin: No pallor or diaphoresis  Lymphatic: No adenopathy neck or groin      Results for orders placed or performed in visit on 10/22/19   POC Urinalysis Dipstick, Multipro   Result Value Ref Range    Color Yellow Yellow, Straw, Dark Yellow, Mary    Clarity, UA Clear Clear    Glucose, UA Negative Negative, 1000 mg/dL (3+) mg/dL    Bilirubin Negative Negative    Ketones, UA Negative Negative    Specific Gravity  1.025 1.005 - 1.030    Blood, UA Negative Negative    pH, Urine 5.0 5.0 - 8.0    Protein, POC Negative Negative mg/dL    Urobilinogen, UA Normal Normal    Nitrite, UA Negative Negative    Leukocytes Negative Negative     Patient's Body mass index is 29.38 kg/m². BMI is above normal parameters. Recommendations include: educational material.    Assessment and Plan    Diagnoses and all orders for this visit:    Nephrolithiasis  -     POC Urinalysis Dipstick, Multipro  -     XR abdomen kub; Future    Essential hypertension    Multiple sclerosis (CMS/HCC)    Urge incontinence of urine    Neurogenic bladder      Nonobstructing right lower pole kidney stones.  We discussed options for observation, ESWL, or ureteroscopy.  Also discussed option for 24-hour urine " collection for metabolic evaluation.  She declined 24 urine at this time and would like to see me in 6 months with pre-clinic KUB or sooner as needed.    We discussed recommendations for reducing future risk of stone formation and/or stone enlargement including increasing fluid intake with a goal of 6 L daily to achieve a urinary output of 2 to 2.5 L daily, low oxalate diet, benefits of dietary citrate, reducing purine intake, and no added salt.

## 2019-10-22 ENCOUNTER — OFFICE VISIT (OUTPATIENT)
Dept: UROLOGY | Facility: CLINIC | Age: 39
End: 2019-10-22

## 2019-10-22 ENCOUNTER — HOSPITAL ENCOUNTER (OUTPATIENT)
Dept: GENERAL RADIOLOGY | Facility: HOSPITAL | Age: 39
Discharge: HOME | End: 2019-10-22
Admitting: UROLOGY

## 2019-10-22 VITALS — HEIGHT: 66 IN | TEMPERATURE: 97.2 F | WEIGHT: 182 LBS | BODY MASS INDEX: 29.25 KG/M2

## 2019-10-22 DIAGNOSIS — G83.4 CAUDA EQUINA SYNDROME (HCC): Primary | ICD-10-CM

## 2019-10-22 DIAGNOSIS — G35 MULTIPLE SCLEROSIS (HCC): ICD-10-CM

## 2019-10-22 DIAGNOSIS — N20.0 NEPHROLITHIASIS: Primary | ICD-10-CM

## 2019-10-22 DIAGNOSIS — N20.0 NEPHROLITHIASIS: ICD-10-CM

## 2019-10-22 DIAGNOSIS — I10 ESSENTIAL HYPERTENSION: ICD-10-CM

## 2019-10-22 DIAGNOSIS — N39.41 URGE INCONTINENCE OF URINE: ICD-10-CM

## 2019-10-22 DIAGNOSIS — N31.9 NEUROGENIC BLADDER: ICD-10-CM

## 2019-10-22 PROCEDURE — 99213 OFFICE O/P EST LOW 20 MIN: CPT | Performed by: UROLOGY

## 2019-10-22 PROCEDURE — 74018 RADEX ABDOMEN 1 VIEW: CPT

## 2019-10-22 PROCEDURE — 81003 URINALYSIS AUTO W/O SCOPE: CPT | Performed by: UROLOGY

## 2019-10-22 RX ORDER — GABAPENTIN 300 MG/1
CAPSULE ORAL
Qty: 150 CAPSULE | Refills: 0 | Status: SHIPPED | OUTPATIENT
Start: 2019-10-22 | End: 2019-10-23 | Stop reason: DRUGHIGH

## 2019-10-22 NOTE — PATIENT INSTRUCTIONS

## 2019-10-23 DIAGNOSIS — G62.9 NEUROPATHY: ICD-10-CM

## 2019-10-23 DIAGNOSIS — G35 MULTIPLE SCLEROSIS (HCC): Primary | ICD-10-CM

## 2019-10-23 RX ORDER — GABAPENTIN 100 MG/1
CAPSULE ORAL
Qty: 135 CAPSULE | Refills: 2 | Status: SHIPPED | OUTPATIENT
Start: 2019-10-23 | End: 2020-01-13

## 2019-10-25 ENCOUNTER — NURSE ONLY (OUTPATIENT)
Dept: PRIMARY CARE CLINIC | Age: 39
End: 2019-10-25
Payer: COMMERCIAL

## 2019-10-25 DIAGNOSIS — Z23 NEED FOR INFLUENZA VACCINATION: Primary | ICD-10-CM

## 2019-10-25 PROCEDURE — 90471 IMMUNIZATION ADMIN: CPT | Performed by: FAMILY MEDICINE

## 2019-10-25 PROCEDURE — 90686 IIV4 VACC NO PRSV 0.5 ML IM: CPT | Performed by: FAMILY MEDICINE

## 2020-01-13 RX ORDER — GABAPENTIN 100 MG/1
CAPSULE ORAL
Qty: 135 CAPSULE | Refills: 2 | Status: SHIPPED | OUTPATIENT
Start: 2020-01-13 | End: 2020-02-21 | Stop reason: SDUPTHER

## 2020-02-12 ENCOUNTER — OFFICE VISIT (OUTPATIENT)
Dept: PRIMARY CARE CLINIC | Age: 40
End: 2020-02-12
Payer: COMMERCIAL

## 2020-02-12 VITALS
HEART RATE: 69 BPM | DIASTOLIC BLOOD PRESSURE: 90 MMHG | WEIGHT: 190 LBS | HEIGHT: 66 IN | SYSTOLIC BLOOD PRESSURE: 130 MMHG | RESPIRATION RATE: 18 BRPM | OXYGEN SATURATION: 98 % | TEMPERATURE: 98.2 F | BODY MASS INDEX: 30.53 KG/M2

## 2020-02-12 LAB
AMPHETAMINE SCREEN, URINE: NORMAL
BARBITURATE SCREEN, URINE: NORMAL
BENZODIAZEPINE SCREEN, URINE: NORMAL
BUPRENORPHINE URINE: NORMAL
COCAINE METABOLITE SCREEN URINE: NORMAL
GABAPENTIN SCREEN, URINE: NORMAL
MDMA URINE: NORMAL
METHADONE SCREEN, URINE: NORMAL
METHAMPHETAMINE, URINE: NORMAL
OPIATE SCREEN URINE: NORMAL
OXYCODONE SCREEN URINE: NORMAL
PHENCYCLIDINE SCREEN URINE: NORMAL
PROPOXYPHENE SCREEN, URINE: NORMAL
THC SCREEN, URINE: NORMAL
TRICYCLIC ANTIDEPRESSANTS, UR: NORMAL

## 2020-02-12 PROCEDURE — 93000 ELECTROCARDIOGRAM COMPLETE: CPT | Performed by: FAMILY MEDICINE

## 2020-02-12 PROCEDURE — 99396 PREV VISIT EST AGE 40-64: CPT | Performed by: FAMILY MEDICINE

## 2020-02-12 PROCEDURE — 80305 DRUG TEST PRSMV DIR OPT OBS: CPT | Performed by: FAMILY MEDICINE

## 2020-02-12 PROCEDURE — G8482 FLU IMMUNIZE ORDER/ADMIN: HCPCS | Performed by: FAMILY MEDICINE

## 2020-02-12 RX ORDER — NORTRIPTYLINE HYDROCHLORIDE 75 MG/1
CAPSULE ORAL NIGHTLY
COMMUNITY
Start: 2020-01-24

## 2020-02-12 NOTE — PROGRESS NOTES
by mouth 2 times daily      promethazine (PHENERGAN) 25 MG tablet Take 25 mg by mouth      cloNIDine (CATAPRES) 0.1 MG tablet 0.1 mg as needed       SUMAtriptan Succinate 6 MG/0.5ML SOAJ as needed       phenazopyridine (PYRIDIUM) 200 MG tablet Take 1 tablet by mouth 3 times daily as needed for Pain. 24 tablet 2    Cholecalciferol (VITAMIN D-3) 5000 UNITS TABS Take 5,000 Units by mouth daily.  tiZANidine (ZANAFLEX) 4 MG tablet Take 4 mg by mouth every 8 hours as needed. No current facility-administered medications for this visit. Past Medical History:   Diagnosis Date    B12 deficiency     Cauda equina syndrome (HCC)     Complicated migraine     Depression     GERD (gastroesophageal reflux disease)     Hematuria     wih abdominal pain    Hyperlipidemia     Hypertension     MS (multiple sclerosis) (HCC)     Myofascial pain     Nerve damage     Neutropenia ([address] 10/21/2015    Pelvic floor dysfunction     Postmenopausal HRT (hormone replacement therapy)     POTS (postural orthostatic tachycardia syndrome)      Past Surgical History:   Procedure Laterality Date    APPENDECTOMY      BACK SURGERY      CHOLECYSTECTOMY, LAPAROSCOPIC      COLONOSCOPY      Dr. Rosemary Mccrary 10 years ?  COLONOSCOPY N/A 8/13/2018    Dr Tommie Chen yr recall    EGD      HYSTERECTOMY      [place] EGD TRANSORAL BIOPSY SINGLE/MULTIPLE N/A 1/29/2018    Dr Tommie Abdul-Active duodenitis, gastritis/gastropathy     Family History   Problem Relation Age of Onset    Diabetes Mother     High Blood Pressure Mother     Colon Polyps Mother     Colon Cancer Neg Hx     Liver Cancer Neg Hx     Liver Disease Neg Hx     Esophageal Cancer Neg Hx     Rectal Cancer Neg Hx     Stomach Cancer Neg Hx      Social History     Tobacco Use    Smoking status: Never Smoker    Smokeless tobacco: Never Used   Substance Use Topics    Alcohol use: No       Allergies: Ropinirole hcl; Simvastatin; and Zolpidem  No LMP recorded.  Patient

## 2020-02-18 ENCOUNTER — NURSE ONLY (OUTPATIENT)
Dept: PRIMARY CARE CLINIC | Age: 40
End: 2020-02-18
Payer: COMMERCIAL

## 2020-02-18 LAB
ALBUMIN SERPL-MCNC: 4.7 G/DL (ref 3.5–5.2)
ALP BLD-CCNC: 113 U/L (ref 35–104)
ALT SERPL-CCNC: 65 U/L (ref 5–33)
ANION GAP SERPL CALCULATED.3IONS-SCNC: 19 MMOL/L (ref 7–19)
AST SERPL-CCNC: 35 U/L (ref 5–32)
BILIRUB SERPL-MCNC: 0.6 MG/DL (ref 0.2–1.2)
BILIRUBIN, POC: ABNORMAL
BLOOD URINE, POC: ABNORMAL
BUN BLDV-MCNC: 21 MG/DL (ref 6–20)
CALCIUM SERPL-MCNC: 9.6 MG/DL (ref 8.6–10)
CHLORIDE BLD-SCNC: 103 MMOL/L (ref 98–111)
CHOLESTEROL, TOTAL: 158 MG/DL (ref 160–199)
CLARITY, POC: CLEAR
CO2: 18 MMOL/L (ref 22–29)
COLOR, POC: ABNORMAL
CREAT SERPL-MCNC: 0.8 MG/DL (ref 0.5–0.9)
GFR NON-AFRICAN AMERICAN: >60
GLUCOSE BLD-MCNC: 75 MG/DL (ref 74–109)
GLUCOSE URINE, POC: ABNORMAL
HDLC SERPL-MCNC: 46 MG/DL (ref 65–121)
KETONES, POC: ABNORMAL
LDL CHOLESTEROL CALCULATED: 90 MG/DL
LEUKOCYTE EST, POC: ABNORMAL
NITRITE, POC: ABNORMAL
PH, POC: 5
POTASSIUM SERPL-SCNC: 4 MMOL/L (ref 3.5–5)
PROTEIN, POC: ABNORMAL
SODIUM BLD-SCNC: 140 MMOL/L (ref 136–145)
SPECIFIC GRAVITY, POC: 1.02
TOTAL PROTEIN: 8.1 G/DL (ref 6.6–8.7)
TRIGL SERPL-MCNC: 108 MG/DL (ref 0–149)
UROBILINOGEN, POC: 2

## 2020-02-18 PROCEDURE — 81002 URINALYSIS NONAUTO W/O SCOPE: CPT | Performed by: FAMILY MEDICINE

## 2020-02-18 PROCEDURE — 36415 COLL VENOUS BLD VENIPUNCTURE: CPT | Performed by: FAMILY MEDICINE

## 2020-02-18 RX ORDER — TIZANIDINE 4 MG/1
4 TABLET ORAL EVERY 8 HOURS PRN
Qty: 270 TABLET | Refills: 1 | Status: SHIPPED | OUTPATIENT
Start: 2020-02-18 | End: 2021-12-09 | Stop reason: SDUPTHER

## 2020-02-21 LAB
ORGANISM: ABNORMAL
URINE CULTURE, ROUTINE: ABNORMAL
URINE CULTURE, ROUTINE: ABNORMAL

## 2020-02-21 RX ORDER — GABAPENTIN 100 MG/1
CAPSULE ORAL
Qty: 135 CAPSULE | Refills: 2 | Status: SHIPPED | OUTPATIENT
Start: 2020-02-21 | End: 2020-05-13

## 2020-02-21 RX ORDER — SULFAMETHOXAZOLE AND TRIMETHOPRIM 800; 160 MG/1; MG/1
1 TABLET ORAL 2 TIMES DAILY
Qty: 14 TABLET | Refills: 0 | Status: SHIPPED | OUTPATIENT
Start: 2020-02-21 | End: 2020-02-28

## 2020-03-09 RX ORDER — PROPRANOLOL HCL 60 MG
CAPSULE, EXTENDED RELEASE 24HR ORAL
Qty: 90 CAPSULE | Refills: 3 | Status: SHIPPED | OUTPATIENT
Start: 2020-03-09 | End: 2021-03-03

## 2020-03-16 RX ORDER — ATORVASTATIN CALCIUM 10 MG/1
TABLET, FILM COATED ORAL
Qty: 90 TABLET | Refills: 3 | Status: SHIPPED | OUTPATIENT
Start: 2020-03-16 | End: 2021-03-10

## 2020-05-13 RX ORDER — GABAPENTIN 100 MG/1
CAPSULE ORAL
Qty: 135 CAPSULE | Refills: 2 | Status: SHIPPED | OUTPATIENT
Start: 2020-05-13 | End: 2020-07-12 | Stop reason: SDUPTHER

## 2020-05-26 NOTE — PROGRESS NOTES
Subjective    Ms. Iniguez is 39 y.o. female    Chief Complaint: Nephrolithiasis    History of Present Illness    39 year old female established patient follow-up for kidney stones.  Location right lower pole kidney.  Severity 5 mm and 3 mm. Quality currently painless.  She has noted some intermittent left-sided back pain.   Radiation none.  Associated symptoms denies gross hematuria, dysuria or current flank pain.  Timing intermittent.  KUB done today reviewed with the patient shows 5 mm and 3 mm right lower pole stones.  No ureteral stone visible.  She hasneurogenic bladder due to MS.   she also has stable mild 0-1ppd urge incontinence along with some mild stress urinary incontinence after single vaginal delivery.  She does not currently want anticholinergic therapy.  Severity stable.    I independently visualized and reviewed the patient's prior imaging studies today in clinic and discussed the imaging findings with the patient.        The following portions of the patient's history were reviewed and updated as appropriate: allergies, current medications, past family history, past medical history, past social history, past surgical history and problem list.    Review of Systems   Constitutional: Negative for chills and fever.   Gastrointestinal: Negative for abdominal pain, anal bleeding, blood in stool, diarrhea, nausea and vomiting.   Genitourinary: Positive for flank pain (left side). Negative for dysuria, frequency, hematuria and urgency.   All other systems reviewed and are negative.        Current Outpatient Medications:   •  atorvastatin (LIPITOR) 10 MG tablet, Take 10 mg by mouth Every Night., Disp: , Rfl:   •  baclofen (LIORESAL) 20 MG tablet, , Disp: , Rfl:   •  Cholecalciferol 5000 UNITS tablet, Take 1 tablet by mouth., Disp: , Rfl:   •  CloNIDine (CATAPRES) 0.1 MG tablet, Take 0.1 mg by mouth As Needed for High Blood Pressure., Disp: , Rfl:   •  conjugated estrogens (PREMARIN) 0.625 MG/GM vaginal  cream, Insert 0.5 g into the vagina., Disp: , Rfl:   •  cyanocobalamin 1000 MCG/ML injection, Inject 1,000 mcg into the shoulder, thigh, or buttocks Every 30 (Thirty) Days., Disp: , Rfl:   •  diphenhydrAMINE-acetaminophen (TYLENOL PM)  MG tablet per tablet, Take 2 tablets by mouth Daily., Disp: , Rfl:   •  gabapentin (NEURONTIN) 300 MG capsule, Take 600 mg by mouth 2 (Two) Times a Day. 600 mg BID and 900mg at bedtime, Disp: , Rfl:   •  GILENYA 0.5 MG capsule, , Disp: , Rfl:   •  metoprolol succinate XL (TOPROL-XL) 50 MG 24 hr tablet, Take 2 tablets by mouth Daily. (Patient taking differently: Take 50 mg by mouth 2 (Two) Times a Day.), Disp: , Rfl:   •  phenazopyridine (PYRIDIUM) 100 MG tablet, Take 100 mg by mouth As Needed for bladder spasms., Disp: , Rfl:   •  Probiotic Product (PROBIOTIC-10 PO), Take  by mouth., Disp: , Rfl:   •  promethazine (PHENERGAN) 25 MG tablet, Take 25 mg by mouth Every 6 (Six) Hours As Needed for Nausea or Vomiting., Disp: , Rfl:   •  propranolol (INDERAL) 60 MG tablet, 60 mg. At night, Disp: , Rfl:   •  SUMAtriptan (IMITREX) 6 MG/0.5ML solution injection, Inject 6 mg under the skin 1 (One) Time As Needed for migraine., Disp: , Rfl:   •  tiZANidine (ZANAFLEX) 4 MG tablet, Take 4 mg by mouth Take As Directed., Disp: , Rfl:   •  topiramate (TOPAMAX) 100 MG tablet, Take 100 mg by mouth 2 (two) times a day., Disp: , Rfl:     Past Medical History:   Diagnosis Date   • Depression    • Hyperlipidemia    • Hypertension    • Kidney stone    • Migraine    • MS (multiple sclerosis) (CMS/HCC)    • POTS (postural orthostatic tachycardia syndrome)        Past Surgical History:   Procedure Laterality Date   • APPENDECTOMY     • BACK SURGERY     • CHOLECYSTECTOMY     • HYSTERECTOMY         Social History     Socioeconomic History   • Marital status:      Spouse name: Not on file   • Number of children: Not on file   • Years of education: Not on file   • Highest education level: Not on file    Tobacco Use   • Smoking status: Never Smoker   • Smokeless tobacco: Never Used   Substance and Sexual Activity   • Alcohol use: No   • Drug use: No   • Sexual activity: Defer   Social History Narrative           Family History   Problem Relation Age of Onset   • Heart disease Father    • Hypertension Father    • Kidney disease Father    • Diabetes type II Mother    • Hypertension Mother    • Hypertension Sister    • Hypertension Brother        Objective    There were no vitals taken for this visit.    Physical Exam  Constitutional: Well nourished, Well developed; No apparent distress; Vital reviewed as above  Psychiatric: Appropriate affect; Alert and oriented  Eyes: Unremarkable  Musculoskeletal: Normal gait and station  GI: Abdomen is soft, non-tender  Respiratory: No distress; Unlabored movement; No accessory musculature needed with symmetric movements  Skin: No pallor or diaphoresis  Lymphatic: No adenopathy neck or groin      Results for orders placed or performed in visit on 10/22/19   POC Urinalysis Dipstick, Multipro   Result Value Ref Range    Color Yellow Yellow, Straw, Dark Yellow, Mary    Clarity, UA Clear Clear    Glucose, UA Negative Negative, 1000 mg/dL (3+) mg/dL    Bilirubin Negative Negative    Ketones, UA Negative Negative    Specific Gravity  1.025 1.005 - 1.030    Blood, UA Negative Negative    pH, Urine 5.0 5.0 - 8.0    Protein, POC Negative Negative mg/dL    Urobilinogen, UA Normal Normal    Nitrite, UA Negative Negative    Leukocytes Negative Negative     Assessment and Plan    Diagnoses and all orders for this visit:    Nephrolithiasis  -     POC Urinalysis Dipstick, Multipro  -     US Renal Bilateral; Future    Essential hypertension    Mixed hyperlipidemia    Multiple sclerosis (CMS/HCC)    Neurogenic bladder    Stress incontinence      Nonobstructing right lower pole kidney stones overall stable on KUB today..  We discussed options for observation, ESWL, or ureteroscopy.  She  would like to follow-up with me early November with pre-clinic renal ultrasound.  She may follow-up as needed for cystoscopy pelvic exam to evaluate her mild VITO.      This document has been signed by NIKOLAI Curry MD on May 28, 2020 15:40

## 2020-05-28 ENCOUNTER — OFFICE VISIT (OUTPATIENT)
Dept: UROLOGY | Facility: CLINIC | Age: 40
End: 2020-05-28

## 2020-05-28 ENCOUNTER — HOSPITAL ENCOUNTER (OUTPATIENT)
Dept: GENERAL RADIOLOGY | Facility: HOSPITAL | Age: 40
Discharge: HOME | End: 2020-05-28
Admitting: UROLOGY

## 2020-05-28 VITALS — TEMPERATURE: 97.2 F | WEIGHT: 203 LBS | BODY MASS INDEX: 32.62 KG/M2 | HEIGHT: 66 IN

## 2020-05-28 DIAGNOSIS — E78.2 MIXED HYPERLIPIDEMIA: ICD-10-CM

## 2020-05-28 DIAGNOSIS — N31.9 NEUROGENIC BLADDER: ICD-10-CM

## 2020-05-28 DIAGNOSIS — N20.0 NEPHROLITHIASIS: ICD-10-CM

## 2020-05-28 DIAGNOSIS — N39.3 STRESS INCONTINENCE: ICD-10-CM

## 2020-05-28 DIAGNOSIS — N20.0 NEPHROLITHIASIS: Primary | ICD-10-CM

## 2020-05-28 DIAGNOSIS — G35 MULTIPLE SCLEROSIS (HCC): ICD-10-CM

## 2020-05-28 DIAGNOSIS — I10 ESSENTIAL HYPERTENSION: ICD-10-CM

## 2020-05-28 LAB
BILIRUB BLD-MCNC: NEGATIVE MG/DL
CLARITY, POC: CLEAR
COLOR UR: YELLOW
GLUCOSE UR STRIP-MCNC: NEGATIVE MG/DL
KETONES UR QL: NEGATIVE
LEUKOCYTE EST, POC: NEGATIVE
NITRITE UR-MCNC: NEGATIVE MG/ML
PH UR: 5 [PH] (ref 5–8)
PROT UR STRIP-MCNC: NEGATIVE MG/DL
RBC # UR STRIP: NEGATIVE /UL
SP GR UR: 1.03 (ref 1–1.03)
UROBILINOGEN UR QL: NORMAL

## 2020-05-28 PROCEDURE — 74018 RADEX ABDOMEN 1 VIEW: CPT

## 2020-05-28 PROCEDURE — 81003 URINALYSIS AUTO W/O SCOPE: CPT | Performed by: UROLOGY

## 2020-05-28 PROCEDURE — 99214 OFFICE O/P EST MOD 30 MIN: CPT | Performed by: UROLOGY

## 2020-05-28 NOTE — PATIENT INSTRUCTIONS

## 2020-07-16 ENCOUNTER — OFFICE VISIT (OUTPATIENT)
Age: 40
End: 2020-07-16

## 2020-07-16 ENCOUNTER — TELEPHONE (OUTPATIENT)
Dept: PRIMARY CARE CLINIC | Age: 40
End: 2020-07-16

## 2020-07-16 VITALS — OXYGEN SATURATION: 98 % | HEART RATE: 69 BPM | TEMPERATURE: 96.9 F

## 2020-07-16 NOTE — TELEPHONE ENCOUNTER
Pt states she has cough, fatigue, chest tightness.  Please test for Covid 19 per Dr. Sanchez Preciado

## 2020-07-18 LAB
REPORT: NORMAL
SARS-COV-2: NOT DETECTED
THIS TEST SENT TO: NORMAL

## 2020-07-20 ENCOUNTER — VIRTUAL VISIT (OUTPATIENT)
Dept: PRIMARY CARE CLINIC | Age: 40
End: 2020-07-20
Payer: COMMERCIAL

## 2020-07-20 PROCEDURE — 99213 OFFICE O/P EST LOW 20 MIN: CPT | Performed by: FAMILY MEDICINE

## 2020-07-20 PROCEDURE — G8427 DOCREV CUR MEDS BY ELIG CLIN: HCPCS | Performed by: FAMILY MEDICINE

## 2020-07-20 RX ORDER — AMOXICILLIN 500 MG/1
500 CAPSULE ORAL 3 TIMES DAILY
Qty: 21 CAPSULE | Refills: 0 | Status: SHIPPED | OUTPATIENT
Start: 2020-07-20 | End: 2020-07-27

## 2020-07-20 ASSESSMENT — ENCOUNTER SYMPTOMS
SORE THROAT: 1
SHORTNESS OF BREATH: 0
COUGH: 1

## 2020-07-20 NOTE — PROGRESS NOTES
Ashok Marroquin, [facility], [place]anny Francis  Phone:  [phone]      2020     TELEHEALTH EVALUATION -- Audio/Visual (During DZJZK-18 public health emergency)    HPI:    Annie Peraza (:  1980) has requested an audio/video evaluation for the following concern(s): Wednesday congestion in chest and then developed a cough. NO fever but feels warm, sore throat, joints hurting , fatigue is terrible. MS is worse. Complaining of a crick in neck. No exposure to Covid. The cough and fatigue is worse. Reviewed vitals. Cough is dry and hacky. She really does not have any chest pain or shortness of breath. No one else in her family is sick. The rash on her face almost looks like blisters. It gets worse as the day goes on. She really has not been outside much. She is still on Gilenya for MS but she has been on this for a long time. Examination of the rash reveals small erythematous papules scattered on her cheeks. She appears tired but otherwise normal.      Review of Systems   Constitutional: Positive for activity change and fatigue. Negative for chills, diaphoresis and fever. HENT: Positive for congestion and sore throat. Eyes: Negative for visual disturbance. Respiratory: Positive for cough. Negative for shortness of breath. Cardiovascular: Negative. Genitourinary: Negative. Musculoskeletal: Positive for arthralgias and myalgias. Skin: Positive for rash. Neurological: Negative for light-headedness and headaches. Hematological: Negative. Psychiatric/Behavioral: Negative. Prior to Visit Medications    Medication Sig Taking?  Authorizing Provider   amoxicillin (AMOXIL) 500 MG capsule Take 1 capsule by mouth 3 times daily for 7 days Yes Charleen Taveras MD   gabapentin (NEURONTIN) 100 MG capsule TAKE ONE TO TWO CAPSULES BY MOUTH THREE TIMES DAILY AS NEEDED FOR MS OR NERVE PAIN  Charleen Taveras MD   atorvastatin (LIPITOR) 10 MG tablet TAKE 1 TABLET AT BEDTIME FOR HIGH CHOLESTEROL  Abril Cabrales MD   propranolol (INDERAL LA) 60 MG extended release capsule TAKE 1 CAPSULE ONCE DAILY FOR HEART RATE  Abril Cabrales MD   tiZANidine (ZANAFLEX) 4 MG tablet Take 1 tablet by mouth every 8 hours as needed (for muscle spasms)  Abril Cabrales MD   nortriptyline (PAMELOR) 75 MG capsule   Historical Provider, MD   topiramate (TOPAMAX) 100 MG tablet Take 1 tablet by mouth 2 times daily  Audelia Gipson MD   Fingolimod HCl (GILENYA PO) Take by mouth  Historical Provider, MD   conjugated estrogens (PREMARIN) 0.625 MG/GM vaginal cream Place 0.5 g vaginally Twice a Week  Missy Bland MD   Probiotic Product (PROBIOTIC DAILY PO) Take by mouth  Historical Provider, MD   baclofen (LIORESAL) 20 MG tablet Take 20 mg by mouth 2 times daily  Historical Provider, MD   promethazine (PHENERGAN) 25 MG tablet Take 25 mg by mouth  Historical Provider, MD   cloNIDine (CATAPRES) 0.1 MG tablet 0.1 mg as needed   Historical Provider, MD   SUMAtriptan Succinate 6 MG/0.5ML SOAJ as needed   Historical Provider, MD   phenazopyridine (PYRIDIUM) 200 MG tablet Take 1 tablet by mouth 3 times daily as needed for Pain. Abril Cabrales MD   Cholecalciferol (VITAMIN D-3) 5000 UNITS TABS Take 5,000 Units by mouth daily. Historical Provider, MD   bethanechol (URECHOLINE) 25 MG tablet Take 1 tablet by mouth 3 times daily.   Abril Cabrales MD       Social History     Tobacco Use    Smoking status: Never Smoker    Smokeless tobacco: Never Used   Substance Use Topics    Alcohol use: No    Drug use: No        Past Medical History:   Diagnosis Date    B12 deficiency     Cauda equina syndrome (HCC)     Complicated migraine     Depression     GERD (gastroesophageal reflux disease)     Hematuria     wih abdominal pain    Hyperlipidemia     Hypertension     MS (multiple sclerosis) (HCC)     Myofascial pain     Nerve damage     Neutropenia ([address] 10/21/2015    Pelvic floor dysfunction     Postmenopausal HRT (hormone replacement therapy)     POTS (postural orthostatic tachycardia syndrome)        PHYSICAL EXAMINATION:  Physical Exam  Vitals signs and nursing note reviewed. Constitutional:       General: She is not in acute distress. Appearance: Normal appearance. She is not ill-appearing. HENT:      Head: Normocephalic. Skin:     General: Skin is warm and dry. Neurological:      Mental Status: She is alert and oriented to person, place, and time. Mental status is at baseline. Psychiatric:         Mood and Affect: Mood normal.         Behavior: Behavior normal.         Thought Content: Thought content normal.         Judgment: Judgment normal.         Due to this being a TeleHealth encounter, evaluation of the following organ systems is limited: Vitals/Constitutional/EENT/Resp/CV/GI//MS/Neuro/Skin/Heme-Lymph-Imm. ASSESSMENT/PLAN:  1. Cough  I truly think this is probably a virus but I am going to go ahead and treat her with amoxicillin in case it is a subacute sinus infection. 2. Chronic fatigue  She is not better in 7 days then I would like her to come in for lab work including a CBC and CMP. Return if symptoms worsen or fail to improve. An  electronic signature was used to authenticate this note. --Kellen Keller MD on 7/20/2020 at 1:14 PM        Pursuant to the emergency declaration under the [facility], [facility] waiver authority and the [employer] Act, this Virtual  Visit was conducted, with patient's consent, to reduce the patient's risk of exposure to COVID-19 and provide continuity of care for an established patient. Services were provided through a video synchronous discussion virtually to substitute for in-person clinic visit.

## 2020-08-03 ENCOUNTER — OFFICE VISIT (OUTPATIENT)
Dept: OBGYN | Age: 40
End: 2020-08-03
Payer: COMMERCIAL

## 2020-08-03 VITALS
BODY MASS INDEX: 33.27 KG/M2 | SYSTOLIC BLOOD PRESSURE: 129 MMHG | HEART RATE: 64 BPM | WEIGHT: 207 LBS | HEIGHT: 66 IN | DIASTOLIC BLOOD PRESSURE: 90 MMHG

## 2020-08-03 PROCEDURE — 99396 PREV VISIT EST AGE 40-64: CPT | Performed by: NURSE PRACTITIONER

## 2020-08-03 RX ORDER — CONJUGATED ESTROGENS 0.62 MG/G
0.5 CREAM VAGINAL
Qty: 1 TUBE | Refills: 5 | Status: SHIPPED | OUTPATIENT
Start: 2020-08-03

## 2020-08-03 ASSESSMENT — ENCOUNTER SYMPTOMS
ALLERGIC/IMMUNOLOGIC NEGATIVE: 1
RESPIRATORY NEGATIVE: 1
CONSTIPATION: 0
GASTROINTESTINAL NEGATIVE: 1
EYES NEGATIVE: 1
DIARRHEA: 0

## 2020-08-03 NOTE — PROGRESS NOTES
Pt presents today for pap smear and breast exam.      [id number]  Pap [id number]  Contraception:hysterectomy    P:1  Ab:0  Bone density:NA  Colonoscopy:2018

## 2020-08-03 NOTE — PATIENT INSTRUCTIONS
Patient Education        Breast Self-Exam: Care Instructions  Your Care Instructions     A breast self-exam is when you check your breasts for lumps or changes. This regular exam helps you learn how your breasts normally look and feel. Most breast problems or changes are not because of cancer. Breast self-exam is not a substitute for a mammogram. Having regular breast exams by your doctor and regular mammograms improve your chances of finding any problems with your breasts. Some women set a time each month to do a step-by-step breast self-exam. Other women like a less formal system. They might look at their breasts as they brush their teeth, or feel their breasts once in a while in the shower. If you notice a change in your breast, tell your doctor. Follow-up care is a key part of your treatment and safety. Be sure to make and go to all appointments, and call your doctor if you are having problems. It's also a good idea to know your test results and keep a list of the medicines you take. How do you do a breast self-exam?  · The best time to examine your breasts is usually one week after your menstrual period begins. Your breasts should not be tender then. If you do not have periods, you might do your exam on a day of the month that is easy to remember. · To examine your breasts:  ? Remove all your clothes above the waist and lie down. When you are lying down, your breast tissue spreads evenly over your chest wall, which makes it easier to feel all your breast tissue. ? Use the pads--not the fingertips--of the 3 middle fingers of your left hand to check your right breast. Move your fingers slowly in small coin-sized circles that overlap. ? Use three levels of pressure to feel of all your breast tissue. Use light pressure to feel the tissue close to the skin surface. Use medium pressure to feel a little deeper. Use firm pressure to feel your tissue close to your breastbone and ribs.  Use each pressure level to feel your breast tissue before moving on to the next spot. ? Check your entire breast, moving up and down as if following a strip from the collarbone to the bra line, and from the armpit to the ribs. Repeat until you have covered the entire breast.  ? Repeat this procedure for your left breast, using the pads of the 3 middle fingers of your right hand. · To examine your breasts while in the shower:  ? Place one arm over your head and lightly soap your breast on that side. ? Using the pads of your fingers, gently move your hand over your breast (in the strip pattern described above), feeling carefully for any lumps or changes. ? Repeat for the other breast.  · Have your doctor inspect anything you notice to see if you need further testing. Where can you learn more? Go to https://chterenceeb.[employer]. org and sign in to your [employer] account. Enter P148 in the [employer] box to learn more about \"Breast Self-Exam: Care Instructions. \"     If you do not have an account, please click on the \"Sign Up Now\" link. Current as of: August 22, 2019               Content Version: 12.5  © 2703-6308 Healthwise, Incorporated. Care instructions adapted under license by [facility] [employer] [facility] ([facility]). If you have questions about a medical condition or this instruction, always ask your healthcare professional. [place] [address] any warranty or liability for your use of this information. Patient Education        Well Visit, Ages 25 to 48: Care Instructions  Your Care Instructions     Physical exams can help you stay healthy. Your doctor has checked your overall health and may have suggested ways to take good care of yourself. He or she also may have recommended tests. At home, you can help prevent illness with healthy eating, regular exercise, and other steps. Follow-up care is a key part of your treatment and safety.  Be sure to make and go to all appointments, and call your doctor if you are having problems. It's also a good idea to know your test results and keep a list of the medicines you take. How can you care for yourself at home? · Reach and stay at a healthy weight. This will lower your risk for many problems, such as obesity, diabetes, heart disease, and high blood pressure. · Get at least 30 minutes of physical activity on most days of the week. Walking is a good choice. You also may want to do other activities, such as running, swimming, cycling, or playing tennis or team sports. Discuss any changes in your exercise program with your doctor. · Do not smoke or allow others to smoke around you. If you need help quitting, talk to your doctor about stop-smoking programs and medicines. These can increase your chances of quitting for good. · Talk to your doctor about whether you have any risk factors for sexually transmitted infections (STIs). Having one sex partner (who does not have STIs and does not have sex with anyone else) is a good way to avoid these infections. · Use birth control if you do not want to have children at this time. Talk with your doctor about the choices available and what might be best for you. · Protect your skin from too much sun. When you're outdoors from 10 a.m. to 4 p.m., stay in the shade or cover up with clothing and a hat with a wide brim. Wear sunglasses that block UV rays. Even when it's cloudy, put broad-spectrum sunscreen (SPF 30 or higher) on any exposed skin. · See a dentist one or two times a year for checkups and to have your teeth cleaned. · Wear a seat belt in the car. Follow your doctor's advice about when to have certain tests. These tests can spot problems early. For everyone  · Cholesterol. Have the fat (cholesterol) in your blood tested after age 21. Your doctor will tell you how often to have this done based on your age, family history, or other things that can increase your risk for heart disease. · Blood pressure.  Have your blood pressure checked during a routine doctor visit. Your doctor will tell you how often to check your blood pressure based on your age, your blood pressure results, and other factors. · Vision. Talk with your doctor about how often to have a glaucoma test.  · Diabetes. Ask your doctor whether you should have tests for diabetes. · Colon cancer. Your risk for colorectal cancer gets higher as you get older. Some experts say that adults should start regular screening at age 48 and stop at age 76. Others say to start before age 48 or continue after age 76. Talk with your doctor about your risk and when to start and stop screening. For women  · Breast exam and mammogram. Talk to your doctor about when you should have a clinical breast exam and a mammogram. Medical experts differ on whether and how often women under 50 should have these tests. Your doctor can help you decide what is right for you. · Cervical cancer screening test and pelvic exam. Begin with a Pap test at age 24. The test often is part of a pelvic exam. Starting at age 27, you may choose to have a Pap test, an HPV test, or both tests at the same time (called co-testing). Talk with your doctor about how often to have testing. · Tests for sexually transmitted infections (STIs). Ask whether you should have tests for STIs. You may be at risk if you have sex with more than one person, especially if your partners do not wear condoms. For men  · Tests for sexually transmitted infections (STIs). Ask whether you should have tests for STIs. You may be at risk if you have sex with more than one person, especially if you do not wear a condom. · Testicular cancer exam. Ask your doctor whether you should check your testicles regularly. · Prostate exam. Talk to your doctor about whether you should have a blood test (called a PSA test) for prostate cancer.  Experts differ on whether and when men should have this test. Some experts suggest it if you are older than 39 and are -American or have a father or brother who got prostate cancer when he was younger than 72. When should you call for help? Watch closely for changes in your health, and be sure to contact your doctor if you have any problems or symptoms that concern you. Where can you learn more? Go to https://chpepiceweb.[employer]Biomoda. org and sign in to your [employer] account. Enter P072 in the [employer] box to learn more about \"Well Visit, Ages 25 to 48: Care Instructions. \"     If you do not have an account, please click on the \"Sign Up Now\" link. Current as of: August 22, 2019               Content Version: 12.5  © 9522-3952 Healthwise, Incorporated. Care instructions adapted under license by [facility] ([facility]). If you have questions about a medical condition or this instruction, always ask your healthcare professional. [address] any warranty or liability for your use of this information.

## 2020-08-03 NOTE — PROGRESS NOTES
Radha Morrison is a 37 y.o. female who presents today for her medical conditions/ complaints as noted below. Radha Morrison is c/o of Gynecologic Exam        HPI   Pt presents for annual exam. Does not get pap smears anymore since her hysterectomy. Needs order for mammogram. Dr Connolly PCP and also sees MS specialist in [place]. [id number]  Pap [id number]  Contraception:hysterectomy    P:1  Ab:0  Bone density:NA  Colonoscopy:2018  No LMP recorded. Patient has had a hysterectomy.     Past Medical History:   Diagnosis Date    B12 deficiency     Cauda equina syndrome (HCC)     Complicated migraine     Depression     GERD (gastroesophageal reflux disease)     Hematuria     wih abdominal pain    Hyperlipidemia     Hypertension     MS (multiple sclerosis) (HCC)     Myofascial pain     Nerve damage     Neutropenia ([address] 10/21/2015    Pelvic floor dysfunction     Postmenopausal HRT (hormone replacement therapy)     POTS (postural orthostatic tachycardia syndrome)      Past Surgical History:   Procedure Laterality Date    APPENDECTOMY      BACK SURGERY      CHOLECYSTECTOMY, LAPAROSCOPIC      COLONOSCOPY      Dr. Josh Matthews 10 years ?     COLONOSCOPY N/A 2018    Dr Pillo Sears yr recall    EGD      HYSTERECTOMY      MO EGD TRANSORAL BIOPSY SINGLE/MULTIPLE N/A 2018    Dr Pillo Abdul-Active duodenitis, gastritis/gastropathy     Family History   Problem Relation Age of Onset    Diabetes Mother     High Blood Pressure Mother     Colon Polyps Mother     Colon Cancer Neg Hx     Liver Cancer Neg Hx     Liver Disease Neg Hx     Esophageal Cancer Neg Hx     Rectal Cancer Neg Hx     Stomach Cancer Neg Hx      Social History     Tobacco Use    Smoking status: Never Smoker    Smokeless tobacco: Never Used   Substance Use Topics    Alcohol use: No       Current Outpatient Medications   Medication Sig Dispense Refill    conjugated estrogens (PREMARIN) 0.625 MG/GM vaginal cream Place 0.5 g vaginally Twice a Week 1 Tube 5    gabapentin (NEURONTIN) 100 MG capsule TAKE ONE TO TWO CAPSULES BY MOUTH THREE TIMES DAILY AS NEEDED FOR MS OR NERVE PAIN 135 capsule 2    atorvastatin (LIPITOR) 10 MG tablet TAKE 1 TABLET AT BEDTIME FOR HIGH CHOLESTEROL 90 tablet 3    propranolol (INDERAL LA) 60 MG extended release capsule TAKE 1 CAPSULE ONCE DAILY FOR HEART RATE 90 capsule 3    tiZANidine (ZANAFLEX) 4 MG tablet Take 1 tablet by mouth every 8 hours as needed (for muscle spasms) 270 tablet 1    nortriptyline (PAMELOR) 75 MG capsule       topiramate (TOPAMAX) 100 MG tablet Take 1 tablet by mouth 2 times daily 180 tablet 3    Fingolimod HCl (GILENYA PO) Take by mouth      Probiotic Product (PROBIOTIC DAILY PO) Take by mouth      baclofen (LIORESAL) 20 MG tablet Take 20 mg by mouth 2 times daily      promethazine (PHENERGAN) 25 MG tablet Take 25 mg by mouth      cloNIDine (CATAPRES) 0.1 MG tablet 0.1 mg as needed       SUMAtriptan Succinate 6 MG/0.5ML SOAJ as needed       phenazopyridine (PYRIDIUM) 200 MG tablet Take 1 tablet by mouth 3 times daily as needed for Pain. 24 tablet 2    Cholecalciferol (VITAMIN D-3) 5000 UNITS TABS Take 5,000 Units by mouth daily. No current facility-administered medications for this visit. Allergies   Allergen Reactions    Ropinirole Hcl     Simvastatin     Zolpidem Other (See Comments)     Vitals:    08/03/20 1007   BP: (!) 129/90   Pulse: 64     Body mass index is 33.41 kg/m². Review of Systems   Constitutional: Negative. HENT: Negative. Eyes: Negative. Respiratory: Negative. Cardiovascular: Negative. Gastrointestinal: Negative. Negative for constipation and diarrhea. Endocrine: Negative. Genitourinary: Positive for vaginal pain (dryness). Negative for frequency, menstrual problem and urgency. Musculoskeletal: Positive for gait problem (uses a cane). Skin: Negative. Allergic/Immunologic: Negative. Hematological: Negative. Psychiatric/Behavioral: Negative. All other systems reviewed and are negative. Physical Exam  Vitals signs and nursing note reviewed. Constitutional:       Appearance: She is well-developed. HENT:      Head: Normocephalic. Neck:      Musculoskeletal: Normal range of motion and neck supple. Thyroid: No thyroid mass or thyromegaly. Cardiovascular:      Rate and Rhythm: Normal rate and regular rhythm. Pulmonary:      Effort: Pulmonary effort is normal.      Breath sounds: Normal breath sounds. Chest:      Breasts:         Right: No inverted nipple, mass, nipple discharge or skin change. Left: No inverted nipple, mass, nipple discharge or skin change. Abdominal:      Palpations: Abdomen is soft. There is no mass. Tenderness: There is no abdominal tenderness. Genitourinary:     General: Normal vulva. Vagina: Erythema present. Uterus: Absent. Adnexa:         Right: No mass or tenderness. Left: No mass or tenderness. Comments: Pap deferred  Cervix surgically absent  Uterus surgically absent  Ovaries surgically absent  Vaginal cuff palpates smooth  Atrophic vaginal changes without lesions  Musculoskeletal: Normal range of motion. Skin:     General: Skin is warm and dry. Neurological:      Mental Status: She is alert and oriented to person, place, and time. Psychiatric:         Attention and Perception: Attention normal.         Mood and Affect: Mood normal.         Speech: Speech normal.         Behavior: Behavior normal.         Thought Content: Thought content normal.         Cognition and Memory: Cognition normal.         Judgment: Judgment normal.          Diagnosis Orders   1. Women's annual routine gynecological examination     2. Screening for vaginal cancer     3. Screening for breast cancer  JACKIE DIGITAL SCREEN W OR WO CAD BILATERAL   4.  Atrophic vaginitis         MEDICATIONS:  Orders Placed This Encounter to check your right breast. Move your fingers slowly in small coin-sized circles that overlap. ? Use three levels of pressure to feel of all your breast tissue. Use light pressure to feel the tissue close to the skin surface. Use medium pressure to feel a little deeper. Use firm pressure to feel your tissue close to your breastbone and ribs. Use each pressure level to feel your breast tissue before moving on to the next spot. ? Check your entire breast, moving up and down as if following a strip from the collarbone to the bra line, and from the armpit to the ribs. Repeat until you have covered the entire breast.  ? Repeat this procedure for your left breast, using the pads of the 3 middle fingers of your right hand. · To examine your breasts while in the shower:  ? Place one arm over your head and lightly soap your breast on that side. ? Using the pads of your fingers, gently move your hand over your breast (in the strip pattern described above), feeling carefully for any lumps or changes. ? Repeat for the other breast.  · Have your doctor inspect anything you notice to see if you need further testing. Where can you learn more? Go to https://Wheeldopepiceweb.[employer]. org and sign in to your [employer] account. Enter P148 in the [employer] box to learn more about \"Breast Self-Exam: Care Instructions. \"     If you do not have an account, please click on the \"Sign Up Now\" link. Current as of: August 22, 2019               Content Version: 12.5  © 5355-2345 Healthwise, Incorporated. Care instructions adapted under license by [facility] ([facility]). If you have questions about a medical condition or this instruction, always ask your healthcare professional. [place] [address] any warranty or liability for your use of this information. Patient Education        Well Visit, Ages 25 to 48: Care Instructions  Your Care Instructions     Physical exams can help you stay healthy.  Your doctor has checked your overall health and may have suggested ways to take good care of yourself. He or she also may have recommended tests. At home, you can help prevent illness with healthy eating, regular exercise, and other steps. Follow-up care is a key part of your treatment and safety. Be sure to make and go to all appointments, and call your doctor if you are having problems. It's also a good idea to know your test results and keep a list of the medicines you take. How can you care for yourself at home? · Reach and stay at a healthy weight. This will lower your risk for many problems, such as obesity, diabetes, heart disease, and high blood pressure. · Get at least 30 minutes of physical activity on most days of the week. Walking is a good choice. You also may want to do other activities, such as running, swimming, cycling, or playing tennis or team sports. Discuss any changes in your exercise program with your doctor. · Do not smoke or allow others to smoke around you. If you need help quitting, talk to your doctor about stop-smoking programs and medicines. These can increase your chances of quitting for good. · Talk to your doctor about whether you have any risk factors for sexually transmitted infections (STIs). Having one sex partner (who does not have STIs and does not have sex with anyone else) is a good way to avoid these infections. · Use birth control if you do not want to have children at this time. Talk with your doctor about the choices available and what might be best for you. · Protect your skin from too much sun. When you're outdoors from 10 a.m. to 4 p.m., stay in the shade or cover up with clothing and a hat with a wide brim. Wear sunglasses that block UV rays. Even when it's cloudy, put broad-spectrum sunscreen (SPF 30 or higher) on any exposed skin. · See a dentist one or two times a year for checkups and to have your teeth cleaned. · Wear a seat belt in the car.   Follow your doctor's advice about when to have certain tests. These tests can spot problems early. For everyone  · Cholesterol. Have the fat (cholesterol) in your blood tested after age 21. Your doctor will tell you how often to have this done based on your age, family history, or other things that can increase your risk for heart disease. · Blood pressure. Have your blood pressure checked during a routine doctor visit. Your doctor will tell you how often to check your blood pressure based on your age, your blood pressure results, and other factors. · Vision. Talk with your doctor about how often to have a glaucoma test.  · Diabetes. Ask your doctor whether you should have tests for diabetes. · Colon cancer. Your risk for colorectal cancer gets higher as you get older. Some experts say that adults should start regular screening at age 48 and stop at age 76. Others say to start before age 48 or continue after age 76. Talk with your doctor about your risk and when to start and stop screening. For women  · Breast exam and mammogram. Talk to your doctor about when you should have a clinical breast exam and a mammogram. Medical experts differ on whether and how often women under 50 should have these tests. Your doctor can help you decide what is right for you. · Cervical cancer screening test and pelvic exam. Begin with a Pap test at age 24. The test often is part of a pelvic exam. Starting at age 27, you may choose to have a Pap test, an HPV test, or both tests at the same time (called co-testing). Talk with your doctor about how often to have testing. · Tests for sexually transmitted infections (STIs). Ask whether you should have tests for STIs. You may be at risk if you have sex with more than one person, especially if your partners do not wear condoms. For men  · Tests for sexually transmitted infections (STIs). Ask whether you should have tests for STIs.  You may be at risk if you have sex with more than one person, especially if you do not wear a condom. · Testicular cancer exam. Ask your doctor whether you should check your testicles regularly. · Prostate exam. Talk to your doctor about whether you should have a blood test (called a PSA test) for prostate cancer. Experts differ on whether and when men should have this test. Some experts suggest it if you are older than 39 and are -American or have a father or brother who got prostate cancer when he was younger than 72. When should you call for help? Watch closely for changes in your health, and be sure to contact your doctor if you have any problems or symptoms that concern you. Where can you learn more? Go to https://[employer] MedicalpeIngen Technologieseb.healthTraetelo.com. org and sign in to your [employer] account. Enter P072 in the [employer] box to learn more about \"Well Visit, Ages 25 to 48: Care Instructions. \"     If you do not have an account, please click on the \"Sign Up Now\" link. Current as of: August 22, 2019               Content Version: 12.5  © 9634-3558 Healthwise, Incorporated. Care instructions adapted under license by [facility] ([facility]). If you have questions about a medical condition or this instruction, always ask your healthcare professional. [address] any warranty or liability for your use of this information.

## 2020-08-12 ENCOUNTER — OFFICE VISIT (OUTPATIENT)
Dept: PRIMARY CARE CLINIC | Age: 40
End: 2020-08-12
Payer: COMMERCIAL

## 2020-08-12 VITALS
OXYGEN SATURATION: 97 % | BODY MASS INDEX: 33.6 KG/M2 | TEMPERATURE: 97.1 F | SYSTOLIC BLOOD PRESSURE: 110 MMHG | RESPIRATION RATE: 16 BRPM | HEART RATE: 73 BPM | DIASTOLIC BLOOD PRESSURE: 84 MMHG | WEIGHT: 208.2 LBS

## 2020-08-12 LAB
ALBUMIN SERPL-MCNC: 4.6 G/DL (ref 3.5–5.2)
ALP BLD-CCNC: 92 U/L (ref 35–104)
ALT SERPL-CCNC: 36 U/L (ref 5–33)
ANION GAP SERPL CALCULATED.3IONS-SCNC: 13 MMOL/L (ref 7–19)
AST SERPL-CCNC: 28 U/L (ref 5–32)
BILIRUB SERPL-MCNC: 0.4 MG/DL (ref 0.2–1.2)
BUN BLDV-MCNC: 17 MG/DL (ref 6–20)
CALCIUM SERPL-MCNC: 9.4 MG/DL (ref 8.6–10)
CHLORIDE BLD-SCNC: 107 MMOL/L (ref 98–111)
CO2: 22 MMOL/L (ref 22–29)
CREAT SERPL-MCNC: 1 MG/DL (ref 0.5–0.9)
GFR AFRICAN AMERICAN: >59
GFR NON-AFRICAN AMERICAN: >60
GLUCOSE BLD-MCNC: 78 MG/DL (ref 74–109)
POTASSIUM SERPL-SCNC: 4.4 MMOL/L (ref 3.5–5)
RHEUMATOID FACTOR: <10 IU/ML
SEDIMENTATION RATE, ERYTHROCYTE: 17 MM/HR (ref 0–20)
SODIUM BLD-SCNC: 142 MMOL/L (ref 136–145)
T4 FREE: 1.16 NG/DL (ref 0.93–1.7)
TOTAL PROTEIN: 7.2 G/DL (ref 6.6–8.7)
TSH SERPL DL<=0.05 MIU/L-ACNC: 4.02 UIU/ML (ref 0.27–4.2)

## 2020-08-12 PROCEDURE — G8417 CALC BMI ABV UP PARAM F/U: HCPCS | Performed by: FAMILY MEDICINE

## 2020-08-12 PROCEDURE — 1036F TOBACCO NON-USER: CPT | Performed by: FAMILY MEDICINE

## 2020-08-12 PROCEDURE — G8427 DOCREV CUR MEDS BY ELIG CLIN: HCPCS | Performed by: FAMILY MEDICINE

## 2020-08-12 PROCEDURE — 99214 OFFICE O/P EST MOD 30 MIN: CPT | Performed by: FAMILY MEDICINE

## 2020-08-14 LAB
ANA IGG, ELISA: NORMAL
CCP IGG ANTIBODIES: 2 UNITS (ref 0–19)

## 2020-08-23 ASSESSMENT — ENCOUNTER SYMPTOMS
GASTROINTESTINAL NEGATIVE: 1
BACK PAIN: 1
SHORTNESS OF BREATH: 1

## 2020-08-24 NOTE — PROGRESS NOTES
SUBJECTIVE:    Dimple Babinski is 37 y. o.female who comes in complaining of Medication Check (6 mth med check, ) and Rash (rash and congestion, she  has been tested for covid and was negative but having symptoms again)   . 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 She just aches all over. Her joints hurt. She says she is always tired. She did have a slightly elevated TS Giselle comes in today for 6-month med check. She is on gabapentin and we refill this. She says she just has not been feeling well. She is been tested for COVID but she keeps getting a recurrent rash and congestion. She is seeing Dr. Belle Barone for multiple sclerosis in [place]. She is on some new drugs. He told her he did not think they were causing her flushing or rash. She does have numbness and tingling in her extremities. The gabapentin does help. She has a history of cauda equina syndrome which really started all of this. She says she just does not feel well. She aches all over. She is tired. Her TSH has been elevated. She comes in today for follow-up of that. She    She is also complaining of worsening right knee pain. She denies injury.     Past Medical History:   Diagnosis Date    B12 deficiency     Cauda equina syndrome (HCC)     Complicated migraine     Depression     GERD (gastroesophageal reflux disease)     Hematuria     wih abdominal pain    Hyperlipidemia     Hypertension     MS (multiple sclerosis) (HCC)     Myofascial pain     Nerve damage     Neutropenia ([address] 10/21/2015    Pelvic floor dysfunction     Postmenopausal HRT (hormone replacement therapy)     POTS (postural orthostatic tachycardia syndrome)          Allergies   Allergen Reactions    Ropinirole Hcl     Simvastatin     Zolpidem Other (See Comments)       Social History     Socioeconomic History    Marital status:      Spouse name: Selma Webster Number of children: 1    Years of education: None    Highest education level: None   Occupational History     Employer: [employer]     Employer: N/A   Social Needs    Financial resource strain: None    Food insecurity     Worry: None     Inability: None    Transportation needs     Medical: None     Non-medical: None   Tobacco Use    Smoking status: Never Smoker    Smokeless tobacco: Never Used   Substance and Sexual Activity    Alcohol use: No    Drug use: No    Sexual activity: Yes     Partners: Male   Lifestyle    Physical activity     Days per week: None     Minutes per session: None    Stress: None   Relationships    Social connections     Talks on phone: None     Gets together: None     Attends Quaker service: None     Active member of club or organization: None     Attends meetings of clubs or organizations: None     Relationship status: None    Intimate partner violence     Fear of current or ex partner: None     Emotionally abused: None     Physically abused: None     Forced sexual activity: None   Other Topics Concern    None   Social History Narrative    None       Review of Systems   Constitutional: Positive for activity change, diaphoresis and fatigue. Negative for fever. HENT: Positive for congestion. Eyes: Negative for visual disturbance. Respiratory: Positive for shortness of breath. Cardiovascular: Positive for leg swelling. Gastrointestinal: Negative. Genitourinary: Positive for difficulty urinating. Musculoskeletal: Positive for arthralgias, back pain and gait problem. Neurological: Positive for weakness and numbness. Hematological: Bruises/bleeds easily. Psychiatric/Behavioral: Positive for decreased concentration and dysphoric mood. Negative for self-injury, sleep disturbance and suicidal ideas. The patient is nervous/anxious.           OBJECTIVE:    Wt Readings from Last 3 Encounters:   08/12/20 208 lb 3.2 oz (94.4 kg)   08/03/20 207 lb (93.9 kg)   02/12/20 190 lb (86.2 kg)       BP 110/84 (Site: Left Upper Arm, Position: Sitting, Cuff Size: Medium Adult)   Pulse 73   Temp 97.1 °F (36.2 °C) (Temporal)   Resp 16   Wt 208 lb 3.2 oz (94.4 kg)   SpO2 97%   Breastfeeding No   BMI 33.60 kg/m²     Physical Exam  Vitals signs and nursing note reviewed. Constitutional:       General: She is not in acute distress. Appearance: Normal appearance. She is well-developed. Comments: Appears tired   HENT:      Head: Normocephalic. Right Ear: Tympanic membrane, ear canal and external ear normal.      Left Ear: Tympanic membrane, ear canal and external ear normal.   Eyes:      Extraocular Movements: Extraocular movements intact. Conjunctiva/sclera: Conjunctivae normal.      Pupils: Pupils are equal, round, and reactive to light. Neck:      Musculoskeletal: Normal range of motion and neck supple. Vascular: No carotid bruit. Cardiovascular:      Rate and Rhythm: Normal rate and regular rhythm. Heart sounds: Normal heart sounds. No murmur. Pulmonary:      Effort: Pulmonary effort is normal. No respiratory distress. Breath sounds: Normal breath sounds. Musculoskeletal:         General: Tenderness present. Comments: Also does hurt when I touch them. There is no increased warmth or redness over her right knee but it does seem larger than her left. Lymphadenopathy:      Cervical: No cervical adenopathy. Skin:     General: Skin is warm and dry. Comments: She seems to have a little bit of a red rash on her face under the skin. No telangiectasia  It seems to be under the skin  No papules or inflammatory nodules. Neurological:      Mental Status: She is alert and oriented to person, place, and time. Comments: Uses a cane to walk   Psychiatric:         Mood and Affect: Mood normal.         Speech: Speech normal.         Behavior: Behavior normal.         Thought Content:  Thought content normal.         Judgment: Judgment normal.

## 2020-09-03 ENCOUNTER — APPOINTMENT (OUTPATIENT)
Dept: GENERAL RADIOLOGY | Facility: HOSPITAL | Age: 40
End: 2020-09-03

## 2020-09-03 ENCOUNTER — HOSPITAL ENCOUNTER (EMERGENCY)
Facility: HOSPITAL | Age: 40
Discharge: HOME | End: 2020-09-03
Admitting: EMERGENCY MEDICINE

## 2020-09-03 ENCOUNTER — APPOINTMENT (OUTPATIENT)
Dept: CARDIOLOGY | Facility: HOSPITAL | Age: 40
End: 2020-09-03

## 2020-09-03 VITALS
HEIGHT: 66 IN | HEART RATE: 84 BPM | SYSTOLIC BLOOD PRESSURE: 133 MMHG | OXYGEN SATURATION: 99 % | WEIGHT: 205.91 LBS | TEMPERATURE: 98.5 F | DIASTOLIC BLOOD PRESSURE: 86 MMHG | BODY MASS INDEX: 33.09 KG/M2 | RESPIRATION RATE: 17 BRPM

## 2020-09-03 DIAGNOSIS — R79.89 ELEVATED TSH: ICD-10-CM

## 2020-09-03 DIAGNOSIS — R07.9 CHEST PAIN, UNSPECIFIED TYPE: Primary | ICD-10-CM

## 2020-09-03 LAB
ALBUMIN SERPL-MCNC: 4.3 G/DL (ref 3.5–5.2)
ALBUMIN/GLOB SERPL: 1.7 G/DL
ALP SERPL-CCNC: 85 U/L (ref 39–117)
ALT SERPL W P-5'-P-CCNC: 36 U/L (ref 1–33)
ANION GAP SERPL CALCULATED.3IONS-SCNC: 12 MMOL/L (ref 5–15)
AST SERPL-CCNC: 26 U/L (ref 1–32)
BASOPHILS # BLD AUTO: 0.03 10*3/MM3 (ref 0–0.2)
BASOPHILS NFR BLD AUTO: 0.8 % (ref 0–1.5)
BH CV STRESS BP STAGE 1: NORMAL
BH CV STRESS BP STAGE 2: NORMAL
BH CV STRESS BP STAGE 3: NORMAL
BH CV STRESS BP STAGE 4: NORMAL
BH CV STRESS BP STAGE 5: NORMAL
BH CV STRESS DOB - ATROPINE STAGE 3: 1
BH CV STRESS DOB - ATROPINE STAGE 4: 1
BH CV STRESS DOSE DOBUTAMINE STAGE 1: 10
BH CV STRESS DOSE DOBUTAMINE STAGE 2: 20
BH CV STRESS DOSE DOBUTAMINE STAGE 3: 30
BH CV STRESS DOSE DOBUTAMINE STAGE 4: 40
BH CV STRESS DOSE DOBUTAMINE STAGE 5: 50
BH CV STRESS DURATION MIN STAGE 1: 3
BH CV STRESS DURATION MIN STAGE 2: 3
BH CV STRESS DURATION MIN STAGE 3: 3
BH CV STRESS DURATION MIN STAGE 4: 3
BH CV STRESS DURATION MIN STAGE 5: 1
BH CV STRESS DURATION SEC STAGE 1: 0
BH CV STRESS DURATION SEC STAGE 2: 0
BH CV STRESS DURATION SEC STAGE 3: 0
BH CV STRESS DURATION SEC STAGE 4: 0
BH CV STRESS DURATION SEC STAGE 5: 4
BH CV STRESS HR STAGE 1: 77
BH CV STRESS HR STAGE 2: 81
BH CV STRESS HR STAGE 3: 106
BH CV STRESS HR STAGE 4: 125
BH CV STRESS HR STAGE 5: 129
BH CV STRESS PROTOCOL 1: NORMAL
BH CV STRESS RECOVERY BP: NORMAL MMHG
BH CV STRESS RECOVERY HR: 92 BPM
BH CV STRESS STAGE 1: 1
BH CV STRESS STAGE 2: 2
BH CV STRESS STAGE 3: 3
BH CV STRESS STAGE 4: 4
BH CV STRESS STAGE 5: 5
BILIRUB SERPL-MCNC: 0.3 MG/DL (ref 0–1.2)
BUN SERPL-MCNC: 13 MG/DL (ref 6–20)
BUN/CREAT SERPL: 16 (ref 7–25)
CALCIUM SPEC-SCNC: 8.9 MG/DL (ref 8.6–10.5)
CHLORIDE SERPL-SCNC: 108 MMOL/L (ref 98–107)
CO2 SERPL-SCNC: 22 MMOL/L (ref 22–29)
CREAT SERPL-MCNC: 0.81 MG/DL (ref 0.57–1)
D DIMER PPP FEU-MCNC: 0.39 MG/L (FEU) (ref 0–0.5)
DEPRECATED RDW RBC AUTO: 42.2 FL (ref 37–54)
EOSINOPHIL # BLD AUTO: 0.13 10*3/MM3 (ref 0–0.4)
EOSINOPHIL NFR BLD AUTO: 3.6 % (ref 0.3–6.2)
ERYTHROCYTE [DISTWIDTH] IN BLOOD BY AUTOMATED COUNT: 12.7 % (ref 12.3–15.4)
GFR SERPL CREATININE-BSD FRML MDRD: 78 ML/MIN/1.73
GLOBULIN UR ELPH-MCNC: 2.5 GM/DL
GLUCOSE SERPL-MCNC: 93 MG/DL (ref 65–99)
HCT VFR BLD AUTO: 35.4 % (ref 34–46.6)
HGB BLD-MCNC: 12.1 G/DL (ref 12–15.9)
IMM GRANULOCYTES # BLD AUTO: 0.01 10*3/MM3 (ref 0–0.05)
IMM GRANULOCYTES NFR BLD AUTO: 0.3 % (ref 0–0.5)
LYMPHOCYTES # BLD AUTO: 0.23 10*3/MM3 (ref 0.7–3.1)
LYMPHOCYTES NFR BLD AUTO: 6.4 % (ref 19.6–45.3)
MCH RBC QN AUTO: 31.5 PG (ref 26.6–33)
MCHC RBC AUTO-ENTMCNC: 34.2 G/DL (ref 31.5–35.7)
MCV RBC AUTO: 92.2 FL (ref 79–97)
MONOCYTES # BLD AUTO: 0.42 10*3/MM3 (ref 0.1–0.9)
MONOCYTES NFR BLD AUTO: 11.7 % (ref 5–12)
NEUTROPHILS NFR BLD AUTO: 2.77 10*3/MM3 (ref 1.7–7)
NEUTROPHILS NFR BLD AUTO: 77.2 % (ref 42.7–76)
NRBC BLD AUTO-RTO: 0 /100 WBC (ref 0–0.2)
NT-PROBNP SERPL-MCNC: <5 PG/ML (ref 0–450)
PERCENT MAX PREDICTED HR: 72.07 %
PLATELET # BLD AUTO: 270 10*3/MM3 (ref 140–450)
PMV BLD AUTO: 9.7 FL (ref 6–12)
POTASSIUM SERPL-SCNC: 4.4 MMOL/L (ref 3.5–5.2)
PROT SERPL-MCNC: 6.8 G/DL (ref 6–8.5)
RBC # BLD AUTO: 3.84 10*6/MM3 (ref 3.77–5.28)
SARS-COV-2 RDRP RESP QL NAA+PROBE: NOT DETECTED
SODIUM SERPL-SCNC: 142 MMOL/L (ref 136–145)
STRESS BASELINE BP: NORMAL MMHG
STRESS BASELINE HR: 77 BPM
STRESS PERCENT HR: 85 %
STRESS POST EXERCISE DUR MIN: 13 MIN
STRESS POST EXERCISE DUR SEC: 4 SEC
STRESS POST PEAK BP: NORMAL MMHG
STRESS POST PEAK HR: 129 BPM
T4 FREE SERPL-MCNC: 0.98 NG/DL (ref 0.93–1.7)
TROPONIN T SERPL-MCNC: <0.01 NG/ML (ref 0–0.03)
TROPONIN T SERPL-MCNC: <0.01 NG/ML (ref 0–0.03)
TSH SERPL DL<=0.05 MIU/L-ACNC: 4.63 UIU/ML (ref 0.27–4.2)
WBC # BLD AUTO: 3.59 10*3/MM3 (ref 3.4–10.8)

## 2020-09-03 PROCEDURE — 93005 ELECTROCARDIOGRAM TRACING: CPT | Performed by: EMERGENCY MEDICINE

## 2020-09-03 PROCEDURE — 85025 COMPLETE CBC W/AUTO DIFF WBC: CPT | Performed by: PHYSICIAN ASSISTANT

## 2020-09-03 PROCEDURE — 25010000003 DOBUTAMINE PER 250 MG: Performed by: INTERNAL MEDICINE

## 2020-09-03 PROCEDURE — 93010 ELECTROCARDIOGRAM REPORT: CPT | Performed by: INTERNAL MEDICINE

## 2020-09-03 PROCEDURE — 84439 ASSAY OF FREE THYROXINE: CPT | Performed by: PHYSICIAN ASSISTANT

## 2020-09-03 PROCEDURE — 87635 SARS-COV-2 COVID-19 AMP PRB: CPT | Performed by: PHYSICIAN ASSISTANT

## 2020-09-03 PROCEDURE — 36415 COLL VENOUS BLD VENIPUNCTURE: CPT

## 2020-09-03 PROCEDURE — 93352 ADMIN ECG CONTRAST AGENT: CPT | Performed by: INTERNAL MEDICINE

## 2020-09-03 PROCEDURE — 80053 COMPREHEN METABOLIC PANEL: CPT | Performed by: PHYSICIAN ASSISTANT

## 2020-09-03 PROCEDURE — 85379 FIBRIN DEGRADATION QUANT: CPT | Performed by: PHYSICIAN ASSISTANT

## 2020-09-03 PROCEDURE — 93017 CV STRESS TEST TRACING ONLY: CPT

## 2020-09-03 PROCEDURE — 93018 CV STRESS TEST I&R ONLY: CPT | Performed by: INTERNAL MEDICINE

## 2020-09-03 PROCEDURE — 99284 EMERGENCY DEPT VISIT MOD MDM: CPT

## 2020-09-03 PROCEDURE — 93005 ELECTROCARDIOGRAM TRACING: CPT | Performed by: PHYSICIAN ASSISTANT

## 2020-09-03 PROCEDURE — 84443 ASSAY THYROID STIM HORMONE: CPT | Performed by: PHYSICIAN ASSISTANT

## 2020-09-03 PROCEDURE — 25010000003 ATROPINE SULFATE: Performed by: INTERNAL MEDICINE

## 2020-09-03 PROCEDURE — 96374 THER/PROPH/DIAG INJ IV PUSH: CPT

## 2020-09-03 PROCEDURE — 93350 STRESS TTE ONLY: CPT | Performed by: INTERNAL MEDICINE

## 2020-09-03 PROCEDURE — 25010000002 PERFLUTREN 6.52 MG/ML SUSPENSION: Performed by: INTERNAL MEDICINE

## 2020-09-03 PROCEDURE — 71045 X-RAY EXAM CHEST 1 VIEW: CPT

## 2020-09-03 PROCEDURE — 93350 STRESS TTE ONLY: CPT

## 2020-09-03 PROCEDURE — 83880 ASSAY OF NATRIURETIC PEPTIDE: CPT | Performed by: PHYSICIAN ASSISTANT

## 2020-09-03 PROCEDURE — 84484 ASSAY OF TROPONIN QUANT: CPT | Performed by: PHYSICIAN ASSISTANT

## 2020-09-03 RX ORDER — SODIUM CHLORIDE 0.9 % (FLUSH) 0.9 %
10 SYRINGE (ML) INJECTION AS NEEDED
Status: DISCONTINUED | OUTPATIENT
Start: 2020-09-03 | End: 2020-09-03 | Stop reason: HOSPADM

## 2020-09-03 RX ORDER — DOBUTAMINE HYDROCHLORIDE 100 MG/100ML
10-50 INJECTION INTRAVENOUS CONTINUOUS
Status: DISCONTINUED | OUTPATIENT
Start: 2020-09-03 | End: 2020-09-03

## 2020-09-03 RX ADMIN — ATROPINE SULFATE 2 MG: 0.1 INJECTION PARENTERAL at 16:13

## 2020-09-03 RX ADMIN — PERFLUTREN 8.48 MG: 6.52 INJECTION, SUSPENSION INTRAVENOUS at 15:53

## 2020-09-03 RX ADMIN — Medication 10 MCG/KG/MIN: at 16:08

## 2020-09-03 NOTE — ED PROVIDER NOTES
Subjective   History of Present Illness    Patient is a pleasant 39-year-old female chief complaint sudden onset chest comfort and shortness of breath.  She describes her symptoms first began yesterday while she was just sitting there.  She just felt this tightness and squeezing sensation in the middle of her chest lasting just for a few minutes at a time.  She denies the pain radiating to her neck, jaw, or through her back.  She did have associated nausea and little bit of fatigue.  She denies any diaphoresis.  She had minimal shortness of breath.  She has been coughing off and on for the past 6 weeks..  She was tested for COVID-19 6 weeks ago and is negative.  She denies any measured fever.  She has been experiencing intermittent leg cramps although at this time she cannot recall which leg was affected.    As she was driving to get blood work for her multiple sclerosis routine evaluations today, she felt a recurrent discomfort that was more severe and intense rating to the left side of her chest.  This twoo lasted just for few minutes.  She denies any nausea but felt more short of breath.  After a few minutes, the sensation went away but has reoccurred a couple times.  She decided to go to urgent care.  Upon their evaluation, at that time, she had discomfort a 2 out of 10.  She was advised to go to the ER to be further evaluated.    Patient does have a history of hypertension, hypercholesterolemia, MS.  She also has history of POTS and takes propanolol daily.  She sees a cardiologist but is under observation.  She denies any further intervention.  She believes her last stress test was 2 years ago was unremarkable.    Currently, she is asymptomatic.  She denies any recent surgeries.  She denies smoking.  She has been on birth control pills.    Review of Systems   Constitutional: Positive for activity change and fatigue.   HENT: Negative.    Eyes: Negative.    Respiratory: Positive for chest tightness and shortness  of breath.    Cardiovascular: Negative.  Negative for leg swelling.   Gastrointestinal: Positive for nausea. Negative for abdominal pain.   Genitourinary: Negative.    Musculoskeletal: Negative.    Neurological: Negative.    Psychiatric/Behavioral: Negative.    All other systems reviewed and are negative.      Past Medical History:   Diagnosis Date   • Depression    • Hyperlipidemia    • Hypertension    • Kidney stone    • Migraine    • MS (multiple sclerosis) (CMS/HCC)    • POTS (postural orthostatic tachycardia syndrome)        Allergies   Allergen Reactions   • Ambien [Zolpidem] Mental Status Change   • Ropinirole Other (See Comments)     Crazy dreams; sleep walking   • Ropinirole Hcl Mental Status Change   • Simvastatin      cramps       Past Surgical History:   Procedure Laterality Date   • APPENDECTOMY     • BACK SURGERY     • CHOLECYSTECTOMY     • HYSTERECTOMY         Family History   Problem Relation Age of Onset   • Heart disease Father    • Hypertension Father    • Kidney disease Father    • Diabetes type II Mother    • Hypertension Mother    • Hypertension Sister    • Hypertension Brother        Social History     Socioeconomic History   • Marital status:      Spouse name: Not on file   • Number of children: Not on file   • Years of education: Not on file   • Highest education level: Not on file   Tobacco Use   • Smoking status: Never Smoker   • Smokeless tobacco: Never Used   Substance and Sexual Activity   • Alcohol use: No   • Drug use: No   • Sexual activity: Defer   Social History Narrative           Prior to Admission medications    Medication Sig Start Date End Date Taking? Authorizing Provider   atorvastatin (LIPITOR) 10 MG tablet Take 10 mg by mouth Every Night.    Mo Post MD   baclofen (LIORESAL) 20 MG tablet Every Night. 11/27/17   Mo Post MD   Cholecalciferol 5000 UNITS tablet Take 1 tablet by mouth.    Mo Post MD   CloNIDine (CATAPRES)  0.1 MG tablet Take 0.1 mg by mouth As Needed for High Blood Pressure.    Mo Post MD   conjugated estrogens (PREMARIN) 0.625 MG/GM vaginal cream Insert 0.5 g into the vagina. 12/6/18   Mo Post MD   diphenhydrAMINE-acetaminophen (TYLENOL PM)  MG tablet per tablet Take 2 tablets by mouth Daily.    Mo Post MD   gabapentin (NEURONTIN) 300 MG capsule Take 100 mg by mouth. 1 tablet in am and 2 tabs at HS    Mo Post MD   GILENYA 0.5 MG capsule  9/4/19   Mo Post MD   nortriptyline (PAMELOR) 75 MG capsule  7/4/20   Emergency, Nurse Epic, RN   phenazopyridine (PYRIDIUM) 100 MG tablet Take 100 mg by mouth As Needed for bladder spasms.    Mo Post MD   Probiotic Product (PROBIOTIC-10 PO) Take  by mouth As Needed.    Mo Post MD   promethazine (PHENERGAN) 25 MG tablet Take 25 mg by mouth Every 6 (Six) Hours As Needed for Nausea or Vomiting.    Mo Post MD   propranolol (INDERAL) 60 MG tablet 60 mg. At night 3/16/18   Mo Post MD   SUMAtriptan (IMITREX) 6 MG/0.5ML solution injection Inject 6 mg under the skin 1 (One) Time As Needed for migraine.    Mo Post MD   tiZANidine (ZANAFLEX) 4 MG tablet Take 4 mg by mouth Take As Directed.    Mo Post MD   topiramate (TOPAMAX) 100 MG tablet Take 100 mg by mouth 2 (two) times a day.    Mo Post MD   cyanocobalamin 1000 MCG/ML injection Inject 1,000 mcg into the shoulder, thigh, or buttocks Every 30 (Thirty) Days.  9/3/20  Mo Post MD   metoprolol succinate XL (TOPROL-XL) 50 MG 24 hr tablet Take 2 tablets by mouth Daily.  Patient taking differently: Take 50 mg by mouth Daily. 5/3/17 9/3/20  Yolande Boucher PA-C       Medications   perflutren (DEFINITY) lipid microspheres injection 8.476 mg (8.476 mg Intravenous Given 9/3/20 1553)   atropine sulfate injection 0.3 mg (2 mg Intravenous Given 9/3/20 1613)       BP  "133/86   Pulse 84   Temp 98.5 °F (36.9 °C) (Oral)   Resp 17   Ht 167.6 cm (65.98\")   Wt 93.4 kg (205 lb 14.6 oz)   SpO2 99%   BMI 33.25 kg/m²       Objective   Physical Exam   Constitutional: She is oriented to person, place, and time. She appears well-developed and well-nourished. No distress.   HENT:   Head: Normocephalic and atraumatic.   Eyes: Pupils are equal, round, and reactive to light. Conjunctivae and EOM are normal.   Neck: Normal range of motion. Neck supple. No tracheal deviation present.   Cardiovascular: Normal rate, regular rhythm, normal heart sounds and intact distal pulses.   No murmur heard.  Pulmonary/Chest: Effort normal and breath sounds normal.   Abdominal: Soft. Bowel sounds are normal. She exhibits no distension and no mass. There is no tenderness. There is no rebound and no guarding.   Musculoskeletal: Normal range of motion. She exhibits no edema.        Right lower leg: Normal. She exhibits no tenderness and no edema.        Left lower leg: Normal. She exhibits no tenderness and no edema.   Negative Homans sign bilaterally.   Neurological: She is alert and oriented to person, place, and time. She has normal reflexes.   Skin: Skin is warm and dry. Capillary refill takes less than 2 seconds. She is not diaphoretic.   Psychiatric: She has a normal mood and affect. Her behavior is normal. Judgment and thought content normal.   Nursing note and vitals reviewed.      Procedures         Lab Results (last 24 hours)     Procedure Component Value Units Date/Time    COVID PRE-OP / PRE-PROCEDURE SCREENING ORDER (NO ISOLATION) - Swab, Nasal Cavity [[id number]] Collected:  09/03/20 1224    Specimen:  Swab from Nasal Cavity Updated:  09/03/20 1321    Narrative:       The following orders were created for panel order COVID PRE-OP / PRE-PROCEDURE SCREENING ORDER (NO ISOLATION) - Swab, Nasal Cavity.  Procedure                               Abnormality         Status                     ---------     "                           -----------         ------                     COVID-19, ABBOTT IN-HOUS...[[id number]]  Normal              Final result                 Please view results for these tests on the individual orders.    COVID-19, ABBOTT IN-HOUSE,NP Swab (NO TRANSPORT MEDIA) 2 HR TAT - Swab, Nasal Cavity [[id number]]  (Normal) Collected:  09/03/20 1224    Specimen:  Swab from Nasal Cavity Updated:  09/03/20 1321     COVID19 Not Detected    Narrative:       Fact sheet for providers: https://www.fda.gov/media/481481/download     Fact sheet for patients: https://www.fda.gov/media/467285/download    CBC & Differential [[id number]] Collected:  09/03/20 1233    Specimen:  Blood from Arm, Right Updated:  09/03/20 1246    Narrative:       The following orders were created for panel order CBC & Differential.  Procedure                               Abnormality         Status                     ---------                               -----------         ------                     CBC Auto Differential[[id number]]        Abnormal            Final result                 Please view results for these tests on the individual orders.    Comprehensive Metabolic Panel [[id number]]  (Abnormal) Collected:  09/03/20 1233    Specimen:  Blood from Arm, Right Updated:  09/03/20 1304     Glucose 93 mg/dL      BUN 13 mg/dL      Creatinine 0.81 mg/dL      Sodium 142 mmol/L      Potassium 4.4 mmol/L      Chloride 108 mmol/L      CO2 22.0 mmol/L      Calcium 8.9 mg/dL      Total Protein 6.8 g/dL      Albumin 4.30 g/dL      ALT (SGPT) 36 U/L      AST (SGOT) 26 U/L      Alkaline Phosphatase 85 U/L      Total Bilirubin 0.3 mg/dL      eGFR Non African Amer 78 mL/min/1.73      Globulin 2.5 gm/dL      A/G Ratio 1.7 g/dL      BUN/Creatinine Ratio 16.0     Anion Gap 12.0 mmol/L     Narrative:       GFR Normal >60  Chronic Kidney Disease <60  Kidney Failure <15      BNP [[id number]]  (Normal) Collected:  09/03/20 1233    Specimen:  Blood from Arm,  Right Updated:  09/03/20 1302     proBNP <5.0 pg/mL     Narrative:       Among patients with dyspnea, NT-proBNP is highly sensitive for the detection of acute congestive heart failure. In addition NT-proBNP of <300 pg/ml effectively rules out acute congestive heart failure with 99% negative predictive value.    Results may be falsely decreased if patient taking Biotin.      D-dimer, Quantitative [[id number]]  (Normal) Collected:  09/03/20 1233    Specimen:  Blood from Arm, Right Updated:  09/03/20 1254     D-Dimer, Quantitative 0.39 mg/L (FEU)     Narrative:       Reference Range is 0-0.50 mg/L FEU. However, results <0.50 mg/L FEU tends to rule out DVT or PE. Results >0.50 mg/L FEU are not useful in predicting absence or presence of DVT or PE.      Troponin [[id number]]  (Normal) Collected:  09/03/20 1233    Specimen:  Blood from Arm, Right Updated:  09/03/20 1302     Troponin T <0.010 ng/mL     Narrative:       Troponin T Reference Range:  <= 0.03 ng/mL-   Negative for AMI  >0.03 ng/mL-     Abnormal for myocardial necrosis.  Clinicians would have to utilize clinical acumen, EKG, Troponin and serial changes to determine if it is an Acute Myocardial Infarction or myocardial injury due to an underlying chronic condition.       Results may be falsely decreased if patient taking Biotin.      TSH [[id number]]  (Abnormal) Collected:  09/03/20 1233    Specimen:  Blood from Arm, Right Updated:  09/03/20 1309     TSH 4.630 uIU/mL     T4, Free [[id number]]  (Normal) Collected:  09/03/20 1233    Specimen:  Blood from Arm, Right Updated:  09/03/20 1308     Free T4 0.98 ng/dL     Narrative:       Results may be falsely increased if patient taking Biotin.      CBC Auto Differential [[id number]]  (Abnormal) Collected:  09/03/20 1233    Specimen:  Blood from Arm, Right Updated:  09/03/20 1246     WBC 3.59 10*3/mm3      RBC 3.84 10*6/mm3      Hemoglobin 12.1 g/dL      Hematocrit 35.4 %      MCV 92.2 fL      MCH 31.5 pg      MCHC 34.2  g/dL      RDW 12.7 %      RDW-SD 42.2 fl      MPV 9.7 fL      Platelets 270 10*3/mm3      Neutrophil % 77.2 %      Lymphocyte % 6.4 %      Monocyte % 11.7 %      Eosinophil % 3.6 %      Basophil % 0.8 %      Immature Grans % 0.3 %      Neutrophils, Absolute 2.77 10*3/mm3      Lymphocytes, Absolute 0.23 10*3/mm3      Monocytes, Absolute 0.42 10*3/mm3      Eosinophils, Absolute 0.13 10*3/mm3      Basophils, Absolute 0.03 10*3/mm3      Immature Grans, Absolute 0.01 10*3/mm3      nRBC 0.0 /100 WBC     Troponin [[id number]]  (Normal) Collected:  09/03/20 1500    Specimen:  Blood Updated:  09/03/20 1530     Troponin T <0.010 ng/mL     Narrative:       Troponin T Reference Range:  <= 0.03 ng/mL-   Negative for AMI  >0.03 ng/mL-     Abnormal for myocardial necrosis.  Clinicians would have to utilize clinical acumen, EKG, Troponin and serial changes to determine if it is an Acute Myocardial Infarction or myocardial injury due to an underlying chronic condition.       Results may be falsely decreased if patient taking Biotin.            Xr Chest 1 View    Result Date: 9/3/2020  Narrative: Frontal upright radiograph of the chest 9/3/2020 12:51 PM CDT  COMPARISON: April 27, 2017.  HISTORY: Chest pain.  FINDINGS: The lungs are clear. The cardiomediastinal silhouette and pulmonary vascularity are within normal limits.  The osseous structures and surrounding soft tissues demonstrate no acute abnormality.      Impression: 1. No radiographic evidence of acute cardiopulmonary process.   This report was finalized on 09/03/2020 13:16 by Dr. Danny Alexis MD.      ED Course  ED Course as of Sep 04 1110   Thu Sep 03, 2020   1405 Patient has been reassessed.  She is resting comfortably without any symptoms.  I have educated her laboratory data with a negative cardiac enzyme, normal EKG, negative d-dimer and BNP.  Her TSH is slightly elevated 4.6.  She already is aware that her previous TSH was slightly elevated and this is followed by her  primary care provider.  Informed her the plan of care is to repeat the cardiac enzyme as well as EKG.  If that is normal, we will proceed with a stress test.  The patient is agreeable.    [TK]   1701 This is a turn over from Gilda HYMAN. Two sets of cardiac markers are negative. Stress echo has been ordered and results remain pending at this time.     [TW]   1757 Stress echo was read as normal baseline ekg, no sign of st elevation, normal response to stress, low risk stress echo without evidence for myocardial ischemia. Patient has no chest pain on re-exam. She is aware of her elevated TSH and states that her pcp closely follows this indicating that she has had elevated tsh as well as low tsh levels. She will need to f/u with pcp for re-evaluation.    [TW]      ED Course User Index  [TK] Malinda Rick PA  [TW] Raysa Mcneill APRN         HEART Score (for prediction of 6-week risk of major adverse cardiac event) reviewed and/or performed as part of the patient evaluation and treatment planning process.  The result associated with this review/performance is: 2      MDM  Number of Diagnoses or Management Options  Chest pain, unspecified type: new and requires workup  Elevated TSH: new and requires workup     Amount and/or Complexity of Data Reviewed  Clinical lab tests: ordered and reviewed  Tests in the radiology section of CPT®: ordered and reviewed  Discuss the patient with other providers: yes    Risk of Complications, Morbidity, and/or Mortality  Presenting problems: moderate  Diagnostic procedures: moderate  Management options: moderate    Patient Progress  Patient progress: improved      Final diagnoses:   Chest pain, unspecified type   Elevated TSH          Raysa Mcneill APRN  09/04/20 1110

## 2020-09-21 ENCOUNTER — HOSPITAL ENCOUNTER (OUTPATIENT)
Dept: WOMENS IMAGING | Age: 40
Discharge: HOME | End: 2020-09-21
Payer: COMMERCIAL

## 2020-09-21 PROCEDURE — 77063 BREAST TOMOSYNTHESIS BI: CPT

## 2020-10-04 RX ORDER — GABAPENTIN 100 MG/1
CAPSULE ORAL
Qty: 135 CAPSULE | Refills: 2 | Status: SHIPPED | OUTPATIENT
Start: 2020-10-04 | End: 2021-02-26

## 2020-10-22 RX ORDER — DIAZEPAM 5 MG/1
TABLET ORAL
Qty: 1 TABLET | Refills: 0 | Status: SHIPPED | OUTPATIENT
Start: 2020-10-22 | End: 2020-10-23

## 2020-10-23 NOTE — PROGRESS NOTES
Subjective    Ms. Iniguez is 39 y.o. female    Chief Complaint: Nephrolithiasis    History of Present Illness    39 year old female established patient follow-up for kidney stones.  Location right lower pole kidney.  Severity 5 mm and 3 mm. Quality currently painless.  She has noted some intermittent left-sided back pain.   Radiation none.  Associated symptoms denies gross hematuria, dysuria or current flank pain.  Renal ultrasound done today shows no hydronephrosis with bilateral small stones.  Context  she hasneurogenic bladder due to MS.   she also has stable mild 0-1ppd urge incontinence along with some mild stress urinary incontinence after single vaginal delivery.  She does not currently want anticholinergic therapy.  Severity stable.    I independently visualized and reviewed the patient's prior imaging studies today in clinic and discussed the imaging findings with the patient.     The following portions of the patient's history were reviewed and updated as appropriate: allergies, current medications, past family history, past medical history, past social history, past surgical history and problem list.    Review of Systems   Constitutional: Negative for chills and fever.   Gastrointestinal: Negative for abdominal pain, anal bleeding and blood in stool.   Genitourinary: Negative for dysuria, flank pain, frequency, hematuria and urgency.   All other systems reviewed and are negative.        Current Outpatient Medications:   •  atorvastatin (LIPITOR) 10 MG tablet, Take 10 mg by mouth Every Night., Disp: , Rfl:   •  baclofen (LIORESAL) 20 MG tablet, Every Night., Disp: , Rfl:   •  Cholecalciferol 5000 UNITS tablet, Take 1 tablet by mouth., Disp: , Rfl:   •  CloNIDine (CATAPRES) 0.1 MG tablet, Take 0.1 mg by mouth As Needed for High Blood Pressure., Disp: , Rfl:   •  conjugated estrogens (PREMARIN) 0.625 MG/GM vaginal cream, Insert 0.5 g into the vagina., Disp: , Rfl:   •  diphenhydrAMINE-acetaminophen  (TYLENOL PM)  MG tablet per tablet, Take 2 tablets by mouth Daily., Disp: , Rfl:   •  gabapentin (NEURONTIN) 300 MG capsule, Take 100 mg by mouth. 1 tablet in am and 2 tabs at HS, Disp: , Rfl:   •  GILENYA 0.5 MG capsule, , Disp: , Rfl:   •  nortriptyline (PAMELOR) 75 MG capsule, , Disp: , Rfl:   •  phenazopyridine (PYRIDIUM) 100 MG tablet, Take 100 mg by mouth As Needed for bladder spasms., Disp: , Rfl:   •  Probiotic Product (PROBIOTIC-10 PO), Take  by mouth As Needed., Disp: , Rfl:   •  promethazine (PHENERGAN) 25 MG tablet, Take 25 mg by mouth Every 6 (Six) Hours As Needed for Nausea or Vomiting., Disp: , Rfl:   •  propranolol (INDERAL) 60 MG tablet, 60 mg. At night, Disp: , Rfl:   •  SUMAtriptan (IMITREX) 6 MG/0.5ML solution injection, Inject 6 mg under the skin 1 (One) Time As Needed for migraine., Disp: , Rfl:   •  tiZANidine (ZANAFLEX) 4 MG tablet, Take 4 mg by mouth Take As Directed., Disp: , Rfl:   •  topiramate (TOPAMAX) 100 MG tablet, Take 100 mg by mouth 2 (two) times a day., Disp: , Rfl:     Past Medical History:   Diagnosis Date   • Depression    • Hyperlipidemia    • Hypertension    • Kidney stone    • Migraine    • MS (multiple sclerosis) (CMS/HCC)    • POTS (postural orthostatic tachycardia syndrome)        Past Surgical History:   Procedure Laterality Date   • APPENDECTOMY     • BACK SURGERY     • CHOLECYSTECTOMY     • HYSTERECTOMY         Social History     Socioeconomic History   • Marital status:      Spouse name: Not on file   • Number of children: Not on file   • Years of education: Not on file   • Highest education level: Not on file   Tobacco Use   • Smoking status: Never Smoker   • Smokeless tobacco: Never Used   Substance and Sexual Activity   • Alcohol use: No   • Drug use: No   • Sexual activity: Defer   Social History Narrative           Family History   Problem Relation Age of Onset   • Heart disease Father    • Hypertension Father    • Kidney disease Father    •  Diabetes type II Mother    • Hypertension Mother    • Hypertension Sister    • Hypertension Brother        Objective    There were no vitals taken for this visit.    Physical Exam  Constitutional: Well nourished, Well developed; No apparent distress; Vital reviewed as above  Psychiatric: Appropriate affect; Alert and oriented  Eyes: Unremarkable  Musculoskeletal: Normal gait and station  GI: Abdomen is soft, non-tender  Respiratory: No distress; Unlabored movement; No accessory musculature needed with symmetric movements  Skin: No pallor or diaphoresis  Lymphatic: No adenopathy neck or groin      Results for orders placed or performed during the hospital encounter of 09/03/20   COVID-19, ABBOTT IN-HOUSE,NP Swab (NO TRANSPORT MEDIA) 2 HR TAT - Swab, Nasal Cavity    Specimen: Nasal Cavity; Swab   Result Value Ref Range    COVID19 Not Detected Not Detected - Ref. Range   Comprehensive Metabolic Panel    Specimen: Arm, Right; Blood   Result Value Ref Range    Glucose 93 65 - 99 mg/dL    BUN 13 6 - 20 mg/dL    Creatinine 0.81 0.57 - 1.00 mg/dL    Sodium 142 136 - 145 mmol/L    Potassium 4.4 3.5 - 5.2 mmol/L    Chloride 108 (H) 98 - 107 mmol/L    CO2 22.0 22.0 - 29.0 mmol/L    Calcium 8.9 8.6 - 10.5 mg/dL    Total Protein 6.8 6.0 - 8.5 g/dL    Albumin 4.30 3.50 - 5.20 g/dL    ALT (SGPT) 36 (H) 1 - 33 U/L    AST (SGOT) 26 1 - 32 U/L    Alkaline Phosphatase 85 39 - 117 U/L    Total Bilirubin 0.3 0.0 - 1.2 mg/dL    eGFR Non African Amer 78 >60 mL/min/1.73    Globulin 2.5 gm/dL    A/G Ratio 1.7 g/dL    BUN/Creatinine Ratio 16.0 7.0 - 25.0    Anion Gap 12.0 5.0 - 15.0 mmol/L   BNP    Specimen: Arm, Right; Blood   Result Value Ref Range    proBNP <5.0 0.0 - 450.0 pg/mL   D-dimer, Quantitative    Specimen: Arm, Right; Blood   Result Value Ref Range    D-Dimer, Quantitative 0.39 0.00 - 0.50 mg/L (FEU)   Troponin    Specimen: Arm, Right; Blood   Result Value Ref Range    Troponin T <0.010 0.000 - 0.030 ng/mL   Troponin     Specimen: Blood   Result Value Ref Range    Troponin T <0.010 0.000 - 0.030 ng/mL   TSH    Specimen: Arm, Right; Blood   Result Value Ref Range    TSH 4.630 (H) 0.270 - 4.200 uIU/mL   T4, Free    Specimen: Arm, Right; Blood   Result Value Ref Range    Free T4 0.98 0.93 - 1.70 ng/dL   CBC Auto Differential    Specimen: Arm, Right; Blood   Result Value Ref Range    WBC 3.59 3.40 - 10.80 10*3/mm3    RBC 3.84 3.77 - 5.28 10*6/mm3    Hemoglobin 12.1 12.0 - 15.9 g/dL    Hematocrit 35.4 34.0 - 46.6 %    MCV 92.2 79.0 - 97.0 fL    MCH 31.5 26.6 - 33.0 pg    MCHC 34.2 31.5 - 35.7 g/dL    RDW 12.7 12.3 - 15.4 %    RDW-SD 42.2 37.0 - 54.0 fl    MPV 9.7 6.0 - 12.0 fL    Platelets 270 140 - 450 10*3/mm3    Neutrophil % 77.2 (H) 42.7 - 76.0 %    Lymphocyte % 6.4 (L) 19.6 - 45.3 %    Monocyte % 11.7 5.0 - 12.0 %    Eosinophil % 3.6 0.3 - 6.2 %    Basophil % 0.8 0.0 - 1.5 %    Immature Grans % 0.3 0.0 - 0.5 %    Neutrophils, Absolute 2.77 1.70 - 7.00 10*3/mm3    Lymphocytes, Absolute 0.23 (L) 0.70 - 3.10 10*3/mm3    Monocytes, Absolute 0.42 0.10 - 0.90 10*3/mm3    Eosinophils, Absolute 0.13 0.00 - 0.40 10*3/mm3    Basophils, Absolute 0.03 0.00 - 0.20 10*3/mm3    Immature Grans, Absolute 0.01 0.00 - 0.05 10*3/mm3    nRBC 0.0 0.0 - 0.2 /100 WBC   Adult Stress Echo W/ Cont or Stress Agent if Necessary Per Protocol   Result Value Ref Range    BH CV STRESS PROTOCOL 1 Pharmacologic     Stage 1 1     HR Stage 1 77     BP Stage 1 131/66     Duration Min Stage 1 3     Duration Sec Stage 1 0     Stress Dose Dobutamine Stage 1 10     Stage 2 2     HR Stage 2 81     BP Stage 2 155/77     Duration Min Stage 2 3     Duration Sec Stage 2 0     Stress Dose Dobutamine Stage 2 20     Stage 3 3     HR Stage 3 106     BP Stage 3 176/88     Duration Min Stage 3 3     Duration Sec Stage 3 0     Stress Dose Dobutamine Stage 3 30     BH CV STRESS  - ATROPINE STAGE 3 1.00     Stage 4 4     HR Stage 4 125     BP Stage 4 182/91     Duration Min Stage 4 3      Duration Sec Stage 4 0     Stress Dose Dobutamine Stage 4 40     BH CV STRESS  - ATROPINE STAGE 4 1.00     Stage 5 5     HR Stage 5 129     BP Stage 5 185/97     Duration Min Stage 5 1     Duration Sec Stage 5 4     Stress Dose Dobutamine Stage 5 50     Baseline HR 77 bpm    Baseline /92 mmHg    Peak  bpm    Peak /97 mmHg    Recovery HR 92 bpm    Recovery /86 mmHg    Exercise duration (min) 13 min    Exercise duration (sec) 4 sec    Percent Target HR 85 %    Percent Max Pred HR 72.07 %     Assessment and Plan    Diagnoses and all orders for this visit:    1. Nephrolithiasis (Primary)  -     POC Urinalysis Dipstick, Multipro  -     XR abdomen kub; Future    2. Essential hypertension    3. Mixed hyperlipidemia    4. Multiple sclerosis (CMS/HCC)    5. Urinary frequency      Nonobstructing asymptomatic kidney stones.  We discussed options for observation, ESWL, or ureteroscopy.    She would like to observe for now.  She would also like to observe for mild urge incontinence at this time.  She will follow-up in 1 year with preclinic [facility] or sooner as needed.        This document has been signed by NIKOLAI Curry MD on 2020 15:29 CDT

## 2020-10-29 ENCOUNTER — HOSPITAL ENCOUNTER (OUTPATIENT)
Dept: ULTRASOUND IMAGING | Facility: HOSPITAL | Age: 40
Discharge: HOME | End: 2020-10-29
Admitting: UROLOGY

## 2020-10-29 ENCOUNTER — OFFICE VISIT (OUTPATIENT)
Dept: UROLOGY | Facility: CLINIC | Age: 40
End: 2020-10-29

## 2020-10-29 VITALS — HEIGHT: 66 IN | WEIGHT: 217.4 LBS | TEMPERATURE: 97.7 F | BODY MASS INDEX: 34.94 KG/M2

## 2020-10-29 DIAGNOSIS — N20.0 NEPHROLITHIASIS: Primary | ICD-10-CM

## 2020-10-29 DIAGNOSIS — E78.2 MIXED HYPERLIPIDEMIA: ICD-10-CM

## 2020-10-29 DIAGNOSIS — I10 ESSENTIAL HYPERTENSION: ICD-10-CM

## 2020-10-29 DIAGNOSIS — R35.0 URINARY FREQUENCY: ICD-10-CM

## 2020-10-29 DIAGNOSIS — G35 MULTIPLE SCLEROSIS (HCC): ICD-10-CM

## 2020-10-29 DIAGNOSIS — N20.0 NEPHROLITHIASIS: ICD-10-CM

## 2020-10-29 LAB
BILIRUB BLD-MCNC: NEGATIVE MG/DL
CLARITY, POC: CLEAR
COLOR UR: YELLOW
GLUCOSE UR STRIP-MCNC: NEGATIVE MG/DL
KETONES UR QL: NEGATIVE
LEUKOCYTE EST, POC: NEGATIVE
NITRITE UR-MCNC: NEGATIVE MG/ML
PH UR: 5.5 [PH] (ref 5–8)
PROT UR STRIP-MCNC: NEGATIVE MG/DL
RBC # UR STRIP: NEGATIVE /UL
SP GR UR: 1.02 (ref 1–1.03)
UROBILINOGEN UR QL: NORMAL

## 2020-10-29 PROCEDURE — 76775 US EXAM ABDO BACK WALL LIM: CPT

## 2020-10-29 PROCEDURE — 99213 OFFICE O/P EST LOW 20 MIN: CPT | Performed by: UROLOGY

## 2020-10-29 PROCEDURE — 81001 URINALYSIS AUTO W/SCOPE: CPT | Performed by: UROLOGY

## 2020-11-10 ENCOUNTER — NURSE ONLY (OUTPATIENT)
Dept: PRIMARY CARE CLINIC | Age: 40
End: 2020-11-10
Payer: COMMERCIAL

## 2020-11-10 PROCEDURE — 90686 IIV4 VACC NO PRSV 0.5 ML IM: CPT | Performed by: FAMILY MEDICINE

## 2020-11-10 PROCEDURE — 90471 IMMUNIZATION ADMIN: CPT | Performed by: FAMILY MEDICINE

## 2020-11-10 NOTE — PROGRESS NOTES
Patient was given 0.5 mL flu vaccine in left deltoid. Patient tolerated well and no reaction noticed.

## 2021-01-11 ENCOUNTER — OFFICE VISIT (OUTPATIENT)
Age: 41
End: 2021-01-11

## 2021-01-11 VITALS — HEART RATE: 68 BPM | OXYGEN SATURATION: 97 % | TEMPERATURE: 98 F

## 2021-01-11 DIAGNOSIS — Z11.59 SCREENING FOR VIRAL DISEASE: Primary | ICD-10-CM

## 2021-01-11 DIAGNOSIS — R09.89 CHEST CONGESTION: ICD-10-CM

## 2021-01-11 DIAGNOSIS — R51.9 ACUTE INTRACTABLE HEADACHE, UNSPECIFIED HEADACHE TYPE: Primary | ICD-10-CM

## 2021-01-11 DIAGNOSIS — J02.9 SORETHROAT: ICD-10-CM

## 2021-01-11 DIAGNOSIS — R19.7 DIARRHEA, UNSPECIFIED TYPE: ICD-10-CM

## 2021-01-11 PROCEDURE — 99999 PR OFFICE/OUTPT VISIT,PROCEDURE ONLY: CPT | Performed by: NURSE PRACTITIONER

## 2021-01-13 LAB — SARS-COV-2, NAA: NOT DETECTED

## 2021-02-26 DIAGNOSIS — G35 MULTIPLE SCLEROSIS (HCC): ICD-10-CM

## 2021-02-26 DIAGNOSIS — G62.9 NEUROPATHY: ICD-10-CM

## 2021-02-26 RX ORDER — GABAPENTIN 100 MG/1
CAPSULE ORAL
Qty: 135 CAPSULE | Refills: 2 | Status: SHIPPED | OUTPATIENT
Start: 2021-02-26 | End: 2021-04-13 | Stop reason: SDUPTHER

## 2021-03-03 RX ORDER — PROPRANOLOL HCL 60 MG
CAPSULE, EXTENDED RELEASE 24HR ORAL
Qty: 90 CAPSULE | Refills: 3 | Status: SHIPPED | OUTPATIENT
Start: 2021-03-03 | End: 2022-02-28

## 2021-03-10 RX ORDER — ATORVASTATIN CALCIUM 10 MG/1
TABLET, FILM COATED ORAL
Qty: 90 TABLET | Refills: 3 | Status: SHIPPED | OUTPATIENT
Start: 2021-03-10 | End: 2021-03-21 | Stop reason: SDUPTHER

## 2021-03-11 ENCOUNTER — OFFICE VISIT (OUTPATIENT)
Dept: PRIMARY CARE CLINIC | Age: 41
End: 2021-03-11
Payer: COMMERCIAL

## 2021-03-11 VITALS
SYSTOLIC BLOOD PRESSURE: 130 MMHG | WEIGHT: 211 LBS | HEART RATE: 83 BPM | DIASTOLIC BLOOD PRESSURE: 80 MMHG | RESPIRATION RATE: 18 BRPM | BODY MASS INDEX: 33.91 KG/M2 | HEIGHT: 66 IN | TEMPERATURE: 96.9 F | OXYGEN SATURATION: 96 %

## 2021-03-11 DIAGNOSIS — G35 MULTIPLE SCLEROSIS (HCC): ICD-10-CM

## 2021-03-11 DIAGNOSIS — R30.0 BURNING WITH URINATION: ICD-10-CM

## 2021-03-11 DIAGNOSIS — Z79.899 MEDICATION MANAGEMENT: ICD-10-CM

## 2021-03-11 DIAGNOSIS — Z00.00 WELL FEMALE EXAM WITHOUT GYNECOLOGICAL EXAM: Primary | ICD-10-CM

## 2021-03-11 DIAGNOSIS — E53.8 B12 DEFICIENCY: ICD-10-CM

## 2021-03-11 DIAGNOSIS — G83.4 CAUDA EQUINA SYNDROME (HCC): ICD-10-CM

## 2021-03-11 DIAGNOSIS — I10 ESSENTIAL HYPERTENSION: ICD-10-CM

## 2021-03-11 DIAGNOSIS — R79.89 ELEVATED TSH: ICD-10-CM

## 2021-03-11 DIAGNOSIS — R82.90 ABNORMAL URINALYSIS: ICD-10-CM

## 2021-03-11 LAB
ALBUMIN SERPL-MCNC: 4.9 G/DL (ref 3.5–5.2)
ALCOHOL URINE: NORMAL
ALP BLD-CCNC: 120 U/L (ref 35–104)
ALT SERPL-CCNC: 56 U/L (ref 5–33)
AMPHETAMINE SCREEN, URINE: NORMAL
ANION GAP SERPL CALCULATED.3IONS-SCNC: 16 MMOL/L (ref 7–19)
APPEARANCE FLUID: ABNORMAL
AST SERPL-CCNC: 29 U/L (ref 5–32)
BARBITURATE SCREEN, URINE: NORMAL
BENZODIAZEPINE SCREEN, URINE: NORMAL
BILIRUB SERPL-MCNC: 0.6 MG/DL (ref 0.2–1.2)
BILIRUBIN, POC: ABNORMAL
BLOOD URINE, POC: ABNORMAL
BUN BLDV-MCNC: 18 MG/DL (ref 6–20)
BUPRENORPHINE URINE: NORMAL
CALCIUM SERPL-MCNC: 9.4 MG/DL (ref 8.6–10)
CHLORIDE BLD-SCNC: 106 MMOL/L (ref 98–111)
CHOLESTEROL, TOTAL: 201 MG/DL (ref 160–199)
CLARITY, POC: ABNORMAL
CO2: 19 MMOL/L (ref 22–29)
COCAINE METABOLITE SCREEN URINE: NORMAL
COLOR, POC: ABNORMAL
CREAT SERPL-MCNC: 0.9 MG/DL (ref 0.5–0.9)
FENTANYL SCREEN, URINE: NORMAL
GABAPENTIN SCREEN, URINE: NORMAL
GFR AFRICAN AMERICAN: >59
GFR NON-AFRICAN AMERICAN: >60
GLUCOSE BLD-MCNC: 86 MG/DL (ref 74–109)
GLUCOSE URINE, POC: ABNORMAL
HCT VFR BLD CALC: 42.6 % (ref 37–47)
HDLC SERPL-MCNC: 42 MG/DL (ref 65–121)
HEMOGLOBIN: 14.6 G/DL (ref 12–16)
KETONES, POC: ABNORMAL
LDL CHOLESTEROL CALCULATED: 106 MG/DL
LEUKOCYTE EST, POC: ABNORMAL
MCH RBC QN AUTO: 31.7 PG (ref 27–31)
MCHC RBC AUTO-ENTMCNC: 34.3 G/DL (ref 33–37)
MCV RBC AUTO: 92.4 FL (ref 81–99)
MDMA URINE: NORMAL
METHADONE SCREEN, URINE: NORMAL
METHAMPHETAMINE, URINE: NORMAL
NITRITE, POC: ABNORMAL
OPIATE SCREEN URINE: NORMAL
OXYCODONE SCREEN URINE: NORMAL
PDW BLD-RTO: 12.6 % (ref 11.5–14.5)
PH, POC: ABNORMAL
PHENCYCLIDINE SCREEN URINE: NORMAL
PLATELET # BLD: 302 K/UL (ref 130–400)
PMV BLD AUTO: 10.3 FL (ref 9.4–12.3)
POTASSIUM SERPL-SCNC: 4.4 MMOL/L (ref 3.5–5)
PROPOXYPHENE SCREEN, URINE: NORMAL
PROTEIN, POC: ABNORMAL
RBC # BLD: 4.61 M/UL (ref 4.2–5.4)
SODIUM BLD-SCNC: 141 MMOL/L (ref 136–145)
SPECIFIC GRAVITY, POC: ABNORMAL
SYNTHETIC CANNABINOIDS(K2) SCREEN, URINE: NORMAL
THC SCREEN, URINE: NORMAL
TOTAL PROTEIN: 7.3 G/DL (ref 6.6–8.7)
TRAMADOL SCREEN URINE: NORMAL
TRICYCLIC ANTIDEPRESSANTS, UR: NORMAL
TRIGL SERPL-MCNC: 264 MG/DL (ref 0–149)
TSH SERPL DL<=0.05 MIU/L-ACNC: 2.42 UIU/ML (ref 0.27–4.2)
UROBILINOGEN, POC: ABNORMAL
VITAMIN B-12: 347 PG/ML (ref 211–946)
WBC # BLD: 5.1 K/UL (ref 4.8–10.8)

## 2021-03-11 PROCEDURE — G8417 CALC BMI ABV UP PARAM F/U: HCPCS | Performed by: FAMILY MEDICINE

## 2021-03-11 PROCEDURE — 1036F TOBACCO NON-USER: CPT | Performed by: FAMILY MEDICINE

## 2021-03-11 PROCEDURE — G8427 DOCREV CUR MEDS BY ELIG CLIN: HCPCS | Performed by: FAMILY MEDICINE

## 2021-03-11 PROCEDURE — 80305 DRUG TEST PRSMV DIR OPT OBS: CPT | Performed by: FAMILY MEDICINE

## 2021-03-11 PROCEDURE — G8482 FLU IMMUNIZE ORDER/ADMIN: HCPCS | Performed by: FAMILY MEDICINE

## 2021-03-11 PROCEDURE — 81002 URINALYSIS NONAUTO W/O SCOPE: CPT | Performed by: FAMILY MEDICINE

## 2021-03-11 PROCEDURE — 99214 OFFICE O/P EST MOD 30 MIN: CPT | Performed by: FAMILY MEDICINE

## 2021-03-11 RX ORDER — PHENAZOPYRIDINE HYDROCHLORIDE 100 MG/1
100 TABLET, FILM COATED ORAL 3 TIMES DAILY PRN
Qty: 6 TABLET | Refills: 0 | Status: SHIPPED | OUTPATIENT
Start: 2021-03-11 | End: 2022-03-11

## 2021-03-11 RX ORDER — CIPROFLOXACIN 250 MG/1
250 TABLET, FILM COATED ORAL 2 TIMES DAILY
Qty: 14 TABLET | Refills: 0 | Status: SHIPPED | OUTPATIENT
Start: 2021-03-11 | End: 2021-03-18

## 2021-03-11 RX ORDER — FLUCONAZOLE 150 MG/1
150 TABLET ORAL ONCE
Qty: 1 TABLET | Refills: 0 | Status: SHIPPED | OUTPATIENT
Start: 2021-03-11 | End: 2021-03-11

## 2021-03-11 NOTE — PROGRESS NOTES
SUBJECTIVE:   41 y.o. female for annual routine Pap and checkup. She is currently disabled due to multiple sclerosis, possible pots syndrome and cauda equina syndrome. She is followed by Dr. Linda Russell in [place]. She is also afraid she may have another UTI. She is complaining of burning with urination. The last 2 weeks she has had a burning pain in the epigastric area. It lasts just a couple of minutes. It does not radiate through to her back. She has no gallbladder. She denies any nausea or vomiting. This intermittent burning pain which does not last that long is not related to meals. She still struggles with her trigeminal neuralgia and it does flareup occasionally. She is on a controlled substance gabapentin which I prescribed. LMP: No LMP recorded. Patient has had a hysterectomy.   Patient Active Problem List    Diagnosis Date Noted    RUQ abdominal pain 08/08/2018    Insomnia 02/16/2017    Trigeminal neuralgia 02/16/2017    Vitamin D deficiency 02/16/2017    Right kidney stone 11/29/2016    Urinary incontinence 11/29/2016    Hypertensive disorder 06/10/2016    Relapsing-remitting multiple sclerosis ([facility] [address]     Complicated migraine     Hematuria     Depression     Nerve damage     Cauda equina syndrome (HCC)     Other hyperlipidemia     Postural orthostatic tachycardia syndrome     Vitamin B12 deficiency (non anemic)     Pelvic floor dysfunction     Myofascial pain      Current Outpatient Medications   Medication Sig Dispense Refill    atorvastatin (LIPITOR) 10 MG tablet TAKE 1 TABLET AT BEDTIME FOR HIGH CHOLESTEROL 90 tablet 3    propranolol (INDERAL LA) 60 MG extended release capsule TAKE 1 CAPSULE ONCE DAILY FOR HEART RATE 90 capsule 3    gabapentin (NEURONTIN) 100 MG capsule TAKE 1 TO 2 CAPSULES BY MOUTH THREE TIMES DAILY AS NEEDED FOR MS OR NERVE PAIN 135 capsule 2    conjugated estrogens (PREMARIN) 0.625 MG/GM vaginal cream Place 0.5 g vaginally Twice a Week 1 Tube 5    tiZANidine (ZANAFLEX) 4 MG tablet Take 1 tablet by mouth every 8 hours as needed (for muscle spasms) 270 tablet 1    nortriptyline (PAMELOR) 75 MG capsule       topiramate (TOPAMAX) 100 MG tablet Take 1 tablet by mouth 2 times daily 180 tablet 3    Fingolimod HCl (GILENYA PO) Take by mouth      Probiotic Product (PROBIOTIC DAILY PO) Take by mouth      baclofen (LIORESAL) 20 MG tablet Take 20 mg by mouth 2 times daily      promethazine (PHENERGAN) 25 MG tablet Take 25 mg by mouth      cloNIDine (CATAPRES) 0.1 MG tablet 0.1 mg as needed       SUMAtriptan Succinate 6 MG/0.5ML SOAJ as needed       Cholecalciferol (VITAMIN D-3) 5000 UNITS TABS Take 5,000 Units by mouth daily.  sulfamethoxazole-trimethoprim (BACTRIM DS;SEPTRA DS) 800-160 MG per tablet Take 1 tablet by mouth 2 times daily for 7 days 14 tablet 0    ciprofloxacin (CIPRO) 250 MG tablet Take 1 tablet by mouth 2 times daily for 7 days 14 tablet 0    phenazopyridine (PYRIDIUM) 100 MG tablet Take 1 tablet by mouth 3 times daily as needed for Pain 6 tablet 0     No current facility-administered medications for this visit. Past Medical History:   Diagnosis Date    B12 deficiency     Cauda equina syndrome (HCC)     Complicated migraine     Depression     GERD (gastroesophageal reflux disease)     Hematuria     wih abdominal pain    Hyperlipidemia     Hypertension     MS (multiple sclerosis) (HCC)     Myofascial pain     Nerve damage     Neutropenia ([address] 10/21/2015    Pelvic floor dysfunction     Postmenopausal HRT (hormone replacement therapy)     POTS (postural orthostatic tachycardia syndrome)      Past Surgical History:   Procedure Laterality Date    APPENDECTOMY      BACK SURGERY      CHOLECYSTECTOMY, LAPAROSCOPIC      COLONOSCOPY      Dr. Nikita Dominguez 10 years ?     COLONOSCOPY N/A 8/13/2018    Dr Cady Avila yr recall    EGD      HYSTERECTOMY      MT EGD TRANSORAL BIOPSY SINGLE/MULTIPLE N/A 1/29/2018    Dr Cady Flynn Abdul-Active duodenitis, gastritis/gastropathy     Family History   Problem Relation Age of Onset    Diabetes Mother     High Blood Pressure Mother     Colon Polyps Mother     Colon Cancer Neg Hx     Liver Cancer Neg Hx     Liver Disease Neg Hx     Esophageal Cancer Neg Hx     Rectal Cancer Neg Hx     Stomach Cancer Neg Hx      Social History     Tobacco Use    Smoking status: Never Smoker    Smokeless tobacco: Never Used   Substance Use Topics    Alcohol use: No       Allergies: Ropinirole hcl, Simvastatin, and Zolpidem    ROS:  Feeling well. No dyspnea or chest pain on exertion. No abdominal pain, change in bowel habits, black or bloody stools. No urinary tract symptoms. GYN ROS: none   No neurological complaints. All other systems negative. OBJECTIVE:   The patient appears well, alert, oriented x 3, in no distress. /80   Pulse 83   Temp 96.9 °F (36.1 °C)   Resp 18   Ht 5' 6\" (1.676 m)   Wt 211 lb (95.7 kg)   SpO2 96%   BMI 34.06 kg/m²   Skin normal, no suspicious skin lesions. ENT normal.  Neck supple. No adenopathy or thyromegaly. ARIEL. Lungs are clear, good air entry, no wheezes, rhonchi or rales. S1 and S2 normal, no murmurs, regular rate and rhythm. Abdomen soft without tenderness, guarding, mass or organomegaly. Extremities show no edema, normal peripheral pulses. Neurological is normal, no focal findings. Psychiatric exam, no signs of depression. BREAST EXAM: Done by GYN    PELVIC EXAM: Done by GYN    ASSESSMENT:   1. Well female exam without gynecological exam    2. Multiple sclerosis (HCC)    3. Elevated TSH    4. Essential hypertension    5. B12 deficiency    6. Burning with urination    7. Abnormal urinalysis    8. Medication management    9. Cauda equina syndrome (HCC)        PLAN:   Lifestyle advice: discussed diet and exercise  MEDICATIONS:  No orders of the defined types were placed in this encounter. Continue current medications.     ORDERS:  Orders Placed This Encounter   Procedures    Culture, Urine    Comprehensive Metabolic Panel    Lipid Panel    Vitamin B12    CBC    TSH without Reflex    POCT Urinalysis no Micro    POCT Rapid Drug Screen     Results for POC orders placed in visit on 03/11/21   POCT Urinalysis no Micro   Result Value Ref Range    Color, UA ornage     Clarity, UA cloudy     Glucose, UA POC n     Bilirubin, UA n     Ketones, UA n     Spec Grav, UA n     Blood, UA POC n     pH, UA n     Protein, UA POC n     Urobilinogen, UA n     Leukocytes, UA large     Nitrite, UA large     Appearance, Fluid Cloudy (A) Clear, Slightly Cloudy   POCT Rapid Drug Screen   Result Value Ref Range    Alcohol, Urine n     Amphetamine Screen, Urine n     Barbiturate Screen, Urine n     Benzodiazepine Screen, Urine n     Buprenorphine Urine n     Cocaine Metabolite Screen, Urine n     FENTANYL SCREEN, URINE n     Gabapentin Screen, Urine n     MDMA, Urine n     Methadone Screen, Urine n     Methamphetamine, Urine n     Opiate Scrn, Ur n     Oxycodone Screen, Ur n     PCP Screen, Urine n     Propoxyphene Screen, Urine n     Synthetic Cannabinoids (K2) Screen, Urine n     THC Screen, Urine n     Tramadol Scrn, Ur n     Tricyclic Antidepressants, Urine n      Controlled Substance Monitoring:    Acute and Chronic Pain Monitoring:   RX Monitoring 3/11/2021   Attestation The Prescription Monitoring Report for this patient was reviewed today. Periodic Controlled Substance Monitoring Possible medication side effects, risk of tolerance/dependence & alternative treatments discussed. ;No signs of potential drug abuse or diversion identified. ;Assessed functional status. ;Obtaining appropriate analgesic effect of treatment. Urinalysis was abnormal but we will await the culture before we treat. She will continue to follow-up with Dr. Judit Pace. He is managing her cauda equina syndrome as well as her multiple sclerosis.     We will contact her with results of her blood work.  We did discuss physical therapy. She is at increased risk of falls. Follow-up:  Return in about 6 months (around 9/11/2021) for Video visit medication monitoring. PATIENT INSTRUCTIONS:  Patient Instructions   We are committed to providing you with the best care possible. In order to help us achieve these goals please remember to bring all medications, herbal products, and over the counter supplements with you to each visit. If your provider has ordered testing for you, please be sure to follow up with our office if you have not received results within 7 days after the testing took place. *If you receive a survey after visiting one of our offices, please take time to share your experience concerning your physician office visit. These surveys are confidential and no health information about you is shared. We are eager to improve for you and we are counting on your feedback to help make that happen. EMR Dragon/transcription disclaimer:  Much of this encounter note is electronic transcription/translation of spoken language to printed texts. The electronic translation of spoken language may be erroneous, or at times, nonsensical words or phrases may be inadvertently transcribed.   Although I have reviewed the note for such errors, some may still exist.

## 2021-03-13 LAB
ORGANISM: ABNORMAL
URINE CULTURE, ROUTINE: ABNORMAL
URINE CULTURE, ROUTINE: ABNORMAL

## 2021-03-13 RX ORDER — SULFAMETHOXAZOLE AND TRIMETHOPRIM 800; 160 MG/1; MG/1
1 TABLET ORAL 2 TIMES DAILY
Qty: 14 TABLET | Refills: 0 | Status: SHIPPED | OUTPATIENT
Start: 2021-03-13 | End: 2021-03-20

## 2021-03-21 RX ORDER — ATORVASTATIN CALCIUM 10 MG/1
TABLET, FILM COATED ORAL
Qty: 90 TABLET | Refills: 3 | Status: SHIPPED | OUTPATIENT
Start: 2021-03-21 | End: 2021-05-25 | Stop reason: SDUPTHER

## 2021-04-13 DIAGNOSIS — G62.9 NEUROPATHY: ICD-10-CM

## 2021-04-13 DIAGNOSIS — G35 MULTIPLE SCLEROSIS (HCC): ICD-10-CM

## 2021-04-13 RX ORDER — GABAPENTIN 100 MG/1
CAPSULE ORAL
Qty: 135 CAPSULE | Refills: 2 | Status: SHIPPED | OUTPATIENT
Start: 2021-04-13 | End: 2021-06-25

## 2021-05-24 ENCOUNTER — PATIENT MESSAGE (OUTPATIENT)
Dept: PRIMARY CARE CLINIC | Age: 41
End: 2021-05-24

## 2021-05-25 RX ORDER — ATORVASTATIN CALCIUM 20 MG/1
TABLET, FILM COATED ORAL
Qty: 90 TABLET | Refills: 3 | Status: SHIPPED | OUTPATIENT
Start: 2021-05-25 | End: 2021-06-01 | Stop reason: SDUPTHER

## 2021-05-28 NOTE — TELEPHONE ENCOUNTER
From: Lizy Kruse  Sent: 5/28/2021 7:29 AM CDT  To: [facility] Clinical Staff  Subject: RE: Prescription Question    I got a message from [facility] this morning that they have tried reaching Dr Salinas Nguyen about this medicine. Could you look into it please?    ----- Message -----  From: Roxane Rowan  Sent: 5/25/21, 9:11 AM  To: Lizy Kruse  Subject: RE: Prescription Question    I sent it in      ----- Message -----   From:Nolvia Zavala   Sent:5/24/2021 11:19 PM EDT   To:Terri Audley Runner, MD   Subject:Prescription Question    I've been doubling up my atorvastatin 10mgs to make 20mg since my last lab work. I need a new prescription for the 20mgs now, please. If you could send it to [facility] I would appreciate it. Thank you!   Cooper Carpenter

## 2021-06-01 RX ORDER — ATORVASTATIN CALCIUM 20 MG/1
TABLET, FILM COATED ORAL
Qty: 9 TABLET | Refills: 0 | Status: SHIPPED | OUTPATIENT
Start: 2021-06-01 | End: 2021-06-22

## 2021-06-14 ENCOUNTER — NURSE ONLY (OUTPATIENT)
Dept: PRIMARY CARE CLINIC | Age: 41
End: 2021-06-14
Payer: COMMERCIAL

## 2021-06-14 DIAGNOSIS — R30.0 DYSURIA: ICD-10-CM

## 2021-06-14 DIAGNOSIS — I10 ESSENTIAL HYPERTENSION: Primary | ICD-10-CM

## 2021-06-14 DIAGNOSIS — E78.00 ELEVATED CHOLESTEROL: ICD-10-CM

## 2021-06-14 LAB
ALBUMIN SERPL-MCNC: 4.4 G/DL (ref 3.5–5.2)
ALP BLD-CCNC: 108 U/L (ref 35–104)
ALT SERPL-CCNC: 15 U/L (ref 5–33)
ANION GAP SERPL CALCULATED.3IONS-SCNC: 10 MMOL/L (ref 7–19)
AST SERPL-CCNC: 16 U/L (ref 5–32)
BILIRUB SERPL-MCNC: 0.4 MG/DL (ref 0.2–1.2)
BILIRUBIN, POC: NORMAL
BLOOD URINE, POC: NORMAL
BUN BLDV-MCNC: 13 MG/DL (ref 6–20)
CALCIUM SERPL-MCNC: 9.2 MG/DL (ref 8.6–10)
CHLORIDE BLD-SCNC: 109 MMOL/L (ref 98–111)
CHOLESTEROL, TOTAL: 157 MG/DL (ref 160–199)
CLARITY, POC: CLEAR
CO2: 22 MMOL/L (ref 22–29)
COLOR, POC: YELLOW
CREAT SERPL-MCNC: 0.9 MG/DL (ref 0.5–0.9)
GFR AFRICAN AMERICAN: >59
GFR NON-AFRICAN AMERICAN: >60
GLUCOSE BLD-MCNC: 88 MG/DL (ref 74–109)
GLUCOSE URINE, POC: NORMAL
HCT VFR BLD CALC: 39.7 % (ref 37–47)
HDLC SERPL-MCNC: 49 MG/DL (ref 65–121)
HEMOGLOBIN: 13.4 G/DL (ref 12–16)
KETONES, POC: 1.01
LDL CHOLESTEROL CALCULATED: 76 MG/DL
LEUKOCYTE EST, POC: NORMAL
MCH RBC QN AUTO: 32.2 PG (ref 27–31)
MCHC RBC AUTO-ENTMCNC: 33.8 G/DL (ref 33–37)
MCV RBC AUTO: 95.4 FL (ref 81–99)
NITRITE, POC: NORMAL
PDW BLD-RTO: 12.8 % (ref 11.5–14.5)
PH, POC: 5
PLATELET # BLD: 314 K/UL (ref 130–400)
PMV BLD AUTO: 10.5 FL (ref 9.4–12.3)
POTASSIUM SERPL-SCNC: 4.3 MMOL/L (ref 3.5–5)
PROTEIN, POC: NORMAL
RBC # BLD: 4.16 M/UL (ref 4.2–5.4)
SODIUM BLD-SCNC: 141 MMOL/L (ref 136–145)
SPECIFIC GRAVITY, POC: NORMAL
TOTAL PROTEIN: 7.1 G/DL (ref 6.6–8.7)
TRIGL SERPL-MCNC: 161 MG/DL (ref 0–149)
UROBILINOGEN, POC: NORMAL
WBC # BLD: 5.5 K/UL (ref 4.8–10.8)

## 2021-06-14 PROCEDURE — 36415 COLL VENOUS BLD VENIPUNCTURE: CPT | Performed by: FAMILY MEDICINE

## 2021-06-14 PROCEDURE — 81002 URINALYSIS NONAUTO W/O SCOPE: CPT | Performed by: FAMILY MEDICINE

## 2021-06-25 DIAGNOSIS — G62.9 NEUROPATHY: ICD-10-CM

## 2021-06-25 DIAGNOSIS — G35 MULTIPLE SCLEROSIS (HCC): ICD-10-CM

## 2021-06-25 RX ORDER — GABAPENTIN 100 MG/1
CAPSULE ORAL
Qty: 135 CAPSULE | Refills: 2 | Status: SHIPPED | OUTPATIENT
Start: 2021-06-25 | End: 2021-07-28 | Stop reason: SDUPTHER

## 2021-07-22 ENCOUNTER — PATIENT MESSAGE (OUTPATIENT)
Dept: PRIMARY CARE CLINIC | Age: 41
End: 2021-07-22

## 2021-07-22 RX ORDER — VALACYCLOVIR HYDROCHLORIDE 1 G/1
1000 TABLET, FILM COATED ORAL 3 TIMES DAILY
Qty: 21 TABLET | Refills: 0 | Status: SHIPPED | OUTPATIENT
Start: 2021-07-22 | End: 2021-07-29

## 2021-07-28 DIAGNOSIS — G35 MULTIPLE SCLEROSIS (HCC): ICD-10-CM

## 2021-07-28 DIAGNOSIS — G62.9 NEUROPATHY: ICD-10-CM

## 2021-07-28 RX ORDER — GABAPENTIN 100 MG/1
CAPSULE ORAL
Qty: 135 CAPSULE | Refills: 2 | Status: SHIPPED | OUTPATIENT
Start: 2021-07-28 | End: 2021-09-14 | Stop reason: SDUPTHER

## 2021-08-02 ENCOUNTER — APPOINTMENT (OUTPATIENT)
Dept: GENERAL RADIOLOGY | Facility: HOSPITAL | Age: 41
End: 2021-08-02

## 2021-08-02 ENCOUNTER — HOSPITAL ENCOUNTER (EMERGENCY)
Facility: HOSPITAL | Age: 41
Discharge: HOME | End: 2021-08-02
Attending: EMERGENCY MEDICINE | Admitting: EMERGENCY MEDICINE

## 2021-08-02 VITALS
DIASTOLIC BLOOD PRESSURE: 88 MMHG | TEMPERATURE: 98.5 F | BODY MASS INDEX: 33.75 KG/M2 | HEART RATE: 78 BPM | OXYGEN SATURATION: 100 % | RESPIRATION RATE: 13 BRPM | SYSTOLIC BLOOD PRESSURE: 144 MMHG | HEIGHT: 66 IN | WEIGHT: 210 LBS

## 2021-08-02 DIAGNOSIS — R07.9 CHEST PAIN, UNSPECIFIED TYPE: Primary | ICD-10-CM

## 2021-08-02 LAB
ALBUMIN SERPL-MCNC: 4.7 G/DL (ref 3.5–5.2)
ALBUMIN/GLOB SERPL: 1.8 G/DL
ALP SERPL-CCNC: 118 U/L (ref 39–117)
ALT SERPL W P-5'-P-CCNC: 14 U/L (ref 1–33)
ANION GAP SERPL CALCULATED.3IONS-SCNC: 11 MMOL/L (ref 5–15)
AST SERPL-CCNC: 13 U/L (ref 1–32)
BASOPHILS # BLD AUTO: 0.07 10*3/MM3 (ref 0–0.2)
BASOPHILS NFR BLD AUTO: 1 % (ref 0–1.5)
BILIRUB SERPL-MCNC: 0.3 MG/DL (ref 0–1.2)
BUN SERPL-MCNC: 16 MG/DL (ref 6–20)
BUN/CREAT SERPL: 19.5 (ref 7–25)
CALCIUM SPEC-SCNC: 9.6 MG/DL (ref 8.6–10.5)
CHLORIDE SERPL-SCNC: 107 MMOL/L (ref 98–107)
CO2 SERPL-SCNC: 24 MMOL/L (ref 22–29)
CREAT SERPL-MCNC: 0.82 MG/DL (ref 0.57–1)
D DIMER PPP FEU-MCNC: 0.3 MG/L (FEU) (ref 0–0.5)
DEPRECATED RDW RBC AUTO: 42.9 FL (ref 37–54)
EOSINOPHIL # BLD AUTO: 0.13 10*3/MM3 (ref 0–0.4)
EOSINOPHIL NFR BLD AUTO: 1.9 % (ref 0.3–6.2)
ERYTHROCYTE [DISTWIDTH] IN BLOOD BY AUTOMATED COUNT: 13.2 % (ref 12.3–15.4)
GFR SERPL CREATININE-BSD FRML MDRD: 76 ML/MIN/1.73
GLOBULIN UR ELPH-MCNC: 2.6 GM/DL
GLUCOSE SERPL-MCNC: 90 MG/DL (ref 65–99)
HCT VFR BLD AUTO: 38.7 % (ref 34–46.6)
HGB BLD-MCNC: 13.5 G/DL (ref 12–15.9)
HOLD SPECIMEN: NORMAL
HOLD SPECIMEN: NORMAL
IMM GRANULOCYTES # BLD AUTO: 0.02 10*3/MM3 (ref 0–0.05)
IMM GRANULOCYTES NFR BLD AUTO: 0.3 % (ref 0–0.5)
LYMPHOCYTES # BLD AUTO: 0.88 10*3/MM3 (ref 0.7–3.1)
LYMPHOCYTES NFR BLD AUTO: 12.5 % (ref 19.6–45.3)
MCH RBC QN AUTO: 31.8 PG (ref 26.6–33)
MCHC RBC AUTO-ENTMCNC: 34.9 G/DL (ref 31.5–35.7)
MCV RBC AUTO: 91.3 FL (ref 79–97)
MONOCYTES # BLD AUTO: 0.75 10*3/MM3 (ref 0.1–0.9)
MONOCYTES NFR BLD AUTO: 10.7 % (ref 5–12)
NEUTROPHILS NFR BLD AUTO: 5.17 10*3/MM3 (ref 1.7–7)
NEUTROPHILS NFR BLD AUTO: 73.6 % (ref 42.7–76)
NRBC BLD AUTO-RTO: 0 /100 WBC (ref 0–0.2)
PLATELET # BLD AUTO: 323 10*3/MM3 (ref 140–450)
PMV BLD AUTO: 9.3 FL (ref 6–12)
POTASSIUM SERPL-SCNC: 4.4 MMOL/L (ref 3.5–5.2)
PROT SERPL-MCNC: 7.3 G/DL (ref 6–8.5)
RBC # BLD AUTO: 4.24 10*6/MM3 (ref 3.77–5.28)
SODIUM SERPL-SCNC: 142 MMOL/L (ref 136–145)
TROPONIN T SERPL-MCNC: <0.01 NG/ML (ref 0–0.03)
TROPONIN T SERPL-MCNC: <0.01 NG/ML (ref 0–0.03)
WBC # BLD AUTO: 7.02 10*3/MM3 (ref 3.4–10.8)
WHOLE BLOOD HOLD SPECIMEN: NORMAL

## 2021-08-02 PROCEDURE — 85025 COMPLETE CBC W/AUTO DIFF WBC: CPT | Performed by: EMERGENCY MEDICINE

## 2021-08-02 PROCEDURE — 71045 X-RAY EXAM CHEST 1 VIEW: CPT

## 2021-08-02 PROCEDURE — 99284 EMERGENCY DEPT VISIT MOD MDM: CPT

## 2021-08-02 PROCEDURE — 93005 ELECTROCARDIOGRAM TRACING: CPT

## 2021-08-02 PROCEDURE — 93010 ELECTROCARDIOGRAM REPORT: CPT | Performed by: INTERNAL MEDICINE

## 2021-08-02 PROCEDURE — 80053 COMPREHEN METABOLIC PANEL: CPT | Performed by: EMERGENCY MEDICINE

## 2021-08-02 PROCEDURE — 85379 FIBRIN DEGRADATION QUANT: CPT | Performed by: EMERGENCY MEDICINE

## 2021-08-02 PROCEDURE — 93005 ELECTROCARDIOGRAM TRACING: CPT | Performed by: EMERGENCY MEDICINE

## 2021-08-02 PROCEDURE — 84484 ASSAY OF TROPONIN QUANT: CPT | Performed by: EMERGENCY MEDICINE

## 2021-08-02 RX ORDER — ASPIRIN 81 MG/1
324 TABLET, CHEWABLE ORAL ONCE
Status: COMPLETED | OUTPATIENT
Start: 2021-08-02 | End: 2021-08-02

## 2021-08-02 RX ORDER — SODIUM CHLORIDE 0.9 % (FLUSH) 0.9 %
10 SYRINGE (ML) INJECTION AS NEEDED
Status: DISCONTINUED | OUTPATIENT
Start: 2021-08-02 | End: 2021-08-02 | Stop reason: HOSPADM

## 2021-08-02 RX ADMIN — ASPIRIN 324 MG: 81 TABLET, CHEWABLE ORAL at 14:46

## 2021-08-02 NOTE — ED TRIAGE NOTES
PATIENT PRESENTS WITH CC OF CHEST PAIN THAT STATED AROUND 1 WEEK AGO AFTER SHE RECEIVED HER SECOND COVID 19 VACCINATION.

## 2021-08-02 NOTE — ED PROVIDER NOTES
Subjective   Patient presents complaining of chest pain for the past 2 weeks off and on.  She had her second vaccine shot of the Covid vaccine 2 weeks ago.  About a few hours later she started having some pain in her left upper chest but also had fever and chills with this was felt it was all a reaction to the vaccine.  Since that time she had intermittent pains in her left upper chest.  It is not going anywhere.  She cannot think of any makes it worse or better.  Sometimes just hits for 10 minutes but other times it will last for an hour.  It is persisted off and on since that time and she is now decided to come in to be checked out.      History provided by:  Patient   used: No    Chest Pain  Pain location:  L chest  Pain quality: aching    Pain radiates to:  Does not radiate  Pain severity:  Moderate  Onset quality:  Sudden  Duration:  2 weeks  Timing:  Intermittent  Progression:  Waxing and waning  Chronicity:  New  Context: not breathing, not drug use, not eating, not intercourse, not lifting, not movement, not raising an arm, not at rest, not stress and not trauma    Relieved by:  Nothing  Worsened by:  Nothing  Ineffective treatments:  None tried  Associated symptoms: no altered mental status, no anxiety, no back pain, no claudication, no cough, no dizziness, no fever, no lower extremity edema, no nausea, no numbness, no orthopnea, no shortness of breath and no vomiting    Risk factors: no aortic disease, no diabetes mellitus, no high cholesterol, no hypertension, no immobilization, not pregnant, no prior DVT/PE and no smoking        Review of Systems   Constitutional: Negative.  Negative for fever.   HENT: Negative.    Respiratory: Negative.  Negative for cough and shortness of breath.    Cardiovascular: Positive for chest pain. Negative for orthopnea and claudication.   Gastrointestinal: Negative.  Negative for nausea and vomiting.   Genitourinary: Negative.    Musculoskeletal: Negative.   Negative for back pain.   Skin: Negative.    Neurological: Negative.  Negative for dizziness and numbness.   Psychiatric/Behavioral: Negative.    All other systems reviewed and are negative.      Past Medical History:   Diagnosis Date   • Depression    • Hyperlipidemia    • Hypertension    • Kidney stone    • Migraine    • MS (multiple sclerosis) (CMS/HCC)    • POTS (postural orthostatic tachycardia syndrome)        Allergies   Allergen Reactions   • Ambien [Zolpidem] Mental Status Change   • Ropinirole Other (See Comments)     Crazy dreams; sleep walking   • Ropinirole Hcl Mental Status Change   • Simvastatin      cramps       Past Surgical History:   Procedure Laterality Date   • APPENDECTOMY     • BACK SURGERY     • CHOLECYSTECTOMY     • HYSTERECTOMY         Family History   Problem Relation Age of Onset   • Heart disease Father    • Hypertension Father    • Kidney disease Father    • Diabetes type II Mother    • Hypertension Mother    • Hypertension Sister    • Hypertension Brother        Social History     Socioeconomic History   • Marital status:      Spouse name: Not on file   • Number of children: Not on file   • Years of education: Not on file   • Highest education level: Not on file   Tobacco Use   • Smoking status: Never Smoker   • Smokeless tobacco: Never Used   Substance and Sexual Activity   • Alcohol use: No   • Drug use: No   • Sexual activity: Defer       Prior to Admission medications    Medication Sig Start Date End Date Taking? Authorizing Provider   atorvastatin (LIPITOR) 10 MG tablet Take 10 mg by mouth Every Night.    Mo Post MD   baclofen (LIORESAL) 20 MG tablet Every Night. 11/27/17   Mo Post MD   Cholecalciferol 5000 UNITS tablet Take 1 tablet by mouth.    Mo Post MD   CloNIDine (CATAPRES) 0.1 MG tablet Take 0.1 mg by mouth As Needed for High Blood Pressure.    Mo Post MD   conjugated estrogens (PREMARIN) 0.625 MG/GM vaginal  cream Insert 0.5 g into the vagina. 12/6/18   Mo Post MD   diphenhydrAMINE-acetaminophen (TYLENOL PM)  MG tablet per tablet Take 2 tablets by mouth Daily.    Mo Post MD   gabapentin (NEURONTIN) 300 MG capsule Take 100 mg by mouth. 1 tablet in am and 2 tabs at HS    Mo Post MD   GILENYA 0.5 MG capsule  9/4/19   Mo Post MD   nortriptyline (PAMELOR) 75 MG capsule  7/4/20   Emergency, Nurse Epic, RN   phenazopyridine (PYRIDIUM) 100 MG tablet Take 100 mg by mouth As Needed for bladder spasms.    Mo Post MD   Probiotic Product (PROBIOTIC-10 PO) Take  by mouth As Needed.    Mo Post MD   promethazine (PHENERGAN) 25 MG tablet Take 25 mg by mouth Every 6 (Six) Hours As Needed for Nausea or Vomiting.    oM Post MD   propranolol (INDERAL) 60 MG tablet 60 mg. At night 3/16/18   Mo Post MD   SUMAtriptan (IMITREX) 6 MG/0.5ML solution injection Inject 6 mg under the skin 1 (One) Time As Needed for migraine.    Mo Post MD   tiZANidine (ZANAFLEX) 4 MG tablet Take 4 mg by mouth Take As Directed.    Mo Post MD   topiramate (TOPAMAX) 100 MG tablet Take 100 mg by mouth 2 (two) times a day.    Mo Post MD       Medications   sodium chloride 0.9 % flush 10 mL (has no administration in time range)   aspirin chewable tablet 324 mg (324 mg Oral Given 8/2/21 1446)       Vitals:    08/02/21 1546   BP:    Pulse: 77   Resp:    Temp:    SpO2: 99%         Objective   Physical Exam  Vitals and nursing note reviewed.   Constitutional:       Appearance: She is well-developed.   HENT:      Head: Normocephalic and atraumatic.   Eyes:      Extraocular Movements: Extraocular movements intact.      Pupils: Pupils are equal, round, and reactive to light.   Cardiovascular:      Rate and Rhythm: Normal rate and regular rhythm.   Pulmonary:      Effort: Pulmonary effort is normal.      Breath sounds:  Normal breath sounds.   Chest:      Chest wall: No tenderness.   Abdominal:      General: Bowel sounds are normal.      Palpations: Abdomen is soft.   Musculoskeletal:         General: Normal range of motion.      Cervical back: Normal range of motion and neck supple.   Skin:     General: Skin is warm and dry.   Neurological:      General: No focal deficit present.      Mental Status: She is alert and oriented to person, place, and time.   Psychiatric:         Mood and Affect: Mood normal.         Behavior: Behavior normal.         Procedures         Lab Results (last 24 hours)     Procedure Component Value Units Date/Time    CBC & Differential [[id number]]  (Abnormal) Collected: 08/02/21 1427    Specimen: Blood Updated: 08/02/21 1440    Narrative:      The following orders were created for panel order CBC & Differential.  Procedure                               Abnormality         Status                     ---------                               -----------         ------                     CBC Auto Differential[[id number]]        Abnormal            Final result                 Please view results for these tests on the individual orders.    Comprehensive Metabolic Panel [[id number]]  (Abnormal) Collected: 08/02/21 1427    Specimen: Blood Updated: 08/02/21 1502     Glucose 90 mg/dL      BUN 16 mg/dL      Creatinine 0.82 mg/dL      Sodium 142 mmol/L      Potassium 4.4 mmol/L      Chloride 107 mmol/L      CO2 24.0 mmol/L      Calcium 9.6 mg/dL      Total Protein 7.3 g/dL      Albumin 4.70 g/dL      ALT (SGPT) 14 U/L      AST (SGOT) 13 U/L      Alkaline Phosphatase 118 U/L      Total Bilirubin 0.3 mg/dL      eGFR Non African Amer 76 mL/min/1.73      Globulin 2.6 gm/dL      A/G Ratio 1.8 g/dL      BUN/Creatinine Ratio 19.5     Anion Gap 11.0 mmol/L     Narrative:      GFR Normal >60  Chronic Kidney Disease <60  Kidney Failure <15      Troponin [[id number]]  (Normal) Collected: 08/02/21 1427    Specimen: Blood Updated:  08/02/21 1500     Troponin T <0.010 ng/mL     Narrative:      Troponin T Reference Range:  <= 0.03 ng/mL-   Negative for AMI  >0.03 ng/mL-     Abnormal for myocardial necrosis.  Clinicians would have to utilize clinical acumen, EKG, Troponin and serial changes to determine if it is an Acute Myocardial Infarction or myocardial injury due to an underlying chronic condition.       Results may be falsely decreased if patient taking Biotin.      CBC Auto Differential [[id number]]  (Abnormal) Collected: 08/02/21 1427    Specimen: Blood Updated: 08/02/21 1440     WBC 7.02 10*3/mm3      RBC 4.24 10*6/mm3      Hemoglobin 13.5 g/dL      Hematocrit 38.7 %      MCV 91.3 fL      MCH 31.8 pg      MCHC 34.9 g/dL      RDW 13.2 %      RDW-SD 42.9 fl      MPV 9.3 fL      Platelets 323 10*3/mm3      Neutrophil % 73.6 %      Lymphocyte % 12.5 %      Monocyte % 10.7 %      Eosinophil % 1.9 %      Basophil % 1.0 %      Immature Grans % 0.3 %      Neutrophils, Absolute 5.17 10*3/mm3      Lymphocytes, Absolute 0.88 10*3/mm3      Monocytes, Absolute 0.75 10*3/mm3      Eosinophils, Absolute 0.13 10*3/mm3      Basophils, Absolute 0.07 10*3/mm3      Immature Grans, Absolute 0.02 10*3/mm3      nRBC 0.0 /100 WBC     Troponin [[id number]] Collected: 08/02/21 1640    Specimen: Blood Updated: 08/02/21 1646    D-dimer, Quantitative [[id number]]  (Normal) Collected: 08/02/21 1640    Specimen: Blood Updated: 08/02/21 1659     D-Dimer, Quantitative 0.30 mg/L (FEU)     Narrative:      Reference Range is 0-0.50 mg/L FEU. However, results <0.50 mg/L FEU tends to rule out DVT or PE. Results >0.50 mg/L FEU are not useful in predicting absence or presence of DVT or PE.            XR Chest 1 View   Final Result       No acute findings. Chronic LEFT hemidiaphragm elevation.   This report was finalized on 08/02/2021 15:12 by Dr. Arjun Valdivia MD.          ED Course  ED Course as of Aug 02 1707   Mon Aug 02, 2021   1705 I told the patient her testing here is all  fine today.  Her troponin is normal and her dimer is also normal.  Chemistries are all fine and CBC is fine her chest x-ray is okay.  I told her there is no signs of a heart attack and this is just some type of nonspecific chest pain that is probably pleuritic in nature more than anything else.  She is discharged in stable condition.    [TR]      ED Course User Index  [TR] Klaus Cotton Jr., MD         HEART Score (for prediction of 6-week risk of major adverse cardiac event) reviewed and/or performed as part of the patient evaluation and treatment planning process.  The result associated with this review/performance is: 0      MDM  Number of Diagnoses or Management Options  Chest pain, unspecified type: new and requires workup     Amount and/or Complexity of Data Reviewed  Clinical lab tests: ordered and reviewed  Tests in the radiology section of CPT®: ordered and reviewed  Tests in the medicine section of CPT®: ordered and reviewed    Risk of Complications, Morbidity, and/or Mortality  Presenting problems: moderate  Diagnostic procedures: moderate  Management options: moderate    Patient Progress  Patient progress: stable      Final diagnoses:   Chest pain, unspecified type          Klaus Cotton Jr., MD  08/02/21 1776

## 2021-08-04 ENCOUNTER — OFFICE VISIT (OUTPATIENT)
Dept: PRIMARY CARE CLINIC | Age: 41
End: 2021-08-04
Payer: COMMERCIAL

## 2021-08-04 VITALS
BODY MASS INDEX: 34.94 KG/M2 | WEIGHT: 217.4 LBS | HEART RATE: 78 BPM | RESPIRATION RATE: 18 BRPM | SYSTOLIC BLOOD PRESSURE: 122 MMHG | TEMPERATURE: 97.5 F | OXYGEN SATURATION: 98 % | HEIGHT: 66 IN | DIASTOLIC BLOOD PRESSURE: 76 MMHG

## 2021-08-04 DIAGNOSIS — R07.89 ATYPICAL CHEST PAIN: Primary | ICD-10-CM

## 2021-08-04 DIAGNOSIS — R30.0 DYSURIA: ICD-10-CM

## 2021-08-04 DIAGNOSIS — R82.998 LEUKOCYTES IN URINE: ICD-10-CM

## 2021-08-04 LAB
APPEARANCE FLUID: CLEAR
BILIRUBIN, POC: NORMAL
BLOOD URINE, POC: NORMAL
CLARITY, POC: CLEAR
COLOR, POC: NORMAL
GLUCOSE URINE, POC: NORMAL
KETONES, POC: NORMAL
LEUKOCYTE EST, POC: NORMAL
NITRITE, POC: NORMAL
PH, POC: 6.5
PROTEIN, POC: NORMAL
QT INTERVAL: 376 MS
QT INTERVAL: 382 MS
QTC INTERVAL: 429 MS
QTC INTERVAL: 433 MS
SPECIFIC GRAVITY, POC: 1.02
UROBILINOGEN, POC: NORMAL

## 2021-08-04 PROCEDURE — 99214 OFFICE O/P EST MOD 30 MIN: CPT | Performed by: NURSE PRACTITIONER

## 2021-08-04 PROCEDURE — G8417 CALC BMI ABV UP PARAM F/U: HCPCS | Performed by: NURSE PRACTITIONER

## 2021-08-04 PROCEDURE — 81002 URINALYSIS NONAUTO W/O SCOPE: CPT | Performed by: NURSE PRACTITIONER

## 2021-08-04 PROCEDURE — G8427 DOCREV CUR MEDS BY ELIG CLIN: HCPCS | Performed by: NURSE PRACTITIONER

## 2021-08-04 PROCEDURE — 1036F TOBACCO NON-USER: CPT | Performed by: NURSE PRACTITIONER

## 2021-08-04 RX ORDER — OCRELIZUMAB 300 MG/10ML
INJECTION INTRAVENOUS
COMMUNITY
Start: 2021-06-02

## 2021-08-04 RX ORDER — CIPROFLOXACIN 250 MG/1
250 TABLET, FILM COATED ORAL 2 TIMES DAILY
Qty: 14 TABLET | Refills: 0 | Status: SHIPPED | OUTPATIENT
Start: 2021-08-04 | End: 2021-08-11

## 2021-08-04 ASSESSMENT — ENCOUNTER SYMPTOMS
EYES NEGATIVE: 1
GASTROINTESTINAL NEGATIVE: 1
RESPIRATORY NEGATIVE: 1
ALLERGIC/IMMUNOLOGIC NEGATIVE: 1

## 2021-08-04 NOTE — PROGRESS NOTES
[facility] PHYSICIAN [facility]  [facility]  [address] [place] [address]  [address] [place] [address]  Dept: [phone]  Dept Fax: [phone]  Loc: [phone]    Sulema Bhatia is a 41 y.o. female who presents today for her medical conditions/complaints as noted below. Sulema Bhatia is c/o of Chest Pain (Pt is here today for a follow up from the ER for chest pain. She states she has had it since July 20th when she got her second one. Pt states the pain comes and goes and it is random. Pt states the painis just on the left. She states it's like a squeezing pain. ) and Dysuria (Pt states she has burning while urinating and had foul odor to her urine. )        HPI:     HPI   Ms. Merry Weller presents today for ER follow-up. She has had atypical chest pain for a few weeks. This chest pain began a few days after receiving her second covid vaccination. It is squeezing pain that sometimes takes her breath away. It lasts for a maximum of fifteen seconds. She was seen in [facility] ER and had a negative cardiac work-up. She is also having UTI symptoms. Due to her history of multiple sclerosis she has difficulty with sensation of her urinary tract. However, today it feels different. Her urine has been foul and dark at home. She denies pain with urination or flank pain. Chief Complaint   Patient presents with    Chest Pain     Pt is here today for a follow up from the ER for chest pain. She states she has had it since July 20th when she got her second one. Pt states the pain comes and goes and it is random. Pt states the painis just on the left. She states it's like a squeezing pain.  Dysuria     Pt states she has burning while urinating and had foul odor to her urine.       Past Medical History:   Diagnosis Date    B12 deficiency     Cauda equina syndrome (HCC)     Complicated migraine     Depression     GERD (gastroesophageal reflux disease)     Hematuria     wih abdominal pain    Hyperlipidemia     Hypertension     MS (multiple sclerosis) (HCC)     Myofascial pain     Nerve damage     Neutropenia ([address] 10/21/2015    Pelvic floor dysfunction     Postmenopausal HRT (hormone replacement therapy)     POTS (postural orthostatic tachycardia syndrome)       Past Surgical History:   Procedure Laterality Date    APPENDECTOMY      BACK SURGERY      CHOLECYSTECTOMY, LAPAROSCOPIC      COLONOSCOPY      Dr. Luisa Freitas 10 years ?     COLONOSCOPY N/A 8/13/2018    Dr Jesica Stevens Higashi yr recall    EGD      HYSTERECTOMY      MT EGD TRANSORAL BIOPSY SINGLE/MULTIPLE N/A 1/29/2018    Dr NORMA Abdul-Active duodenitis, gastritis/gastropathy       Vitals 8/4/2021 3/11/2021 1/11/2021 8/12/2020 8/3/2020 3/49/9180   SYSTOLIC 203 573 - 675 843 -   DIASTOLIC 76 80 - 84 90 -   Site Left Upper Arm - - Left Upper Arm Left Upper Arm -   Position Sitting - - Sitting Sitting -   Cuff Size Large Adult - - Medium Adult Medium Adult -   Pulse 78 83 68 73 64 69   Temp 97.5 96.9 98 97.1 - 96.9   Resp 18 18 - 16 - -   SpO2 98 96 97 97 - 98   Weight 217 lb 6.4 oz 211 lb - 208 lb 3.2 oz 207 lb -   Height 5' 6\" 5' 6\" - - 5' 6\" -   Body mass index 35.09 kg/m2 34.05 kg/m2 - - 33.41 kg/m2 -   Some recent data might be hidden       Family History   Problem Relation Age of Onset    Diabetes Mother     High Blood Pressure Mother     Colon Polyps Mother     Colon Cancer Neg Hx     Liver Cancer Neg Hx     Liver Disease Neg Hx     Esophageal Cancer Neg Hx     Rectal Cancer Neg Hx     Stomach Cancer Neg Hx        Social History     Tobacco Use    Smoking status: Never Smoker    Smokeless tobacco: Never Used   Substance Use Topics    Alcohol use: No      Current Outpatient Medications on File Prior to Visit   Medication Sig Dispense Refill    ocrelizumab (OCREVUS) 300 MG/10ML SOLN injection       gabapentin (NEURONTIN) 100 MG capsule TAKE ONE TO TWO CAPSULES BY MOUTH THREE TIMES DAILY AS NEEDED 135 capsule 2    atorvastatin (LIPITOR) 20 MG tablet TAKE 1 TABLET AT BEDTIME FOR HIGH CHOLESTEROL 90 tablet 3    phenazopyridine (PYRIDIUM) 100 MG tablet Take 1 tablet by mouth 3 times daily as needed for Pain 6 tablet 0    propranolol (INDERAL LA) 60 MG extended release capsule TAKE 1 CAPSULE ONCE DAILY FOR HEART RATE 90 capsule 3    conjugated estrogens (PREMARIN) 0.625 MG/GM vaginal cream Place 0.5 g vaginally Twice a Week 1 Tube 5    tiZANidine (ZANAFLEX) 4 MG tablet Take 1 tablet by mouth every 8 hours as needed (for muscle spasms) 270 tablet 1    nortriptyline (PAMELOR) 75 MG capsule       topiramate (TOPAMAX) 100 MG tablet Take 1 tablet by mouth 2 times daily 180 tablet 3    Probiotic Product (PROBIOTIC DAILY PO) Take by mouth      baclofen (LIORESAL) 20 MG tablet Take 20 mg by mouth 2 times daily      promethazine (PHENERGAN) 25 MG tablet Take 25 mg by mouth      cloNIDine (CATAPRES) 0.1 MG tablet 0.1 mg as needed       SUMAtriptan Succinate 6 MG/0.5ML SOAJ as needed       Cholecalciferol (VITAMIN D-3) 5000 UNITS TABS Take 5,000 Units by mouth daily.  [DISCONTINUED] bethanechol (URECHOLINE) 25 MG tablet Take 1 tablet by mouth 3 times daily. 270 tablet 3     No current facility-administered medications on file prior to visit. Allergies   Allergen Reactions    Ropinirole Hcl     Simvastatin     Zolpidem Other (See Comments)       Health Maintenance   Topic Date Due    Hepatitis C screen  Never done    Flu vaccine (1) 09/01/2021    Lipid screen  06/14/2022    DTaP/Tdap/Td vaccine (2 - Td or Tdap) 07/09/2023    Colon cancer screen colonoscopy  08/13/2023    Cervical cancer screen  10/25/2023    COVID-19 Vaccine  Completed    HIV screen  Addressed    Hepatitis A vaccine  Aged Out    Hepatitis B vaccine  Aged Out    Hib vaccine  Aged Out    Meningococcal (ACWY) vaccine  Aged Out    Pneumococcal 0-64 years Vaccine  Aged Out       Subjective:      Review of Systems   Constitutional: Negative. HENT: Negative.     Eyes: Negative. Respiratory: Negative. Cardiovascular: Positive for chest pain. Gastrointestinal: Negative. Endocrine: Negative. Genitourinary: Positive for dysuria. Negative for flank pain. Musculoskeletal: Negative. Allergic/Immunologic: Negative. Neurological: Negative. Hematological: Negative. Psychiatric/Behavioral: Negative. Objective:     Physical Exam  HENT:      Head: Normocephalic. Nose: Nose normal.      Mouth/Throat:      Mouth: Mucous membranes are moist.   Eyes:      Pupils: Pupils are equal, round, and reactive to light. Cardiovascular:      Rate and Rhythm: Normal rate and regular rhythm. Pulses: Normal pulses. Heart sounds: Normal heart sounds. Pulmonary:      Effort: Pulmonary effort is normal.      Breath sounds: Normal breath sounds. Abdominal:      General: Abdomen is flat. Musculoskeletal:         General: Normal range of motion. Cervical back: Normal range of motion. Skin:     General: Skin is warm. Capillary Refill: Capillary refill takes less than 2 seconds. Neurological:      General: No focal deficit present. Mental Status: She is alert. Psychiatric:         Mood and Affect: Mood normal.       /76 (Site: Left Upper Arm, Position: Sitting, Cuff Size: Large Adult)   Pulse 78   Temp 97.5 °F (36.4 °C) (Temporal)   Resp 18   Ht 5' 6\" (1.676 m)   Wt 217 lb 6.4 oz (98.6 kg)   SpO2 98%   BMI 35.09 kg/m²   Results for POC orders placed in visit on 08/04/21   POCT Urinalysis no Micro   Result Value Ref Range    Color, UA stewart     Clarity, UA clear     Glucose, UA POC n     Bilirubin, UA n     Ketones, UA n     Spec Grav, UA 1.020     Blood, UA POC ++     pH, UA 6.5     Protein, UA POC trace     Urobilinogen, UA n     Leukocytes, UA trace     Nitrite, UA n     Appearance, Fluid Clear Clear, Slightly Cloudy       Assessment:       Diagnosis Orders   1. Atypical chest pain     2.  Dysuria  POCT Urinalysis no Micro Culture, Urine   3. Leukocytes in urine  Culture, Urine         Plan:   More than 50% of the time was spent counseling and coordinating care for a total time of 35min face to face. We had an in depth discussion with the patient regarding options for treatment. We offered a CTA of her chest to rule out blood clot. There are no clinical indicators for a PE, however since she had recently received her vaccine this was offered. She declined at the time. She was also offered a steroid pack for inflammation. She declined this due to intolerance of steroids in the past. We provided chest pain education and told her to present to the ER if any of her symptoms worsen. Patient given educational materials -see patient instructions. Discussed use, benefit, and side effects of prescribed medications. All patient questions answered. Pt voiced understanding. Reviewed health maintenance. Instructed to continue currentmedications, diet and exercise. Patient agreed with treatment plan. Follow up as directed. MEDICATIONS:  Orders Placed This Encounter   Medications    ciprofloxacin (CIPRO) 250 MG tablet     Sig: Take 1 tablet by mouth 2 times daily for 7 days     Dispense:  14 tablet     Refill:  0         ORDERS:  Orders Placed This Encounter   Procedures    Culture, Urine    POCT Urinalysis no Micro       Follow-up:  Return if symptoms worsen or fail to improve. PATIENT INSTRUCTIONS:  Patient Instructions   Start antibiotics for uti, lots of fluids  May do ibuprofen 600-800 mg with food 3 times a day x 7 days for inflammation  May call for steroid pack  May call for CTA of chest if not improving  If short breath chest pain go to ER    Electronically signed by DEVEN Chen CNP on 8/4/2021 at 1:25 PM    EMR Dragon/transcription disclaimer:  Much of thisencounter note is electronic transcription/translation of spoken language to printed texts.   The electronic translation of spoken language may be erroneous, or at times, nonsensical words or phrases may be inadvertentlytranscribed.   Although I have reviewed the note for such errors, some may still exist.

## 2021-08-04 NOTE — PATIENT INSTRUCTIONS
Start antibiotics for uti, lots of fluids  May do ibuprofen 600-800 mg with food 3 times a day x 7 days for inflammation  May call for steroid pack  May call for CTA of chest if not improving  If short breath chest pain go to ER

## 2021-08-06 LAB
ORGANISM: ABNORMAL
URINE CULTURE, ROUTINE: ABNORMAL
URINE CULTURE, ROUTINE: ABNORMAL

## 2021-08-11 ENCOUNTER — PATIENT MESSAGE (OUTPATIENT)
Dept: PRIMARY CARE CLINIC | Age: 41
End: 2021-08-11

## 2021-08-11 RX ORDER — METHYLPREDNISOLONE 4 MG/1
TABLET ORAL
Qty: 1 KIT | Refills: 0 | Status: SHIPPED | OUTPATIENT
Start: 2021-08-11 | End: 2021-08-17

## 2021-08-11 NOTE — TELEPHONE ENCOUNTER
From: Esther Resendiz  To: DEVEN Chopra - JUNIOR  Sent: 8/11/2021 1:09 PM CDT  Subject: Non-Urgent Medical Question    I'm still having the chest pains. Can I get the steroids, please?  [address]   [address]

## 2021-08-19 ENCOUNTER — OFFICE VISIT (OUTPATIENT)
Dept: FAMILY MEDICINE CLINIC | Facility: CLINIC | Age: 41
End: 2021-08-19

## 2021-08-19 VITALS
TEMPERATURE: 96.8 F | SYSTOLIC BLOOD PRESSURE: 140 MMHG | WEIGHT: 211.2 LBS | BODY MASS INDEX: 33.94 KG/M2 | HEIGHT: 66 IN | DIASTOLIC BLOOD PRESSURE: 91 MMHG | OXYGEN SATURATION: 98 % | HEART RATE: 83 BPM

## 2021-08-19 DIAGNOSIS — R52 BODY ACHES: Primary | ICD-10-CM

## 2021-08-19 DIAGNOSIS — R07.9 CHEST PAIN, UNSPECIFIED TYPE: ICD-10-CM

## 2021-08-19 DIAGNOSIS — R05.9 COUGH: ICD-10-CM

## 2021-08-19 PROCEDURE — 99213 OFFICE O/P EST LOW 20 MIN: CPT | Performed by: NURSE PRACTITIONER

## 2021-08-19 PROCEDURE — U0004 COV-19 TEST NON-CDC HGH THRU: HCPCS | Performed by: NURSE PRACTITIONER

## 2021-08-19 PROCEDURE — U0005 INFEC AGEN DETEC AMPLI PROBE: HCPCS | Performed by: NURSE PRACTITIONER

## 2021-08-20 ENCOUNTER — TELEPHONE (OUTPATIENT)
Dept: PRIMARY CARE CLINIC | Age: 41
End: 2021-08-20

## 2021-08-20 DIAGNOSIS — T88.1XXD: ICD-10-CM

## 2021-08-20 DIAGNOSIS — R07.89 ATYPICAL CHEST PAIN: Primary | ICD-10-CM

## 2021-08-20 LAB — SARS-COV-2 ORF1AB RESP QL NAA+PROBE: NOT DETECTED

## 2021-08-20 NOTE — TELEPHONE ENCOUNTER
Pt scheduled and notified of appt. She is notified of being scheduled Tuesday 8/24/2021 @ 8:00 am and to be fasting. Advised to go to ER if worse.

## 2021-08-20 NOTE — TELEPHONE ENCOUNTER
Pt states she saw you a few weeks ago and you had a list of stuff to try. She took ibuprofen, and took steroids. She states she is ready to have CT of her chest as soon as possible or to be seen here if need be.

## 2021-08-24 ENCOUNTER — HOSPITAL ENCOUNTER (OUTPATIENT)
Dept: CT IMAGING | Age: 41
Discharge: HOME | End: 2021-08-24
Payer: COMMERCIAL

## 2021-08-24 ENCOUNTER — OFFICE VISIT (OUTPATIENT)
Dept: PRIMARY CARE CLINIC | Age: 41
End: 2021-08-24
Payer: COMMERCIAL

## 2021-08-24 VITALS
HEART RATE: 91 BPM | SYSTOLIC BLOOD PRESSURE: 130 MMHG | DIASTOLIC BLOOD PRESSURE: 88 MMHG | OXYGEN SATURATION: 97 % | BODY MASS INDEX: 34.86 KG/M2 | WEIGHT: 216 LBS | RESPIRATION RATE: 16 BRPM | TEMPERATURE: 97.9 F

## 2021-08-24 DIAGNOSIS — R07.89 ATYPICAL CHEST PAIN: ICD-10-CM

## 2021-08-24 DIAGNOSIS — J98.6 HEMIDIAPHRAGM PARALYSIS: Primary | ICD-10-CM

## 2021-08-24 DIAGNOSIS — T88.1XXD: ICD-10-CM

## 2021-08-24 PROCEDURE — G8427 DOCREV CUR MEDS BY ELIG CLIN: HCPCS | Performed by: NURSE PRACTITIONER

## 2021-08-24 PROCEDURE — 99213 OFFICE O/P EST LOW 20 MIN: CPT | Performed by: NURSE PRACTITIONER

## 2021-08-24 PROCEDURE — G8417 CALC BMI ABV UP PARAM F/U: HCPCS | Performed by: NURSE PRACTITIONER

## 2021-08-24 PROCEDURE — 71270 CT THORAX DX C-/C+: CPT

## 2021-08-24 PROCEDURE — 1036F TOBACCO NON-USER: CPT | Performed by: NURSE PRACTITIONER

## 2021-08-24 PROCEDURE — 6360000004 HC RX CONTRAST MEDICATION: Performed by: FAMILY MEDICINE

## 2021-08-24 RX ADMIN — IOPAMIDOL 75 ML: 755 INJECTION, SOLUTION INTRAVENOUS at 08:20

## 2021-08-24 NOTE — PROGRESS NOTES
[facility] PHYSICIAN SERVICES  [facility]  [address] [place] [address]  [address] [place] [address]  Dept: [phone]  Dept Fax: [phone]  Loc: [phone]    Franco Chen is a 41 y.o. female who presents today for her medical conditions/complaints as noted below. Franco Chen is c/o of Results (here to discuss CT results from today)        HPI:     HPI   Chief Complaint   Patient presents with   [employer] Results     here to discuss CT results from today   She was seen in the emergency room on August 2 with atypical chest pain that started 2 days after her second Covid vaccination. She was worked up in the emergency room and then follow-up with me on August 4 still having some trouble breathing saying she was having a squeezing pain. She called back and wanted to proceed with a CT so we did a CT with and without contrast because it CTA was not covered on her insurance. Past Medical History:   Diagnosis Date    B12 deficiency     Cauda equina syndrome (HCC)     Complicated migraine     Depression     GERD (gastroesophageal reflux disease)     Hematuria     wih abdominal pain    Hyperlipidemia     Hypertension     MS (multiple sclerosis) (HCC)     Myofascial pain     Nerve damage     Neutropenia ([address] 10/21/2015    Pelvic floor dysfunction     Postmenopausal HRT (hormone replacement therapy)     POTS (postural orthostatic tachycardia syndrome)       Past Surgical History:   Procedure Laterality Date    APPENDECTOMY      BACK SURGERY      CHOLECYSTECTOMY, LAPAROSCOPIC      COLONOSCOPY      Dr. Tatiana Horn 10 years ?     COLONOSCOPY N/A 8/13/2018    Dr Nataly Madrigal yr recall    EGD      HYSTERECTOMY      [place] EGD TRANSORAL BIOPSY SINGLE/MULTIPLE N/A 1/29/2018    Dr NORMA Abdul-Active duodenitis, gastritis/gastropathy       Vitals 8/24/2021 8/4/2021 3/11/2021 1/11/2021 8/12/2020 1/0/5622   SYSTOLIC [phone] - [phone]   DIASTOLIC 88 76 80 - 84 90   Site - Left Upper Arm - - Left Upper (PHENERGAN) 25 MG tablet Take 25 mg by mouth      cloNIDine (CATAPRES) 0.1 MG tablet 0.1 mg as needed       SUMAtriptan Succinate 6 MG/0.5ML SOAJ as needed       Cholecalciferol (VITAMIN D-3) 5000 UNITS TABS Take 5,000 Units by mouth daily.  [DISCONTINUED] bethanechol (URECHOLINE) 25 MG tablet Take 1 tablet by mouth 3 times daily. 270 tablet 3     No current facility-administered medications on file prior to visit. Allergies   Allergen Reactions    Ropinirole Hcl     Simvastatin     Zolpidem Other (See Comments)       Health Maintenance   Topic Date Due    Hepatitis C screen  Never done    Flu vaccine (1) 09/01/2021    Lipid screen  06/14/2022    DTaP/Tdap/Td vaccine (2 - Td or Tdap) 07/09/2023    Colon cancer screen colonoscopy  08/13/2023    Cervical cancer screen  10/25/2023    COVID-19 Vaccine  Completed    HIV screen  Addressed    Hepatitis A vaccine  Aged Out    Hepatitis B vaccine  Aged Out    Hib vaccine  Aged Out    Meningococcal (ACWY) vaccine  Aged Out    Pneumococcal 0-64 years Vaccine  Aged Out       Subjective:      Review of Systems   Constitutional: Positive for fatigue. Respiratory: Positive for cough and shortness of breath. Cardiovascular: Positive for chest pain. Negative for palpitations and leg swelling. Psychiatric/Behavioral: Negative. Objective:     Physical Exam  Vitals and nursing note reviewed. Constitutional:       Appearance: She is well-developed. HENT:      Head: Normocephalic. Right Ear: External ear normal.      Left Ear: External ear normal.   Eyes:      Pupils: Pupils are equal, round, and reactive to light. Cardiovascular:      Rate and Rhythm: Normal rate and regular rhythm. Pulses: Normal pulses. Heart sounds: Normal heart sounds. Pulmonary:      Effort: Pulmonary effort is normal.      Breath sounds: Normal breath sounds. Musculoskeletal:      Cervical back: Normal range of motion.    Skin:     General: Skin is warm and dry. Neurological:      Mental Status: She is alert and oriented to person, place, and time. Psychiatric:         Behavior: Behavior normal.         Thought Content: Thought content normal.         Judgment: Judgment normal.       /88   Pulse 91   Temp 97.9 °F (36.6 °C) (Temporal)   Resp 16   Wt 216 lb (98 kg)   SpO2 97%   Breastfeeding No   BMI 34.86 kg/m²     Assessment:       Diagnosis Orders   1. Hemidiaphragm paralysis  Av. Solitario Lehman MD, Pulmonary Disease, [facility]         Plan:   More than 50% of the time was spent counseling and coordinating care for a total time of 25 min face to face. She made an appointment for the afternoon she had her CT scan so she did go over the results with me. The only thing abnormal is the elevation of the left hemidiaphragm. I am going to refer her to pulmonology to work this up. CT CHEST W WO CONTRAST    Result Date: 8/24/2021  CT CHEST W WO CONTRAST 8/24/2021 8:03 AM HISTORY: R07.89 COMPARISON: CT scan dated 8/19/2009 DLP: 857 mGy cm TECHNIQUE: Serial helical tomographic images of the chest were acquired before and following the infusion of Isovue contrast intravenously. Bone and soft tissue algorithms were provided. Automated exposure control was also utilized to decrease patient radiation dose. FINDINGS: Neck base: The imaged portion of the neck and thyroid gland is unremarkable. Lungs: The lungs are clear without pneumothorax, consolidation, nodules or mass lesion. No pleural effusion is present. The trachea and bronchial tree are patent. Heart: The heart is normal in size. There is no pericardial effusion. Vessels: Thoracic aorta is normal in caliber. Great vessel origins are unremarkable. Central pulmonary arteries are normal in caliber. No filling defect appreciated in the central pulmonary arteries. Lymph nodes: No significant hilar, mediastinal or axillary lymphadenopathy is appreciated.  Skeletal and soft tissues: Endplate sclerosis at R64-C98. No acute osseous injury. Chest wall soft tissues are unremarkable. Upper abdomen: The imaged portion of the upper abdomen demonstrates no acute process. Prior cholecystectomy. Elevation of the left hemidiaphragm. 1. No acute cardiopulmonary process. 2. Chronic elevation of the left hemidiaphragm, suggesting eventration or hemidiaphragm paralysis. 3. Moderate degenerative change at T11-T12 with loss of disc height and endplate sclerosis. Signed by Dr Sue Cantu     Patient given educational materials -see patient instructions. Discussed use, benefit, and side effects of prescribed medications. All patient questions answered. Pt voiced understanding. Reviewed health maintenance. Instructed to continue currentmedications, diet and exercise. Patient agreed with treatment plan. Follow up as directed. MEDICATIONS:  No orders of the defined types were placed in this encounter. ORDERS:  Orders Placed This Encounter   Procedures   Sofía Kimbrough MD, Pulmonary Disease, [facility]       Follow-up:  No follow-ups on file. PATIENT INSTRUCTIONS:  There are no Patient Instructions on file for this visit. Electronically signed by DEVEN Alvarez CNP on 8/30/2021 at 6:03 PM    EMR Dragon/transcription disclaimer:  Much of thisencounter note is electronic transcription/translation of spoken language to printed texts. The electronic translation of spoken language may be erroneous, or at times, nonsensical words or phrases may be inadvertentlytranscribed.   Although I have reviewed the note for such errors, some may still exist.

## 2021-08-30 ASSESSMENT — ENCOUNTER SYMPTOMS
COUGH: 1
SHORTNESS OF BREATH: 1

## 2021-09-01 ENCOUNTER — OFFICE VISIT (OUTPATIENT)
Dept: PRIMARY CARE CLINIC | Age: 41
End: 2021-09-01
Payer: COMMERCIAL

## 2021-09-01 VITALS
DIASTOLIC BLOOD PRESSURE: 80 MMHG | TEMPERATURE: 97.7 F | WEIGHT: 217 LBS | SYSTOLIC BLOOD PRESSURE: 120 MMHG | HEIGHT: 66 IN | BODY MASS INDEX: 34.87 KG/M2 | OXYGEN SATURATION: 99 % | HEART RATE: 80 BPM

## 2021-09-01 DIAGNOSIS — N20.0 KIDNEY STONE: Primary | ICD-10-CM

## 2021-09-01 DIAGNOSIS — R30.0 DYSURIA: ICD-10-CM

## 2021-09-01 DIAGNOSIS — R31.0 GROSS HEMATURIA: ICD-10-CM

## 2021-09-01 LAB
APPEARANCE FLUID: CLEAR
BILIRUBIN, POC: ABNORMAL
BLOOD URINE, POC: ABNORMAL
CLARITY, POC: CLEAR
COLOR, POC: ABNORMAL
GLUCOSE URINE, POC: ABNORMAL
KETONES, POC: ABNORMAL
LEUKOCYTE EST, POC: ABNORMAL
NITRITE, POC: ABNORMAL
PH, POC: 5
PROTEIN, POC: ABNORMAL
SPECIFIC GRAVITY, POC: 1.03
UROBILINOGEN, POC: 0.2

## 2021-09-01 PROCEDURE — G8427 DOCREV CUR MEDS BY ELIG CLIN: HCPCS | Performed by: NURSE PRACTITIONER

## 2021-09-01 PROCEDURE — G8417 CALC BMI ABV UP PARAM F/U: HCPCS | Performed by: NURSE PRACTITIONER

## 2021-09-01 PROCEDURE — 81002 URINALYSIS NONAUTO W/O SCOPE: CPT | Performed by: NURSE PRACTITIONER

## 2021-09-01 PROCEDURE — 99214 OFFICE O/P EST MOD 30 MIN: CPT | Performed by: NURSE PRACTITIONER

## 2021-09-01 PROCEDURE — 1036F TOBACCO NON-USER: CPT | Performed by: NURSE PRACTITIONER

## 2021-09-01 RX ORDER — TAMSULOSIN HYDROCHLORIDE 0.4 MG/1
0.4 CAPSULE ORAL DAILY
Qty: 10 CAPSULE | Refills: 0 | Status: SHIPPED | OUTPATIENT
Start: 2021-09-01 | End: 2021-09-14

## 2021-09-01 RX ORDER — CIPROFLOXACIN 250 MG/1
250 TABLET, FILM COATED ORAL 2 TIMES DAILY
Qty: 14 TABLET | Refills: 0 | Status: SHIPPED | OUTPATIENT
Start: 2021-09-01 | End: 2021-09-08

## 2021-09-01 RX ORDER — KETOROLAC TROMETHAMINE 10 MG/1
10 TABLET, FILM COATED ORAL EVERY 6 HOURS PRN
Qty: 20 TABLET | Refills: 0 | Status: SHIPPED | OUTPATIENT
Start: 2021-09-01 | End: 2022-07-19

## 2021-09-01 NOTE — PROGRESS NOTES
[facility] PHYSICIAN SERVICES  [facility]  [address] [place] [address]  [address] [place] [address]  Dept: [phone]  Dept Fax: [phone]  Loc: [phone]    Katerina Das is a 41 y.o. female who presents today for her medical conditions/complaints as noted below. Katerina Das is c/o of Dysuria (along with mid-right sided back and abdominal area pain, visible blood in urine for the past 2 days. History of kidney stones. )        HPI:     HPI   Chief Complaint   Patient presents with    Dysuria     along with mid-right sided back and abdominal area pain, visible blood in urine for the past 2 days. History of kidney stones. Noticed her urine was very dark-colored and believes it was blood in it 2 days ago. She has had some flank pain that radiates down around her side and into her vagina. She has had a complete hysterectomy. Past Medical History:   Diagnosis Date    B12 deficiency     Cauda equina syndrome (HCC)     Complicated migraine     Depression     GERD (gastroesophageal reflux disease)     Hematuria     wih abdominal pain    Hyperlipidemia     Hypertension     MS (multiple sclerosis) (HCC)     Myofascial pain     Nerve damage     Neutropenia ([address] 10/21/2015    Pelvic floor dysfunction     Postmenopausal HRT (hormone replacement therapy)     POTS (postural orthostatic tachycardia syndrome)       Past Surgical History:   Procedure Laterality Date    APPENDECTOMY      BACK SURGERY      CHOLECYSTECTOMY, LAPAROSCOPIC      COLONOSCOPY      Dr. Toño Pathak 10 years ?     COLONOSCOPY N/A 8/13/2018    Dr Rena Remyman yr recall    EGD      HYSTERECTOMY      [place] EGD TRANSORAL BIOPSY SINGLE/MULTIPLE N/A 1/29/2018    Dr NORMA Abdul-Active duodenitis, gastritis/gastropathy       Vitals 9/1/2021 8/24/2021 8/4/2021 3/11/2021 1/11/2021 3/92/6963   SYSTOLIC 820 730 605 934 - 074   DIASTOLIC 80 88 76 80 - 84   Site - - Left Upper Arm - - Left Upper Arm   Position - - Sitting - - Sitting   Cuff Size - - Large Adult - - Medium Adult   Pulse 80 91 78 83 68 73   Temp 97.7 97.9 97.5 96.9 98 97.1   Resp - 16 18 18 - 16   SpO2 99 97 98 96 97 97   Weight 217 lb 216 lb 217 lb 6.4 oz 211 lb - 208 lb 3.2 oz   Height 5' 6\" - 5' 6\" 5' 6\" - -   Body mass index 35.02 kg/m2 - 35.09 kg/m2 34.05 kg/m2 - -   Some recent data might be hidden       Family History   Problem Relation Age of Onset    Diabetes Mother     High Blood Pressure Mother     Colon Polyps Mother     Colon Cancer Neg Hx     Liver Cancer Neg Hx     Liver Disease Neg Hx     Esophageal Cancer Neg Hx     Rectal Cancer Neg Hx     Stomach Cancer Neg Hx        Social History     Tobacco Use    Smoking status: Never Smoker    Smokeless tobacco: Never Used   Substance Use Topics    Alcohol use: No      Current Outpatient Medications on File Prior to Visit   Medication Sig Dispense Refill    ocrelizumab (OCREVUS) 300 MG/10ML SOLN injection       atorvastatin (LIPITOR) 20 MG tablet TAKE 1 TABLET AT BEDTIME FOR HIGH CHOLESTEROL 90 tablet 3    phenazopyridine (PYRIDIUM) 100 MG tablet Take 1 tablet by mouth 3 times daily as needed for Pain 6 tablet 0    propranolol (INDERAL LA) 60 MG extended release capsule TAKE 1 CAPSULE ONCE DAILY FOR HEART RATE 90 capsule 3    conjugated estrogens (PREMARIN) 0.625 MG/GM vaginal cream Place 0.5 g vaginally Twice a Week 1 Tube 5    tiZANidine (ZANAFLEX) 4 MG tablet Take 1 tablet by mouth every 8 hours as needed (for muscle spasms) 270 tablet 1    nortriptyline (PAMELOR) 75 MG capsule       topiramate (TOPAMAX) 100 MG tablet Take 1 tablet by mouth 2 times daily 180 tablet 3    Probiotic Product (PROBIOTIC DAILY PO) Take by mouth      baclofen (LIORESAL) 20 MG tablet Take 20 mg by mouth 2 times daily      promethazine (PHENERGAN) 25 MG tablet Take 25 mg by mouth      cloNIDine (CATAPRES) 0.1 MG tablet 0.1 mg as needed       SUMAtriptan Succinate 6 MG/0.5ML SOAJ as needed       Cholecalciferol (VITAMIN D-3) 5000 UNITS TABS Take 5,000 Units by mouth daily.  gabapentin (NEURONTIN) 100 MG capsule TAKE ONE TO TWO CAPSULES BY MOUTH THREE TIMES DAILY AS NEEDED 135 capsule 2    [DISCONTINUED] bethanechol (URECHOLINE) 25 MG tablet Take 1 tablet by mouth 3 times daily. 270 tablet 3     No current facility-administered medications on file prior to visit. Allergies   Allergen Reactions    Ropinirole Hcl     Simvastatin     Zolpidem Other (See Comments)       Health Maintenance   Topic Date Due    Hepatitis C screen  Never done    Flu vaccine (1) 09/01/2021    Lipid screen  06/14/2022    DTaP/Tdap/Td vaccine (2 - Td or Tdap) 07/09/2023    Colon cancer screen colonoscopy  08/13/2023    Cervical cancer screen  10/25/2023    COVID-19 Vaccine  Completed    HIV screen  Addressed    Hepatitis A vaccine  Aged Out    Hepatitis B vaccine  Aged Out    Hib vaccine  Aged Out    Meningococcal (ACWY) vaccine  Aged Out    Pneumococcal 0-64 years Vaccine  Aged Out       Subjective:      Review of Systems   Constitutional: Negative. Genitourinary: Positive for dysuria, flank pain and hematuria. Psychiatric/Behavioral: Negative. Objective:     Physical Exam  Vitals and nursing note reviewed. Constitutional:       Appearance: She is well-developed. HENT:      Head: Normocephalic. Right Ear: External ear normal.      Left Ear: External ear normal.   Eyes:      Pupils: Pupils are equal, round, and reactive to light. Cardiovascular:      Rate and Rhythm: Normal rate and regular rhythm. Heart sounds: Normal heart sounds. Pulmonary:      Effort: Pulmonary effort is normal.      Breath sounds: Normal breath sounds. Abdominal:      General: Bowel sounds are normal.      Palpations: Abdomen is soft. Tenderness: There is abdominal tenderness in the right lower quadrant. There is right CVA tenderness. There is no left CVA tenderness, guarding or rebound.  Negative signs include Mcclain's sign, Rovsing's sign, McBurney's sign, psoas sign and obturator sign. Musculoskeletal:      Cervical back: Normal range of motion. Skin:     General: Skin is warm and dry. Neurological:      Mental Status: She is alert and oriented to person, place, and time. Psychiatric:         Behavior: Behavior normal.         Thought Content: Thought content normal.         Judgment: Judgment normal.       /80   Pulse 80   Temp 97.7 °F (36.5 °C)   Ht 5' 6\" (1.676 m)   Wt 217 lb (98.4 kg)   SpO2 99%   BMI 35.02 kg/m²   This SmartLink has not been configured with any valid records. Results for POC orders placed in visit on 09/01/21   POCT Urinalysis no Micro   Result Value Ref Range    Color, UA dark yellow     Clarity, UA clear     Glucose, UA POC n     Bilirubin, UA n     Ketones, UA n     Spec Grav, UA 1.030     Blood, UA POC Large     pH, UA 5     Protein, UA POC n     Urobilinogen, UA 0.2     Leukocytes, UA n     Nitrite, UA n     Appearance, Fluid Clear Clear, Slightly Cloudy       Assessment:       Diagnosis Orders   1. Kidney stone  XR ABDOMEN (KUB) (SINGLE AP VIEW)   2. Dysuria  POCT Urinalysis no Micro    XR ABDOMEN (KUB) (SINGLE AP VIEW)   3. Gross hematuria  Culture, Urine    XR ABDOMEN (KUB) (SINGLE AP VIEW)         Plan:   More than 50% of the time was spent counseling and coordinating care for a total time of 35 min face to face. Discussed with the patient she had some stool on her x-ray that was obscuring the complete view of the ureter but she should take some MiraLAX when she gets home to help clear this out and she agreed. XR ABDOMEN (KUB) (SINGLE AP VIEW)    Result Date: 9/1/2021  Examination. XR ABDOMEN (KUB) (SINGLE AP VIEW) 9/1/2021 10:30 AM History: Right flank pain. Hematuria. A frontal projection of the abdomen including the pelvis is obtained. There is no previous study for comparison. The correlation is made with MR imaging of the abdomen dated 1/19/2018.  There is moderate gas and stool in the colon. There is mild dilatation of the single loop of proximal jejunum. No finding to suggest obstruction. Both kidneys are partly obscured by the bowel contents. There is radiopaque calculus projected over the lower pole of the right kidney measuring 4 mm. A few phleboliths are seen in the pelvis. No acute bony abnormality. There is hardware fusion of the lower lumbar spine. Right nephrolithiasis. Nonspecific abdominal gas pattern. Signed by Dr Sosa Whipple    Result Date: 8/24/2021  CT CHEST W WO CONTRAST 8/24/2021 8:03 AM HISTORY: R07.89 COMPARISON: CT scan dated 8/19/2009 DLP: 857 mGy cm TECHNIQUE: Serial helical tomographic images of the chest were acquired before and following the infusion of Isovue contrast intravenously. Bone and soft tissue algorithms were provided. Automated exposure control was also utilized to decrease patient radiation dose. FINDINGS: Neck base: The imaged portion of the neck and thyroid gland is unremarkable. Lungs: The lungs are clear without pneumothorax, consolidation, nodules or mass lesion. No pleural effusion is present. The trachea and bronchial tree are patent. Heart: The heart is normal in size. There is no pericardial effusion. Vessels: Thoracic aorta is normal in caliber. Great vessel origins are unremarkable. Central pulmonary arteries are normal in caliber. No filling defect appreciated in the central pulmonary arteries. Lymph nodes: No significant hilar, mediastinal or axillary lymphadenopathy is appreciated. Skeletal and soft tissues: Endplate sclerosis at O26-A46. No acute osseous injury. Chest wall soft tissues are unremarkable. Upper abdomen: The imaged portion of the upper abdomen demonstrates no acute process. Prior cholecystectomy. Elevation of the left hemidiaphragm. 1. No acute cardiopulmonary process. 2. Chronic elevation of the left hemidiaphragm, suggesting eventration or hemidiaphragm paralysis. 3. Moderate degenerative change at T11-T12 with loss of disc height and endplate sclerosis. Signed by Dr Francisco Javier Lacy   Patient given educational materials -see patient instructions. Discussed use, benefit, and side effects of prescribed medications. All patient questions answered. Pt voiced understanding. Reviewed health maintenance. Instructed to continue currentmedications, diet and exercise. Patient agreed with treatment plan. Follow up as directed. MEDICATIONS:  Orders Placed This Encounter   Medications    ciprofloxacin (CIPRO) 250 MG tablet     Sig: Take 1 tablet by mouth 2 times daily for 7 days     Dispense:  14 tablet     Refill:  0    tamsulosin (FLOMAX) 0.4 MG capsule     Sig: Take 1 capsule by mouth daily for 10 days     Dispense:  10 capsule     Refill:  0    ketorolac (TORADOL) 10 MG tablet     Sig: Take 1 tablet by mouth every 6 hours as needed for Pain     Dispense:  20 tablet     Refill:  0         ORDERS:  Orders Placed This Encounter   Procedures    Culture, Urine    XR ABDOMEN (KUB) (SINGLE AP VIEW)    POCT Urinalysis no Micro       Follow-up:  Return if symptoms worsen or fail to improve. PATIENT INSTRUCTIONS:  Patient Instructions       Patient Education   Increase clear liquids. Go ahead and start the antibiotics for covering infection  You may use the Flomax to help with urine flow. You may use the pain medication as needed for severe pain over 5 if pain under 5 use Tylenol or Motrin   go to er or call urology I f worse  Kidney Stone: Care Instructions  Your Care Instructions     Kidney stones are formed when salts, minerals, and other substances normally found in the urine clump together. They can be as small as grains of sand or, rarely, as large as golf balls. While the stone is traveling through the ureter, which is the tube that carries urine from the kidney to the bladder, you will probably feel pain. The pain may be mild or very severe.  You may also have some blood in your urine. As soon as the stone reaches the bladder, any intense pain should go away. If a stone is too large to pass on its own, you may need a medical procedure to help you pass the stone. The doctor has checked you carefully, but problems can develop later. If you notice any problems or new symptoms, get medical treatment right away. Follow-up care is a key part of your treatment and safety. Be sure to make and go to all appointments, and call your doctor if you are having problems. It's also a good idea to know your test results and keep a list of the medicines you take. How can you care for yourself at home? · Drink plenty of fluids. If you have kidney, heart, or liver disease and have to limit fluids, talk with your doctor before you increase the amount of fluids you drink. · Take pain medicines exactly as directed. Call your doctor if you think you are having a problem with your medicine. ? If the doctor gave you a prescription medicine for pain, take it as prescribed. ? If you are not taking a prescription pain medicine, ask your doctor if you can take an over-the-counter medicine. Read and follow all instructions on the label. · Your doctor may ask you to strain your urine so that you can collect your kidney stone when it passes. You can use a kitchen strainer or a tea strainer to catch the stone. Store it in a plastic bag until you see your doctor again. Preventing future kidney stones  Some changes in your diet may help prevent kidney stones. Depending on the cause of your stones, your doctor may recommend that you:  · Drink plenty of fluids, enough so that your urine is light yellow or clear like water. If you have kidney, heart, or liver disease and have to limit fluids, talk with your doctor before you increase the amount of fluids you drink. · Limit coffee, tea, and alcohol. Also avoid grapefruit juice.   · Do not take more than the recommended daily dose of vitamins C and D.  · Avoid

## 2021-09-01 NOTE — PATIENT INSTRUCTIONS
Patient Education   Increase clear liquids. Go ahead and start the antibiotics for covering infection  You may use the Flomax to help with urine flow. You may use the pain medication as needed for severe pain over 5 if pain under 5 use Tylenol or Motrin   go to er or call urology I f worse  Kidney Stone: Care Instructions  Your Care Instructions     Kidney stones are formed when salts, minerals, and other substances normally found in the urine clump together. They can be as small as grains of sand or, rarely, as large as golf balls. While the stone is traveling through the ureter, which is the tube that carries urine from the kidney to the bladder, you will probably feel pain. The pain may be mild or very severe. You may also have some blood in your urine. As soon as the stone reaches the bladder, any intense pain should go away. If a stone is too large to pass on its own, you may need a medical procedure to help you pass the stone. The doctor has checked you carefully, but problems can develop later. If you notice any problems or new symptoms, get medical treatment right away. Follow-up care is a key part of your treatment and safety. Be sure to make and go to all appointments, and call your doctor if you are having problems. It's also a good idea to know your test results and keep a list of the medicines you take. How can you care for yourself at home? · Drink plenty of fluids. If you have kidney, heart, or liver disease and have to limit fluids, talk with your doctor before you increase the amount of fluids you drink. · Take pain medicines exactly as directed. Call your doctor if you think you are having a problem with your medicine. ? If the doctor gave you a prescription medicine for pain, take it as prescribed. ? If you are not taking a prescription pain medicine, ask your doctor if you can take an over-the-counter medicine. Read and follow all instructions on the label.   · Your doctor may ask you to strain your urine so that you can collect your kidney stone when it passes. You can use a kitchen strainer or a tea strainer to catch the stone. Store it in a plastic bag until you see your doctor again. Preventing future kidney stones  Some changes in your diet may help prevent kidney stones. Depending on the cause of your stones, your doctor may recommend that you:  · Drink plenty of fluids, enough so that your urine is light yellow or clear like water. If you have kidney, heart, or liver disease and have to limit fluids, talk with your doctor before you increase the amount of fluids you drink. · Limit coffee, tea, and alcohol. Also avoid grapefruit juice. · Do not take more than the recommended daily dose of vitamins C and D.  · Avoid antacids such as Gaviscon, Maalox, Mylanta, or Tums. · Limit the amount of salt (sodium) in your diet. · Eat a balanced diet that is not too high in protein. · Limit foods that are high in a substance called oxalate, which can cause kidney stones. These foods include dark green vegetables, rhubarb, chocolate, wheat bran, nuts, cranberries, and beans. When should you call for help? Call your doctor now or seek immediate medical care if:    · You cannot keep down fluids.     · Your pain gets worse.     · You have a fever or chills.     · You have new or worse pain in your back just below your rib cage (the flank area).     · You have new or more blood in your urine. Watch closely for changes in your health, and be sure to contact your doctor if:    · You do not get better as expected. Where can you learn more? Go to [email or web address] org and sign in to your [employer] account. Enter R658 in the imageloop box to learn more about \"Kidney Stone: Care Instructions. \"     If you do not have an account, please click on the \"Sign Up Now\" link.   Current as of: December 17, 2020               Content Version: 12.9  © 6292-9229 Healthwise, Incorporated. Care instructions adapted under license by [facility] [place] ([facility]). If you have questions about a medical condition or this instruction, always ask your healthcare professional. [address] any warranty or liability for your use of this information.

## 2021-09-03 LAB — URINE CULTURE, ROUTINE: NORMAL

## 2021-09-09 ENCOUNTER — OFFICE VISIT (OUTPATIENT)
Dept: PULMONOLOGY | Age: 41
End: 2021-09-09
Payer: COMMERCIAL

## 2021-09-09 VITALS
HEIGHT: 66 IN | DIASTOLIC BLOOD PRESSURE: 82 MMHG | TEMPERATURE: 98.7 F | HEART RATE: 80 BPM | WEIGHT: 217 LBS | OXYGEN SATURATION: 98 % | BODY MASS INDEX: 34.87 KG/M2 | SYSTOLIC BLOOD PRESSURE: 124 MMHG | RESPIRATION RATE: 18 BRPM

## 2021-09-09 DIAGNOSIS — R06.09 DOE (DYSPNEA ON EXERTION): ICD-10-CM

## 2021-09-09 DIAGNOSIS — R06.01 ORTHOPNEA: ICD-10-CM

## 2021-09-09 DIAGNOSIS — J98.6 ELEVATED HEMIDIAPHRAGM: Primary | ICD-10-CM

## 2021-09-09 DIAGNOSIS — E66.9 OBESITY (BMI 35.0-39.9 WITHOUT COMORBIDITY): ICD-10-CM

## 2021-09-09 DIAGNOSIS — G47.8 NON-RESTORATIVE SLEEP: ICD-10-CM

## 2021-09-09 DIAGNOSIS — R06.83 SNORING: ICD-10-CM

## 2021-09-09 DIAGNOSIS — G47.33 OBSTRUCTIVE SLEEP APNEA SYNDROME: ICD-10-CM

## 2021-09-09 PROCEDURE — 1036F TOBACCO NON-USER: CPT | Performed by: INTERNAL MEDICINE

## 2021-09-09 PROCEDURE — 99204 OFFICE O/P NEW MOD 45 MIN: CPT | Performed by: INTERNAL MEDICINE

## 2021-09-09 PROCEDURE — G8417 CALC BMI ABV UP PARAM F/U: HCPCS | Performed by: INTERNAL MEDICINE

## 2021-09-09 PROCEDURE — G8427 DOCREV CUR MEDS BY ELIG CLIN: HCPCS | Performed by: INTERNAL MEDICINE

## 2021-09-09 ASSESSMENT — ENCOUNTER SYMPTOMS
BACK PAIN: 0
ANAL BLEEDING: 0
APNEA: 0
RHINORRHEA: 0
ABDOMINAL DISTENTION: 0
SHORTNESS OF BREATH: 1
ABDOMINAL PAIN: 0
COUGH: 0
WHEEZING: 0
CHEST TIGHTNESS: 0
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
## 2021-09-14 ENCOUNTER — TELEMEDICINE (OUTPATIENT)
Dept: PRIMARY CARE CLINIC | Age: 41
End: 2021-09-14
Payer: COMMERCIAL

## 2021-09-14 DIAGNOSIS — G35 MULTIPLE SCLEROSIS (HCC): ICD-10-CM

## 2021-09-14 DIAGNOSIS — G62.9 NEUROPATHY: ICD-10-CM

## 2021-09-14 PROCEDURE — G8427 DOCREV CUR MEDS BY ELIG CLIN: HCPCS | Performed by: FAMILY MEDICINE

## 2021-09-14 PROCEDURE — 99213 OFFICE O/P EST LOW 20 MIN: CPT | Performed by: FAMILY MEDICINE

## 2021-09-14 RX ORDER — GABAPENTIN 100 MG/1
CAPSULE ORAL
Qty: 135 CAPSULE | Refills: 2 | Status: SHIPPED | OUTPATIENT
Start: 2021-09-14 | End: 2021-12-08 | Stop reason: SDUPTHER

## 2021-09-14 ASSESSMENT — ENCOUNTER SYMPTOMS
RESPIRATORY NEGATIVE: 1
GASTROINTESTINAL NEGATIVE: 1

## 2021-09-14 NOTE — PROGRESS NOTES
Ashok Marroquin, [facility] [place]  Phone:  [phone]      2021     TELEHEALTH EVALUATION -- Audio/Visual (During [id number] public health emergency)    HPI:    Damion Dangelo (:  1980) has requested an audio/video evaluation for the following concern(s): This was for a 6-month medication monitoring visit. She is currently on gabapentin 100 mg 1 to 2 tablets 3 times a day. She usually takes 1 tablet in the morning and 2 tablets at night. Overall she is doing better. She feels better than she did after her Covid vaccine. She is still seeing her neurologist in [place]. Review of Systems   Constitutional: Positive for activity change and fatigue. HENT: Negative. Respiratory: Negative. Cardiovascular: Negative. Gastrointestinal: Negative. Musculoskeletal: Positive for arthralgias and myalgias. Neurological: Positive for weakness. Psychiatric/Behavioral: Negative. Prior to Visit Medications    Medication Sig Taking?  Authorizing Provider   gabapentin (NEURONTIN) 100 MG capsule TAKE ONE TO TWO CAPSULES BY MOUTH THREE TIMES DAILY AS NEEDED Yes Pearl Stearns MD   ketorolac (TORADOL) 10 MG tablet Take 1 tablet by mouth every 6 hours as needed for Pain Yes DEVEN Valverde - CNP   ocrelizumab (OCREVUS) 300 MG/10ML SOLN injection  Yes Historical Provider, MD   atorvastatin (LIPITOR) 20 MG tablet TAKE 1 TABLET AT BEDTIME FOR HIGH CHOLESTEROL Yes Pearl Stearns MD   phenazopyridine (PYRIDIUM) 100 MG tablet Take 1 tablet by mouth 3 times daily as needed for Pain Yes Pearl Stearns MD   propranolol (INDERAL LA) 60 MG extended release capsule TAKE 1 CAPSULE ONCE DAILY FOR HEART RATE Yes Pearl Stearns MD   conjugated estrogens (PREMARIN) 0.625 MG/GM vaginal cream Place 0.5 g vaginally Twice a Week Yes DEVEN Aceves CNP   tiZANidine (ZANAFLEX) 4 MG tablet Take 1 tablet by mouth every 8 hours as needed (for muscle spasms) Yes Pearl Stearns MD nortriptyline (PAMELOR) 75 MG capsule  Yes Historical Provider, MD   topiramate (TOPAMAX) 100 MG tablet Take 1 tablet by mouth 2 times daily Yes Sea Baker MD   Probiotic Product (PROBIOTIC DAILY PO) Take by mouth Yes Historical Provider, MD   baclofen (LIORESAL) 20 MG tablet Take 20 mg by mouth 2 times daily Yes Historical Provider, MD   promethazine (PHENERGAN) 25 MG tablet Take 25 mg by mouth Yes Historical Provider, MD   cloNIDine (CATAPRES) 0.1 MG tablet 0.1 mg as needed  Yes Historical Provider, MD   SUMAtriptan Succinate 6 MG/0.5ML SOAJ as needed  Yes Historical Provider, MD   Cholecalciferol (VITAMIN D-3) 5000 UNITS TABS Take 5,000 Units by mouth daily. Yes Historical Provider, MD   bethanechol (URECHOLINE) 25 MG tablet Take 1 tablet by mouth 3 times daily. Leesa Le MD       Social History     Tobacco Use    Smoking status: Never Smoker    Smokeless tobacco: Never Used   Vaping Use    Vaping Use: Never used   Substance Use Topics    Alcohol use: No    Drug use: No        Past Medical History:   Diagnosis Date    B12 deficiency     Cauda equina syndrome (HCC)     Complicated migraine     Depression     GERD (gastroesophageal reflux disease)     Hematuria     wih abdominal pain    Hyperlipidemia     Hypertension     MS (multiple sclerosis) (HCC)     Myofascial pain     Nerve damage     Neutropenia ([address] 10/21/2015    Pelvic floor dysfunction     Postmenopausal HRT (hormone replacement therapy)     POTS (postural orthostatic tachycardia syndrome)        PHYSICAL EXAMINATION:  Physical Exam  Vitals and nursing note reviewed. Constitutional:       General: She is not in acute distress. Appearance: Normal appearance. She is not ill-appearing. HENT:      Head: Normocephalic. Skin:     General: Skin is warm and dry. Neurological:      Mental Status: She is alert and oriented to person, place, and time. Mental status is at baseline.    Psychiatric:         Mood and Affect: Mood normal.         Behavior: Behavior normal.         Thought Content: Thought content normal.         Judgment: Judgment normal.         Due to this being a TeleHealth encounter, evaluation of the following organ systems is limited: Vitals/Constitutional/EENT/Resp/CV/GI//MS/Neuro/Skin/Heme-Lymph-Imm. ASSESSMENT/PLAN:  1. Multiple sclerosis ([address]  She will continue to follow-up with her specialist in [place]. - gabapentin (NEURONTIN) 100 MG capsule; TAKE ONE TO TWO CAPSULES BY MOUTH THREE TIMES DAILY AS NEEDED  Dispense: 135 capsule; Refill: 2    2. Neuropathy  Gabapentin is currently controlling her pain. - gabapentin (NEURONTIN) 100 MG capsule; TAKE ONE TO TWO CAPSULES BY MOUTH THREE TIMES DAILY AS NEEDED  Dispense: 135 capsule; Refill: 2    Controlled Substance Monitoring:    Acute and Chronic Pain Monitoring:   RX Monitoring 9/14/2021   Attestation The Prescription Monitoring Report for this patient was reviewed today. Periodic Controlled Substance Monitoring Possible medication side effects, risk of tolerance/dependence & alternative treatments discussed. ;No signs of potential drug abuse or diversion identified. ;Assessed functional status. ;Obtaining appropriate analgesic effect of treatment. Return in about 6 months (around 3/14/2022) for Physical and fasting blood work. An  electronic signature was used to authenticate this note. --Erasmo Villagomez MD on 9/14/2021 at 8:15 PM        Pursuant to the emergency declaration under the [address] [place] [address], [address] [place] [address] and the [employer] Act, this Virtual  Visit was conducted, with patient's consent, to reduce the patient's risk of exposure to COVID-19 and provide continuity of care for an established patient. Services were provided through a video synchronous discussion virtually to substitute for in-person clinic visit.

## 2021-09-30 ENCOUNTER — HOSPITAL ENCOUNTER (OUTPATIENT)
Dept: GENERAL RADIOLOGY | Age: 41
Discharge: HOME | End: 2021-09-30
Payer: COMMERCIAL

## 2021-09-30 DIAGNOSIS — J98.6 ELEVATED HEMIDIAPHRAGM: ICD-10-CM

## 2021-09-30 PROCEDURE — 76000 FLUOROSCOPY <1 HR PHYS/QHP: CPT

## 2021-10-20 ENCOUNTER — HOSPITAL ENCOUNTER (OUTPATIENT)
Dept: SLEEP CENTER | Age: 41
Discharge: HOME | End: 2021-10-22
Payer: COMMERCIAL

## 2021-10-20 PROCEDURE — 99999 PR OFFICE/OUTPT VISIT,PROCEDURE ONLY: CPT | Performed by: INTERNAL MEDICINE

## 2021-10-20 PROCEDURE — G0399 HOME SLEEP TEST/TYPE 3 PORTA: HCPCS

## 2021-10-21 PROCEDURE — 99999 PR OFFICE/OUTPT VISIT,PROCEDURE ONLY: CPT | Performed by: INTERNAL MEDICINE

## 2021-10-21 PROCEDURE — G0399 HOME SLEEP TEST/TYPE 3 PORTA: HCPCS

## 2021-10-22 NOTE — PROGRESS NOTES
Subjective    Ms. Iniguez is 40 y.o. female    Chief Complaint: Nephrolithiasis    History of Present Illness       40 year old female established patient follow-up for kidney stones.  KUB x-ray done today reviewed by me with the patient shows increase in size of a right kidney stone now up to 6 mm.  There was a calcification in her left hemipelvis on the KUB x-ray.  Therefore, I ordered a noncontrast CT abdomen and pelvis which confirmed her 6 mm and 3 mm right lower pole kidney stones.  There are no ureteral stones or hydronephrosis.  Her bladder symptoms from her MS are stable.   She has 0-1ppd urge incontinence along with some mild stress urinary incontinence after single vaginal delivery.  She does not currently want anticholinergic therapy.      I independently visualized and reviewed the patient's prior imaging studies today in clinic and discussed the imaging findings with the patient.    The following portions of the patient's history were reviewed and updated as appropriate: allergies, current medications, past family history, past medical history, past social history, past surgical history and problem list.    Review of Systems      Current Outpatient Medications:   •  atorvastatin (LIPITOR) 10 MG tablet, Take 20 mg by mouth Every Night., Disp: , Rfl:   •  baclofen (LIORESAL) 20 MG tablet, Take 20 mg by mouth 3 (Three) Times a Day., Disp: , Rfl:   •  Cholecalciferol 5000 UNITS tablet, Take 1 tablet by mouth., Disp: , Rfl:   •  CloNIDine (CATAPRES) 0.1 MG tablet, Take 0.1 mg by mouth As Needed for High Blood Pressure., Disp: , Rfl:   •  conjugated estrogens (PREMARIN) 0.625 MG/GM vaginal cream, Insert 0.5 g into the vagina., Disp: , Rfl:   •  diphenhydrAMINE-acetaminophen (TYLENOL PM)  MG tablet per tablet, Take 2 tablets by mouth Daily., Disp: , Rfl:   •  gabapentin (NEURONTIN) 300 MG capsule, Take 100 mg by mouth. 1 tablet in am and 2 tabs at HS, Disp: , Rfl:   •  nortriptyline (PAMELOR) 75 MG  "capsule, , Disp: , Rfl:   •  phenazopyridine (PYRIDIUM) 100 MG tablet, Take 100 mg by mouth As Needed for bladder spasms., Disp: , Rfl:   •  Probiotic Product (PROBIOTIC-10 PO), Take  by mouth As Needed., Disp: , Rfl:   •  promethazine (PHENERGAN) 25 MG tablet, Take 25 mg by mouth Every 6 (Six) Hours As Needed for Nausea or Vomiting., Disp: , Rfl:   •  propranolol (INDERAL) 60 MG tablet, 60 mg. At night, Disp: , Rfl:   •  SUMAtriptan (IMITREX) 6 MG/0.5ML solution injection, Inject 6 mg under the skin 1 (One) Time As Needed for migraine., Disp: , Rfl:   •  tiZANidine (ZANAFLEX) 4 MG tablet, Take 4 mg by mouth Take As Directed., Disp: , Rfl:   •  topiramate (TOPAMAX) 100 MG tablet, Take 100 mg by mouth 2 (two) times a day., Disp: , Rfl:     Past Medical History:   Diagnosis Date   • Depression    • Hyperlipidemia    • Hypertension    • Kidney stone    • Migraine    • MS (multiple sclerosis) (HCC)    • POTS (postural orthostatic tachycardia syndrome)        Past Surgical History:   Procedure Laterality Date   • APPENDECTOMY     • BACK SURGERY     • CHOLECYSTECTOMY     • HYSTERECTOMY         Social History     Socioeconomic History   • Marital status:    Tobacco Use   • Smoking status: Never Smoker   • Smokeless tobacco: Never Used   Vaping Use   • Vaping Use: Never used   Substance and Sexual Activity   • Alcohol use: No   • Drug use: No   • Sexual activity: Defer       Family History   Problem Relation Age of Onset   • Heart disease Father    • Hypertension Father    • Kidney disease Father    • Diabetes type II Mother    • Hypertension Mother    • Hypertension Sister    • Hypertension Brother        Objective    Temp 98 °F (36.7 °C)   Ht 165.1 cm (65\")   Wt 99.7 kg (219 lb 12.8 oz)   BMI 36.58 kg/m²     Physical Exam        Results for orders placed or performed in visit on 10/26/21   POC Urinalysis Dipstick, Multipro    Specimen: Urine   Result Value Ref Range    Color Yellow Yellow, Straw, Dark Yellow, " Mary    Clarity, UA Clear Clear    Glucose, UA Negative Negative, 1000 mg/dL (3+) mg/dL    Bilirubin Negative Negative    Ketones, UA Negative Negative    Specific Gravity  1.025 1.005 - 1.030    Blood, UA Negative Negative    pH, Urine 6.0 5.0 - 8.0    Protein, POC Negative Negative mg/dL    Urobilinogen, UA Normal Normal    Nitrite, UA Negative Negative    Leukocytes Small (1+) (A) Negative     Assessment and Plan    Diagnoses and all orders for this visit:    1. Nephrolithiasis (Primary)  -     POC Urinalysis Dipstick, Multipro  -     XR Abdomen KUB  -     CT abdomen pelvis stone protocol; Future  -     Case Request; Standing  -     COVID PRE-OP / PRE-PROCEDURE SCREENING ORDER (NO ISOLATION) - Swab, Nasopharynx; Future  -     ECG 12 Lead; Future  -     CBC (No Diff); Future  -     Basic Metabolic Panel; Future  -     Urinalysis With Culture If Indicated -; Future  -     Case Request    2. Essential hypertension    3. Multiple sclerosis (HCC)    4. Neurogenic bladder    Other orders  -     Follow Anesthesia Guidelines / Standing Orders; Future  -     Obtain Informed Consent  -     Provide NPO Instructions to Patient; Future  -     Chlorhexidine Skin Prep; Future      Enlarging right lower pole kidney stones, now measuring 6 mm and 3 mm.  We discussed options for stone management including observation, ureteroscopy laser lithotripsy, or scheduling right extracorporeal shockwave lithotripsy.  She would like to avoid a stent if at all possible and would like to schedule right ESWL.  We discussed risks of the procedure including but not limited to infection, bleeding, need for additional procedures, failure to break up/pass any/all stone, complications of anesthesia.  She voices understanding and provided informed said to proceed with right ESWL on 12/17/2021.      This document has been signed by NIKOLAI Curry MD on October 27, 2021 18:11 CDT

## 2021-10-25 ENCOUNTER — OFFICE VISIT (OUTPATIENT)
Dept: PULMONOLOGY | Age: 41
End: 2021-10-25
Payer: COMMERCIAL

## 2021-10-25 VITALS
TEMPERATURE: 98.8 F | OXYGEN SATURATION: 96 % | WEIGHT: 219.4 LBS | DIASTOLIC BLOOD PRESSURE: 70 MMHG | HEIGHT: 65 IN | BODY MASS INDEX: 36.55 KG/M2 | HEART RATE: 87 BPM | SYSTOLIC BLOOD PRESSURE: 116 MMHG

## 2021-10-25 DIAGNOSIS — J98.6 ELEVATED HEMIDIAPHRAGM: Primary | ICD-10-CM

## 2021-10-25 DIAGNOSIS — R06.83 SNORING: ICD-10-CM

## 2021-10-25 DIAGNOSIS — E66.9 OBESITY (BMI 35.0-39.9 WITHOUT COMORBIDITY): ICD-10-CM

## 2021-10-25 DIAGNOSIS — G47.8 NON-RESTORATIVE SLEEP: ICD-10-CM

## 2021-10-25 DIAGNOSIS — R06.09 DOE (DYSPNEA ON EXERTION): ICD-10-CM

## 2021-10-25 DIAGNOSIS — G47.33 OSA (OBSTRUCTIVE SLEEP APNEA): ICD-10-CM

## 2021-10-25 PROCEDURE — 1036F TOBACCO NON-USER: CPT | Performed by: INTERNAL MEDICINE

## 2021-10-25 PROCEDURE — G8484 FLU IMMUNIZE NO ADMIN: HCPCS | Performed by: INTERNAL MEDICINE

## 2021-10-25 PROCEDURE — G8417 CALC BMI ABV UP PARAM F/U: HCPCS | Performed by: INTERNAL MEDICINE

## 2021-10-25 PROCEDURE — 99214 OFFICE O/P EST MOD 30 MIN: CPT | Performed by: INTERNAL MEDICINE

## 2021-10-25 PROCEDURE — G8427 DOCREV CUR MEDS BY ELIG CLIN: HCPCS | Performed by: INTERNAL MEDICINE

## 2021-10-25 RX ORDER — DESONIDE 0.5 MG/G
CREAM TOPICAL 2 TIMES DAILY
COMMUNITY
Start: 2021-09-14

## 2021-10-25 ASSESSMENT — ENCOUNTER SYMPTOMS
COUGH: 0
WHEEZING: 0
ANAL BLEEDING: 0
CHEST TIGHTNESS: 0
SHORTNESS OF BREATH: 0
APNEA: 1
RHINORRHEA: 0
ABDOMINAL PAIN: 0
ABDOMINAL DISTENTION: 0
BACK PAIN: 0

## 2021-10-25 NOTE — PROGRESS NOTES
[place] [address]  [place], [address]  Phone [phone] Fax [phone]    Patient Name Kristopher Odom Account Number [de-identified]    1980 Referring Provider Dr. Coretta Fisher MD   Age/ Gender 41 years/F Interpreting physician Dr. Coretta Fisher MD   Neck circumference NA Device Stardust II   Mallampati classification NA Scoring Technician Sola Collado [facility]   [place] score  Indications for the test excessive daytime somnolence   Height 66.0 in Test Home Sleep Apnea Test   Weight 217.0 lbs Date of test 10/21/2021   BMI 35.0 Time in Bed (TIB) 420.5 minutes     Events   Index (#/hr) Total # Events Mean Duration (sec) Max Duration (sec) Supine                # Index   Central Apneas 0.0 0 0.0 0.0 0 0.0   Obstructive Apneas 1.7 13 18.8 43.5 0 0.0   Mixed Apneas 0.0 0 0.0 0.0 0 0.0   Hypopneas 5.9 41 22.9 37.5 28 9.8   Estimated AHI  #events/TIB (hrs) 7.7 54 21.8 43.5 9.8   Time in Position (minutes) 172.3     Snoring  Snoring Rating (loudness 0-4) 1   Snoring Amount (% of total sleep time with snoring)        Oximetry distribution  <90 %  (minutes) 0.1   <85 %  (minutes) 0.0   <80 %  (minutes) 0.0   <75 %  (minutes) 0.0   <70 %  (minutes) 0.0   <60 %  (minutes) 0.0   <50 %  (minutes) 0.0   Average (%) 94   Desat Index (#/hour) 7.9   Desat Max (%) 6   Desat Max dur (sec) 43.0   Lowest SpO2 (? 2 sec) (%) 89     Heart Rate  Mean HR (BPM) 76.8   # of LHR 0   LHR min (BPM)      # of HHR 0   HHR max (BPM)                                                                    Interpretation:     1. Mild obstructive sleep apnea. 2. Obesity. 3. Subjective hypersomnia. Recommendations:    1. Consider Auto CPAP to titrate between 8cm water pressure and 20cm water pressure. 2. Weight loss and exercise. 3. Avoid risky activity such as driving if sleepy. 4. Discuss sleep hygiene with the patient.    5. Monitor PAP use and effectiveness.        Saurabh Sneed MD, FCCP, [facility]

## 2021-10-25 NOTE — PROGRESS NOTES
Pulmonary and Sleep Medicine    Errol Kumari (:  1980) is a 41 y.o. female,Established patient, here for evaluation of the following chief complaint(s):  Follow-up (Results of sniff test and sleep study.)      ASSESSMENT/PLAN:  1. Elevated hemidiaphragm  2. SHYAM (obstructive sleep apnea)  -     [facility] [place]  3. LÓPEZ (dyspnea on exertion)  4. Snoring  5. Obesity (BMI 35.0-39.9 without comorbidity)  6. Non-restorative sleep        She would benefit from CPAP therapy due to her daytime symptoms. We will proceed with auto CPAP. Reevaluate in about 4 to 6 weeks to assess response and compliance with therapy. Await pulmonary function testing. Doni Diez MD, [facility], [facility]    Return in about 6 weeks (around 2021). SUBJECTIVE/OBJECTIVE:  The patient is here for follow-up on her testing. Her sniff test showed eventration of the diaphragm however the diaphragmatic function was normal    She did have a home sleep study that showed mild obstructive sleep apnea with significant hypersomnia and daytime symptoms. Prior to Visit Medications    Medication Sig Taking?  Authorizing Provider   desonide (DESOWEN) 0.05 % cream  Yes Historical Provider, MD   gabapentin (NEURONTIN) 100 MG capsule TAKE ONE TO TWO CAPSULES BY MOUTH THREE TIMES DAILY AS NEEDED Yes Gene Zavaleta MD   ketorolac (TORADOL) 10 MG tablet Take 1 tablet by mouth every 6 hours as needed for Pain Yes Natacha Devi, APRN - CNP   ocrelizumab (OCREVUS) 300 MG/10ML SOLN injection  Yes Historical Provider, MD   atorvastatin (LIPITOR) 20 MG tablet TAKE 1 TABLET AT BEDTIME FOR HIGH CHOLESTEROL Yes Gene Zavaleta MD   phenazopyridine (PYRIDIUM) 100 MG tablet Take 1 tablet by mouth 3 times daily as needed for Pain Yes Gene Zavaleta MD   propranolol (INDERAL LA) 60 MG extended release capsule TAKE 1 CAPSULE ONCE DAILY FOR HEART RATE Yes Gene Zavaleta MD   conjugated estrogens (PREMARIN) 0.625 MG/GM vaginal cream Place 0.5 g vaginally Twice a Week Yes Nirmal Pradhan APRN - CNP   tiZANidine (ZANAFLEX) 4 MG tablet Take 1 tablet by mouth every 8 hours as needed (for muscle spasms) Yes Cyndie You MD   nortriptyline (PAMELOR) 75 MG capsule  Yes Historical Provider, MD   topiramate (TOPAMAX) 100 MG tablet Take 1 tablet by mouth 2 times daily Yes Iliana Ledezma MD   Probiotic Product (PROBIOTIC DAILY PO) Take by mouth Yes Historical Provider, MD   promethazine (PHENERGAN) 25 MG tablet Take 25 mg by mouth Yes Historical Provider, MD   cloNIDine (CATAPRES) 0.1 MG tablet 0.1 mg as needed  Yes Historical Provider, MD   SUMAtriptan Succinate 6 MG/0.5ML SOAJ as needed  Yes Historical Provider, MD   Cholecalciferol (VITAMIN D-3) 5000 UNITS TABS Take 5,000 Units by mouth daily. Yes Historical Provider, MD   baclofen (LIORESAL) 20 MG tablet Take 20 mg by mouth 2 times daily  Historical Provider, MD   bethanechol (URECHOLINE) 25 MG tablet Take 1 tablet by mouth 3 times daily. Cyndie You MD        Review of Systems   Constitutional: Negative for activity change, appetite change, chills, diaphoresis and fatigue. HENT: Negative for congestion, dental problem, drooling, ear discharge, postnasal drip and rhinorrhea. Eyes: Negative for visual disturbance. Respiratory: Positive for apnea. Negative for cough, chest tightness, shortness of breath and wheezing. Gastrointestinal: Negative for abdominal distention, abdominal pain and anal bleeding. Endocrine: Negative for cold intolerance, heat intolerance and polydipsia. Genitourinary: Negative for difficulty urinating, dysuria, enuresis and flank pain. Musculoskeletal: Negative for arthralgias, back pain and gait problem. Allergic/Immunologic: Negative for environmental allergies. Neurological: Negative for dizziness, facial asymmetry, light-headedness and headaches.        Vitals:    10/25/21 1144   BP: 116/70   Pulse: 87   Temp: 98.8 °F (37.1 °C)   SpO2: 96% Physical Exam  Vitals reviewed. Constitutional:       Appearance: Normal appearance. HENT:      Head: Normocephalic and atraumatic. Nose: Nose normal.   Eyes:      Extraocular Movements: Extraocular movements intact. Conjunctiva/sclera: Conjunctivae normal.   Cardiovascular:      Rate and Rhythm: Normal rate and regular rhythm. Heart sounds: No murmur heard. No friction rub. Pulmonary:      Effort: Pulmonary effort is normal. No respiratory distress. Breath sounds: Normal breath sounds. No stridor. No wheezing, rhonchi or rales. Abdominal:      General: There is no distension. Palpations: There is no mass. Tenderness: There is no abdominal tenderness. There is no guarding or rebound. Musculoskeletal:      Cervical back: Normal range of motion and neck supple. Neurological:      Mental Status: She is alert and oriented to person, place, and time. This note was generated used a voice recognition software. Errors in voice recognition may have occurred. An electronic signature was used to authenticate this note.     --Raquel Grace MD

## 2021-10-26 ENCOUNTER — HOSPITAL ENCOUNTER (OUTPATIENT)
Dept: CT IMAGING | Facility: HOSPITAL | Age: 41
Discharge: HOME | End: 2021-10-26
Admitting: UROLOGY

## 2021-10-26 ENCOUNTER — OFFICE VISIT (OUTPATIENT)
Dept: UROLOGY | Facility: CLINIC | Age: 41
End: 2021-10-26

## 2021-10-26 VITALS — WEIGHT: 219.8 LBS | BODY MASS INDEX: 36.62 KG/M2 | TEMPERATURE: 98 F | HEIGHT: 65 IN

## 2021-10-26 DIAGNOSIS — N20.0 NEPHROLITHIASIS: Primary | ICD-10-CM

## 2021-10-26 DIAGNOSIS — N31.9 NEUROGENIC BLADDER: ICD-10-CM

## 2021-10-26 DIAGNOSIS — N20.0 NEPHROLITHIASIS: ICD-10-CM

## 2021-10-26 DIAGNOSIS — G35 MULTIPLE SCLEROSIS (HCC): ICD-10-CM

## 2021-10-26 DIAGNOSIS — I10 ESSENTIAL HYPERTENSION: ICD-10-CM

## 2021-10-26 LAB
BILIRUB BLD-MCNC: NEGATIVE MG/DL
CLARITY, POC: CLEAR
COLOR UR: YELLOW
GLUCOSE UR STRIP-MCNC: NEGATIVE MG/DL
KETONES UR QL: NEGATIVE
LEUKOCYTE EST, POC: ABNORMAL
NITRITE UR-MCNC: NEGATIVE MG/ML
PH UR: 6 [PH] (ref 5–8)
PROT UR STRIP-MCNC: NEGATIVE MG/DL
RBC # UR STRIP: NEGATIVE /UL
SP GR UR: 1.02 (ref 1–1.03)
UROBILINOGEN UR QL: NORMAL

## 2021-10-26 PROCEDURE — 74176 CT ABD & PELVIS W/O CONTRAST: CPT

## 2021-10-26 PROCEDURE — 81001 URINALYSIS AUTO W/SCOPE: CPT | Performed by: UROLOGY

## 2021-10-26 PROCEDURE — 99214 OFFICE O/P EST MOD 30 MIN: CPT | Performed by: UROLOGY

## 2021-10-26 RX ORDER — BACLOFEN 20 MG/1
20 TABLET ORAL 3 TIMES DAILY
COMMUNITY

## 2021-10-26 NOTE — PATIENT INSTRUCTIONS
"BMI for Adults  What is BMI?  Body mass index (BMI) is a number that is calculated from a person's weight and height. BMI can help estimate how much of a person's weight is composed of fat. BMI does not measure body fat directly. Rather, it is an alternative to procedures that directly measure body fat, which can be difficult and expensive.  BMI can help identify people who may be at higher risk for certain medical problems.  What are BMI measurements used for?  BMI is used as a screening tool to identify possible weight problems. It helps determine whether a person is obese, overweight, a healthy weight, or underweight.  BMI is useful for:  · Identifying a weight problem that may be related to a medical condition or may increase the risk for medical problems.  · Promoting changes, such as changes in diet and exercise, to help reach a healthy weight. BMI screening can be repeated to see if these changes are working.  How is BMI calculated?  BMI involves measuring your weight in relation to your height. Both height and weight are measured, and the BMI is calculated from those numbers. This can be done either in English (U.S[place] or metric measurements. Note that charts and online BMI calculators are available to help you find your BMI quickly and easily without having to do these calculations yourself.  To calculate your BMI in English (U.S.) measurements:    1. Measure your weight in pounds (lb).  2. Multiply the number of pounds by 703.  ? For example, for a person who weighs 180 lb, multiply that number by 703, which equals 126,540.  3. Measure your height in inches. Then multiply that number by itself to get a measurement called \"inches squared.\"  ? For example, for a person who is 70 inches tall, the \"inches squared\" measurement is 70 inches x 70 inches, which equals 4,900 inches squared.  4. Divide the total from step 2 (number of lb x 703) by the total from step 3 (inches squared): 126,540 ÷ 4,900 = 25.8. This is " "your BMI.    To calculate your BMI in metric measurements:  1. Measure your weight in kilograms (kg).  2. Measure your height in meters (m). Then multiply that number by itself to get a measurement called \"meters squared.\"  ? For example, for a person who is 1.75 m tall, the \"meters squared\" measurement is 1.75 m x 1.75 m, which is equal to 3.1 meters squared.  3. Divide the number of kilograms (your weight) by the meters squared number. In this example: 70 ÷ 3.1 = 22.6. This is your BMI.  What do the results mean?  BMI charts are used to identify whether you are underweight, normal weight, overweight, or obese. The following guidelines will be used:  · Underweight: BMI less than 18.5.  · Normal weight: BMI between 18.5 and 24.9.  · Overweight: BMI between 25 and 29.9.  · Obese: BMI of 30 or above.  Keep these notes in mind:  · Weight includes both fat and muscle, so someone with a muscular build, such as an athlete, may have a BMI that is higher than 24.9. In cases like these, BMI is not an accurate measure of body fat.  · To determine if excess body fat is the cause of a BMI of 25 or higher, further assessments may need to be done by a health care provider.  · BMI is usually interpreted in the same way for men and women.  Where to find more information  For more information about BMI, including tools to quickly calculate your BMI, go to these websites:  · Centers for Disease Control and Prevention: www.cdc.gov  · American Heart Association: www.heart.org  · National Heart, Lung, and Blood [place]: www.nhlbi.nih.gov  Summary  · Body mass index (BMI) is a number that is calculated from a person's weight and height.  · BMI may help estimate how much of a person's weight is composed of fat. BMI can help identify those who may be at higher risk for certain medical problems.  · BMI can be measured using English measurements or metric measurements.  · BMI charts are used to identify whether you are underweight, normal " weight, overweight, or obese.  This information is not intended to replace advice given to you by your health care provider. Make sure you discuss any questions you have with your health care provider.  Document Revised: 09/09/2020 Document Reviewed: 07/17/2020  Elsevier Patient Education © 2021 Elsevier Inc.

## 2021-10-27 RX ORDER — SODIUM CHLORIDE 9 MG/ML
100 INJECTION, SOLUTION INTRAVENOUS CONTINUOUS
Status: CANCELLED | OUTPATIENT
Start: 2021-10-27

## 2021-10-28 ENCOUNTER — HOSPITAL ENCOUNTER (OUTPATIENT)
Dept: WOMENS IMAGING | Age: 41
Discharge: HOME | End: 2021-10-28
Payer: COMMERCIAL

## 2021-10-28 DIAGNOSIS — Z12.31 BREAST CANCER SCREENING BY MAMMOGRAM: ICD-10-CM

## 2021-10-28 PROCEDURE — 77063 BREAST TOMOSYNTHESIS BI: CPT

## 2021-12-01 ENCOUNTER — HOSPITAL ENCOUNTER (OUTPATIENT)
Dept: PULMONOLOGY | Age: 41
Discharge: HOME | End: 2021-12-01
Payer: COMMERCIAL

## 2021-12-01 DIAGNOSIS — R06.09 DOE (DYSPNEA ON EXERTION): ICD-10-CM

## 2021-12-01 DIAGNOSIS — J98.6 ELEVATED HEMIDIAPHRAGM: ICD-10-CM

## 2021-12-01 PROCEDURE — 94060 EVALUATION OF WHEEZING: CPT | Performed by: INTERNAL MEDICINE

## 2021-12-01 PROCEDURE — 94727 GAS DIL/WSHOT DETER LNG VOL: CPT

## 2021-12-01 PROCEDURE — 94060 EVALUATION OF WHEEZING: CPT

## 2021-12-01 PROCEDURE — 94729 DIFFUSING CAPACITY: CPT

## 2021-12-01 PROCEDURE — 94729 DIFFUSING CAPACITY: CPT | Performed by: INTERNAL MEDICINE

## 2021-12-01 PROCEDURE — 94726 PLETHYSMOGRAPHY LUNG VOLUMES: CPT | Performed by: INTERNAL MEDICINE

## 2021-12-01 PROCEDURE — 6360000002 HC RX W HCPCS: Performed by: INTERNAL MEDICINE

## 2021-12-01 RX ORDER — ALBUTEROL SULFATE 2.5 MG/3ML
2.5 SOLUTION RESPIRATORY (INHALATION) EVERY 6 HOURS PRN
Status: DISCONTINUED | OUTPATIENT
Start: 2021-12-01 | End: 2021-12-03 | Stop reason: HOSPADM

## 2021-12-01 RX ADMIN — ALBUTEROL SULFATE 2.5 MG: 2.5 SOLUTION RESPIRATORY (INHALATION) at 15:01

## 2021-12-01 NOTE — PROCEDURES
Pulmonary Function Study    Interpretation:    The FVC is Normal. FEV1 is Normal. FEV1/FVC ratio is Normal. After bronchodilator therapy there was no significant improvement in FEV1. Total lung capacity is Normal. Residual volume is Normal.      Diffusing lung capacity when corrected for alveolar volume is Normal.         Impression:    1. Normal pulmonary function.         Saurabh Sneed MD, FCCP, [facility]

## 2021-12-01 NOTE — LETTER
Pulmonary Fuction Testing Lab  [facility], [address] [place] [address]  [phone]  Fax [phone]   November 15, 2021    Dear Jimi Buchanan,      This letter is to UPDATE you regarding your upcoming appointment on 12/1/2021 at 2:30 PM. Our current protocol states that if you have had a Covid vaccine and it has been at least two weeks since your last dose you will not be required to have a Covid test. Please bring your Covid card with you. If you are unvaccinated you must have your COVID-19 test on 11/29/2021 between 9 AM and 12 PM. You need to quarantine once you have done your Covid test until your appointment. We are required to have results back prior to your appointment. If your test is positive you will be called with those results. If you choose to have your Covid test done else where rather than at one of our [facility]) locations, you must have the printed test results in hand with you on the day of your appointment. Below are the addresses for the testing locations:    [address] [place] [address] Ave, [address]  Mondays, Wednesdays, and Fridays 9 AM- 12 PM    [facility] Location   [facility] Urgent Care next to [address] [facility]  Monday thru Friday 8 AM - 5 PM       Please do not use any bronchodilators (rescue inhalers, breathing treatments, ect.) 4 hours prior to test and no long acting respiratory medications 12 hours before test. These medications will cause test results to be inaccurate. On the day of your appointment you will come through the Main Entrance of the [facility] hospital in order to register at the [address] [place] [address] [facility] [place] registration. Upon entering the building you will take an immediate left. Please sign in to be registered. Once resgistered they will let me know. Thank you for entrusting us at [facility]) with your care. We look forward to seeing for you.     [facility]) Pulmonary Function Marisol Louise, Registered Respiratory Therapist

## 2021-12-07 ENCOUNTER — OFFICE VISIT (OUTPATIENT)
Dept: PULMONOLOGY | Age: 41
End: 2021-12-07
Payer: COMMERCIAL

## 2021-12-07 VITALS
HEART RATE: 74 BPM | WEIGHT: 222.2 LBS | DIASTOLIC BLOOD PRESSURE: 70 MMHG | BODY MASS INDEX: 37.02 KG/M2 | SYSTOLIC BLOOD PRESSURE: 114 MMHG | TEMPERATURE: 97.6 F | HEIGHT: 65 IN | OXYGEN SATURATION: 96 %

## 2021-12-07 DIAGNOSIS — R06.83 SNORING: ICD-10-CM

## 2021-12-07 DIAGNOSIS — G47.33 OSA (OBSTRUCTIVE SLEEP APNEA): Primary | ICD-10-CM

## 2021-12-07 DIAGNOSIS — E66.9 OBESITY (BMI 35.0-39.9 WITHOUT COMORBIDITY): ICD-10-CM

## 2021-12-07 DIAGNOSIS — G47.8 NON-RESTORATIVE SLEEP: ICD-10-CM

## 2021-12-07 DIAGNOSIS — J98.6 ELEVATED HEMIDIAPHRAGM: ICD-10-CM

## 2021-12-07 PROCEDURE — 99214 OFFICE O/P EST MOD 30 MIN: CPT | Performed by: INTERNAL MEDICINE

## 2021-12-07 PROCEDURE — G8484 FLU IMMUNIZE NO ADMIN: HCPCS | Performed by: INTERNAL MEDICINE

## 2021-12-07 PROCEDURE — G8427 DOCREV CUR MEDS BY ELIG CLIN: HCPCS | Performed by: INTERNAL MEDICINE

## 2021-12-07 PROCEDURE — 1036F TOBACCO NON-USER: CPT | Performed by: INTERNAL MEDICINE

## 2021-12-07 PROCEDURE — G8417 CALC BMI ABV UP PARAM F/U: HCPCS | Performed by: INTERNAL MEDICINE

## 2021-12-07 ASSESSMENT — ENCOUNTER SYMPTOMS
ABDOMINAL DISTENTION: 0
COUGH: 0
SHORTNESS OF BREATH: 0
ABDOMINAL PAIN: 0
CHEST TIGHTNESS: 0
ANAL BLEEDING: 0
RHINORRHEA: 0
APNEA: 0
WHEEZING: 0
BACK PAIN: 0

## 2021-12-07 NOTE — PROGRESS NOTES
Pulmonary and Sleep Medicine    Vania Rosen (:  1980) is a 35 y.o. female,Established patient, here for evaluation of the following chief complaint(s):  Follow-up (PFT results)      Referring physician:  No referring provider defined for this encounter. ASSESSMENT/PLAN:  1. SHYAM (obstructive sleep apnea)  2. Elevated hemidiaphragm  3. Obesity (BMI 35.0-39.9 without comorbidity)  4. Non-restorative sleep  5. Snoring        Discussed finding on the PFT. Discussed findings on the sleep compliance report. She is doing well with the CPAP. Discussed sleep hygiene. She goes to the bed around 9 or 10 and watches TV and falls a sleep in about an hour. Discussed implementing sterile bedroom environment. Gavin Huerta MD, [facility], [facility]    Return in about 3 months (around 3/7/2022). SUBJECTIVE/OBJECTIVE:  She is here for follow up on sleep apnea and shortness of breath. She is using the CPAP and is compliant with the CPAP. She feels it is helping her some. She had a PFT and it was normal. She only wheezes if she laughs. Prior to Visit Medications    Medication Sig Taking?  Authorizing Provider   desonide (DESOWEN) 0.05 % cream  Yes Historical Provider, MD   ketorolac (TORADOL) 10 MG tablet Take 1 tablet by mouth every 6 hours as needed for Pain Yes DEVEN Alves CNP   ocrelizumab (OCREVUS) 300 MG/10ML SOLN injection  Yes Historical Provider, MD   atorvastatin (LIPITOR) 20 MG tablet TAKE 1 TABLET AT BEDTIME FOR HIGH CHOLESTEROL Yes Kenneth Neely MD   phenazopyridine (PYRIDIUM) 100 MG tablet Take 1 tablet by mouth 3 times daily as needed for Pain Yes Kenneth Neely MD   propranolol (INDERAL LA) 60 MG extended release capsule TAKE 1 CAPSULE ONCE DAILY FOR HEART RATE Yes Kenneth Neely MD   conjugated estrogens (PREMARIN) 0.625 MG/GM vaginal cream Place 0.5 g vaginally Twice a Week Yes DEVEN Fry CNP   tiZANidine (ZANAFLEX) 4 MG tablet Take 1 tablet by mouth every 8 hours as needed (for muscle spasms) Yes Artem Ro MD   nortriptyline (PAMELOR) 75 MG capsule  Yes Historical Provider, MD   topiramate (TOPAMAX) 100 MG tablet Take 1 tablet by mouth 2 times daily Yes Chastity Herrera MD   Probiotic Product (PROBIOTIC DAILY PO) Take by mouth Yes Historical Provider, MD   promethazine (PHENERGAN) 25 MG tablet Take 25 mg by mouth Yes Historical Provider, MD   cloNIDine (CATAPRES) 0.1 MG tablet 0.1 mg as needed  Yes Historical Provider, MD   SUMAtriptan Succinate 6 MG/0.5ML SOAJ as needed  Yes Historical Provider, MD   Cholecalciferol (VITAMIN D-3) 5000 UNITS TABS Take 5,000 Units by mouth daily. Yes Historical Provider, MD   gabapentin (NEURONTIN) 100 MG capsule TAKE ONE TO TWO CAPSULES BY MOUTH THREE TIMES DAILY AS NEEDED  Artem Ro MD   baclofen (LIORESAL) 20 MG tablet Take 20 mg by mouth 2 times daily  Historical Provider, MD   bethanechol (URECHOLINE) 25 MG tablet Take 1 tablet by mouth 3 times daily. Artem Ro MD        Review of Systems   Constitutional: Negative for activity change, appetite change, chills, diaphoresis and fatigue. HENT: Negative for congestion, dental problem, drooling, ear discharge, postnasal drip and rhinorrhea. Eyes: Negative for visual disturbance. Respiratory: Negative for apnea, cough, chest tightness, shortness of breath and wheezing. Gastrointestinal: Negative for abdominal distention, abdominal pain and anal bleeding. Endocrine: Negative for cold intolerance, heat intolerance and polydipsia. Genitourinary: Negative for difficulty urinating, dysuria, enuresis and flank pain. Musculoskeletal: Negative for arthralgias, back pain and gait problem. Allergic/Immunologic: Negative for environmental allergies. Neurological: Negative for dizziness, facial asymmetry, light-headedness and headaches.        Vitals:    12/07/21 1119   BP: 114/70   Pulse: 74   Temp: 97.6 °F (36.4 °C)   SpO2: 96%     Physical Exam  Vitals reviewed. Constitutional:       Appearance: Normal appearance. HENT:      Head: Normocephalic and atraumatic. Nose: Nose normal.   Eyes:      Extraocular Movements: Extraocular movements intact. Conjunctiva/sclera: Conjunctivae normal.   Cardiovascular:      Rate and Rhythm: Normal rate and regular rhythm. Heart sounds: No murmur heard. No friction rub. Pulmonary:      Effort: Pulmonary effort is normal. No respiratory distress. Breath sounds: Normal breath sounds. No stridor. No wheezing, rhonchi or rales. Abdominal:      General: There is no distension. Palpations: There is no mass. Tenderness: There is no abdominal tenderness. There is no guarding or rebound. Musculoskeletal:      Cervical back: Normal range of motion and neck supple. Neurological:      Mental Status: She is alert and oriented to person, place, and time. This note was generated used a voice recognition software. Errors in voice recognition may have occurred. An electronic signature was used to authenticate this note.     --Florentino Arnett MD

## 2021-12-09 RX ORDER — TIZANIDINE 4 MG/1
4 TABLET ORAL EVERY 8 HOURS PRN
Qty: 270 TABLET | Refills: 1 | Status: SHIPPED | OUTPATIENT
Start: 2021-12-09

## 2021-12-14 ENCOUNTER — PRE-ADMISSION TESTING (OUTPATIENT)
Dept: PREADMISSION TESTING | Facility: HOSPITAL | Age: 41
End: 2021-12-14

## 2021-12-14 ENCOUNTER — LAB (OUTPATIENT)
Dept: LAB | Facility: HOSPITAL | Age: 41
End: 2021-12-14

## 2021-12-14 VITALS
RESPIRATION RATE: 18 BRPM | BODY MASS INDEX: 35.36 KG/M2 | SYSTOLIC BLOOD PRESSURE: 140 MMHG | WEIGHT: 220.02 LBS | OXYGEN SATURATION: 97 % | DIASTOLIC BLOOD PRESSURE: 94 MMHG | HEART RATE: 76 BPM | HEIGHT: 66 IN

## 2021-12-14 DIAGNOSIS — N20.0 NEPHROLITHIASIS: ICD-10-CM

## 2021-12-14 PROCEDURE — 80048 BASIC METABOLIC PNL TOTAL CA: CPT | Performed by: UROLOGY

## 2021-12-14 PROCEDURE — 85027 COMPLETE CBC AUTOMATED: CPT | Performed by: UROLOGY

## 2021-12-14 PROCEDURE — U0004 COV-19 TEST NON-CDC HGH THRU: HCPCS | Performed by: UROLOGY

## 2021-12-14 PROCEDURE — 93010 ELECTROCARDIOGRAM REPORT: CPT | Performed by: INTERNAL MEDICINE

## 2021-12-14 PROCEDURE — 81001 URINALYSIS AUTO W/SCOPE: CPT | Performed by: UROLOGY

## 2021-12-14 PROCEDURE — 93005 ELECTROCARDIOGRAM TRACING: CPT

## 2021-12-14 NOTE — DISCHARGE INSTRUCTIONS
DAY OF SURGERY INSTRUCTIONS          ARRIVAL TIME: AS DIRECTED BY OFFICE    YOU MAY TAKE THE FOLLOWING MEDICATION(S) THE MORNING OF SURGERY WITH A SIP OF WATER: Gabapentin and Propanolol    ALL OTHER HOME MEDICATIONS CHECK WITH YOUR DOCTOR    DO NOT TAKE ANY ERECTILE DYSFUNCTION MEDICATIONS (EX:  CIALIS, VIAGRA) 24 HOURS PRIOR TO SURGERY              MANAGING PAIN AFTER SURGERY    We know you are probably wondering what your pain will be like after surgery.  Following surgery it is unrealistic to expect you will not have pain.   Pain is how our bodies let us know that something is wrong or cautions us to be careful.  That said, our goal is to make your pain tolerable.    Methods we may use to treat your pain include (oral or IV medications, PCAs, epidurals, nerve blocks, etc.)   While some procedures require IV pain medications for a short time after surgery, transitioning to pain medications by mouth allows for better management of pain.   Your nurse will encourage you to take oral pain medications whenever possible.  IV medications work almost immediately, but only last a short while.  Taking medications by mouth allows for a more constant level of medication in your blood stream for a longer period of time.      Once your pain is out of control it is harder to get back under control.  It is important you are aware when your next dose of pain medication is due.  If you are admitted, your nurse may write the time of your next dose on the white board in your room to help you remember.      We are interested in your pain and encourage you to inform us about aggravating factors during your visit.   Many times a simple repositioning every few hours can make a big difference.    If your physician says it is okay, do not let your pain prevent you from getting out of bed. Be sure to call your nurse for assistance prior to getting up so you do not fall.      Before surgery, please decide your tolerable pain goal.  These  faces help describe the pain ratings we use on a 0-10 scale.   Be prepared to tell us your goal and whether or not you take pain or anxiety medications at home.      BEFORE YOU COME TO THE HOSPITAL  (Pre-op instructions)  • Do not eat, drink, smoke or chew gum after midnight the night before surgery.  This also includes no mints.  • Morning of surgery take only the medicines you have been instructed with a sip of water unless otherwise instructed  by your physician.  • Do not shave, wear makeup or dark nail polish.  • Remove all jewelry including rings.  • Leave anything you consider valuable at home.  • Leave your suitcase in the car until after your surgery.  • Bring the following with you if applicable:  o Picture ID and insurance, Medicare or Medicaid cards  o Co-pay/deductible required by insurance (cash, check, credit card)  o Copy of advance directive, living will or power-of- documents if not brought to Pre-work  o CPAP or BIPAP mask and tubing  o Relaxation aids ( book, magazine), etc.  o Hearing aids                                 ON THE DAY OF SURGERY  · On the day of surgery check in at registration located at the main entrance of the hospital. Only one family member or friend are allowed per patient.  ? You will be registered and given a beeper with instructions where to wait in the main lobby.  ? When your beeper lights up and vibrates a member of the Outpatient Surgery staff will meet you at the double doors under the stair steps and escort you to your preoperative room.   · You may have cloth compression devices placed on your legs. These help to prevent blood clots and reduce swelling in your legs.  · An IV may be inserted into one of your veins.  · In the operating room, you may be given one or more of the following:  ? A medicine to help you relax (sedative).  ? A medicine to numb the area (local anesthetic).  ? A medicine to make you fall asleep (general anesthetic).  ? A medicine that  "is injected into an area of your body to numb everything below the injection site (regional anesthetic).  · Your surgical site will be marked or identified.  · You may be given an antibiotic through your IV to help prevent infection.  Contact a health care provider if you:  · Develop a fever of more than 100.4°F (38°C) or other feelings of illness during the 48 hours before your surgery.  · Have symptoms that get worse.  Have questions or concerns about your surgery    General Anesthesia/Surgery, Adult  General anesthesia is the use of medicines to make a person \"go to sleep\" (unconscious) for a medical procedure. General anesthesia must be used for certain procedures, and is often recommended for procedures that:  · Last a long time.  · Require you to be still or in an unusual position.  · Are major and can cause blood loss.  The medicines used for general anesthesia are called general anesthetics. As well as making you unconscious for a certain amount of time, these medicines:  · Prevent pain.  · Control your blood pressure.  · Relax your muscles.  Tell a health care provider about:  · Any allergies you have.  · All medicines you are taking, including vitamins, herbs, eye drops, creams, and over-the-counter medicines.  · Any problems you or family members have had with anesthetic medicines.  · Types of anesthetics you have had in the past.  · Any blood disorders you have.  · Any surgeries you have had.  · Any medical conditions you have.  · Any recent upper respiratory, chest, or ear infections.  · Any history of:  ? Heart or lung conditions, such as heart failure, sleep apnea, asthma, or chronic obstructive pulmonary disease (COPD).  ?  service.  ? Depression or anxiety.  · Any tobacco or drug use, including marijuana or alcohol use.  · Whether you are pregnant or may be pregnant.  What are the risks?  Generally, this is a safe procedure. However, problems may occur, including:  · Allergic " reaction.  · Lung and heart problems.  · Inhaling food or liquid from the stomach into the lungs (aspiration).  · Nerve injury.  · Air in the bloodstream, which can lead to stroke.  · Extreme agitation or confusion (delirium) when you wake up from the anesthetic.  · Waking up during your procedure and being unable to move. This is rare.  These problems are more likely to develop if you are having a major surgery or if you have an advanced or serious medical condition. You can prevent some of these complications by answering all of your health care provider's questions thoroughly and by following all instructions before your procedure.  General anesthesia can cause side effects, including:  · Nausea or vomiting.  · A sore throat from the breathing tube.  · Hoarseness.  · Wheezing or coughing.  · Shaking chills.  · Tiredness.  · Body aches.  · Anxiety.  · Sleepiness or drowsiness.  · Confusion or agitation.  RISKS AND COMPLICATIONS OF SURGERY  Your health care provider will discuss possible risks and complications with you before surgery. Common risks and complications include:    · Problems due to the use of anesthetics.  · Blood loss and replacement (does not apply to minor surgical procedures).  · Temporary increase in pain due to surgery.  · Uncorrected pain or problems that the surgery was meant to correct.  · Infection.  · New damage.    What happens before the procedure?    Medicines  Ask your health care provider about:  · Changing or stopping your regular medicines. This is especially important if you are taking diabetes medicines or blood thinners.  · Taking medicines such as aspirin and ibuprofen. These medicines can thin your blood. Do not take these medicines unless your health care provider tells you to take them.  · Taking over-the-counter medicines, vitamins, herbs, and supplements. Do not take these during the week before your procedure unless your health care provider approves them.  General  instructions  · Starting 3-6 weeks before the procedure, do not use any products that contain nicotine or tobacco, such as cigarettes and e-cigarettes. If you need help quitting, ask your health care provider.  · If you brush your teeth on the morning of the procedure, make sure to spit out all of the toothpaste.  · Tell your health care provider if you become ill or develop a cold, cough, or fever.  · If instructed by your health care provider, bring your sleep apnea device with you on the day of your surgery (if applicable).  · Ask your health care provider if you will be going home the same day, the following day, or after a longer hospital stay.  ? Plan to have someone take you home from the hospital or clinic.  ? Plan to have a responsible adult care for you for at least 24 hours after you leave the hospital or clinic. This is important.  What happens during the procedure?  · You will be given anesthetics through both of the following:  ? A mask placed over your nose and mouth.  ? An IV in one of your veins.  · You may receive a medicine to help you relax (sedative).  · After you are unconscious, a breathing tube may be inserted down your throat to help you breathe. This will be removed before you wake up.  · An anesthesia specialist will stay with you throughout your procedure. He or she will:  ? Keep you comfortable and safe by continuing to give you medicines and adjusting the amount of medicine that you get.  ? Monitor your blood pressure, pulse, and oxygen levels to make sure that the anesthetics do not cause any problems.  The procedure may vary among health care providers and hospitals.  What happens after the procedure?  · Your blood pressure, temperature, heart rate, breathing rate, and blood oxygen level will be monitored until the medicines you were given have worn off.  · You will wake up in a recovery area. You may wake up slowly.  · If you feel anxious or agitated, you may be given medicine to  help you calm down.  · If you will be going home the same day, your health care provider may check to make sure you can walk, drink, and urinate.  · Your health care provider will treat any pain or side effects you have before you go home.  · Do not drive for 24 hours if you were given a sedative.  Summary  · General anesthesia is used to keep you still and prevent pain during a procedure.  · It is important to tell your healthcare provider about your medical history and any surgeries you have had, and previous experience with anesthesia.  · Follow your healthcare provider’s instructions about when to stop eating, drinking, or taking certain medicines before your procedure.  · Plan to have someone take you home from the hospital or clinic.  This information is not intended to replace advice given to you by your health care provider. Make sure you discuss any questions you have with your health care provider.  Document Released: 03/26/2009 Document Revised: 08/03/2018 Document Reviewed: 08/03/2018  [employer] Patient Education © 2019 [employer]      Fall Prevention in Hospitals, Adult  As a hospital patient, your condition and the treatments you receive can increase your risk for falls. Some additional risk factors for falls in a hospital include:  · Being in an unfamiliar environment.  · Being on bed rest.  · Your surgery.  · Taking certain medicines.  · Your tubing requirements, such as intravenous (IV) therapy or catheters.  It is important that you learn how to decrease fall risks while at the hospital. Below are important tips that can help prevent falls.  SAFETY TIPS FOR PREVENTING FALLS  Talk about your risk of falling.  · Ask your health care provider why you are at risk for falling. Is it your medicine, illness, tubing placement, or something else?  · Make a plan with your health care provider to keep you safe from falls.  · Ask your health care provider or pharmacist about side effects of your  medicines. Some medicines can make you dizzy or affect your coordination.  Ask for help.  · Ask for help before getting out of bed. You may need to press your call button.  · Ask for assistance in getting safely to the toilet.  · Ask for a walker or cane to be put at your bedside. Ask that most of the side rails on your bed be placed up before your health care provider leaves the room.  · Ask family or friends to sit with you.  · Ask for things that are out of your reach, such as your glasses, hearing aids, telephone, bedside table, or call button.  Follow these tips to avoid falling:  · Stay lying or seated, rather than standing, while waiting for help.  · Wear rubber-soled slippers or shoes whenever you walk in the hospital.  · Avoid quick, sudden movements.  ¨ Change positions slowly.  ¨ Sit on the side of your bed before standing.  ¨ Stand up slowly and wait before you start to walk.  · Let your health care provider know if there is a spill on the floor.  · Pay careful attention to the medical equipment, electrical cords, and tubes around you.  · When you need help, use your call button by your bed or in the bathroom. Wait for one of your health care providers to help you.  · If you feel dizzy or unsure of your footing, return to bed and wait for assistance.  · Avoid being distracted by the TV, telephone, or another person in your room.  · Do not lean or support yourself on rolling objects, such as IV poles or bedside tables.     This information is not intended to replace advice given to you by your health care provider. Make sure you discuss any questions you have with your health care provider.     Document Released: 12/15/2001 Document Revised: 01/08/2016 Document Reviewed: 08/25/2013  [employer] Patient Education ©2016 [employer]            DAY OF SURGERY INSTRUCTIONS          ARRIVAL TIME: AS DIRECTED BY OFFICE    YOU MAY TAKE THE FOLLOWING MEDICATION(S) THE MORNING OF SURGERY WITH A SIP OF  WATER: ***    ALL OTHER HOME MEDICATIONS CHECK WITH YOUR DOCTOR    DO NOT TAKE ANY ERECTILE DYSFUNCTION MEDICATIONS (EX:  CIALIS, VIAGRA) 24 HOURS PRIOR TO SURGERY              MANAGING PAIN AFTER SURGERY    We know you are probably wondering what your pain will be like after surgery.  Following surgery it is unrealistic to expect you will not have pain.   Pain is how our bodies let us know that something is wrong or cautions us to be careful.  That said, our goal is to make your pain tolerable.    Methods we may use to treat your pain include (oral or IV medications, PCAs, epidurals, nerve blocks, etc.)   While some procedures require IV pain medications for a short time after surgery, transitioning to pain medications by mouth allows for better management of pain.   Your nurse will encourage you to take oral pain medications whenever possible.  IV medications work almost immediately, but only last a short while.  Taking medications by mouth allows for a more constant level of medication in your blood stream for a longer period of time.      Once your pain is out of control it is harder to get back under control.  It is important you are aware when your next dose of pain medication is due.  If you are admitted, your nurse may write the time of your next dose on the white board in your room to help you remember.      We are interested in your pain and encourage you to inform us about aggravating factors during your visit.   Many times a simple repositioning every few hours can make a big difference.    If your physician says it is okay, do not let your pain prevent you from getting out of bed. Be sure to call your nurse for assistance prior to getting up so you do not fall.      Before surgery, please decide your tolerable pain goal.  These faces help describe the pain ratings we use on a 0-10 scale.   Be prepared to tell us your goal and whether or not you take pain or anxiety medications at home.      BEFORE YOU  COME TO THE HOSPITAL  (Pre-op instructions)  • Do not eat, drink, smoke or chew gum after midnight the night before surgery.  This also includes no mints.  • Morning of surgery take only the medicines you have been instructed with a sip of water unless otherwise instructed  by your physician.  • Do not shave, wear makeup or dark nail polish.  • Remove all jewelry including rings.  • Leave anything you consider valuable at home.  • Leave your suitcase in the car until after your surgery.  • Bring the following with you if applicable:  o Picture ID and insurance, Medicare or Medicaid cards  o Co-pay/deductible required by insurance (cash, check, credit card)  o Copy of advance directive, living will or power-of- documents if not brought to Pre-work  o CPAP or BIPAP mask and tubing  o Relaxation aids ( book, magazine), etc.  o Hearing aids                                 ON THE DAY OF SURGERY  · On the day of surgery check in at registration located at the main entrance of the hospital. Only one family member or friend are allowed per patient.  ? You will be registered and given a beeper with instructions where to wait in the main lobby.  ? When your beeper lights up and vibrates a member of the Outpatient Surgery staff will meet you at the double doors under the stair steps and escort you to your preoperative room.   · You may have cloth compression devices placed on your legs. These help to prevent blood clots and reduce swelling in your legs.  · An IV may be inserted into one of your veins.  · In the operating room, you may be given one or more of the following:  ? A medicine to help you relax (sedative).  ? A medicine to numb the area (local anesthetic).  ? A medicine to make you fall asleep (general anesthetic).  ? A medicine that is injected into an area of your body to numb everything below the injection site (regional anesthetic).  · Your surgical site will be marked or identified.  · You may be given  "an antibiotic through your IV to help prevent infection.  Contact a health care provider if you:  · Develop a fever of more than 100.4°F (38°C) or other feelings of illness during the 48 hours before your surgery.  · Have symptoms that get worse.  Have questions or concerns about your surgery    General Anesthesia/Surgery, Adult  General anesthesia is the use of medicines to make a person \"go to sleep\" (unconscious) for a medical procedure. General anesthesia must be used for certain procedures, and is often recommended for procedures that:  · Last a long time.  · Require you to be still or in an unusual position.  · Are major and can cause blood loss.  The medicines used for general anesthesia are called general anesthetics. As well as making you unconscious for a certain amount of time, these medicines:  · Prevent pain.  · Control your blood pressure.  · Relax your muscles.  Tell a health care provider about:  · Any allergies you have.  · All medicines you are taking, including vitamins, herbs, eye drops, creams, and over-the-counter medicines.  · Any problems you or family members have had with anesthetic medicines.  · Types of anesthetics you have had in the past.  · Any blood disorders you have.  · Any surgeries you have had.  · Any medical conditions you have.  · Any recent upper respiratory, chest, or ear infections.  · Any history of:  ? Heart or lung conditions, such as heart failure, sleep apnea, asthma, or chronic obstructive pulmonary disease (COPD).  ?  service.  ? Depression or anxiety.  · Any tobacco or drug use, including marijuana or alcohol use.  · Whether you are pregnant or may be pregnant.  What are the risks?  Generally, this is a safe procedure. However, problems may occur, including:  · Allergic reaction.  · Lung and heart problems.  · Inhaling food or liquid from the stomach into the lungs (aspiration).  · Nerve injury.  · Air in the bloodstream, which can lead to stroke.  · Extreme " agitation or confusion (delirium) when you wake up from the anesthetic.  · Waking up during your procedure and being unable to move. This is rare.  These problems are more likely to develop if you are having a major surgery or if you have an advanced or serious medical condition. You can prevent some of these complications by answering all of your health care provider's questions thoroughly and by following all instructions before your procedure.  General anesthesia can cause side effects, including:  · Nausea or vomiting.  · A sore throat from the breathing tube.  · Hoarseness.  · Wheezing or coughing.  · Shaking chills.  · Tiredness.  · Body aches.  · Anxiety.  · Sleepiness or drowsiness.  · Confusion or agitation.  RISKS AND COMPLICATIONS OF SURGERY  Your health care provider will discuss possible risks and complications with you before surgery. Common risks and complications include:    · Problems due to the use of anesthetics.  · Blood loss and replacement (does not apply to minor surgical procedures).  · Temporary increase in pain due to surgery.  · Uncorrected pain or problems that the surgery was meant to correct.  · Infection.  · New damage.    What happens before the procedure?    Medicines  Ask your health care provider about:  · Changing or stopping your regular medicines. This is especially important if you are taking diabetes medicines or blood thinners.  · Taking medicines such as aspirin and ibuprofen. These medicines can thin your blood. Do not take these medicines unless your health care provider tells you to take them.  · Taking over-the-counter medicines, vitamins, herbs, and supplements. Do not take these during the week before your procedure unless your health care provider approves them.  General instructions  · Starting 3-6 weeks before the procedure, do not use any products that contain nicotine or tobacco, such as cigarettes and e-cigarettes. If you need help quitting, ask your health care  provider.  · If you brush your teeth on the morning of the procedure, make sure to spit out all of the toothpaste.  · Tell your health care provider if you become ill or develop a cold, cough, or fever.  · If instructed by your health care provider, bring your sleep apnea device with you on the day of your surgery (if applicable).  · Ask your health care provider if you will be going home the same day, the following day, or after a longer hospital stay.  ? Plan to have someone take you home from the hospital or clinic.  ? Plan to have a responsible adult care for you for at least 24 hours after you leave the hospital or clinic. This is important.  What happens during the procedure?  · You will be given anesthetics through both of the following:  ? A mask placed over your nose and mouth.  ? An IV in one of your veins.  · You may receive a medicine to help you relax (sedative).  · After you are unconscious, a breathing tube may be inserted down your throat to help you breathe. This will be removed before you wake up.  · An anesthesia specialist will stay with you throughout your procedure. He or she will:  ? Keep you comfortable and safe by continuing to give you medicines and adjusting the amount of medicine that you get.  ? Monitor your blood pressure, pulse, and oxygen levels to make sure that the anesthetics do not cause any problems.  The procedure may vary among health care providers and hospitals.  What happens after the procedure?  · Your blood pressure, temperature, heart rate, breathing rate, and blood oxygen level will be monitored until the medicines you were given have worn off.  · You will wake up in a recovery area. You may wake up slowly.  · If you feel anxious or agitated, you may be given medicine to help you calm down.  · If you will be going home the same day, your health care provider may check to make sure you can walk, drink, and urinate.  · Your health care provider will treat any pain or side  effects you have before you go home.  · Do not drive for 24 hours if you were given a sedative.  Summary  · General anesthesia is used to keep you still and prevent pain during a procedure.  · It is important to tell your healthcare provider about your medical history and any surgeries you have had, and previous experience with anesthesia.  · Follow your healthcare provider’s instructions about when to stop eating, drinking, or taking certain medicines before your procedure.  · Plan to have someone take you home from the hospital or clinic.  This information is not intended to replace advice given to you by your health care provider. Make sure you discuss any questions you have with your health care provider.  Document Released: 03/26/2009 Document Revised: 08/03/2018 Document Reviewed: 08/03/2018  [employer] Interactive Patient Education © 2019 [employer]      Fall Prevention in Hospitals, Adult  As a hospital patient, your condition and the treatments you receive can increase your risk for falls. Some additional risk factors for falls in a hospital include:  · Being in an unfamiliar environment.  · Being on bed rest.  · Your surgery.  · Taking certain medicines.  · Your tubing requirements, such as intravenous (IV) therapy or catheters.  It is important that you learn how to decrease fall risks while at the hospital. Below are important tips that can help prevent falls.  SAFETY TIPS FOR PREVENTING FALLS  Talk about your risk of falling.  · Ask your health care provider why you are at risk for falling. Is it your medicine, illness, tubing placement, or something else?  · Make a plan with your health care provider to keep you safe from falls.  · Ask your health care provider or pharmacist about side effects of your medicines. Some medicines can make you dizzy or affect your coordination.  Ask for help.  · Ask for help before getting out of bed. You may need to press your call button.  · Ask for assistance in  getting safely to the toilet.  · Ask for a walker or cane to be put at your bedside. Ask that most of the side rails on your bed be placed up before your health care provider leaves the room.  · Ask family or friends to sit with you.  · Ask for things that are out of your reach, such as your glasses, hearing aids, telephone, bedside table, or call button.  Follow these tips to avoid falling:  · Stay lying or seated, rather than standing, while waiting for help.  · Wear rubber-soled slippers or shoes whenever you walk in the hospital.  · Avoid quick, sudden movements.  ¨ Change positions slowly.  ¨ Sit on the side of your bed before standing.  ¨ Stand up slowly and wait before you start to walk.  · Let your health care provider know if there is a spill on the floor.  · Pay careful attention to the medical equipment, electrical cords, and tubes around you.  · When you need help, use your call button by your bed or in the bathroom. Wait for one of your health care providers to help you.  · If you feel dizzy or unsure of your footing, return to bed and wait for assistance.  · Avoid being distracted by the TV, telephone, or another person in your room.  · Do not lean or support yourself on rolling objects, such as IV poles or bedside tables.     This information is not intended to replace advice given to you by your health care provider. Make sure you discuss any questions you have with your health care provider.     Document Released: 12/15/2001 Document Revised: 01/08/2016 Document Reviewed: 08/25/2013  [email or web address] Interactive Patient Education ©2016 [email or web address]            PATIENT/FAMILY/RESPONSIBLE PARTY VERBALIZES UNDERSTANDING OF ABOVE EDUCATION.  COPY OF PAIN SCALE GIVEN AND REVIEWED WITH VERBALIZED UNDERSTANDING.

## 2021-12-16 LAB
QT INTERVAL: 384 MS
QTC INTERVAL: 420 MS

## 2021-12-17 ENCOUNTER — HOSPITAL ENCOUNTER (OUTPATIENT)
Facility: HOSPITAL | Age: 41
Setting detail: HOSPITAL OUTPATIENT SURGERY
Discharge: HOME | End: 2021-12-17
Attending: UROLOGY | Admitting: UROLOGY

## 2021-12-17 ENCOUNTER — APPOINTMENT (OUTPATIENT)
Dept: GENERAL RADIOLOGY | Facility: HOSPITAL | Age: 41
End: 2021-12-17

## 2021-12-17 ENCOUNTER — ANESTHESIA (OUTPATIENT)
Dept: PERIOP | Facility: HOSPITAL | Age: 41
End: 2021-12-17

## 2021-12-17 ENCOUNTER — ANESTHESIA EVENT (OUTPATIENT)
Dept: PERIOP | Facility: HOSPITAL | Age: 41
End: 2021-12-17

## 2021-12-17 VITALS
TEMPERATURE: 97.7 F | DIASTOLIC BLOOD PRESSURE: 104 MMHG | OXYGEN SATURATION: 97 % | HEART RATE: 79 BPM | SYSTOLIC BLOOD PRESSURE: 129 MMHG | RESPIRATION RATE: 18 BRPM

## 2021-12-17 DIAGNOSIS — N20.0 NEPHROLITHIASIS: ICD-10-CM

## 2021-12-17 PROCEDURE — 25010000002 DEXAMETHASONE PER 1 MG: Performed by: NURSE ANESTHETIST, CERTIFIED REGISTERED

## 2021-12-17 PROCEDURE — 76000 FLUOROSCOPY <1 HR PHYS/QHP: CPT

## 2021-12-17 PROCEDURE — 25010000002 PROPOFOL 10 MG/ML EMULSION: Performed by: NURSE ANESTHETIST, CERTIFIED REGISTERED

## 2021-12-17 PROCEDURE — 25010000002 DEXAMETHASONE PER 1 MG: Performed by: ANESTHESIOLOGY

## 2021-12-17 PROCEDURE — 25010000002 FENTANYL CITRATE (PF) 100 MCG/2ML SOLUTION: Performed by: NURSE ANESTHETIST, CERTIFIED REGISTERED

## 2021-12-17 PROCEDURE — 25010000002 LEVOFLOXACIN PER 250 MG: Performed by: UROLOGY

## 2021-12-17 PROCEDURE — S0260 H&P FOR SURGERY: HCPCS | Performed by: UROLOGY

## 2021-12-17 PROCEDURE — 74018 RADEX ABDOMEN 1 VIEW: CPT

## 2021-12-17 PROCEDURE — 25010000002 ONDANSETRON PER 1 MG: Performed by: NURSE ANESTHETIST, CERTIFIED REGISTERED

## 2021-12-17 PROCEDURE — 25010000002 KETOROLAC TROMETHAMINE PER 15 MG: Performed by: NURSE ANESTHETIST, CERTIFIED REGISTERED

## 2021-12-17 PROCEDURE — 50590 FRAGMENTING OF KIDNEY STONE: CPT | Performed by: UROLOGY

## 2021-12-17 PROCEDURE — 25010000002 MIDAZOLAM PER 1 MG: Performed by: ANESTHESIOLOGY

## 2021-12-17 RX ORDER — SCOLOPAMINE TRANSDERMAL SYSTEM 1 MG/1
1 PATCH, EXTENDED RELEASE TRANSDERMAL CONTINUOUS
Status: DISCONTINUED | OUTPATIENT
Start: 2021-12-17 | End: 2021-12-17 | Stop reason: HOSPADM

## 2021-12-17 RX ORDER — SODIUM CHLORIDE, SODIUM LACTATE, POTASSIUM CHLORIDE, CALCIUM CHLORIDE 600; 310; 30; 20 MG/100ML; MG/100ML; MG/100ML; MG/100ML
1000 INJECTION, SOLUTION INTRAVENOUS CONTINUOUS
Status: DISCONTINUED | OUTPATIENT
Start: 2021-12-17 | End: 2021-12-17 | Stop reason: HOSPADM

## 2021-12-17 RX ORDER — LIDOCAINE HYDROCHLORIDE 10 MG/ML
0.5 INJECTION, SOLUTION EPIDURAL; INFILTRATION; INTRACAUDAL; PERINEURAL ONCE AS NEEDED
Status: DISCONTINUED | OUTPATIENT
Start: 2021-12-17 | End: 2021-12-17 | Stop reason: HOSPADM

## 2021-12-17 RX ORDER — LIDOCAINE HYDROCHLORIDE 20 MG/ML
INJECTION, SOLUTION EPIDURAL; INFILTRATION; INTRACAUDAL; PERINEURAL AS NEEDED
Status: DISCONTINUED | OUTPATIENT
Start: 2021-12-17 | End: 2021-12-17 | Stop reason: SURG

## 2021-12-17 RX ORDER — FENTANYL CITRATE 50 UG/ML
INJECTION, SOLUTION INTRAMUSCULAR; INTRAVENOUS AS NEEDED
Status: DISCONTINUED | OUTPATIENT
Start: 2021-12-17 | End: 2021-12-17 | Stop reason: SURG

## 2021-12-17 RX ORDER — SODIUM CHLORIDE 0.9 % (FLUSH) 0.9 %
3-10 SYRINGE (ML) INJECTION AS NEEDED
Status: DISCONTINUED | OUTPATIENT
Start: 2021-12-17 | End: 2021-12-17 | Stop reason: HOSPADM

## 2021-12-17 RX ORDER — SODIUM CHLORIDE, SODIUM LACTATE, POTASSIUM CHLORIDE, CALCIUM CHLORIDE 600; 310; 30; 20 MG/100ML; MG/100ML; MG/100ML; MG/100ML
100 INJECTION, SOLUTION INTRAVENOUS CONTINUOUS
Status: DISCONTINUED | OUTPATIENT
Start: 2021-12-17 | End: 2021-12-17 | Stop reason: HOSPADM

## 2021-12-17 RX ORDER — ONDANSETRON 4 MG/1
4 TABLET, ORALLY DISINTEGRATING ORAL EVERY 6 HOURS PRN
Qty: 6 TABLET | Refills: 1 | Status: SHIPPED | OUTPATIENT
Start: 2021-12-17 | End: 2022-03-28

## 2021-12-17 RX ORDER — OXYCODONE AND ACETAMINOPHEN 10; 325 MG/1; MG/1
1 TABLET ORAL ONCE AS NEEDED
Status: DISCONTINUED | OUTPATIENT
Start: 2021-12-17 | End: 2021-12-17 | Stop reason: HOSPADM

## 2021-12-17 RX ORDER — FLUMAZENIL 0.1 MG/ML
0.2 INJECTION INTRAVENOUS AS NEEDED
Status: DISCONTINUED | OUTPATIENT
Start: 2021-12-17 | End: 2021-12-17 | Stop reason: HOSPADM

## 2021-12-17 RX ORDER — DEXAMETHASONE SODIUM PHOSPHATE 4 MG/ML
4 INJECTION, SOLUTION INTRA-ARTICULAR; INTRALESIONAL; INTRAMUSCULAR; INTRAVENOUS; SOFT TISSUE ONCE AS NEEDED
Status: COMPLETED | OUTPATIENT
Start: 2021-12-17 | End: 2021-12-17

## 2021-12-17 RX ORDER — PROPOFOL 10 MG/ML
VIAL (ML) INTRAVENOUS AS NEEDED
Status: DISCONTINUED | OUTPATIENT
Start: 2021-12-17 | End: 2021-12-17 | Stop reason: SURG

## 2021-12-17 RX ORDER — ONDANSETRON 2 MG/ML
4 INJECTION INTRAMUSCULAR; INTRAVENOUS ONCE AS NEEDED
Status: DISCONTINUED | OUTPATIENT
Start: 2021-12-17 | End: 2021-12-17 | Stop reason: HOSPADM

## 2021-12-17 RX ORDER — LEVOFLOXACIN 5 MG/ML
500 INJECTION, SOLUTION INTRAVENOUS ONCE
Status: COMPLETED | OUTPATIENT
Start: 2021-12-17 | End: 2021-12-17

## 2021-12-17 RX ORDER — TAMSULOSIN HYDROCHLORIDE 0.4 MG/1
1 CAPSULE ORAL DAILY
Qty: 30 CAPSULE | Refills: 0 | Status: SHIPPED | OUTPATIENT
Start: 2021-12-17 | End: 2022-03-28

## 2021-12-17 RX ORDER — SODIUM CHLORIDE 9 MG/ML
100 INJECTION, SOLUTION INTRAVENOUS CONTINUOUS
Status: DISCONTINUED | OUTPATIENT
Start: 2021-12-17 | End: 2021-12-17 | Stop reason: HOSPADM

## 2021-12-17 RX ORDER — KETOROLAC TROMETHAMINE 30 MG/ML
INJECTION, SOLUTION INTRAMUSCULAR; INTRAVENOUS AS NEEDED
Status: DISCONTINUED | OUTPATIENT
Start: 2021-12-17 | End: 2021-12-17 | Stop reason: SURG

## 2021-12-17 RX ORDER — HYDROCODONE BITARTRATE AND ACETAMINOPHEN 7.5; 325 MG/1; MG/1
1 TABLET ORAL ONCE AS NEEDED
Status: DISCONTINUED | OUTPATIENT
Start: 2021-12-17 | End: 2021-12-17 | Stop reason: HOSPADM

## 2021-12-17 RX ORDER — MIDAZOLAM HYDROCHLORIDE 1 MG/ML
1 INJECTION INTRAMUSCULAR; INTRAVENOUS
Status: COMPLETED | OUTPATIENT
Start: 2021-12-17 | End: 2021-12-17

## 2021-12-17 RX ORDER — HYDROCODONE BITARTRATE AND ACETAMINOPHEN 7.5; 325 MG/1; MG/1
1 TABLET ORAL EVERY 6 HOURS PRN
Qty: 12 TABLET | Refills: 0 | Status: SHIPPED | OUTPATIENT
Start: 2021-12-17 | End: 2022-03-28

## 2021-12-17 RX ORDER — SODIUM CHLORIDE 0.9 % (FLUSH) 0.9 %
3 SYRINGE (ML) INJECTION EVERY 12 HOURS SCHEDULED
Status: DISCONTINUED | OUTPATIENT
Start: 2021-12-17 | End: 2021-12-17 | Stop reason: HOSPADM

## 2021-12-17 RX ORDER — OXYCODONE AND ACETAMINOPHEN 7.5; 325 MG/1; MG/1
2 TABLET ORAL EVERY 4 HOURS PRN
Status: DISCONTINUED | OUTPATIENT
Start: 2021-12-17 | End: 2021-12-17 | Stop reason: HOSPADM

## 2021-12-17 RX ORDER — FENTANYL CITRATE 50 UG/ML
25 INJECTION, SOLUTION INTRAMUSCULAR; INTRAVENOUS
Status: DISCONTINUED | OUTPATIENT
Start: 2021-12-17 | End: 2021-12-17 | Stop reason: HOSPADM

## 2021-12-17 RX ORDER — NALOXONE HCL 0.4 MG/ML
0.4 VIAL (ML) INJECTION AS NEEDED
Status: DISCONTINUED | OUTPATIENT
Start: 2021-12-17 | End: 2021-12-17 | Stop reason: HOSPADM

## 2021-12-17 RX ORDER — SODIUM CHLORIDE 0.9 % (FLUSH) 0.9 %
3 SYRINGE (ML) INJECTION AS NEEDED
Status: DISCONTINUED | OUTPATIENT
Start: 2021-12-17 | End: 2021-12-17 | Stop reason: HOSPADM

## 2021-12-17 RX ORDER — LABETALOL HYDROCHLORIDE 5 MG/ML
5 INJECTION, SOLUTION INTRAVENOUS
Status: DISCONTINUED | OUTPATIENT
Start: 2021-12-17 | End: 2021-12-17 | Stop reason: HOSPADM

## 2021-12-17 RX ORDER — DEXAMETHASONE SODIUM PHOSPHATE 4 MG/ML
INJECTION, SOLUTION INTRA-ARTICULAR; INTRALESIONAL; INTRAMUSCULAR; INTRAVENOUS; SOFT TISSUE AS NEEDED
Status: DISCONTINUED | OUTPATIENT
Start: 2021-12-17 | End: 2021-12-17 | Stop reason: SURG

## 2021-12-17 RX ORDER — ONDANSETRON 2 MG/ML
INJECTION INTRAMUSCULAR; INTRAVENOUS AS NEEDED
Status: DISCONTINUED | OUTPATIENT
Start: 2021-12-17 | End: 2021-12-17 | Stop reason: SURG

## 2021-12-17 RX ORDER — ACETAMINOPHEN 500 MG
1000 TABLET ORAL ONCE
Status: COMPLETED | OUTPATIENT
Start: 2021-12-17 | End: 2021-12-17

## 2021-12-17 RX ADMIN — MIDAZOLAM 1 MG: 1 INJECTION INTRAMUSCULAR; INTRAVENOUS at 07:40

## 2021-12-17 RX ADMIN — LIDOCAINE HYDROCHLORIDE 100 MG: 20 INJECTION, SOLUTION EPIDURAL; INFILTRATION; INTRACAUDAL; PERINEURAL at 08:16

## 2021-12-17 RX ADMIN — SODIUM CHLORIDE, POTASSIUM CHLORIDE, SODIUM LACTATE AND CALCIUM CHLORIDE 1000 ML: 600; 310; 30; 20 INJECTION, SOLUTION INTRAVENOUS at 06:32

## 2021-12-17 RX ADMIN — DEXAMETHASONE SODIUM PHOSPHATE 4 MG: 4 INJECTION, SOLUTION INTRA-ARTICULAR; INTRALESIONAL; INTRAMUSCULAR; INTRAVENOUS; SOFT TISSUE at 08:21

## 2021-12-17 RX ADMIN — PROPOFOL 200 MG: 10 INJECTION, EMULSION INTRAVENOUS at 08:16

## 2021-12-17 RX ADMIN — DEXAMETHASONE SODIUM PHOSPHATE 4 MG: 4 INJECTION, SOLUTION INTRA-ARTICULAR; INTRALESIONAL; INTRAMUSCULAR; INTRAVENOUS; SOFT TISSUE at 07:28

## 2021-12-17 RX ADMIN — ONDANSETRON 4 MG: 2 INJECTION INTRAMUSCULAR; INTRAVENOUS at 08:31

## 2021-12-17 RX ADMIN — SODIUM CHLORIDE, POTASSIUM CHLORIDE, SODIUM LACTATE AND CALCIUM CHLORIDE: 600; 310; 30; 20 INJECTION, SOLUTION INTRAVENOUS at 08:59

## 2021-12-17 RX ADMIN — SCOLOPAMINE TRANSDERMAL SYSTEM 1 PATCH: 1 PATCH, EXTENDED RELEASE TRANSDERMAL at 07:28

## 2021-12-17 RX ADMIN — LEVOFLOXACIN 500 MG: 5 INJECTION, SOLUTION INTRAVENOUS at 08:21

## 2021-12-17 RX ADMIN — MIDAZOLAM 1 MG: 1 INJECTION INTRAMUSCULAR; INTRAVENOUS at 07:28

## 2021-12-17 RX ADMIN — ACETAMINOPHEN 1000 MG: 500 TABLET, FILM COATED ORAL at 07:28

## 2021-12-17 RX ADMIN — LEVOFLOXACIN 500 MG: 5 INJECTION, SOLUTION INTRAVENOUS at 07:28

## 2021-12-17 RX ADMIN — KETOROLAC TROMETHAMINE 30 MG: 30 INJECTION, SOLUTION INTRAMUSCULAR; INTRAVENOUS at 08:57

## 2021-12-17 RX ADMIN — FENTANYL CITRATE 100 MCG: 50 INJECTION, SOLUTION INTRAMUSCULAR; INTRAVENOUS at 08:12

## 2021-12-17 NOTE — ANESTHESIA POSTPROCEDURE EVALUATION
Patient: Nurys Iniguez    Procedure Summary     Date: 12/17/21 Room / Location:  [facility] OR 09 /  [facility] OR    Anesthesia Start: 0811 Anesthesia Stop: 0912    Procedure: RIGHT EXTRACORPOREAL SHOCKWAVE LITHOTRIPSY (Right ) Diagnosis:       Nephrolithiasis      (Nephrolithiasis [N20.0])    Surgeons: Sarbjit Curry MD Provider: Hussein García CRNA    Anesthesia Type: general ASA Status: 3          Anesthesia Type: general    Vitals  Vitals Value Taken Time   /83 12/17/21 0950   Temp 97.7 °F (36.5 °C) 12/17/21 0950   Pulse 83 12/17/21 0952   Resp 14 12/17/21 0950   SpO2 94 % 12/17/21 0952   Vitals shown include unvalidated device data.        Post Anesthesia Care and Evaluation    PONV Status: none  Comments: Patient d/c from PACU prior to anes eval based on [place] score.  Please see RN notes for details of d/c criteria.    Blood pressure 108/92, pulse 83, temperature 97.7 °F (36.5 °C), temperature source Temporal, resp. rate 18, SpO2 96 %, not currently breastfeeding.

## 2021-12-17 NOTE — OP NOTE
OP NOTE  Nurys Iniguez    Date of Procedure: 12/17/2021    Pre-op Diagnosis:   Nephrolithiasis [N20.0]    Post-op Diagnosis:     Post-Op Diagnosis Codes:     * Nephrolithiasis [N20.0]    Procedure/CPT® Codes:      Procedure(s):  RIGHT EXTRACORPOREAL SHOCKWAVE LITHOTRIPSY    Surgeon(s):  Sarbjit Curry MD    Anesthesia: General    Staff:   Circulator: Denver Zarco RN  Scrub Person: Jose José Luis W    Indications:   41-year-old female with 6 mm and 3 mm right lower pole kidney stones.   After discussion of options, patient has given informed consent to undergo right ESWL.  All risks, benefits, and alternatives were discussed.    Operative Report: The patient is identified. The KUB is reviewed. After answering any questions, patient was brought to the operating room and placed on the patient table of the [facility].  General endotracheal anesthesia was administered.  Preoperative KUB was reviewed.   An [facility] c-arm fluoroscope was used to identify the stone.    The shock-head is brought into position.  This stone is brought into the F2 plane for focus.  Treatment was initiated.  We gradually increased the energy level from 1 to 7.  This stone was treated at a rate of 60 for the first 300 shocks followed by rate 90.  We paused for 2 minutes after 300 shocks to reduce risk of renal damage.  The stone was periodically checked to make sure that we were on it and getting good breakup.  We checked at 700, 1000, 1500, 2000, and 2500 shocks.  The stone did have good breakup.  I felt it was unnecessary to place a ureteral stent.  At the conclusion of 3000 shocks, the patient was awakened from anesthesia and transferred to the recovery room in satisfactory condition.      Estimated Blood Loss: <30 mL    Specimens:                None      Drains: * No LDAs found *    Complications: none      Sarbjit Curry MD     Date: 12/17/2021  Time: 08:35 CST

## 2021-12-17 NOTE — H&P
Subjective     Ms. Iniguez is 40 y.o. female     Chief Complaint: Nephrolithiasis     History of Present Illness        40 year old female established patient follow-up for kidney stones.  KUB x-ray done today reviewed by me with the patient shows increase in size of a right kidney stone now up to 6 mm.  There was a calcification in her left hemipelvis on the KUB x-ray.  Therefore, I ordered a noncontrast CT abdomen and pelvis which confirmed her 6 mm and 3 mm right lower pole kidney stones.  There are no ureteral stones or hydronephrosis.  Her bladder symptoms from her MS are stable.   She has 0-1ppd urge incontinence along with some mild stress urinary incontinence after single vaginal delivery.  She does not currently want anticholinergic therapy.      I independently visualized and reviewed the patient's prior imaging studies today in clinic and discussed the imaging findings with the patient.     The following portions of the patient's history were reviewed and updated as appropriate: allergies, current medications, past family history, past medical history, past social history, past surgical history and problem list.     Review of Systems        Current Outpatient Medications:   •  atorvastatin (LIPITOR) 10 MG tablet, Take 20 mg by mouth Every Night., Disp: , Rfl:   •  baclofen (LIORESAL) 20 MG tablet, Take 20 mg by mouth 3 (Three) Times a Day., Disp: , Rfl:   •  Cholecalciferol 5000 UNITS tablet, Take 1 tablet by mouth., Disp: , Rfl:   •  CloNIDine (CATAPRES) 0.1 MG tablet, Take 0.1 mg by mouth As Needed for High Blood Pressure., Disp: , Rfl:   •  conjugated estrogens (PREMARIN) 0.625 MG/GM vaginal cream, Insert 0.5 g into the vagina., Disp: , Rfl:   •  diphenhydrAMINE-acetaminophen (TYLENOL PM)  MG tablet per tablet, Take 2 tablets by mouth Daily., Disp: , Rfl:   •  gabapentin (NEURONTIN) 300 MG capsule, Take 100 mg by mouth. 1 tablet in am and 2 tabs at HS, Disp: , Rfl:   •  nortriptyline (PAMELOR)  "75 MG capsule, , Disp: , Rfl:   •  phenazopyridine (PYRIDIUM) 100 MG tablet, Take 100 mg by mouth As Needed for bladder spasms., Disp: , Rfl:   •  Probiotic Product (PROBIOTIC-10 PO), Take  by mouth As Needed., Disp: , Rfl:   •  promethazine (PHENERGAN) 25 MG tablet, Take 25 mg by mouth Every 6 (Six) Hours As Needed for Nausea or Vomiting., Disp: , Rfl:   •  propranolol (INDERAL) 60 MG tablet, 60 mg. At night, Disp: , Rfl:   •  SUMAtriptan (IMITREX) 6 MG/0.5ML solution injection, Inject 6 mg under the skin 1 (One) Time As Needed for migraine., Disp: , Rfl:   •  tiZANidine (ZANAFLEX) 4 MG tablet, Take 4 mg by mouth Take As Directed., Disp: , Rfl:   •  topiramate (TOPAMAX) 100 MG tablet, Take 100 mg by mouth 2 (two) times a day., Disp: , Rfl:      Medical History        Past Medical History:   Diagnosis Date   • Depression     • Hyperlipidemia     • Hypertension     • Kidney stone     • Migraine     • MS (multiple sclerosis) (HCC)     • POTS (postural orthostatic tachycardia syndrome)              Surgical History         Past Surgical History:   Procedure Laterality Date   • APPENDECTOMY       • BACK SURGERY       • CHOLECYSTECTOMY       • HYSTERECTOMY                Social History   Social History           Socioeconomic History   • Marital status:    Tobacco Use   • Smoking status: Never Smoker   • Smokeless tobacco: Never Used   Vaping Use   • Vaping Use: Never used   Substance and Sexual Activity   • Alcohol use: No   • Drug use: No   • Sexual activity: Defer                  Family History   Problem Relation Age of Onset   • Heart disease Father     • Hypertension Father     • Kidney disease Father     • Diabetes type II Mother     • Hypertension Mother     • Hypertension Sister     • Hypertension Brother           Objective     Temp 98 °F (36.7 °C)   Ht 165.1 cm (65\")   Wt 99.7 kg (219 lb 12.8 oz)   BMI 36.58 kg/m²      Physical Exam                 Results for orders placed or performed in visit on " 10/26/21   POC Urinalysis Dipstick, Multipro     Specimen: Urine   Result Value Ref Range     Color Yellow Yellow, Straw, Dark Yellow, Mary     Clarity, UA Clear Clear     Glucose, UA Negative Negative, 1000 mg/dL (3+) mg/dL     Bilirubin Negative Negative     Ketones, UA Negative Negative     Specific Gravity  1.025 1.005 - 1.030     Blood, UA Negative Negative     pH, Urine 6.0 5.0 - 8.0     Protein, POC Negative Negative mg/dL     Urobilinogen, UA Normal Normal     Nitrite, UA Negative Negative     Leukocytes Small (1+) (A) Negative      Assessment and Plan     Diagnoses and all orders for this visit:     1. Nephrolithiasis (Primary)  -     POC Urinalysis Dipstick, Multipro  -     XR Abdomen KUB  -     CT abdomen pelvis stone protocol; Future  -     Case Request; Standing  -     COVID PRE-OP / PRE-PROCEDURE SCREENING ORDER (NO ISOLATION) - Swab, Nasopharynx; Future  -     ECG 12 Lead; Future  -     CBC (No Diff); Future  -     Basic Metabolic Panel; Future  -     Urinalysis With Culture If Indicated -; Future  -     Case Request     2. Essential hypertension     3. Multiple sclerosis (HCC)     4. Neurogenic bladder     Other orders  -     Follow Anesthesia Guidelines / Standing Orders; Future  -     Obtain Informed Consent  -     Provide NPO Instructions to Patient; Future  -     Chlorhexidine Skin Prep; Future        Enlarging right lower pole kidney stones, now measuring 6 mm and 3 mm.  We discussed options for stone management including observation, ureteroscopy laser lithotripsy, or scheduling right extracorporeal shockwave lithotripsy.  She would like to avoid a stent if at all possible and would like to schedule right ESWL.  We discussed risks of the procedure including but not limited to infection, bleeding, need for additional procedures, failure to break up/pass any/all stone, complications of anesthesia.  She voices understanding and provided informed said to proceed with right ESWL on 12/17/2021.

## 2021-12-17 NOTE — DISCHARGE INSTRUCTIONS

## 2021-12-17 NOTE — ANESTHESIA PREPROCEDURE EVALUATION
Anesthesia Evaluation     Patient summary reviewed   history of anesthetic complications: PONV  NPO Solid Status: > 8 hours  NPO Liquid Status: > 8 hours           Airway   Mallampati: I  TM distance: >3 FB  Neck ROM: full  No difficulty expected  Dental - normal exam     Pulmonary    (+) sleep apnea (mild, APAP),   (-) not a smoker    ROS comment: Left diaphragm elevation  Cardiovascular   Exercise tolerance: good (4-7 METS)    ECG reviewed    (+) hypertension, hyperlipidemia,     ROS comment: POTS    Neuro/Psych    ROS Comment: MS  GI/Hepatic/Renal/Endo    (+)   renal disease stones,   (-) liver disease, diabetes    Musculoskeletal     Abdominal    Substance History      OB/GYN          Other                        Anesthesia Plan    ASA 3     general     intravenous induction     Anesthetic plan, all risks, benefits, and alternatives have been provided, discussed and informed consent has been obtained with: patient.

## 2021-12-17 NOTE — ANESTHESIA PROCEDURE NOTES
Airway  Urgency: elective    Date/Time: 12/17/2021 8:17 AM  Airway not difficult    General Information and Staff    Patient location during procedure: OR  CRNA: Hussein García CRNA    Indications and Patient Condition  Indications for airway management: airway protection    Preoxygenated: yes  Mask difficulty assessment: 0 - not attempted    Final Airway Details  Final airway type: supraglottic airway      Successful airway: I-gel  Size 4    Number of attempts at approach: 1  Assessment: lips, teeth, and gum same as pre-op

## 2022-02-11 ENCOUNTER — OFFICE VISIT (OUTPATIENT)
Dept: OBGYN CLINIC | Age: 42
End: 2022-02-11
Payer: COMMERCIAL

## 2022-02-11 VITALS
HEART RATE: 68 BPM | SYSTOLIC BLOOD PRESSURE: 135 MMHG | WEIGHT: 228 LBS | DIASTOLIC BLOOD PRESSURE: 72 MMHG | BODY MASS INDEX: 37.94 KG/M2

## 2022-02-11 DIAGNOSIS — Z82.62 FAMILY HX OSTEOPOROSIS: ICD-10-CM

## 2022-02-11 DIAGNOSIS — N76.1 CHRONIC VAGINITIS: ICD-10-CM

## 2022-02-11 DIAGNOSIS — Z79.52 LONG TERM SYSTEMIC STEROID USER: ICD-10-CM

## 2022-02-11 DIAGNOSIS — Z12.39 ENCOUNTER FOR SCREENING BREAST EXAMINATION: ICD-10-CM

## 2022-02-11 DIAGNOSIS — G35 MS (MULTIPLE SCLEROSIS) (HCC): ICD-10-CM

## 2022-02-11 DIAGNOSIS — Z01.419 ENCOUNTER FOR GYNECOLOGICAL EXAMINATION WITHOUT ABNORMAL FINDING: Primary | ICD-10-CM

## 2022-02-11 PROCEDURE — G0101 CA SCREEN;PELVIC/BREAST EXAM: HCPCS | Performed by: NURSE PRACTITIONER

## 2022-02-11 PROCEDURE — G8484 FLU IMMUNIZE NO ADMIN: HCPCS | Performed by: NURSE PRACTITIONER

## 2022-02-11 NOTE — PATIENT INSTRUCTIONS
Patient Education        Well Visit, Ages 25 to 48: Care Instructions  Overview     Well visits can help you stay healthy. Your doctor has checked your overall health and may have suggested ways to take good care of yourself. Your doctor also may have recommended tests. At home, you can help prevent illness with healthy eating, regular exercise, and other steps. Follow-up care is a key part of your treatment and safety. Be sure to make and go to all appointments, and call your doctor if you are having problems. It's also a good idea to know your test results and keep a list of the medicines you take. How can you care for yourself at home? · Get screening tests that you and your doctor decide on. Screening helps find diseases before any symptoms appear. · Eat healthy foods. Choose fruits, vegetables, whole grains, protein, and low-fat dairy foods. Limit fat, especially saturated fat. Reduce salt in your diet. · Limit alcohol. If you are a man, have no more than 2 drinks a day or 14 drinks a week. If you are a woman, have no more than 1 drink a day or 7 drinks a week. · Get at least 30 minutes of physical activity on most days of the week. Walking is a good choice. You also may want to do other activities, such as running, swimming, cycling, or playing tennis or team sports. Discuss any changes in your exercise program with your doctor. · Reach and stay at a healthy weight. This will lower your risk for many problems, such as obesity, diabetes, heart disease, and high blood pressure. · Do not smoke or allow others to smoke around you. If you need help quitting, talk to your doctor about stop-smoking programs and medicines. These can increase your chances of quitting for good. · Care for your mental health. It is easy to get weighed down by worry and stress. Learn strategies to manage stress, like deep breathing and mindfulness, and stay connected with your family and community.  If you find you often feel sad or hopeless, talk with your doctor. Treatment can help. · Talk to your doctor about whether you have any risk factors for sexually transmitted infections (STIs). You can help prevent STIs if you wait to have sex with a new partner (or partners) until you've each been tested for STIs. It also helps if you use condoms (male or female condoms) and if you limit your sex partners to one person who only has sex with you. Vaccines are available for some STIs, such as HPV. · Use birth control if it's important to you to prevent pregnancy. Talk with your doctor about the choices available and what might be best for you. · If you think you may have a problem with alcohol or drug use, talk to your doctor. This includes prescription medicines (such as amphetamines and opioids) and illegal drugs (such as cocaine and methamphetamine). Your doctor can help you figure out what type of treatment is best for you. · Protect your skin from too much sun. When you're outdoors from 10 a.m. to 4 p.m., stay in the shade or cover up with clothing and a hat with a wide brim. Wear sunglasses that block UV rays. Even when it's cloudy, put broad-spectrum sunscreen (SPF 30 or higher) on any exposed skin. · See a dentist one or two times a year for checkups and to have your teeth cleaned. · Wear a seat belt in the car. When should you call for help? Watch closely for changes in your health, and be sure to contact your doctor if you have any problems or symptoms that concern you. Where can you learn more? Go to https://[place].[employer]. org and sign in to your [employer] account. Enter P072 in the [employer] box to learn more about \"Well Visit, Ages 25 to 48: Care Instructions. \"     If you do not have an account, please click on the \"Sign Up Now\" link. Current as of: October 6, 2021               Content Version: 13.1  © 9667-9690 Healthwise, Incorporated. Care instructions adapted under license by [facility] ([facility]). If you have questions about a medical condition or this instruction, always ask your healthcare professional. [place] [address] any warranty or liability for your use of this information. Patient Education        Breast Self-Exam: Care Instructions  Your Care Instructions     A breast self-exam is when you check your breasts for lumps or changes. This regular exam helps you learn how your breasts normally look and feel. Most breast problems or changes are not because of cancer. Breast self-exam is not a substitute for a mammogram. Having regular breast exams by your doctor and regular mammograms improve your chances of finding any problems with your breasts. Some women set a time each month to do a step-by-step breast self-exam. Other women like a less formal system. They might look at their breasts as they brush their teeth, or feel their breasts once in a while in the shower. If you notice a change in your breast, tell your doctor. Follow-up care is a key part of your treatment and safety. Be sure to make and go to all appointments, and call your doctor if you are having problems. It's also a good idea to know your test results and keep a list of the medicines you take. How do you do a breast self-exam?  · The best time to examine your breasts is usually one week after your menstrual period begins. Your breasts should not be tender then. If you do not have periods, you might do your exam on a day of the month that is easy to remember. · To examine your breasts:  ? Remove all your clothes above the waist and lie down. When you are lying down, your breast tissue spreads evenly over your chest wall, which makes it easier to feel all your breast tissue. ? Use the padsnot the fingertipsof the 3 middle fingers of your left hand to check your right breast. Move your fingers slowly in small coin-sized circles that overlap. ? Use three levels of pressure to feel of all your breast tissue. Use light pressure to feel the tissue close to the skin surface. Use medium pressure to feel a little deeper. Use firm pressure to feel your tissue close to your breastbone and ribs. Use each pressure level to feel your breast tissue before moving on to the next spot. ? Check your entire breast, moving up and down as if following a strip from the collarbone to the bra line, and from the armpit to the ribs. Repeat until you have covered the entire breast.  ? Repeat this procedure for your left breast, using the pads of the 3 middle fingers of your right hand. · To examine your breasts while in the shower:  ? Place one arm over your head and lightly soap your breast on that side. ? Using the pads of your fingers, gently move your hand over your breast (in the strip pattern described above), feeling carefully for any lumps or changes. ? Repeat for the other breast.  · Have your doctor inspect anything you notice to see if you need further testing. Where can you learn more? Go to https://[employer]s.[employer]. org and sign in to your [employer] account. Enter P148 in the [employer] box to learn more about \"Breast Self-Exam: Care Instructions. \"     If you do not have an account, please click on the \"Sign Up Now\" link. Current as of: September 8, 2021               Content Version: 13.1  © 4410-6888 Healthwise, Incorporated. Care instructions adapted under license by [facility] ([facility]). If you have questions about a medical condition or this instruction, always ask your healthcare professional. [place] [address] any warranty or liability for your use of this information.

## 2022-02-11 NOTE — PROGRESS NOTES
[facility] JOHANA MOELLER OB/GYN  CNM Office Note    Neri Urbina is a 35 y.o. female who presents today for her medical conditions/ complaints as noted below. Chief Complaint   Patient presents with    Gynecologic Exam         HPI  Pt presents today for pelvic and breast exam. Has had issues last couple years with combination of vaginitis and dysuria. When checked sometimes says uti/inf and other times says normal. Most of the time its a mixture of pressure sensation, vaginal burning, and feeling swollen but hard to tell at times due to MS causing loss of sensation intermittenly. Last mammogram:  10/2021  Last pap smear:  2020  Contraception:  hyst  :  1  Para:  1  AB:  0  Last bone density:  never  Last colonoscopy:   2015          Patient Active Problem List   Diagnosis    Complicated migraine    Hematuria    Depression    Nerve damage    Cauda equina syndrome (HCC)    Elevated cholesterol    Postural orthostatic tachycardia syndrome    Vitamin B12 deficiency (non anemic)    Pelvic floor dysfunction    Myofascial pain    Relapsing-remitting multiple sclerosis ([address]    Hypertensive disorder    Insomnia    Right kidney stone    Trigeminal neuralgia    Urinary incontinence    Vitamin D deficiency    RUQ abdominal pain    Elevated hemidiaphragm    LÓPEZ (dyspnea on exertion)    Snoring    Orthopnea    Non-restorative sleep    SHYAM (obstructive sleep apnea)    Obesity (BMI 35.0-39.9 without comorbidity)       No LMP recorded. Patient has had a hysterectomy.   [id number]    Past Medical History:   Diagnosis Date    B12 deficiency     Cauda equina syndrome (HCC)     Complicated migraine     Depression     GERD (gastroesophageal reflux disease)     Hematuria     wih abdominal pain    Hyperlipidemia     Hypertension     MS (multiple sclerosis) (HCC)     Myofascial pain     Nerve damage     Neutropenia ([address] 10/21/2015    Pelvic floor dysfunction     Postmenopausal HRT (hormone replacement therapy)     POTS (postural orthostatic tachycardia syndrome)      Past Surgical History:   Procedure Laterality Date    APPENDECTOMY      BACK SURGERY      CHOLECYSTECTOMY, LAPAROSCOPIC      COLONOSCOPY      Dr. Jed Quijano 10 years ?     COLONOSCOPY N/A 8/13/2018    Dr Debi Reddy Common yr recall    EGD      HYSTERECTOMY      OVARY REMOVAL      SD EGD TRANSORAL BIOPSY SINGLE/MULTIPLE N/A 1/29/2018    Dr Debi Abdul-Active duodenitis, gastritis/gastropathy     Family History   Problem Relation Age of Onset    Diabetes Mother     High Blood Pressure Mother     Colon Polyps Mother     Colon Cancer Neg Hx     Liver Cancer Neg Hx     Liver Disease Neg Hx     Esophageal Cancer Neg Hx     Rectal Cancer Neg Hx     Stomach Cancer Neg Hx      Social History     Tobacco Use    Smoking status: Never Smoker    Smokeless tobacco: Never Used   Substance Use Topics    Alcohol use: No       Current Outpatient Medications   Medication Sig Dispense Refill    tiZANidine (ZANAFLEX) 4 MG tablet Take 1 tablet by mouth every 8 hours as needed (for muscle spasms) 270 tablet 1    desonide (DESOWEN) 0.05 % cream       ketorolac (TORADOL) 10 MG tablet Take 1 tablet by mouth every 6 hours as needed for Pain 20 tablet 0    ocrelizumab (OCREVUS) 300 MG/10ML SOLN injection       atorvastatin (LIPITOR) 20 MG tablet TAKE 1 TABLET AT BEDTIME FOR HIGH CHOLESTEROL 90 tablet 3    phenazopyridine (PYRIDIUM) 100 MG tablet Take 1 tablet by mouth 3 times daily as needed for Pain 6 tablet 0    propranolol (INDERAL LA) 60 MG extended release capsule TAKE 1 CAPSULE ONCE DAILY FOR HEART RATE 90 capsule 3    conjugated estrogens (PREMARIN) 0.625 MG/GM vaginal cream Place 0.5 g vaginally Twice a Week 1 Tube 5    nortriptyline (PAMELOR) 75 MG capsule       topiramate (TOPAMAX) 100 MG tablet Take 1 tablet by mouth 2 times daily 180 tablet 3    Probiotic Product (PROBIOTIC DAILY PO) Take by mouth      promethazine (PHENERGAN) 25 MG tablet Take 25 mg by mouth      cloNIDine (CATAPRES) 0.1 MG tablet 0.1 mg as needed       SUMAtriptan Succinate 6 MG/0.5ML SOAJ as needed       Cholecalciferol (VITAMIN D-3) 5000 UNITS TABS Take 5,000 Units by mouth daily.  gabapentin (NEURONTIN) 100 MG capsule TAKE ONE TO TWO CAPSULES BY MOUTH THREE TIMES DAILY AS NEEDED 135 capsule 2     No current facility-administered medications for this visit. Allergies   Allergen Reactions    Ropinirole Hcl     Simvastatin     Zolpidem Other (See Comments)    Ropinirole Other (See Comments)     Crazy dreams; sleep walking     Vitals:    02/11/22 1502   BP: 135/72   Pulse: 68     Body mass index is 37.94 kg/m². Review of Systems   Constitutional: Negative. HENT: Negative. Eyes: Negative. Respiratory: Negative. Cardiovascular: Negative. Gastrointestinal: Negative. Endocrine: Negative. Genitourinary: Positive for vaginal discharge and vaginal pain (pressure, swollen). Negative for dysuria, flank pain, frequency, pelvic pain and vaginal bleeding. Musculoskeletal: Positive for myalgias. Skin: Negative. Allergic/Immunologic: Negative. Neurological: Positive for weakness. Hematological: Negative. Psychiatric/Behavioral: Negative. Physical Exam  Vitals and nursing note reviewed. Constitutional:       Appearance: She is well-developed. HENT:      Head: Normocephalic and atraumatic. Eyes:      Conjunctiva/sclera: Conjunctivae normal.      Pupils: Pupils are equal, round, and reactive to light. Cardiovascular:      Rate and Rhythm: Normal rate and regular rhythm. Heart sounds: Normal heart sounds. Pulmonary:      Effort: Pulmonary effort is normal.   Chest:      Comments: Breasts symmetrical without tenderness, masses, or nipple discharge. Nipples everted bilaterally. Abdominal:      Palpations: Abdomen is soft.    Genitourinary:     Vagina: Normal.      Comments: Uterus: surgically absent  Cervix: surgically absent  Adnexa: surgically absent  Musculoskeletal:      Cervical back: Normal range of motion and neck supple. Skin:     General: Skin is warm and dry. Neurological:      Mental Status: She is alert and oriented to person, place, and time. Diagnosis Orders   1. Encounter for gynecological examination without abnormal finding     2. Encounter for screening breast examination     3. Family hx osteoporosis  DEXA BONE DENSITY 2 SITES   4. Long term systemic steroid user  DEXA BONE DENSITY 2 SITES   5. MS (multiple sclerosis) ([facility])  DEXA BONE DENSITY 2 SITES   6. Chronic vaginitis         MEDICATIONS:  No orders of the defined types were placed in this encounter. ORDERS:  Orders Placed This Encounter   Procedures    DEXA BONE DENSITY 2 SITES       PLAN:  1. WWE- pap not indicated s/p Hyst,  SBE reviewed and CBE performed. Annual labs with PCP. Mammogram current. 2. Family history osteo and Long term steroid use- Dexa Scan ordered. 3. Vaginitis- minimal atrophy noted on exam but discussed likely factor and use of estrace. Not currently an issue today but will f/u with worsening s/s.

## 2022-02-17 NOTE — PATIENT INSTRUCTIONS
Patient Education        Cough: Care Instructions  Your Care Instructions    A cough is your body's response to something that bothers your throat or airways. Many things can cause a cough. You might cough because of a cold or the flu, bronchitis, or asthma. Smoking, postnasal drip, allergies, and stomach acid that backs up into your throat also can cause coughs. A cough is a symptom, not a disease. Most coughs stop when the cause, such as a cold, goes away. You can take a few steps at home to cough less and feel better. Follow-up care is a key part of your treatment and safety. Be sure to make and go to all appointments, and call your doctor if you are having problems. It's also a good idea to know your test results and keep a list of the medicines you take. How can you care for yourself at home? · Drink lots of water and other fluids. This helps thin the mucus and soothes a dry or sore throat. Honey or lemon juice in hot water or tea may ease a dry cough. · Take cough medicine as directed by your doctor. · Prop up your head on pillows to help you breathe and ease a dry cough. · Try cough drops to soothe a dry or sore throat. Cough drops don't stop a cough. Medicine-flavored cough drops are no better than candy-flavored drops or hard candy. · Do not smoke. Avoid secondhand smoke. If you need help quitting, talk to your doctor about stop-smoking programs and medicines. These can increase your chances of quitting for good. When should you call for help? Call 911 anytime you think you may need emergency care. For example, call if:  ? · You have severe trouble breathing. ?Call your doctor now or seek immediate medical care if:  ? · You cough up blood. ? · You have new or worse trouble breathing. ? · You have a new or higher fever. ? · You have a new rash. ? Watch closely for changes in your health, and be sure to contact your doctor if:  ? · You cough more deeply or more often, especially if you with patient

## 2022-02-23 ENCOUNTER — HOSPITAL ENCOUNTER (OUTPATIENT)
Dept: WOMENS IMAGING | Age: 42
Discharge: HOME | End: 2022-02-23
Payer: COMMERCIAL

## 2022-02-23 DIAGNOSIS — Z82.62 FAMILY HX OSTEOPOROSIS: ICD-10-CM

## 2022-02-23 DIAGNOSIS — G35 MS (MULTIPLE SCLEROSIS) (HCC): ICD-10-CM

## 2022-02-23 DIAGNOSIS — Z79.52 LONG TERM SYSTEMIC STEROID USER: ICD-10-CM

## 2022-02-23 PROCEDURE — 77080 DXA BONE DENSITY AXIAL: CPT

## 2022-02-24 RX ORDER — UREA 10 %
1 LOTION (ML) TOPICAL DAILY
Qty: 30 TABLET | Refills: 5 | Status: SHIPPED | OUTPATIENT
Start: 2022-02-24 | End: 2022-08-30 | Stop reason: ALTCHOICE

## 2022-02-28 RX ORDER — PROPRANOLOL HCL 60 MG
CAPSULE, EXTENDED RELEASE 24HR ORAL
Qty: 90 CAPSULE | Refills: 3 | Status: SHIPPED | OUTPATIENT
Start: 2022-02-28

## 2022-03-08 ENCOUNTER — HOSPITAL ENCOUNTER (OUTPATIENT)
Dept: GENERAL RADIOLOGY | Age: 42
Discharge: HOME | End: 2022-03-08
Payer: COMMERCIAL

## 2022-03-08 ENCOUNTER — OFFICE VISIT (OUTPATIENT)
Dept: PULMONOLOGY | Age: 42
End: 2022-03-08
Payer: COMMERCIAL

## 2022-03-08 VITALS
HEART RATE: 80 BPM | SYSTOLIC BLOOD PRESSURE: 118 MMHG | BODY MASS INDEX: 36.85 KG/M2 | WEIGHT: 221.2 LBS | DIASTOLIC BLOOD PRESSURE: 72 MMHG | OXYGEN SATURATION: 97 % | HEIGHT: 65 IN

## 2022-03-08 DIAGNOSIS — K21.9 GASTROESOPHAGEAL REFLUX DISEASE WITHOUT ESOPHAGITIS: ICD-10-CM

## 2022-03-08 DIAGNOSIS — E66.01 SEVERE OBESITY (BMI 35.0-39.9) WITH COMORBIDITY (HCC): ICD-10-CM

## 2022-03-08 DIAGNOSIS — R06.83 SNORING: ICD-10-CM

## 2022-03-08 DIAGNOSIS — G47.33 OSA (OBSTRUCTIVE SLEEP APNEA): Primary | ICD-10-CM

## 2022-03-08 DIAGNOSIS — J98.6 ELEVATED HEMIDIAPHRAGM: ICD-10-CM

## 2022-03-08 DIAGNOSIS — R09.1 PLEURISY: ICD-10-CM

## 2022-03-08 PROCEDURE — G8417 CALC BMI ABV UP PARAM F/U: HCPCS | Performed by: INTERNAL MEDICINE

## 2022-03-08 PROCEDURE — 1036F TOBACCO NON-USER: CPT | Performed by: INTERNAL MEDICINE

## 2022-03-08 PROCEDURE — 99214 OFFICE O/P EST MOD 30 MIN: CPT | Performed by: INTERNAL MEDICINE

## 2022-03-08 PROCEDURE — G8427 DOCREV CUR MEDS BY ELIG CLIN: HCPCS | Performed by: INTERNAL MEDICINE

## 2022-03-08 PROCEDURE — G8484 FLU IMMUNIZE NO ADMIN: HCPCS | Performed by: INTERNAL MEDICINE

## 2022-03-08 PROCEDURE — 71046 X-RAY EXAM CHEST 2 VIEWS: CPT

## 2022-03-08 RX ORDER — OMEPRAZOLE 40 MG/1
40 CAPSULE, DELAYED RELEASE ORAL
Qty: 30 CAPSULE | Refills: 5 | Status: SHIPPED | OUTPATIENT
Start: 2022-03-08 | End: 2022-08-30 | Stop reason: ALTCHOICE

## 2022-03-08 RX ORDER — LANOLIN ALCOHOL/MO/W.PET/CERES
CREAM (GRAM) TOPICAL
COMMUNITY
Start: 2022-02-25 | End: 2022-03-14

## 2022-03-08 ASSESSMENT — ENCOUNTER SYMPTOMS
WHEEZING: 0
BACK PAIN: 0
ANAL BLEEDING: 0
COUGH: 0
ABDOMINAL PAIN: 0
APNEA: 1
SHORTNESS OF BREATH: 0
RHINORRHEA: 0
CHEST TIGHTNESS: 0
ABDOMINAL DISTENTION: 0

## 2022-03-08 NOTE — PROGRESS NOTES
Pulmonary and Sleep Medicine    Slim Kovacs (:  1980) is a 35 y.o. female,Established patient, here for evaluation of the following chief complaint(s):  Follow-up (Pt came in for a follow up she states that she still has the cough at night and pain in her right lung when she takes a deep breath. )      Referring physician:  No referring provider defined for this encounter. ASSESSMENT/PLAN:  1. SHYAM (obstructive sleep apnea)  2. Elevated hemidiaphragm (no paralysis)  3. Snoring  4. Pleurisy  -     XR CHEST (2 VW); Future  5. Severe obesity (BMI 35.0-39. 9) with comorbidity ([address]  6. Gastroesophageal reflux disease without esophagitis  -     omeprazole (PRILOSEC) 40 MG delayed release capsule; Take 1 capsule by mouth every morning (before breakfast), Disp-30 capsule, R-5Normal        Pleurisy likely secondary to viral etiology. We will get a chest x-ray today. Continue CPAP she is compliant with the CPAP she feels it helps. We will treat her empirically for reflux and reassess her cough symptoms in about 3 months. Lib[facility] Seymour [facility]    Return in about 3 months (around 2022). SUBJECTIVE/OBJECTIVE:  The patient is here for follow-up regarding obstructive sleep apnea. She is using the CPAP she is compliant with the CPAP she feels it helps her sleep better. She says she was walking yesterday and developed a sudden onset of pleuritic chest pain. No fever. He continues to have pleuritic pain with deep inspiration. She continues to cough predominantly at night after she lays down. Upon review of her CT done in 2021 she does have distended esophagus with gas suggestive of esophageal reflux. Prior to Visit Medications    Medication Sig Taking?  Authorizing Provider   Calcium 500-100 MG-UNIT CHEW  Yes Historical Provider, MD   propranolol (INDERAL LA) 60 MG extended release capsule TAKE 1 CAPSULE ONCE DAILY FOR HEART RATE Yes Miguel Samaniego MD calcium carbonate (OS-RAJINDER) 1250 (500 Ca) MG chewable tablet Take 1 tablet by mouth daily Yes Ra Card APRN - CNM   tiZANidine (ZANAFLEX) 4 MG tablet Take 1 tablet by mouth every 8 hours as needed (for muscle spasms) Yes Buck Huber MD   desonide (DESOWEN) 0.05 % cream  Yes Historical Provider, MD   ketorolac (TORADOL) 10 MG tablet Take 1 tablet by mouth every 6 hours as needed for Pain Yes Vidhi Zepeda, APRN - CNP   ocrelizumab (OCREVUS) 300 MG/10ML SOLN injection  Yes Historical Provider, MD   atorvastatin (LIPITOR) 20 MG tablet TAKE 1 TABLET AT BEDTIME FOR HIGH CHOLESTEROL Yes Buck Huber MD   phenazopyridine (PYRIDIUM) 100 MG tablet Take 1 tablet by mouth 3 times daily as needed for Pain Yes Buck Huber MD   conjugated estrogens (PREMARIN) 0.625 MG/GM vaginal cream Place 0.5 g vaginally Twice a Week Yes Shanti Miller APRN - CNP   nortriptyline (PAMELOR) 75 MG capsule  Yes Historical Provider, MD   topiramate (TOPAMAX) 100 MG tablet Take 1 tablet by mouth 2 times daily Yes Bailee Poole MD   Probiotic Product (PROBIOTIC DAILY PO) Take by mouth Yes Historical Provider, MD   promethazine (PHENERGAN) 25 MG tablet Take 25 mg by mouth Yes Historical Provider, MD   cloNIDine (CATAPRES) 0.1 MG tablet 0.1 mg as needed  Yes Historical Provider, MD   SUMAtriptan Succinate 6 MG/0.5ML SOAJ as needed  Yes Historical Provider, MD   Cholecalciferol (VITAMIN D-3) 5000 UNITS TABS Take 5,000 Units by mouth daily. Yes Historical Provider, MD   gabapentin (NEURONTIN) 100 MG capsule TAKE ONE TO TWO CAPSULES BY MOUTH THREE TIMES DAILY AS NEEDED  Buck Huber MD   bethanechol (URECHOLINE) 25 MG tablet Take 1 tablet by mouth 3 times daily. Buck Huber MD        Review of Systems   Constitutional: Negative for activity change, appetite change, chills, diaphoresis and fatigue. HENT: Negative for congestion, dental problem, drooling, ear discharge, postnasal drip and rhinorrhea.     Eyes: Negative for visual disturbance. Respiratory: Positive for apnea. Negative for cough, chest tightness, shortness of breath and wheezing. Gastrointestinal: Negative for abdominal distention, abdominal pain and anal bleeding. Endocrine: Negative for cold intolerance, heat intolerance and polydipsia. Genitourinary: Negative for difficulty urinating, dysuria, enuresis and flank pain. Musculoskeletal: Negative for arthralgias, back pain and gait problem. Allergic/Immunologic: Negative for environmental allergies. Neurological: Negative for dizziness, facial asymmetry, light-headedness and headaches. Vitals:    03/08/22 1103   BP: 118/72   Pulse: 80   SpO2: 97%     Physical Exam  Vitals reviewed. Constitutional:       Appearance: Normal appearance. HENT:      Head: Normocephalic and atraumatic. Nose: Nose normal.   Eyes:      Extraocular Movements: Extraocular movements intact. Conjunctiva/sclera: Conjunctivae normal.   Cardiovascular:      Rate and Rhythm: Normal rate and regular rhythm. Heart sounds: No murmur heard. No friction rub. Pulmonary:      Effort: Pulmonary effort is normal. No respiratory distress. Breath sounds: Normal breath sounds. No stridor. No wheezing, rhonchi or rales. Abdominal:      General: There is no distension. Palpations: There is no mass. Tenderness: There is no abdominal tenderness. There is no guarding or rebound. Musculoskeletal:      Cervical back: Normal range of motion and neck supple. Neurological:      Mental Status: She is alert and oriented to person, place, and time. Motor: Weakness present. This note was generated used a voice recognition software. Errors in voice recognition may have occurred. An electronic signature was used to authenticate this note.     --Marina Saldivar MD

## 2022-03-11 DIAGNOSIS — N20.0 NEPHROLITHIASIS: Primary | ICD-10-CM

## 2022-03-14 ENCOUNTER — OFFICE VISIT (OUTPATIENT)
Dept: PRIMARY CARE CLINIC | Age: 42
End: 2022-03-14
Payer: COMMERCIAL

## 2022-03-14 VITALS
BODY MASS INDEX: 37.32 KG/M2 | WEIGHT: 224 LBS | OXYGEN SATURATION: 99 % | HEIGHT: 65 IN | SYSTOLIC BLOOD PRESSURE: 120 MMHG | TEMPERATURE: 97.4 F | DIASTOLIC BLOOD PRESSURE: 70 MMHG | HEART RATE: 69 BPM

## 2022-03-14 DIAGNOSIS — G83.4 CAUDA EQUINA SYNDROME (HCC): ICD-10-CM

## 2022-03-14 DIAGNOSIS — Z79.899 MEDICATION MANAGEMENT: ICD-10-CM

## 2022-03-14 DIAGNOSIS — Z00.00 WELL FEMALE EXAM WITHOUT GYNECOLOGICAL EXAM: Primary | ICD-10-CM

## 2022-03-14 DIAGNOSIS — E53.8 B12 DEFICIENCY: ICD-10-CM

## 2022-03-14 DIAGNOSIS — G62.9 NEUROPATHY: ICD-10-CM

## 2022-03-14 DIAGNOSIS — R30.0 DYSURIA: ICD-10-CM

## 2022-03-14 DIAGNOSIS — G35 MULTIPLE SCLEROSIS (HCC): ICD-10-CM

## 2022-03-14 PROBLEM — M51.36 DEGENERATION OF LUMBAR INTERVERTEBRAL DISC: Status: ACTIVE | Noted: 2019-03-21

## 2022-03-14 PROBLEM — M51.369 DEGENERATION OF LUMBAR INTERVERTEBRAL DISC: Status: ACTIVE | Noted: 2019-03-21

## 2022-03-14 PROBLEM — N39.3 STRESS INCONTINENCE: Status: ACTIVE | Noted: 2020-05-28

## 2022-03-14 PROBLEM — N31.9 NEUROGENIC BLADDER: Status: ACTIVE | Noted: 2018-10-02

## 2022-03-14 LAB
ALBUMIN SERPL-MCNC: 4.3 G/DL (ref 3.5–5.2)
ALCOHOL URINE: NORMAL
ALP BLD-CCNC: 104 U/L (ref 35–104)
ALT SERPL-CCNC: 18 U/L (ref 5–33)
AMPHETAMINE SCREEN, URINE: NORMAL
ANION GAP SERPL CALCULATED.3IONS-SCNC: 14 MMOL/L (ref 7–19)
APPEARANCE FLUID: CLEAR
AST SERPL-CCNC: 15 U/L (ref 5–32)
BARBITURATE SCREEN, URINE: NORMAL
BENZODIAZEPINE SCREEN, URINE: NORMAL
BILIRUB SERPL-MCNC: 0.3 MG/DL (ref 0.2–1.2)
BILIRUBIN, POC: ABNORMAL
BLOOD URINE, POC: ABNORMAL
BUN BLDV-MCNC: 18 MG/DL (ref 6–20)
BUPRENORPHINE URINE: NORMAL
CALCIUM SERPL-MCNC: 9.6 MG/DL (ref 8.6–10)
CHLORIDE BLD-SCNC: 103 MMOL/L (ref 98–111)
CHOLESTEROL, TOTAL: 173 MG/DL (ref 160–199)
CLARITY, POC: CLEAR
CO2: 20 MMOL/L (ref 22–29)
COCAINE METABOLITE SCREEN URINE: NORMAL
COLOR, POC: YELLOW
CREAT SERPL-MCNC: 1 MG/DL (ref 0.5–0.9)
FENTANYL SCREEN, URINE: NORMAL
GABAPENTIN SCREEN, URINE: NORMAL
GFR AFRICAN AMERICAN: >59
GFR NON-AFRICAN AMERICAN: >60
GLUCOSE BLD-MCNC: 93 MG/DL (ref 74–109)
GLUCOSE URINE, POC: ABNORMAL
HCT VFR BLD CALC: 40.2 % (ref 37–47)
HDLC SERPL-MCNC: 49 MG/DL (ref 65–121)
HEMOGLOBIN: 13.2 G/DL (ref 12–16)
KETONES, POC: ABNORMAL
LDL CHOLESTEROL CALCULATED: 91 MG/DL
LEUKOCYTE EST, POC: ABNORMAL
MCH RBC QN AUTO: 31 PG (ref 27–31)
MCHC RBC AUTO-ENTMCNC: 32.8 G/DL (ref 33–37)
MCV RBC AUTO: 94.4 FL (ref 81–99)
MDMA URINE: NORMAL
METHADONE SCREEN, URINE: NORMAL
METHAMPHETAMINE, URINE: NORMAL
NITRITE, POC: ABNORMAL
OPIATE SCREEN URINE: NORMAL
OXYCODONE SCREEN URINE: NORMAL
PDW BLD-RTO: 13.5 % (ref 11.5–14.5)
PH, POC: ABNORMAL
PHENCYCLIDINE SCREEN URINE: NORMAL
PLATELET # BLD: 313 K/UL (ref 130–400)
PMV BLD AUTO: 10 FL (ref 9.4–12.3)
POTASSIUM SERPL-SCNC: 4.4 MMOL/L (ref 3.5–5)
PROPOXYPHENE SCREEN, URINE: NORMAL
PROTEIN, POC: ABNORMAL
RBC # BLD: 4.26 M/UL (ref 4.2–5.4)
SODIUM BLD-SCNC: 137 MMOL/L (ref 136–145)
SPECIFIC GRAVITY, POC: ABNORMAL
SYNTHETIC CANNABINOIDS(K2) SCREEN, URINE: NORMAL
T4 FREE: 1.15 NG/DL (ref 0.93–1.7)
THC SCREEN, URINE: NORMAL
TOTAL PROTEIN: 7.3 G/DL (ref 6.6–8.7)
TRAMADOL SCREEN URINE: NORMAL
TRICYCLIC ANTIDEPRESSANTS, UR: NORMAL
TRIGL SERPL-MCNC: 165 MG/DL (ref 0–149)
TSH REFLEX FT4: 4.65 UIU/ML (ref 0.35–5.5)
UROBILINOGEN, POC: ABNORMAL
VITAMIN B-12: 324 PG/ML (ref 211–946)
VITAMIN D 25-HYDROXY: 33.9 NG/ML
WBC # BLD: 5.7 K/UL (ref 4.8–10.8)

## 2022-03-14 PROCEDURE — 81002 URINALYSIS NONAUTO W/O SCOPE: CPT | Performed by: FAMILY MEDICINE

## 2022-03-14 PROCEDURE — G8484 FLU IMMUNIZE NO ADMIN: HCPCS | Performed by: FAMILY MEDICINE

## 2022-03-14 PROCEDURE — 80305 DRUG TEST PRSMV DIR OPT OBS: CPT | Performed by: FAMILY MEDICINE

## 2022-03-14 PROCEDURE — 99396 PREV VISIT EST AGE 40-64: CPT | Performed by: FAMILY MEDICINE

## 2022-03-14 RX ORDER — GABAPENTIN 100 MG/1
CAPSULE ORAL
Qty: 135 CAPSULE | Refills: 2 | Status: SHIPPED | OUTPATIENT
Start: 2022-03-14 | End: 2022-07-18

## 2022-03-14 RX ORDER — SUMATRIPTAN 6 MG/.5ML
6 INJECTION, SOLUTION SUBCUTANEOUS
Qty: 9 EACH | Refills: 3 | Status: SHIPPED | OUTPATIENT
Start: 2022-03-14 | End: 2022-10-14

## 2022-03-14 ASSESSMENT — PATIENT HEALTH QUESTIONNAIRE - PHQ9
7. TROUBLE CONCENTRATING ON THINGS, SUCH AS READING THE NEWSPAPER OR WATCHING TELEVISION: 0
6. FEELING BAD ABOUT YOURSELF - OR THAT YOU ARE A FAILURE OR HAVE LET YOURSELF OR YOUR FAMILY DOWN: 0
4. FEELING TIRED OR HAVING LITTLE ENERGY: 0
9. THOUGHTS THAT YOU WOULD BE BETTER OFF DEAD, OR OF HURTING YOURSELF: 0
SUM OF ALL RESPONSES TO PHQ QUESTIONS 1-9: 0
3. TROUBLE FALLING OR STAYING ASLEEP: 0
5. POOR APPETITE OR OVEREATING: 0
SUM OF ALL RESPONSES TO PHQ QUESTIONS 1-9: 0
SUM OF ALL RESPONSES TO PHQ9 QUESTIONS 1 & 2: 0
8. MOVING OR SPEAKING SO SLOWLY THAT OTHER PEOPLE COULD HAVE NOTICED. OR THE OPPOSITE, BEING SO FIGETY OR RESTLESS THAT YOU HAVE BEEN MOVING AROUND A LOT MORE THAN USUAL: 0
2. FEELING DOWN, DEPRESSED OR HOPELESS: 0
SUM OF ALL RESPONSES TO PHQ QUESTIONS 1-9: 0
1. LITTLE INTEREST OR PLEASURE IN DOING THINGS: 0
SUM OF ALL RESPONSES TO PHQ QUESTIONS 1-9: 0

## 2022-03-14 NOTE — TELEPHONE ENCOUNTER
Provider needs to review PDMP    PDMP Monitoring:    Last PDMP Roberto Neighbours as Reviewed [facility]):  Review User Review Instant Review Result   Romina Almonte 3/14/2022 11:55 AM Reviewed PDMP [1]     Urine Drug Screenings (1 yr)     POCT Rapid Drug Screen  Collected: 3/14/2022 (Final result)    Complete Results          POCT Rapid Drug Screen  Collected: 3/11/2021  1:39 PM (Final result)    Complete Results          POCT Rapid Drug Screen  Collected: 2/12/2020 10:27 AM (Final result)    Complete Results          POCT Rapid Drug Screen  Collected: 8/3/2018  2:47 PM (Final result)    Complete Results              Medication Contract and Consent for Opioid Use Documents Filed     Patient Documents     Type of Document Status Date Received Received By Description    Medication Contract Signed 8/9/2019  3:11 PM AVEL RILEY     Medication Contract Signed 8/12/2020 12:03 PM [facility] Patient

## 2022-03-14 NOTE — PROGRESS NOTES
SUBJECTIVE:   35 y.o. female for annual routine Pap and checkup. She sees a GYN for her Pap and breast exam.    I follow her because she is on gabapentin for her chronic pain and chronic back pain with occasional sciatica. She also has relapsing multiple sclerosis and is followed at [facility]. She had a very bad weekend with weakness on her left side and she said she could hardly get out of bed. Next Monday she will receive another treatment in [place] for her MS. Her migraines are under fairly good control. She uses Imitrex and it helps. LMP: No LMP recorded. Patient has had a hysterectomy.   Patient Active Problem List    Diagnosis Date Noted    Pleurisy 03/08/2022    Elevated hemidiaphragm 09/09/2021    LÓPEZ (dyspnea on exertion) 09/09/2021    Snoring 09/09/2021    Orthopnea 09/09/2021    Non-restorative sleep 09/09/2021    SHYAM (obstructive sleep apnea) 09/09/2021    Obesity (BMI 35.0-39.9 without comorbidity) 09/09/2021    Stress incontinence 05/28/2020    Degeneration of lumbar intervertebral disc 03/21/2019    Neurogenic bladder 10/02/2018    RUQ abdominal pain 08/08/2018    Insomnia 02/16/2017    Trigeminal neuralgia 02/16/2017    Vitamin D deficiency 02/16/2017    Right kidney stone 11/29/2016    Urinary incontinence 11/29/2016    Hypertensive disorder 06/10/2016    Radial styloid tenosynovitis 04/20/2016    Relapsing-remitting multiple sclerosis (HCC)     Complicated migraine     Hematuria     Depression     Nerve damage     Cauda equina syndrome (HCC)     Elevated cholesterol     Postural orthostatic tachycardia syndrome     Vitamin B12 deficiency (non anemic)     Pelvic floor dysfunction     Myofascial pain      Current Outpatient Medications   Medication Sig Dispense Refill    BACLOFEN PO       omeprazole (PRILOSEC) 40 MG delayed release capsule Take 1 capsule by mouth every morning (before breakfast) 30 capsule 5    propranolol (INDERAL LA) 60 MG extended release capsule TAKE 1 CAPSULE ONCE DAILY FOR HEART RATE 90 capsule 3    calcium carbonate (OS-RAJINDER) 1250 (500 Ca) MG chewable tablet Take 1 tablet by mouth daily 30 tablet 5    tiZANidine (ZANAFLEX) 4 MG tablet Take 1 tablet by mouth every 8 hours as needed (for muscle spasms) 270 tablet 1    desonide (DESOWEN) 0.05 % cream       ocrelizumab (OCREVUS) 300 MG/10ML SOLN injection       atorvastatin (LIPITOR) 20 MG tablet TAKE 1 TABLET AT BEDTIME FOR HIGH CHOLESTEROL 90 tablet 3    conjugated estrogens (PREMARIN) 0.625 MG/GM vaginal cream Place 0.5 g vaginally Twice a Week 1 Tube 5    nortriptyline (PAMELOR) 75 MG capsule       topiramate (TOPAMAX) 100 MG tablet Take 1 tablet by mouth 2 times daily 180 tablet 3    Probiotic Product (PROBIOTIC DAILY PO) Take by mouth      promethazine (PHENERGAN) 25 MG tablet Take 25 mg by mouth      cloNIDine (CATAPRES) 0.1 MG tablet 0.1 mg as needed       Cholecalciferol (VITAMIN D-3) 5000 UNITS TABS Take 5,000 Units by mouth daily.  gabapentin (NEURONTIN) 100 MG capsule TAKE ONE TO TWO CAPSULES BY MOUTH THREE TIMES DAILY AS NEEDED 135 capsule 2    SUMAtriptan (IMITREX) 6 MG/0.5ML SOLN injection Inject 0.5 mLs into the skin once as needed for Migraine 9 each 3    ketorolac (TORADOL) 10 MG tablet Take 1 tablet by mouth every 6 hours as needed for Pain (Patient not taking: Reported on 3/14/2022) 20 tablet 0     No current facility-administered medications for this visit.      Past Medical History:   Diagnosis Date    B12 deficiency     Cauda equina syndrome (HCC)     Complicated migraine     Depression     GERD (gastroesophageal reflux disease)     Hematuria     wih abdominal pain    Hyperlipidemia     Hypertension     MS (multiple sclerosis) (HCC)     Myofascial pain     Nerve damage     Neutropenia ([address] 10/21/2015    Pelvic floor dysfunction     Postmenopausal HRT (hormone replacement therapy)     POTS (postural orthostatic tachycardia syndrome) Past Surgical History:   Procedure Laterality Date    APPENDECTOMY      BACK SURGERY      [place] COLONOSCOPY      Dr. August Villar 10 years ?  COLONOSCOPY N/A 8/13/2018    Dr Cristino Phillips yr recall    EGD      HYSTERECTOMY      OVARY REMOVAL      MT EGD TRANSORAL BIOPSY SINGLE/MULTIPLE N/A 1/29/2018    Dr Cristino Abdul-Active duodenitis, gastritis/gastropathy     Family History   Problem Relation Age of Onset    Diabetes Mother     High Blood Pressure Mother     Colon Polyps Mother     Colon Cancer Neg Hx     Liver Cancer Neg Hx     Liver Disease Neg Hx     Esophageal Cancer Neg Hx     Rectal Cancer Neg Hx     Stomach Cancer Neg Hx      Social History     Tobacco Use    Smoking status: Never Smoker    Smokeless tobacco: Never Used   Substance Use Topics    Alcohol use: No       Allergies: Ropinirole hcl, Zolpidem, Ropinirole, and Simvastatin    ROS:  Feeling well. No dyspnea or chest pain on exertion. No abdominal pain, change in bowel habits, black or bloody stools. No urinary tract symptoms. GYN ROS: none   No neurological complaints. All other systems negative. OBJECTIVE:   The patient appears well, alert, oriented x 3, in no distress. /70   Pulse 69   Temp 97.4 °F (36.3 °C)   Ht 5' 5\" (1.651 m)   Wt 224 lb (101.6 kg)   SpO2 99%   BMI 37.28 kg/m²   Skin normal, no suspicious skin lesions. ENT normal.  Neck supple. No adenopathy or thyromegaly. ARIEL. Lungs are clear, good air entry, no wheezes, rhonchi or rales. S1 and S2 normal, no murmurs, regular rate and rhythm. Abdomen soft without tenderness, guarding, mass or organomegaly. Extremities show no edema, normal peripheral pulses. Neurological is normal, no focal findings. Psychiatric exam, no signs of depression. BREAST EXAM: Done by GYN    PELVIC EXAM: Done by GYN    ASSESSMENT:  1. Well female exam without gynecological exam    2. Dysuria    3. Medication management    4.  Cauda equina syndrome ([address]    5. B12 deficiency         PLAN:   Lifestyle advice: discussed diet and exercise    1. Well female exam without gynecological exam  -     CBC  -     Comprehensive Metabolic Panel  -     Lipid Panel  -     Vitamin D 25 Hydroxy  -     TSH with Reflex to FT4  2. Dysuria  -     POCT Urinalysis no Micro  -     Culture, Urine  3. Medication management  -     POCT Rapid Drug Screen  4. Cauda equina syndrome ([address]  5. B12 deficiency  -     Vitamin B12       Will contact her when we get results of her blood work. They also need to be sent down to her MS specialist.  She is not abusing her medications. PDMP Monitoring:    Last PDMP Ignacio Bonner as Reviewed [facility]):  Review User Review Instant Review Result   Trinda Sprain 3/14/2022  2:52 PM Reviewed PDMP [1]     Last Controlled Substance Monitoring Documentation      Office Visit from 3/14/2022 in [id number]   Periodic Controlled Substance Monitoring Possible medication side effects, risk of tolerance/dependence & alternative treatments discussed. , Assessed functional status. , No signs of potential drug abuse or diversion identified. filed at 03/14/2022 1155        Urine Drug Screenings (1 yr)     POCT Rapid Drug Screen  Collected: 3/14/2022 (Final result)    Complete Results          POCT Rapid Drug Screen  Collected: 3/11/2021  1:39 PM (Final result)    Complete Results          POCT Rapid Drug Screen  Collected: 2/12/2020 10:27 AM (Final result)    Complete Results          POCT Rapid Drug Screen  Collected: 8/3/2018  2:47 PM (Final result)    Complete Results              Medication Contract and Consent for Opioid Use Documents Filed     Patient Documents     Type of Document Status Date Received Received By Description    Medication Contract Signed 8/9/2019  3:11 PM AVEL RILEY     Medication Contract Signed 8/12/2020 12:03 PM Vani Miguel               Her cauda equina syndrome is stable although not completely controlled.   This is also followed by her MS doctor. Urinalysis revealed trace leukocytes and trace blood. We will culture but I thought this is probably a contaminant. I refilled her Imitrex. Follow-up:  Return in about 6 months (around 9/14/2022) for Video visit medication recheck. PATIENT INSTRUCTIONS:  Patient Instructions   We are committed to providing you with the best care possible. In order to help us achieve these goals please remember to bring all medications, herbal products, and over the counter supplements with you to each visit. If your provider has ordered testing for you, please be sure to follow up with our office if you have not received results within 7 days after the testing took place. *If you receive a survey after visiting one of our offices, please take time to share your experience concerning your physician office visit. These surveys are confidential and no health information about you is shared. We are eager to improve for you and we are counting on your feedback to help make that happen. EMR Dragon/transcription disclaimer:  Much of this encounter note is electronic transcription/translation of spoken language to printed texts. The electronic translation of spoken language may be erroneous, or at times, nonsensical words or phrases may be inadvertently transcribed.   Although I have reviewed the note for such errors, some may still exist.

## 2022-03-16 LAB
ORGANISM: ABNORMAL
URINE CULTURE, ROUTINE: ABNORMAL
URINE CULTURE, ROUTINE: ABNORMAL
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
## 2022-03-25 ENCOUNTER — HOSPITAL ENCOUNTER (OUTPATIENT)
Dept: ULTRASOUND IMAGING | Facility: HOSPITAL | Age: 42
Discharge: HOME | End: 2022-03-25
Admitting: UROLOGY

## 2022-03-25 DIAGNOSIS — N20.0 NEPHROLITHIASIS: ICD-10-CM

## 2022-03-25 PROCEDURE — 76775 US EXAM ABDO BACK WALL LIM: CPT

## 2022-03-28 ENCOUNTER — OFFICE VISIT (OUTPATIENT)
Dept: UROLOGY | Facility: CLINIC | Age: 42
End: 2022-03-28

## 2022-03-28 VITALS — BODY MASS INDEX: 35.82 KG/M2 | WEIGHT: 215 LBS | HEIGHT: 65 IN | TEMPERATURE: 98 F

## 2022-03-28 DIAGNOSIS — N20.0 NEPHROLITHIASIS: Primary | ICD-10-CM

## 2022-03-28 LAB
BILIRUB BLD-MCNC: NEGATIVE MG/DL
CLARITY, POC: CLEAR
COLOR UR: YELLOW
GLUCOSE UR STRIP-MCNC: NEGATIVE MG/DL
KETONES UR QL: NEGATIVE
LEUKOCYTE EST, POC: NEGATIVE
NITRITE UR-MCNC: NEGATIVE MG/ML
PH UR: 6.5 [PH] (ref 5–8)
PROT UR STRIP-MCNC: NEGATIVE MG/DL
RBC # UR STRIP: NEGATIVE /UL
SP GR UR: 1 (ref 1–1.03)
UROBILINOGEN UR QL: NORMAL

## 2022-03-28 PROCEDURE — 99213 OFFICE O/P EST LOW 20 MIN: CPT | Performed by: UROLOGY

## 2022-03-28 PROCEDURE — 81001 URINALYSIS AUTO W/SCOPE: CPT | Performed by: UROLOGY

## 2022-05-02 RX ORDER — ATORVASTATIN CALCIUM 20 MG/1
TABLET, FILM COATED ORAL
Qty: 90 TABLET | Refills: 3 | Status: SHIPPED | OUTPATIENT
Start: 2022-05-02

## 2022-05-03 ENCOUNTER — NURSE ONLY (OUTPATIENT)
Dept: PRIMARY CARE CLINIC | Age: 42
End: 2022-05-03
Payer: COMMERCIAL

## 2022-05-03 DIAGNOSIS — R30.0 DYSURIA: Primary | ICD-10-CM

## 2022-05-03 LAB
APPEARANCE FLUID: ABNORMAL
BILIRUBIN, POC: ABNORMAL
BLOOD URINE, POC: ABNORMAL
CLARITY, POC: ABNORMAL
COLOR, POC: YELLOW
GLUCOSE URINE, POC: ABNORMAL
KETONES, POC: ABNORMAL
LEUKOCYTE EST, POC: ABNORMAL
NITRITE, POC: ABNORMAL
PH, POC: ABNORMAL
PROTEIN, POC: ABNORMAL
SPECIFIC GRAVITY, POC: ABNORMAL
UROBILINOGEN, POC: ABNORMAL

## 2022-05-03 PROCEDURE — 81002 URINALYSIS NONAUTO W/O SCOPE: CPT | Performed by: NURSE PRACTITIONER

## 2022-05-05 LAB — URINE CULTURE, ROUTINE: NORMAL

## 2022-06-21 ENCOUNTER — OFFICE VISIT (OUTPATIENT)
Dept: PULMONOLOGY | Age: 42
End: 2022-06-21
Payer: COMMERCIAL

## 2022-06-21 VITALS
BODY MASS INDEX: 37.92 KG/M2 | SYSTOLIC BLOOD PRESSURE: 112 MMHG | HEART RATE: 81 BPM | HEIGHT: 65 IN | WEIGHT: 227.6 LBS | DIASTOLIC BLOOD PRESSURE: 70 MMHG | OXYGEN SATURATION: 96 %

## 2022-06-21 DIAGNOSIS — R06.09 DOE (DYSPNEA ON EXERTION): ICD-10-CM

## 2022-06-21 DIAGNOSIS — G47.33 OSA (OBSTRUCTIVE SLEEP APNEA): Primary | ICD-10-CM

## 2022-06-21 DIAGNOSIS — R06.83 SNORING: ICD-10-CM

## 2022-06-21 DIAGNOSIS — J98.6 ELEVATED HEMIDIAPHRAGM: ICD-10-CM

## 2022-06-21 DIAGNOSIS — R05.9 COUGH: ICD-10-CM

## 2022-06-21 DIAGNOSIS — E66.01 SEVERE OBESITY (BMI 35.0-39.9) WITH COMORBIDITY (HCC): ICD-10-CM

## 2022-06-21 PROCEDURE — G8427 DOCREV CUR MEDS BY ELIG CLIN: HCPCS | Performed by: INTERNAL MEDICINE

## 2022-06-21 PROCEDURE — G8417 CALC BMI ABV UP PARAM F/U: HCPCS | Performed by: INTERNAL MEDICINE

## 2022-06-21 PROCEDURE — 1036F TOBACCO NON-USER: CPT | Performed by: INTERNAL MEDICINE

## 2022-06-21 PROCEDURE — 99214 OFFICE O/P EST MOD 30 MIN: CPT | Performed by: INTERNAL MEDICINE

## 2022-06-21 RX ORDER — ALBUTEROL SULFATE 90 UG/1
2 AEROSOL, METERED RESPIRATORY (INHALATION) 4 TIMES DAILY PRN
Qty: 18 G | Refills: 5 | Status: SHIPPED | OUTPATIENT
Start: 2022-06-21

## 2022-06-21 ASSESSMENT — ENCOUNTER SYMPTOMS
APNEA: 1
SHORTNESS OF BREATH: 0
RHINORRHEA: 0
ABDOMINAL PAIN: 0
CHEST TIGHTNESS: 0
ABDOMINAL DISTENTION: 0
WHEEZING: 0
COUGH: 1
BACK PAIN: 0
ANAL BLEEDING: 0

## 2022-06-21 NOTE — PROGRESS NOTES
Pulmonary and Sleep Medicine    Isai Acevedo (:  1980) is a 35 y.o. female,Established patient, here for evaluation of the following chief complaint(s):  Follow-up (Pt came in today for a cpap follow up with a chest x-ray.)      Referring physician:  No referring provider defined for this encounter. ASSESSMENT/PLAN:  1. SHYAM (obstructive sleep apnea)  2. Elevated hemidiaphragm (no paralysis)  3. Snoring  4. Severe obesity (BMI 35.0-39. 9) with comorbidity ([address]  5. LÓPEZ (dyspnea on exertion)  6. Cough  -     albuterol sulfate HFA (VENTOLIN HFA) 108 (90 Base) MCG/ACT inhaler; Inhale 2 puffs into the lungs 4 times daily as needed for Wheezing, Disp-18 g, R-5Normal        Continue with the CPAP she is compliant with the CPAP and feels it helps. Will attempt a rescue inhaler at bedtime and assess cough response. Suspect cough is related to microaspiration caused by MS. follow-up up in 6 weeks to assess response to the bronchodilator. Cathie Franz MD, [facility], [facility]    Return in about 6 weeks (around 2022). SUBJECTIVE/OBJECTIVE:  She is here for follow up on sleep apnea and cough. She continues to cough, her cough is non productive. Her cough is mostly after she goes to bed. Denies wheezing. She is using the PPI. Does not feel the PPI help. She is using the CPAP and is compliant with the CPAP, she feels the CPAP is helping her. Prior to Visit Medications    Medication Sig Taking?  Authorizing Provider   atorvastatin (LIPITOR) 20 MG tablet TAKE 1 TABLET AT BEDTIME FOR HIGH CHOLESTEROL Yes Jonathon Uriostegui MD   BACLOFEN PO  Yes Historical Provider, MD   omeprazole (PRILOSEC) 40 MG delayed release capsule Take 1 capsule by mouth every morning (before breakfast) Yes Saurabh Sneed MD   propranolol (INDERAL LA) 60 MG extended release capsule TAKE 1 CAPSULE ONCE DAILY FOR HEART RATE Yes Jonathon Uriostegui MD   calcium carbonate (OS-RAJINDER) 1250 (500 Ca) MG chewable tablet Take 1 tablet by mouth daily Yes Ana Lilia Montez APRN - CNM   tiZANidine (ZANAFLEX) 4 MG tablet Take 1 tablet by mouth every 8 hours as needed (for muscle spasms) Yes Jeff Koenig MD   desonide (DESOWEN) 0.05 % cream  Yes Historical Provider, MD   ocrelizumab (OCREVUS) 300 MG/10ML SOLN injection  Yes Historical Provider, MD   conjugated estrogens (PREMARIN) 0.625 MG/GM vaginal cream Place 0.5 g vaginally Twice a Week Yes Nadene Ahumada, APRN - CNP   nortriptyline (PAMELOR) 75 MG capsule  Yes Historical Provider, MD   topiramate (TOPAMAX) 100 MG tablet Take 1 tablet by mouth 2 times daily Yes Jose Carlos Elizalde MD   Probiotic Product (PROBIOTIC DAILY PO) Take by mouth Yes Historical Provider, MD   promethazine (PHENERGAN) 25 MG tablet Take 25 mg by mouth Yes Historical Provider, MD   cloNIDine (CATAPRES) 0.1 MG tablet 0.1 mg as needed  Yes Historical Provider, MD   Cholecalciferol (VITAMIN D-3) 5000 UNITS TABS Take 5,000 Units by mouth daily. Yes Historical Provider, MD   gabapentin (NEURONTIN) 100 MG capsule TAKE ONE TO TWO CAPSULES BY MOUTH THREE TIMES DAILY AS NEEDED  Jeff Koenig MD   SUMAtriptan (IMITREX) 6 MG/0.5ML SOLN injection Inject 0.5 mLs into the skin once as needed for Migraine  Jeff Koenig MD   ketorolac (TORADOL) 10 MG tablet Take 1 tablet by mouth every 6 hours as needed for Pain  Patient not taking: Reported on 3/14/2022  DEVEN Alvarez - CNP   bethanechol (URECHOLINE) 25 MG tablet Take 1 tablet by mouth 3 times daily. Jeff Koenig MD        Review of Systems   Constitutional: Negative for activity change, appetite change, chills, diaphoresis and fatigue. HENT: Negative for congestion, dental problem, drooling, ear discharge, postnasal drip and rhinorrhea. Eyes: Negative for visual disturbance. Respiratory: Positive for apnea and cough. Negative for chest tightness, shortness of breath and wheezing.     Gastrointestinal: Negative for abdominal distention, abdominal pain and anal bleeding. Endocrine: Negative for cold intolerance, heat intolerance and polydipsia. Genitourinary: Negative for difficulty urinating, dysuria, enuresis and flank pain. Musculoskeletal: Negative for arthralgias, back pain and gait problem. Allergic/Immunologic: Negative for environmental allergies. Neurological: Negative for dizziness, facial asymmetry, light-headedness and headaches. Vitals:    06/21/22 1309   BP: 112/70   Pulse: 81   SpO2: 96%     BMI Readings from Last 1 Encounters:   06/21/22 37.87 kg/m²     . [id number]      Physical Exam  Vitals reviewed. Constitutional:       Appearance: Normal appearance. HENT:      Head: Normocephalic and atraumatic. Nose: Nose normal.   Eyes:      Extraocular Movements: Extraocular movements intact. Conjunctiva/sclera: Conjunctivae normal.   Cardiovascular:      Rate and Rhythm: Normal rate and regular rhythm. Heart sounds: No murmur heard. No friction rub. Pulmonary:      Effort: Pulmonary effort is normal. No respiratory distress. Breath sounds: Normal breath sounds. No stridor. No wheezing, rhonchi or rales. Abdominal:      General: There is no distension. Palpations: There is no mass. Tenderness: There is no abdominal tenderness. There is no guarding or rebound. Musculoskeletal:      Cervical back: Normal range of motion and neck supple. Neurological:      Mental Status: She is alert and oriented to person, place, and time. Motor: Weakness present. This note was generated used a voice recognition software. Errors in voice recognition may have occurred. An electronic signature was used to authenticate this note.     --Pipe Wright MD

## 2022-07-18 DIAGNOSIS — G35 MULTIPLE SCLEROSIS (HCC): ICD-10-CM

## 2022-07-18 DIAGNOSIS — G62.9 NEUROPATHY: ICD-10-CM

## 2022-07-18 RX ORDER — GABAPENTIN 100 MG/1
CAPSULE ORAL
Qty: 135 CAPSULE | Refills: 2 | Status: SHIPPED | OUTPATIENT
Start: 2022-07-18 | End: 2022-08-15 | Stop reason: SDUPTHER

## 2022-07-19 ENCOUNTER — OFFICE VISIT (OUTPATIENT)
Dept: PRIMARY CARE CLINIC | Age: 42
End: 2022-07-19
Payer: COMMERCIAL

## 2022-07-19 VITALS
TEMPERATURE: 97.7 F | DIASTOLIC BLOOD PRESSURE: 86 MMHG | SYSTOLIC BLOOD PRESSURE: 138 MMHG | HEART RATE: 81 BPM | HEIGHT: 65 IN | OXYGEN SATURATION: 96 % | BODY MASS INDEX: 37.22 KG/M2 | WEIGHT: 223.4 LBS

## 2022-07-19 DIAGNOSIS — R25.2 MUSCLE CRAMPS: Primary | ICD-10-CM

## 2022-07-19 DIAGNOSIS — M25.541 ARTHRALGIA OF BOTH HANDS: ICD-10-CM

## 2022-07-19 DIAGNOSIS — M25.542 ARTHRALGIA OF BOTH HANDS: ICD-10-CM

## 2022-07-19 DIAGNOSIS — M25.569 PAIN AND SWELLING OF KNEE, UNSPECIFIED LATERALITY: ICD-10-CM

## 2022-07-19 DIAGNOSIS — M25.469 PAIN AND SWELLING OF KNEE, UNSPECIFIED LATERALITY: ICD-10-CM

## 2022-07-19 DIAGNOSIS — G35 RELAPSING-REMITTING MULTIPLE SCLEROSIS (HCC): ICD-10-CM

## 2022-07-19 LAB
ALBUMIN SERPL-MCNC: 4.9 G/DL (ref 3.5–5.2)
ALP BLD-CCNC: 112 U/L (ref 35–104)
ALT SERPL-CCNC: 36 U/L (ref 5–33)
ANION GAP SERPL CALCULATED.3IONS-SCNC: 15 MMOL/L (ref 7–19)
AST SERPL-CCNC: 32 U/L (ref 5–32)
BILIRUB SERPL-MCNC: 0.5 MG/DL (ref 0.2–1.2)
BUN BLDV-MCNC: 23 MG/DL (ref 6–20)
C-REACTIVE PROTEIN: 0.67 MG/DL (ref 0–0.5)
CALCIUM SERPL-MCNC: 9.7 MG/DL (ref 8.6–10)
CHLORIDE BLD-SCNC: 106 MMOL/L (ref 98–111)
CO2: 20 MMOL/L (ref 22–29)
CREAT SERPL-MCNC: 1.1 MG/DL (ref 0.5–0.9)
GFR AFRICAN AMERICAN: >59
GFR NON-AFRICAN AMERICAN: 54
GLUCOSE BLD-MCNC: 92 MG/DL (ref 74–109)
HCT VFR BLD CALC: 42.7 % (ref 37–47)
HEMOGLOBIN: 14.6 G/DL (ref 12–16)
MCH RBC QN AUTO: 31.3 PG (ref 27–31)
MCHC RBC AUTO-ENTMCNC: 34.2 G/DL (ref 33–37)
MCV RBC AUTO: 91.4 FL (ref 81–99)
PDW BLD-RTO: 12.9 % (ref 11.5–14.5)
PLATELET # BLD: 336 K/UL (ref 130–400)
PMV BLD AUTO: 10.6 FL (ref 9.4–12.3)
POTASSIUM SERPL-SCNC: 4 MMOL/L (ref 3.5–5)
RBC # BLD: 4.67 M/UL (ref 4.2–5.4)
RHEUMATOID FACTOR: <10 IU/ML
SODIUM BLD-SCNC: 141 MMOL/L (ref 136–145)
TOTAL CK: 69 U/L (ref 26–192)
TOTAL PROTEIN: 7.8 G/DL (ref 6.6–8.7)
VITAMIN D 25-HYDROXY: 40.3 NG/ML
WBC # BLD: 7.1 K/UL (ref 4.8–10.8)

## 2022-07-19 PROCEDURE — 99214 OFFICE O/P EST MOD 30 MIN: CPT | Performed by: FAMILY MEDICINE

## 2022-07-19 PROCEDURE — G8427 DOCREV CUR MEDS BY ELIG CLIN: HCPCS | Performed by: FAMILY MEDICINE

## 2022-07-19 PROCEDURE — 1036F TOBACCO NON-USER: CPT | Performed by: FAMILY MEDICINE

## 2022-07-19 PROCEDURE — G8417 CALC BMI ABV UP PARAM F/U: HCPCS | Performed by: FAMILY MEDICINE

## 2022-07-19 RX ORDER — PREDNISONE 20 MG/1
TABLET ORAL
Qty: 15 TABLET | Refills: 0 | Status: SHIPPED | OUTPATIENT
Start: 2022-07-19 | End: 2022-08-03 | Stop reason: ALTCHOICE

## 2022-07-19 RX ORDER — LANOLIN ALCOHOL/MO/W.PET/CERES
CREAM (GRAM) TOPICAL DAILY
COMMUNITY
Start: 2022-06-09

## 2022-07-19 RX ORDER — BACLOFEN 20 MG/1
1 TABLET ORAL 3 TIMES DAILY
COMMUNITY
Start: 2022-05-11 | End: 2022-10-31 | Stop reason: SDUPTHER

## 2022-07-19 ASSESSMENT — ENCOUNTER SYMPTOMS: RESPIRATORY NEGATIVE: 1

## 2022-07-20 NOTE — PROGRESS NOTES
SUBJECTIVE:    Eva Obrien is 35 y. o.female who comes in complaining of Joint Pain (Pt reports began last week. She states she has consulted her MS doctor and determined it was not related to [address]. Pt reports joints are red sometimes. ) and Spasms (Pt reports began last week. Pt reports varies and is sporadic. )   . HPI: Hilaria comes in today complaining of joint pain which started last week. She talked to Dr. Perales and he felt that it was not related to her MS. She is also noticed that her joints seem red sometimes especially on her hands and she is having severe muscle spasms which come and go. They may be all over her body but some of them are very severe. She really does not know what is going on. Her knees ache especially her right knee. She has had no recent tick bites. Allergies   Allergen Reactions    Ropinirole Hcl      Other reaction(s): Mental Status Change    Zolpidem Other (See Comments)    Ropinirole Other (See Comments)     Crazy dreams; sleep walking    Simvastatin Other (See Comments)     Leg cramps       Social History     Socioeconomic History    Marital status:      Spouse name: Ita Alas    Number of children: 1    Years of education: None    Highest education level: None   Occupational History     Employer: [employer]     Employer: N/A   Tobacco Use    Smoking status: Never    Smokeless tobacco: Never   Vaping Use    Vaping Use: Never used   Substance and Sexual Activity    Alcohol use: No    Drug use: No    Sexual activity: Yes     Partners: Male       Review of Systems   Constitutional:  Positive for activity change and fatigue. Negative for chills, diaphoresis and fever. Respiratory: Negative. Cardiovascular: Negative. Musculoskeletal:  Positive for arthralgias and myalgias. Neurological: Negative. Hematological: Negative. Psychiatric/Behavioral: Negative.          Current Outpatient Medications on File Prior to Visit   Medication Sig Dispense Refill    baclofen (LIORESAL) 20 MG tablet Take 1 tablet by mouth in the morning and 1 tablet at noon and 1 tablet before bedtime. Calcium 500-100 MG-UNIT CHEW Take by mouth daily      gabapentin (NEURONTIN) 100 MG capsule TAKE ONE TO TWO CAPSULES BY MOUTH THREE TIMES DAILY AS NEEDED 135 capsule 2    albuterol sulfate HFA (VENTOLIN HFA) 108 (90 Base) MCG/ACT inhaler Inhale 2 puffs into the lungs 4 times daily as needed for Wheezing 18 g 5    atorvastatin (LIPITOR) 20 MG tablet TAKE 1 TABLET AT BEDTIME FOR HIGH CHOLESTEROL 90 tablet 3    SUMAtriptan (IMITREX) 6 MG/0.5ML SOLN injection Inject 0.5 mLs into the skin once as needed for Migraine 9 each 3    omeprazole (PRILOSEC) 40 MG delayed release capsule Take 1 capsule by mouth every morning (before breakfast) 30 capsule 5    propranolol (INDERAL LA) 60 MG extended release capsule TAKE 1 CAPSULE ONCE DAILY FOR HEART RATE 90 capsule 3    calcium carbonate (OS-RAJINDER) 1250 (500 Ca) MG chewable tablet Take 1 tablet by mouth daily 30 tablet 5    tiZANidine (ZANAFLEX) 4 MG tablet Take 1 tablet by mouth every 8 hours as needed (for muscle spasms) 270 tablet 1    desonide (DESOWEN) 0.05 % cream       ocrelizumab (OCREVUS) 300 MG/10ML SOLN injection       conjugated estrogens (PREMARIN) 0.625 MG/GM vaginal cream Place 0.5 g vaginally Twice a Week 1 Tube 5    nortriptyline (PAMELOR) 75 MG capsule       topiramate (TOPAMAX) 100 MG tablet Take 1 tablet by mouth 2 times daily 180 tablet 3    Probiotic Product (PROBIOTIC DAILY PO) Take by mouth      promethazine (PHENERGAN) 25 MG tablet Take 25 mg by mouth      cloNIDine (CATAPRES) 0.1 MG tablet 0.1 mg as needed       Cholecalciferol (VITAMIN D-3) 5000 UNITS TABS Take 5,000 Units by mouth daily. [DISCONTINUED] bethanechol (URECHOLINE) 25 MG tablet Take 1 tablet by mouth 3 times daily. 270 tablet 3     No current facility-administered medications on file prior to visit.           OBJECTIVE:    Wt Readings from Last 3 Encounters:   07/19/22 223 lb 6.4 oz (101.3 kg)   06/21/22 227 lb 9.6 oz (103.2 kg)   03/14/22 224 lb (101.6 kg)       /86   Pulse 81   Temp 97.7 °F (36.5 °C)   Ht 5' 5\" (1.651 m)   Wt 223 lb 6.4 oz (101.3 kg)   SpO2 96%   BMI 37.18 kg/m²     Physical Exam  Vitals reviewed. Constitutional:       General: She is not in acute distress. Appearance: Normal appearance. She is well-developed. HENT:      Head: Normocephalic. Right Ear: Tympanic membrane, ear canal and external ear normal.      Left Ear: Tympanic membrane, ear canal and external ear normal.      Nose: Nose normal. No rhinorrhea. Mouth/Throat:      Mouth: Mucous membranes are moist.      Pharynx: No posterior oropharyngeal erythema. Eyes:      Extraocular Movements: Extraocular movements intact. Conjunctiva/sclera: Conjunctivae normal.      Pupils: Pupils are equal, round, and reactive to light. Neck:      Vascular: No carotid bruit. Cardiovascular:      Rate and Rhythm: Normal rate and regular rhythm. Heart sounds: Normal heart sounds. No murmur heard. Pulmonary:      Effort: Pulmonary effort is normal. No respiratory distress. Breath sounds: Normal breath sounds. Musculoskeletal:         General: Tenderness present. Cervical back: Normal range of motion and neck supple. Comments: Joints in her hands seems slightly enlarged but there is no increased redness over the joints although the backs of her hands appear slightly reddened. Radial and ulnar pulses are intact. No peripheral edema. Right knee appears larger than the left but there is no increased warmth or redness. Lymphadenopathy:      Cervical: No cervical adenopathy. Skin:     General: Skin is warm and dry. Neurological:      Mental Status: She is alert and oriented to person, place, and time.    Psychiatric:         Mood and Affect: Mood normal.         Speech: Speech normal.         Behavior: Behavior normal. Thought Content: Thought content normal.         Judgment: Judgment normal.       ASSESSMENT:    1. Muscle cramps    2. Arthralgia of both hands    3. Relapsing-remitting multiple sclerosis (HCC)    4. Pain and swelling of knee, unspecified laterality          PLAN:  1. Muscle cramps  -     CBC  -     Comprehensive Metabolic Panel  -     Vitamin D 25 Hydroxy  -     C-Reactive Protein  -     CK  -     Rheumatoid Factor  -     GREGORIA Screen with Reflex  -     Cyclic Citrul Peptide Antibody, IgG  2. Arthralgia of both hands  -     CBC  -     Comprehensive Metabolic Panel  -     Vitamin D 25 Hydroxy  -     C-Reactive Protein  -     CK  -     Rheumatoid Factor  -     GREGORIA Screen with Reflex  -     Cyclic Citrul Peptide Antibody, IgG  3. Relapsing-remitting multiple sclerosis (HCC)  4. Pain and swelling of knee, unspecified laterality  -     CBC  -     Comprehensive Metabolic Panel  -     Vitamin D 25 Hydroxy  -     C-Reactive Protein  -     CK  -     Rheumatoid Factor  -     GREGORIA Screen with Reflex  -     Cyclic Citrul Peptide Antibody, IgG     I am going to begin a work-up for rheumatoid arthritis. We will contact her when we get results. We will also check a vitamin D level. I am not sure if this is related to her MS or if it is related to something else. In the meantime I called in prednisone 20 mg 2 tablets once a day for 2 days then 20 mg once a day for 6 days then 10 mg once a day for a few more days. She is to take this with food. If she has any problems she is to let me know. Continue to monitor blood pressure closely. Follow-up:  Return if symptoms worsen or fail to improve. PATIENT INSTRUCTIONS:  Patient Instructions   We are committed to providing you with the best care possible. In order to help us achieve these goals please remember to bring all medications, herbal products, and over the counter supplements with you to each visit.      If your provider has ordered testing for you, please be sure to follow up with our office if you have not received results within 7 days after the testing took place. *If you receive a survey after visiting one of our offices, please take time to share your experience concerning your physician office visit. These surveys are confidential and no health information about you is shared. We are eager to improve for you and we are counting on your feedback to help make that happen. EMR Dragon/transcription disclaimer:  Much of this encounter note is electronic transcription/translation of spoken language to printed texts. The electronic translation of spoken language may be erroneous, or at times, nonsensical words or phrases may beinadvertently transcribed.   Although I have reviewed the note for such errors, some may still exist.

## 2022-07-21 LAB
ANA IGG, ELISA: NORMAL
CCP IGG ANTIBODIES: 2 UNITS (ref 0–19)

## 2022-08-02 ENCOUNTER — TELEPHONE (OUTPATIENT)
Dept: GASTROENTEROLOGY | Age: 42
End: 2022-08-02

## 2022-08-02 NOTE — TELEPHONE ENCOUNTER
This patient as noted was last seen 2018 by BG nam, I told the patient she will need an OV before we can make any recommendations, she is now scheduled with [address] [place] [address] cyril on 8-30-22 @ 8:10 am. Raúl arrington

## 2022-08-02 NOTE — TELEPHONE ENCOUNTER
Yoselin Sherley called requesting return call from nurse and to be seen. Last seen 2018. She has concern with constipation, bleeding and pain. PCP  instructed her to take medication to relieve symptoms small bowel movement on Friday has not had another bowel movement since. [facility] not able to accommodate any time soon.  Please return her call to discuss [phone]      Thank you

## 2022-08-03 ENCOUNTER — OFFICE VISIT (OUTPATIENT)
Dept: PULMONOLOGY | Age: 42
End: 2022-08-03
Payer: COMMERCIAL

## 2022-08-03 VITALS
WEIGHT: 217.4 LBS | HEIGHT: 65 IN | DIASTOLIC BLOOD PRESSURE: 88 MMHG | SYSTOLIC BLOOD PRESSURE: 126 MMHG | BODY MASS INDEX: 36.22 KG/M2 | TEMPERATURE: 97.6 F | OXYGEN SATURATION: 97 % | HEART RATE: 73 BPM

## 2022-08-03 DIAGNOSIS — J98.6 ELEVATED HEMIDIAPHRAGM: ICD-10-CM

## 2022-08-03 DIAGNOSIS — R06.09 DOE (DYSPNEA ON EXERTION): ICD-10-CM

## 2022-08-03 DIAGNOSIS — G47.33 OSA (OBSTRUCTIVE SLEEP APNEA): Primary | ICD-10-CM

## 2022-08-03 DIAGNOSIS — E66.9 OBESITY (BMI 35.0-39.9 WITHOUT COMORBIDITY): ICD-10-CM

## 2022-08-03 DIAGNOSIS — K21.9 GASTROESOPHAGEAL REFLUX DISEASE WITHOUT ESOPHAGITIS: ICD-10-CM

## 2022-08-03 PROCEDURE — 99214 OFFICE O/P EST MOD 30 MIN: CPT | Performed by: INTERNAL MEDICINE

## 2022-08-03 PROCEDURE — G8417 CALC BMI ABV UP PARAM F/U: HCPCS | Performed by: INTERNAL MEDICINE

## 2022-08-03 PROCEDURE — G8427 DOCREV CUR MEDS BY ELIG CLIN: HCPCS | Performed by: INTERNAL MEDICINE

## 2022-08-03 PROCEDURE — 1036F TOBACCO NON-USER: CPT | Performed by: INTERNAL MEDICINE

## 2022-08-03 ASSESSMENT — ENCOUNTER SYMPTOMS
COUGH: 1
WHEEZING: 0
CHEST TIGHTNESS: 0
BACK PAIN: 0
RHINORRHEA: 0
ANAL BLEEDING: 0
SHORTNESS OF BREATH: 0
ABDOMINAL DISTENTION: 0
APNEA: 1
ABDOMINAL PAIN: 0

## 2022-08-03 NOTE — PROGRESS NOTES
Pulmonary and Sleep Medicine    Jayro Odell (:  1980) is a 35 y.o. female,Established patient, here for evaluation of the following chief complaint(s):  Sleep Apnea (Pt here for 6 week follow up for SHYAM.)      Referring physician:  No referring provider defined for this encounter. ASSESSMENT/PLAN:  1. SHYAM (obstructive sleep apnea) on CPAP  2. Elevated hemidiaphragm (no paralysis)  3. LÓPEZ (dyspnea on exertion)  4. Obesity (BMI 35.0-39.9 without comorbidity)  5. Gastroesophageal reflux disease without esophagitis    He cough is likely due to microaspiration during sleep. She did not improve with PPI or with BIN. Continue with the CPAP. She is compliant with the CPAP and feels the CPAP helps her symptoms. Tere Ivy MD, [facility], [facility]    Return in about 3 months (around 11/3/2022). SUBJECTIVE/OBJECTIVE:  She is here for follow up on sleep apnea and cough. She did not feel the inhaler helped. She endorses occasional aspiration and difficulty swallowing. She is using the CPAP and is compliant with the CPAP. She denies any cough during the daytime. She only coughs at night. Tried PPI and did not help the cough. Prior to Visit Medications    Medication Sig Taking? Authorizing Provider   baclofen (LIORESAL) 20 MG tablet Take 1 tablet by mouth in the morning and 1 tablet at noon and 1 tablet before bedtime.  Yes Historical Provider, MD   Calcium 500-100 MG-UNIT CHEW Take by mouth daily Yes Historical Provider, MD   gabapentin (NEURONTIN) 100 MG capsule TAKE ONE TO TWO CAPSULES BY MOUTH THREE TIMES DAILY AS NEEDED Yes Gopal Nogueira MD   albuterol sulfate HFA (VENTOLIN HFA) 108 (90 Base) MCG/ACT inhaler Inhale 2 puffs into the lungs 4 times daily as needed for Wheezing Yes Saurabh Sneed MD   atorvastatin (LIPITOR) 20 MG tablet TAKE 1 TABLET AT BEDTIME FOR HIGH CHOLESTEROL Yes Gopal Nogueira MD   omeprazole (PRILOSEC) 40 MG delayed release capsule Take 1 capsule by mouth every morning (before breakfast) Yes Saurabh Sneed MD   propranolol (INDERAL LA) 60 MG extended release capsule TAKE 1 CAPSULE ONCE DAILY FOR HEART RATE Yes Chris Roth MD   calcium carbonate (OS-RAJINDER) 1250 (500 Ca) MG chewable tablet Take 1 tablet by mouth daily Yes DEVEN Martinez - CNMADINA   tiZANidine (ZANAFLEX) 4 MG tablet Take 1 tablet by mouth every 8 hours as needed (for muscle spasms) Yes Chris Roth MD   desonide (DESOWEN) 0.05 % cream  Yes Historical Provider, MD   ocrelizumab (OCREVUS) 300 MG/10ML SOLN injection  Yes Historical Provider, MD   conjugated estrogens (PREMARIN) 0.625 MG/GM vaginal cream Place 0.5 g vaginally Twice a Week Yes DEVEN Acosta - CNP   nortriptyline (PAMELOR) 75 MG capsule  Yes Historical Provider, MD   topiramate (TOPAMAX) 100 MG tablet Take 1 tablet by mouth 2 times daily Yes Daryl Collins MD   Probiotic Product (PROBIOTIC DAILY PO) Take by mouth Yes Historical Provider, MD   promethazine (PHENERGAN) 25 MG tablet Take 25 mg by mouth Yes Historical Provider, MD   cloNIDine (CATAPRES) 0.1 MG tablet 0.1 mg as needed  Yes Historical Provider, MD   Cholecalciferol (VITAMIN D-3) 5000 UNITS TABS Take 5,000 Units by mouth daily. Yes Historical Provider, MD   SUMAtriptan (IMITREX) 6 MG/0.5ML SOLN injection Inject 0.5 mLs into the skin once as needed for Migraine  Chris Roth MD   bethanechol (URECHOLINE) 25 MG tablet Take 1 tablet by mouth 3 times daily. Chris Roth MD        Review of Systems   Constitutional:  Negative for activity change, appetite change, chills, diaphoresis and fatigue. HENT:  Negative for congestion, dental problem, drooling, ear discharge, postnasal drip and rhinorrhea. Eyes:  Negative for visual disturbance. Respiratory:  Positive for apnea and cough. Negative for chest tightness, shortness of breath and wheezing.     Gastrointestinal:  Negative for abdominal distention, abdominal pain and anal bleeding. Endocrine: Negative for cold intolerance, heat intolerance and polydipsia. Genitourinary:  Negative for difficulty urinating, dysuria, enuresis and flank pain. Musculoskeletal:  Negative for arthralgias, back pain and gait problem. Allergic/Immunologic: Negative for environmental allergies. Neurological:  Negative for dizziness, facial asymmetry, light-headedness and headaches. Vitals:    08/03/22 1332   BP: 126/88   Pulse: 73   Temp: 97.6 °F (36.4 °C)   SpO2: 97%     BMI Readings from Last 1 Encounters:   08/03/22 36.18 kg/m²     [unfilled]     Physical Exam  Vitals reviewed. Constitutional:       Appearance: Normal appearance. HENT:      Head: Normocephalic and atraumatic. Nose: Nose normal.   Eyes:      Extraocular Movements: Extraocular movements intact. Conjunctiva/sclera: Conjunctivae normal.   Cardiovascular:      Rate and Rhythm: Normal rate and regular rhythm. Heart sounds: No murmur heard. No friction rub. Pulmonary:      Effort: Pulmonary effort is normal. No respiratory distress. Breath sounds: Normal breath sounds. No stridor. No wheezing, rhonchi or rales. Abdominal:      General: There is no distension. Palpations: There is no mass. Tenderness: There is no abdominal tenderness. There is no guarding or rebound. Musculoskeletal:      Cervical back: Normal range of motion and neck supple. Neurological:      Mental Status: She is alert and oriented to person, place, and time. This note was generated used a voice recognition software. Errors in voice recognition may have occurred. An electronic signature was used to authenticate this note.     --John Jefferson MD

## 2022-08-15 DIAGNOSIS — G62.9 NEUROPATHY: ICD-10-CM

## 2022-08-15 DIAGNOSIS — G35 MULTIPLE SCLEROSIS (HCC): ICD-10-CM

## 2022-08-16 RX ORDER — GABAPENTIN 100 MG/1
CAPSULE ORAL
Qty: 135 CAPSULE | Refills: 2 | Status: SHIPPED | OUTPATIENT
Start: 2022-08-16 | End: 2022-09-14 | Stop reason: SDUPTHER

## 2022-08-22 ENCOUNTER — NURSE ONLY (OUTPATIENT)
Dept: PRIMARY CARE CLINIC | Age: 42
End: 2022-08-22
Payer: COMMERCIAL

## 2022-08-22 DIAGNOSIS — R82.90 URINE ABNORMALITY: Primary | ICD-10-CM

## 2022-08-22 DIAGNOSIS — R79.82 ELEVATED C-REACTIVE PROTEIN (CRP): Primary | ICD-10-CM

## 2022-08-22 DIAGNOSIS — N28.9 FUNCTION KIDNEY DECREASED: ICD-10-CM

## 2022-08-22 DIAGNOSIS — R82.90 ABNORMAL URINE: ICD-10-CM

## 2022-08-22 LAB
ALBUMIN SERPL-MCNC: 4.3 G/DL (ref 3.5–5.2)
ALP BLD-CCNC: 102 U/L (ref 35–104)
ALT SERPL-CCNC: 17 U/L (ref 5–33)
ANION GAP SERPL CALCULATED.3IONS-SCNC: 9 MMOL/L (ref 7–19)
APPEARANCE FLUID: CLEAR
AST SERPL-CCNC: 14 U/L (ref 5–32)
BILIRUB SERPL-MCNC: 0.4 MG/DL (ref 0.2–1.2)
BILIRUBIN, POC: ABNORMAL
BLOOD URINE, POC: ABNORMAL
BUN BLDV-MCNC: 11 MG/DL (ref 6–20)
C-REACTIVE PROTEIN: 0.36 MG/DL (ref 0–0.5)
CALCIUM SERPL-MCNC: 8.9 MG/DL (ref 8.6–10)
CHLORIDE BLD-SCNC: 111 MMOL/L (ref 98–111)
CLARITY, POC: CLEAR
CO2: 23 MMOL/L (ref 22–29)
COLOR, POC: YELLOW
CREAT SERPL-MCNC: 0.9 MG/DL (ref 0.5–0.9)
GFR AFRICAN AMERICAN: >59
GFR NON-AFRICAN AMERICAN: >60
GLUCOSE BLD-MCNC: 100 MG/DL (ref 74–109)
GLUCOSE URINE, POC: ABNORMAL
KETONES, POC: ABNORMAL
LEUKOCYTE EST, POC: ABNORMAL
NITRITE, POC: ABNORMAL
PH, POC: ABNORMAL
POTASSIUM SERPL-SCNC: 4.5 MMOL/L (ref 3.5–5)
PROTEIN, POC: ABNORMAL
SODIUM BLD-SCNC: 143 MMOL/L (ref 136–145)
SPECIFIC GRAVITY, POC: ABNORMAL
TOTAL PROTEIN: 6.7 G/DL (ref 6.6–8.7)
UROBILINOGEN, POC: ABNORMAL

## 2022-08-22 PROCEDURE — 81002 URINALYSIS NONAUTO W/O SCOPE: CPT | Performed by: FAMILY MEDICINE

## 2022-08-24 LAB
ORGANISM: ABNORMAL
URINE CULTURE, ROUTINE: ABNORMAL
URINE CULTURE, ROUTINE: ABNORMAL

## 2022-08-24 RX ORDER — AMOXICILLIN 875 MG/1
875 TABLET, COATED ORAL 2 TIMES DAILY
Qty: 20 TABLET | Refills: 0 | Status: SHIPPED | OUTPATIENT
Start: 2022-08-24 | End: 2022-09-03

## 2022-08-30 ENCOUNTER — OFFICE VISIT (OUTPATIENT)
Dept: GASTROENTEROLOGY | Age: 42
End: 2022-08-30
Payer: COMMERCIAL

## 2022-08-30 VITALS
HEIGHT: 65 IN | OXYGEN SATURATION: 97 % | DIASTOLIC BLOOD PRESSURE: 66 MMHG | HEART RATE: 81 BPM | BODY MASS INDEX: 36.82 KG/M2 | WEIGHT: 221 LBS | SYSTOLIC BLOOD PRESSURE: 122 MMHG

## 2022-08-30 DIAGNOSIS — R10.31 CHRONIC BILATERAL LOWER ABDOMINAL PAIN: ICD-10-CM

## 2022-08-30 DIAGNOSIS — G89.29 CHRONIC BILATERAL LOWER ABDOMINAL PAIN: ICD-10-CM

## 2022-08-30 DIAGNOSIS — R13.10 DYSPHAGIA, UNSPECIFIED TYPE: ICD-10-CM

## 2022-08-30 DIAGNOSIS — R10.13 EPIGASTRIC PAIN: ICD-10-CM

## 2022-08-30 DIAGNOSIS — R10.32 CHRONIC BILATERAL LOWER ABDOMINAL PAIN: ICD-10-CM

## 2022-08-30 DIAGNOSIS — K59.00 CONSTIPATION, UNSPECIFIED CONSTIPATION TYPE: ICD-10-CM

## 2022-08-30 DIAGNOSIS — R19.4 CHANGE IN BOWEL HABITS: Primary | ICD-10-CM

## 2022-08-30 PROCEDURE — G8417 CALC BMI ABV UP PARAM F/U: HCPCS | Performed by: NURSE PRACTITIONER

## 2022-08-30 PROCEDURE — 99203 OFFICE O/P NEW LOW 30 MIN: CPT | Performed by: NURSE PRACTITIONER

## 2022-08-30 PROCEDURE — 1036F TOBACCO NON-USER: CPT | Performed by: NURSE PRACTITIONER

## 2022-08-30 PROCEDURE — G8427 DOCREV CUR MEDS BY ELIG CLIN: HCPCS | Performed by: NURSE PRACTITIONER

## 2022-08-30 RX ORDER — OMEPRAZOLE 40 MG/1
40 CAPSULE, DELAYED RELEASE ORAL PRN
COMMUNITY

## 2022-08-30 ASSESSMENT — ENCOUNTER SYMPTOMS
BACK PAIN: 0
ABDOMINAL DISTENTION: 0
COUGH: 1
SORE THROAT: 0
CONSTIPATION: 1
ABDOMINAL PAIN: 1
NAUSEA: 0
RECTAL PAIN: 0
VOMITING: 0
DIARRHEA: 0
TROUBLE SWALLOWING: 1
BLOOD IN STOOL: 0
VOICE CHANGE: 0
SHORTNESS OF BREATH: 0

## 2022-08-30 NOTE — PROGRESS NOTES
Subjective:      Cindy Stroud is a34 y.o. female  Chief Complaint   Patient presents with    Abdominal Pain    Constipation    Dysphagia       HPI  PCP: Darrick Sandifer, MD  New pt to me  Previous pt of DEVEN Mandel  Pt reports constipation  Reports the constipation started in June 2019  Came out of nowhere  Doesn't think there was a change in medications when this started  Previous to this she always had diarrhea  Recently went 2 weeks with no BM  Currently on a daily probiotic and daily fiber supplement  On average has a BM every few days, not sure if she is emptying. Reports PASTOR pain   Started 6 months ago  Waxes and wanes  Describes as \"burning\" that occurs intermittently  No triggers    C/o lower abdominal pain  Waxes and wanes  No triggers for the pain  Describes as \"cramping\"  Last occurred last night  This pain is chronic for many years  Reports a hx of endometriosis and CORNELIUS    Reports dysphagia  With foods and liquids  [place] fpor years  Occurs at random times    Has MS    Family HX:    Pt denies family hx of colon polyps, colon CA, inflammatory bowel dx, gastric CA and esophageal CA.     Past Medical History:   Diagnosis Date    B12 deficiency     Cauda equina syndrome (HCC)     Complicated migraine     Depression     GERD (gastroesophageal reflux disease)     Hematuria     wih abdominal pain    Hyperlipidemia     Hypertension     MS (multiple sclerosis) (HCC)     Myofascial pain     Nerve damage     Neutropenia ([address] 10/21/2015    Pelvic floor dysfunction     Postmenopausal HRT (hormone replacement therapy)     POTS (postural orthostatic tachycardia syndrome)           Past Surgical History:   Procedure Laterality Date    APPENDECTOMY      BACK SURGERY      CHOLECYSTECTOMY, LAPAROSCOPIC      COLONOSCOPY  11/16/2015    Dr. Yin Mcgovern- AP, 5YR RECALL    COLONOSCOPY N/A 08/13/2018    Dr Ye [facility]--10 yr recall    EGD      HYSTERECTOMY (CERVIX STATUS UNKNOWN)      OVARY REMOVAL      [place] EGD TRANSORAL BIOPSY SINGLE/MULTIPLE N/A 01/29/2018    Dr Sonia Abdul-Active duodenitis, gastritis/gastropathy       Social History     Socioeconomic History    Marital status:      Spouse name: Raji Arndt    Number of children: 1    Years of education: None    Highest education level: None   Occupational History     Employer: [address] [place] [address] [employer] [facility]     Employer: N/A   Tobacco Use    Smoking status: Never    Smokeless tobacco: Never   Vaping Use    Vaping Use: Never used   Substance and Sexual Activity    Alcohol use: No    Drug use: No    Sexual activity: Yes     Partners: Male       Allergies   Allergen Reactions    Ropinirole Hcl      Other reaction(s): Mental Status Change    Zolpidem Other (See Comments)    Ropinirole Other (See Comments)     Crazy dreams; sleep walking    Simvastatin Other (See Comments)     Leg cramps       Current Outpatient Medications   Medication Sig Dispense Refill    omeprazole (PRILOSEC) 40 MG delayed release capsule Take 40 mg by mouth as needed      amoxicillin (AMOXIL) 875 MG tablet Take 1 tablet by mouth 2 times daily for 10 days 20 tablet 0    gabapentin (NEURONTIN) 100 MG capsule TAKE ONE TO TWO CAPSULES BY MOUTH THREE TIMES DAILY AS NEEDED 135 capsule 2    baclofen (LIORESAL) 20 MG tablet Take 1 tablet by mouth in the morning and 1 tablet at noon and 1 tablet before bedtime.       Calcium 500-100 MG-UNIT CHEW Take by mouth daily      albuterol sulfate HFA (VENTOLIN HFA) 108 (90 Base) MCG/ACT inhaler Inhale 2 puffs into the lungs 4 times daily as needed for Wheezing 18 g 5    atorvastatin (LIPITOR) 20 MG tablet TAKE 1 TABLET AT BEDTIME FOR HIGH CHOLESTEROL 90 tablet 3    SUMAtriptan (IMITREX) 6 MG/0.5ML SOLN injection Inject 0.5 mLs into the skin once as needed for Migraine 9 each 3    propranolol (INDERAL LA) 60 MG extended release capsule TAKE 1 CAPSULE ONCE DAILY FOR HEART RATE 90 capsule 3    tiZANidine (ZANAFLEX) 4 MG tablet Take 1 tablet by mouth every 8 hours as needed (for muscle spasms) (Patient taking differently: Take 4 mg by mouth nightly as needed (for muscle spasms)) 270 tablet 1    desonide (DESOWEN) 0.05 % cream in the morning and at bedtime      ocrelizumab (OCREVUS) 300 MG/10ML SOLN injection every 6 months      conjugated estrogens (PREMARIN) 0.625 MG/GM vaginal cream Place 0.5 g vaginally Twice a Week 1 Tube 5    nortriptyline (PAMELOR) 75 MG capsule nightly      topiramate (TOPAMAX) 100 MG tablet Take 1 tablet by mouth 2 times daily 180 tablet 3    Probiotic Product (PROBIOTIC DAILY PO) Take by mouth daily      promethazine (PHENERGAN) 25 MG tablet Take 25 mg by mouth      cloNIDine (CATAPRES) 0.1 MG tablet 0.1 mg as needed       Cholecalciferol (VITAMIN D-3) 5000 UNITS TABS Take 5,000 Units by mouth daily. No current facility-administered medications for this visit. Review of Systems   Constitutional:  Positive for fatigue. Negative for appetite change, fever and unexpected weight change. HENT:  Positive for trouble swallowing. Negative for sore throat and voice change. Respiratory:  Positive for cough. Negative for shortness of breath. Cardiovascular:  Negative for chest pain, palpitations and leg swelling. Gastrointestinal:  Positive for abdominal pain and constipation. Negative for abdominal distention, blood in stool, diarrhea, nausea, rectal pain and vomiting. Genitourinary:  Negative for hematuria. Musculoskeletal:  Negative for arthralgias, back pain and neck pain. Neurological:  Negative for dizziness, weakness, light-headedness and headaches. Psychiatric/Behavioral:  Negative for dysphoric mood and sleep disturbance. The patient is not nervous/anxious. All other systems reviewed and are negative. Objective:     Physical Exam  Vitals and nursing note reviewed. Constitutional:       Appearance: She is well-developed.       Comments: /66   Pulse 81   Ht 5' 5\" (1.651 m)   Wt 221 lb (100.2 kg)   SpO2 97%   BMI 36.78 kg/m²    Eyes:      General: No scleral icterus. Conjunctiva/sclera: Conjunctivae normal.      Pupils: Pupils are equal, round, and reactive to light. Neck:      Thyroid: No thyromegaly. Cardiovascular:      Rate and Rhythm: Normal rate and regular rhythm. Heart sounds: Normal heart sounds. No murmur heard. No friction rub. No gallop. Pulmonary:      Effort: Pulmonary effort is normal. No respiratory distress. Breath sounds: Normal breath sounds. Abdominal:      General: Bowel sounds are normal. There is no distension. Palpations: Abdomen is soft. Tenderness: There is no abdominal tenderness. There is no rebound. Comments: No abd pain with palpation on exam   Musculoskeletal:         General: No deformity. Normal range of motion. Cervical back: Normal range of motion and neck supple. Neurological:      Mental Status: She is alert and oriented to person, place, and time. Cranial Nerves: No cranial nerve deficit. Psychiatric:         Judgment: Judgment normal.         Assessment:       Diagnosis Orders   1. Change in bowel habits  COLONOSCOPY W/ OR W/O BIOPSY      2. Constipation, unspecified constipation type  COLONOSCOPY W/ OR W/O BIOPSY      3. Chronic bilateral lower abdominal pain  COLONOSCOPY W/ OR W/O BIOPSY      4. Epigastric pain  ESOPHAGOSCOPY / EGD      5. Dysphagia, unspecified type  ESOPHAGOSCOPY / EGD            Plan:     Start miralax daily  Schedule outpatient endoscopy r/o h pylori. Patient advised no Aspirin, Fish Oil, Vit E or NSAIDs 5 (five) days before procedure. Follow-up Visit: per Dr. Raysa Greenberg  Pt Education:   Risks, benefits, and alternatives to endoscopy were discussed. Patient voices understanding of risks of, but not limited to, perforation, bleeding, and infection. The risk of perforation is increased with esophageal dilatation. All questions answered to patient's satisfaction. Patient is agreable to proceed.   Schedule outpatient

## 2022-08-30 NOTE — PATIENT INSTRUCTIONS
You are going to have an Endoscopy and here are some basic instructions:    Nothing to eat or drink after midnight EXCEPT:  PLEASE TAKE MEDICATION(S) FOR HIGH BLOOD PRESSURE, SEIZURES, HEART, AND THYROID WITH A SIP OF WATER AT LEAST 2 HOURS PRIOR TO ARRIVAL TIME.   YOU MAY ALSO TAKE ANY INHALERS YOU ARE PRESCRIBED. You will not be able to drive for 24 hours after the procedure due to sedation. Bring a  with you the day of the procedure. No aspirin, ibuprofen, naproxen, fish oil or vitamin E for 5 days before procedure. Continue current medications. If you are on blood thinners, clearance from the prescribing physician will be obtained before your procedure is scheduled. Increased [email or web address] may be associated with discontinuation of your blood thinner and include, but not limited to, stroke, TIA, or cardiac event. If biopsies are taken during the procedure they will be sent to a pathologist for analysis. You will be notified by mail of the pathology results in 2-3 weeks. Your physician may also schedule a follow up appointment with the nurse practitioner to discuss pathology, symptoms or to check if you have had any problems related to your procedure. If you prefer not to return to the office after your procedure please discuss this with your physician on the day of your procedure. You are going to have a colonoscopy and here are some instructions: You will be given specific directions regarding restrictions to diet and bowel prep instructions including laxatives. Please read these instructions one week prior to your scheduled procedure to ensure that you are prepared. Follow prep instructions provided for bowel prep. Take all of the bowel prep as directed. If you are having problems with nausea, stop your prep for 30-45 min to allow the nausea to subside before resuming your prep.      Nothing to eat or drink after midnight the day of the procedure EXCEPT:  PLEASE TAKE soups    Foods and drinks to avoid  Avoid foods that contain too much fiber. Stay clear of dark colored beverages. They can stick to the walls of the digestive tract and make it difficult to differentiate from blood. Some of these foods are:  Red meat, rice, nuts and vegetables   Milk, other milk based fluids and cream   Most fruit and puddings   Whole grain pasta   Cereals, bran and seeds   Colored beverages, especially those that are red or purple in color   Red colored Jell-O   On the day before the colonoscopy, continue to drink plenty of clear fluids. It is important   to keep yourself hydrated before the exam.     Please follow all instructions as provided for cleansing the bowel. Failure to have an adequately prepped colon may cause you to have incomplete exam with further testing required.

## 2022-09-07 ENCOUNTER — NURSE ONLY (OUTPATIENT)
Dept: PRIMARY CARE CLINIC | Age: 42
End: 2022-09-07
Payer: COMMERCIAL

## 2022-09-07 DIAGNOSIS — R30.9 URINARY PAIN: Primary | ICD-10-CM

## 2022-09-07 DIAGNOSIS — R30.0 BURNING WITH URINATION: ICD-10-CM

## 2022-09-07 LAB
APPEARANCE FLUID: ABNORMAL
BILIRUBIN, POC: ABNORMAL
BLOOD URINE, POC: ABNORMAL
CLARITY, POC: ABNORMAL
COLOR, POC: YELLOW
GLUCOSE URINE, POC: ABNORMAL
KETONES, POC: ABNORMAL
LEUKOCYTE EST, POC: ABNORMAL
NITRITE, POC: ABNORMAL
PH, POC: 5
PROTEIN, POC: ABNORMAL
SPECIFIC GRAVITY, POC: 1010
UROBILINOGEN, POC: 0.2

## 2022-09-07 PROCEDURE — 81002 URINALYSIS NONAUTO W/O SCOPE: CPT | Performed by: FAMILY MEDICINE

## 2022-09-08 ENCOUNTER — ANESTHESIA EVENT (OUTPATIENT)
Dept: OPERATING ROOM | Age: 42
End: 2022-09-08

## 2022-09-09 LAB
ORGANISM: ABNORMAL
URINE CULTURE, ROUTINE: ABNORMAL
URINE CULTURE, ROUTINE: ABNORMAL

## 2022-09-09 RX ORDER — AMOXICILLIN 875 MG/1
875 TABLET, COATED ORAL 2 TIMES DAILY
Qty: 20 TABLET | Refills: 0 | Status: SHIPPED | OUTPATIENT
Start: 2022-09-09 | End: 2022-09-19

## 2022-09-12 ENCOUNTER — HOSPITAL ENCOUNTER (OUTPATIENT)
Age: 42
Setting detail: OUTPATIENT SURGERY
Discharge: HOME | End: 2022-09-12
Attending: INTERNAL MEDICINE | Admitting: INTERNAL MEDICINE
Payer: COMMERCIAL

## 2022-09-12 ENCOUNTER — HOSPITAL ENCOUNTER (OUTPATIENT)
Age: 42
Setting detail: SPECIMEN
Discharge: HOME | End: 2022-09-12
Payer: COMMERCIAL

## 2022-09-12 ENCOUNTER — APPOINTMENT (OUTPATIENT)
Dept: OPERATING ROOM | Age: 42
End: 2022-09-12

## 2022-09-12 ENCOUNTER — ANESTHESIA (OUTPATIENT)
Dept: OPERATING ROOM | Age: 42
End: 2022-09-12

## 2022-09-12 VITALS
HEART RATE: 69 BPM | RESPIRATION RATE: 18 BRPM | SYSTOLIC BLOOD PRESSURE: 114 MMHG | TEMPERATURE: 97.9 F | HEIGHT: 65 IN | DIASTOLIC BLOOD PRESSURE: 64 MMHG | WEIGHT: 210 LBS | BODY MASS INDEX: 34.99 KG/M2 | OXYGEN SATURATION: 100 %

## 2022-09-12 PROCEDURE — G8918 PT W/O PREOP ORDER IV AB PRO: HCPCS

## 2022-09-12 PROCEDURE — 88342 IMHCHEM/IMCYTCHM 1ST ANTB: CPT

## 2022-09-12 PROCEDURE — 88305 TISSUE EXAM BY PATHOLOGIST: CPT

## 2022-09-12 PROCEDURE — 45378 DIAGNOSTIC COLONOSCOPY: CPT

## 2022-09-12 PROCEDURE — 43239 EGD BIOPSY SINGLE/MULTIPLE: CPT

## 2022-09-12 PROCEDURE — G8907 PT DOC NO EVENTS ON DISCHARG: HCPCS

## 2022-09-12 PROCEDURE — 45378 DIAGNOSTIC COLONOSCOPY: CPT | Performed by: INTERNAL MEDICINE

## 2022-09-12 PROCEDURE — 43239 EGD BIOPSY SINGLE/MULTIPLE: CPT | Performed by: INTERNAL MEDICINE

## 2022-09-12 RX ORDER — SODIUM CHLORIDE 9 MG/ML
INJECTION, SOLUTION INTRAVENOUS CONTINUOUS PRN
Status: DISCONTINUED | OUTPATIENT
Start: 2022-09-12 | End: 2022-09-12

## 2022-09-12 RX ORDER — LIDOCAINE HYDROCHLORIDE 10 MG/ML
INJECTION, SOLUTION INFILTRATION; PERINEURAL PRN
Status: DISCONTINUED | OUTPATIENT
Start: 2022-09-12 | End: 2022-09-12 | Stop reason: SDUPTHER

## 2022-09-12 RX ORDER — SODIUM CHLORIDE 9 MG/ML
INJECTION, SOLUTION INTRAVENOUS CONTINUOUS
Status: DISCONTINUED | OUTPATIENT
Start: 2022-09-12 | End: 2022-09-12 | Stop reason: HOSPADM

## 2022-09-12 RX ORDER — PROPOFOL 10 MG/ML
INJECTION, EMULSION INTRAVENOUS PRN
Status: DISCONTINUED | OUTPATIENT
Start: 2022-09-12 | End: 2022-09-12 | Stop reason: SDUPTHER

## 2022-09-12 RX ADMIN — PROPOFOL 400 MG: 10 INJECTION, EMULSION INTRAVENOUS at 12:50

## 2022-09-12 RX ADMIN — SODIUM CHLORIDE: 9 INJECTION, SOLUTION INTRAVENOUS at 12:38

## 2022-09-12 RX ADMIN — LIDOCAINE HYDROCHLORIDE 40 MG: 10 INJECTION, SOLUTION INFILTRATION; PERINEURAL at 12:50

## 2022-09-12 ASSESSMENT — ENCOUNTER SYMPTOMS: SHORTNESS OF BREATH: 1

## 2022-09-12 NOTE — ANESTHESIA PRE PROCEDURE
Department of Anesthesiology  Preprocedure Note       Name:  Manda Lagos   Age:  35 y.o.  :  1980                                          MRN:  [id number]         Date:  2022      Surgeon: Jesus Flores):  Morelia Fernandez MD    Procedure: Procedure(s):  EGD BIOPSY  COLONOSCOPY DIAGNOSTIC    Medications prior to admission:   Prior to Admission medications    Medication Sig Start Date End Date Taking? Authorizing Provider   amoxicillin (AMOXIL) 875 MG tablet Take 1 tablet by mouth 2 times daily for 10 days 22  Kyra White MD   omeprazole (PRILOSEC) 40 MG delayed release capsule Take 40 mg by mouth as needed    Historical Provider, MD   gabapentin (NEURONTIN) 100 MG capsule TAKE ONE TO TWO CAPSULES BY MOUTH THREE TIMES DAILY AS NEEDED 8/16/22 9/15/22  Kyra White MD   baclofen (LIORESAL) 20 MG tablet Take 1 tablet by mouth in the morning and 1 tablet at noon and 1 tablet before bedtime.  22   Historical Provider, MD   Calcium 500-100 MG-UNIT CHEW Take by mouth daily 22   Historical Provider, MD   albuterol sulfate HFA (VENTOLIN HFA) 108 (90 Base) MCG/ACT inhaler Inhale 2 puffs into the lungs 4 times daily as needed for Wheezing 22   Saurabh Sneed MD   atorvastatin (LIPITOR) 20 MG tablet TAKE 1 TABLET AT BEDTIME FOR HIGH CHOLESTEROL 22   Kyra White MD   SUMAtriptan (IMITREX) 6 MG/0.5ML SOLN injection Inject 0.5 mLs into the skin once as needed for Migraine 3/14/22 8/30/22  Kyra White MD   propranolol (INDERAL LA) 60 MG extended release capsule TAKE 1 CAPSULE ONCE DAILY FOR HEART RATE 22   Kyra White MD   tiZANidine (ZANAFLEX) 4 MG tablet Take 1 tablet by mouth every 8 hours as needed (for muscle spasms)  Patient taking differently: Take 4 mg by mouth nightly as needed (for muscle spasms) 21   Kyra White MD   desonide (DESOWEN) 0.05 % cream in the morning and at bedtime 21   Historical Provider, MD   ocrelizumab (OCREVUS) 300 MG/10ML SOLN injection every 6 months 6/2/21   Historical Provider, MD   conjugated estrogens (PREMARIN) 0.625 MG/GM vaginal cream Place 0.5 g vaginally Twice a Week 8/3/20   DEVEN Palma - CNP   nortriptyline (PAMELOR) 75 MG capsule nightly 1/24/20   Historical Provider, MD   topiramate (TOPAMAX) 100 MG tablet Take 1 tablet by mouth 2 times daily 8/22/19   Farheen Iglesias MD   Probiotic Product (PROBIOTIC DAILY PO) Take by mouth daily    Historical Provider, MD   promethazine (PHENERGAN) 25 MG tablet Take 25 mg by mouth    Historical Provider, MD   cloNIDine (CATAPRES) 0.1 MG tablet 0.1 mg as needed  3/24/17   Historical Provider, MD   Cholecalciferol (VITAMIN D-3) 5000 UNITS TABS Take 5,000 Units by mouth daily. Historical Provider, MD   bethanechol (URECHOLINE) 25 MG tablet Take 1 tablet by mouth 3 times daily. 6/5/13 9/9/21  Boris Niño MD       Current medications:    No current facility-administered medications for this encounter. Allergies: Allergies   Allergen Reactions    Ropinirole Hcl      Other reaction(s): Mental Status Change    Zolpidem Other (See Comments)    Ropinirole Other (See Comments)     Crazy dreams; sleep walking    Simvastatin Other (See Comments)     Leg cramps       Problem List:    Patient Active Problem List   Diagnosis Code    Complicated migraine Z94. 109    Hematuria     Depression F32. A    Nerve damage T14. 8XXA    Cauda equina syndrome (HCC) G83.4    Elevated cholesterol E78.00    Postural orthostatic tachycardia syndrome I49.8    Vitamin B12 deficiency (non anemic) E53.8    Pelvic floor dysfunction M62.89    Myofascial pain M79.18    Relapsing-remitting multiple sclerosis (HCC) G35    Hypertensive disorder I10    Insomnia G47.00    Right kidney stone N20.0    Trigeminal neuralgia G50.0    Urinary incontinence R32    Vitamin D deficiency E55.9    RUQ abdominal pain R10.11    Elevated hemidiaphragm J98.6    LÓPEZ (dyspnea on exertion) R06.00    Snoring R06.83    Orthopnea R06.01    Non-restorative sleep G47.8    SHYAM (obstructive sleep apnea) G47.33    Obesity (BMI 35.0-39.9 without comorbidity) E66.9    Pleurisy R09.1    Neurogenic bladder N31.9    Radial styloid tenosynovitis M65.4    Degeneration of lumbar intervertebral disc M51.36    Stress incontinence N39.3    Gastroesophageal reflux disease without esophagitis K21.9    Change in bowel habits R19.4    Constipation K59.00    Chronic bilateral lower abdominal pain R10.31, G89.29, R10.32    Epigastric pain R10.13    Dysphagia R13.10       Past Medical History:        Diagnosis Date    B12 deficiency     Cauda equina syndrome (HCC)     Complicated migraine     Depression     GERD (gastroesophageal reflux disease)     Hematuria     wih abdominal pain    Hyperlipidemia     Hypertension     MS (multiple sclerosis) (HCC)     Myofascial pain     Nerve damage     Neutropenia (HCC) 10/21/2015    Pelvic floor dysfunction     Postmenopausal HRT (hormone replacement therapy)     POTS (postural orthostatic tachycardia syndrome)        Past Surgical History:        Procedure Laterality Date    APPENDECTOMY      BACK SURGERY      CHOLECYSTECTOMY, LAPAROSCOPIC      COLONOSCOPY  11/16/2015    Dr. Mick Merchant- AP, 5YR RECALL    COLONOSCOPY N/A 08/13/2018    Dr Axel Tenorio Heads yr recall    EGD      HYSTERECTOMY (CERVIX STATUS UNKNOWN)      OVARY REMOVAL      [place] EGD TRANSORAL BIOPSY SINGLE/MULTIPLE N/A 01/29/2018    Dr Axel Abdul-Active duodenitis, gastritis/gastropathy       Social History:    Social History     Tobacco Use    Smoking status: Never    Smokeless tobacco: Never   Substance Use Topics    Alcohol use:  No                                Counseling given: Not Answered      Vital Signs (Current):   Vitals:    09/12/22 1224   BP: (!) 154/92   Pulse: 79   Resp: 16   Temp: 98.1 °F (36.7 °C)   SpO2: 97%   Weight: 210 lb (95.3 kg)   Height: 5' 5\" (1.651 m) BP Readings from Last 3 Encounters:   09/12/22 (!) 154/92   08/30/22 122/66   08/03/22 126/88       NPO Status: Time of last liquid consumption: 0715                        Time of last solid consumption: 2300                        Date of last liquid consumption: 09/12/22                        Date of last solid food consumption: 09/10/22    BMI:   Wt Readings from Last 3 Encounters:   09/12/22 210 lb (95.3 kg)   08/30/22 221 lb (100.2 kg)   08/03/22 217 lb 6.4 oz (98.6 kg)     Body mass index is 34.95 kg/m². CBC:   Lab Results   Component Value Date/Time    WBC 7.1 07/19/2022 03:23 PM    RBC 4.67 07/19/2022 03:23 PM    HGB 14.6 07/19/2022 03:23 PM    HCT 42.7 07/19/2022 03:23 PM    HCT 40.6 12/11/2011 09:18 PM    MCV 91.4 07/19/2022 03:23 PM    RDW 12.9 07/19/2022 03:23 PM     07/19/2022 03:23 PM     12/11/2011 09:18 PM       CMP:   Lab Results   Component Value Date/Time     08/22/2022 06:00 PM     12/11/2011 09:18 PM    K 4.5 08/22/2022 06:00 PM    K 4.4 12/11/2011 09:18 PM     08/22/2022 06:00 PM     12/11/2011 09:18 PM    CO2 23 08/22/2022 06:00 PM    BUN 11 08/22/2022 06:00 PM    CREATININE 0.9 08/22/2022 06:00 PM    CREATININE 0.8 12/11/2011 09:18 PM    GFRAA >59 08/22/2022 06:00 PM    LABGLOM >60 08/22/2022 06:00 PM    GLUCOSE 100 08/22/2022 06:00 PM    PROT 6.7 08/22/2022 06:00 PM    CALCIUM 8.9 08/22/2022 06:00 PM    BILITOT 0.4 08/22/2022 06:00 PM    ALKPHOS 102 08/22/2022 06:00 PM    AST 14 08/22/2022 06:00 PM    ALT 17 08/22/2022 06:00 PM       POC Tests: No results for input(s): POCGLU, POCNA, POCK, POCCL, POCBUN, POCHEMO, POCHCT in the last 72 hours.     Coags: No results found for: PROTIME, INR, APTT    HCG (If Applicable): No results found for: PREGTESTUR, PREGSERUM, HCG, HCGQUANT     ABGs: No results found for: PHART, PO2ART, GIA9BJT, UUH9DJI, BEART, Y4MGRSTL     Type & Screen (If Applicable):  No results found for: LABABO, [address]    Drug/Infectious Status (If Applicable):  No results found for: HIV, HEPCAB    COVID-19 Screening (If Applicable):   Lab Results   Component Value Date/Time    COVID19 NOT DETECTED 01/11/2021 12:43 PM           Anesthesia Evaluation  Patient summary reviewed and Nursing notes reviewed  Airway: Mallampati: II  TM distance: >3 FB   Neck ROM: full  Mouth opening: > = 3 FB   Dental: normal exam         Pulmonary:normal exam    (+) shortness of breath:  sleep apnea:                             Cardiovascular:  Exercise tolerance: poor (<4 METS),   (+) hypertension:, orthopnea, LÓPEZ:,          Beta Blocker:  Dose within 24 Hrs         Neuro/Psych:   Negative Neuro/Psych ROS  (+) neuromuscular disease:, headaches: migraine headaches, psychiatric history:            GI/Hepatic/Renal: Neg GI/Hepatic/Renal ROS  (+) GERD:,           Endo/Other: Negative Endo/Other ROS                    Abdominal:             Vascular: negative vascular ROS. Other Findings:           Anesthesia Plan      general and TIVA     ASA 3       Induction: intravenous. Anesthetic plan and risks discussed with patient. Plan discussed with CRNA.                     Jose F Chavez, APRN - CRNA   9/12/2022

## 2022-09-12 NOTE — H&P
Patient Name: Manda Lagos  : 1980  MRN: [id number]  DATE: 22    Allergies: Allergies   Allergen Reactions    Ropinirole Hcl      Other reaction(s): Mental Status Change    Zolpidem Other (See Comments)    Ropinirole Other (See Comments)     Crazy dreams; sleep walking    Simvastatin Other (See Comments)     Leg cramps        ENDOSCOPY  History and Physical    Procedure:    [x] Diagnostic Colonoscopy       [] Screening Colonoscopy  [x] EGD      [] ERCP      [] EUS       [] Other    [x] Previous office notes/History and Physical reviewed from the patients chart. Please see EMR for further details of HPI. I have examined the patient's status immediately prior to the procedure and:      Indications/HPI:    [x]Abdominal Pain   []Barretts  []Screening/Surveillance   []History of Polyps  [x]Dysphagia            [] +Cologard/DNA testing  []Abnormal Imaging              []EOE Hx              [] Family Hx of CRC/Polyps  []Anemia                            []Food Impaction       []Recent Poor Prep  []GI Bleed             []Lymphadenopathy  []History of Polyps  [x]Change in bowel habits []Heartburn/Reflux  []Cancer- GI/Lung  []Chest Pain - Non Cardiac []Heme (+) Stool []Ulcers  [x]Constipation  []Hemoptysis  []Incontinence    []Diarrhea  []Hypoxemia  []Rectal Bleed (BRBPR)  []Nausea/Vomiting   [] Varices  []Crohns/Colitis  []Pancreatic Cyst   [] Cirrhosis   []Pancreatitis    []Abnormal MRCP  []Elevated LFT [] Stent Removal, Previous ERCP  []Other:     Anesthesia:   [x] MAC [] Moderate Sedation   [] General   [] None     ROS: 12 pt Review of Symptoms was negative unless mentioned above    Medications:   Prior to Admission medications    Medication Sig Start Date End Date Taking?  Authorizing Provider   amoxicillin (AMOXIL) 875 MG tablet Take 1 tablet by mouth 2 times daily for 10 days 22  Kyra White MD   omeprazole (PRILOSEC) 40 MG delayed release capsule Take 40 mg by mouth as needed    Historical Provider, MD   gabapentin (NEURONTIN) 100 MG capsule TAKE ONE TO TWO CAPSULES BY MOUTH THREE TIMES DAILY AS NEEDED 8/16/22 9/15/22  Cuco Cason MD   baclofen (LIORESAL) 20 MG tablet Take 1 tablet by mouth in the morning and 1 tablet at noon and 1 tablet before bedtime.  5/11/22   Historical Provider, MD   Calcium 500-100 MG-UNIT CHEW Take by mouth daily 6/9/22   Historical Provider, MD   albuterol sulfate HFA (VENTOLIN HFA) 108 (90 Base) MCG/ACT inhaler Inhale 2 puffs into the lungs 4 times daily as needed for Wheezing 6/21/22   Saurabh Sneed MD   atorvastatin (LIPITOR) 20 MG tablet TAKE 1 TABLET AT BEDTIME FOR HIGH CHOLESTEROL 5/2/22   Cuco Cason MD   SUMAtriptan (IMITREX) 6 MG/0.5ML SOLN injection Inject 0.5 mLs into the skin once as needed for Migraine 3/14/22 8/30/22  Cuco Csaon MD   propranolol (INDERAL LA) 60 MG extended release capsule TAKE 1 CAPSULE ONCE DAILY FOR HEART RATE 2/28/22   Cuco Cason MD   tiZANidine (ZANAFLEX) 4 MG tablet Take 1 tablet by mouth every 8 hours as needed (for muscle spasms)  Patient taking differently: Take 4 mg by mouth nightly as needed (for muscle spasms) 12/9/21   Cuco Cason MD   desonide (DESOWEN) 0.05 % cream in the morning and at bedtime 9/14/21   Historical Provider, MD   ocrelizumab (OCREVUS) 300 MG/10ML SOLN injection every 6 months 6/2/21   Historical Provider, MD   conjugated estrogens (PREMARIN) 0.625 MG/GM vaginal cream Place 0.5 g vaginally Twice a Week 8/3/20   DEVEN Van CNP   nortriptyline (PAMELOR) 75 MG capsule nightly 1/24/20   Historical Provider, MD   topiramate (TOPAMAX) 100 MG tablet Take 1 tablet by mouth 2 times daily 8/22/19   Frankey Lei, MD   Probiotic Product (PROBIOTIC DAILY PO) Take by mouth daily    Historical Provider, MD   promethazine (PHENERGAN) 25 MG tablet Take 25 mg by mouth    Historical Provider, MD   cloNIDine (CATAPRES) 0.1 MG tablet 0.1 mg as needed  3/24/17 Historical Provider, MD   Cholecalciferol (VITAMIN D-3) 5000 UNITS TABS Take 5,000 Units by mouth daily. Historical Provider, MD   bethanechol (URECHOLINE) 25 MG tablet Take 1 tablet by mouth 3 times daily. 6/5/13 9/9/21  Dixie Villarreal MD       Past Medical History:  Past Medical History:   Diagnosis Date    B12 deficiency     Cauda equina syndrome ([facility] [address]     Complicated migraine     Depression     GERD (gastroesophageal reflux disease)     Hematuria     wih abdominal pain    Hyperlipidemia     Hypertension     MS (multiple sclerosis) (HCC)     Myofascial pain     Nerve damage     Neutropenia ([address] 10/21/2015    Pelvic floor dysfunction     Postmenopausal HRT (hormone replacement therapy)     POTS (postural orthostatic tachycardia syndrome)        Past Surgical History:  Past Surgical History:   Procedure Laterality Date    APPENDECTOMY      BACK SURGERY      CHOLECYSTECTOMY, LAPAROSCOPIC      COLONOSCOPY  11/16/2015    Dr. Angi Dueñas- AP, 5YR RECALL    COLONOSCOPY N/A 08/13/2018    Dr Raisa Abdul-[facility]--10 yr recall    EGD      HYSTERECTOMY (CERVIX STATUS UNKNOWN)      OVARY REMOVAL      [place] EGD TRANSORAL BIOPSY SINGLE/MULTIPLE N/A 01/29/2018    Dr Raisa Abdul-Active duodenitis, gastritis/gastropathy       Social History:  Social History     Tobacco Use    Smoking status: Never    Smokeless tobacco: Never   Vaping Use    Vaping Use: Never used   Substance Use Topics    Alcohol use: No    Drug use: No       Vital Signs:   Vitals:    09/12/22 1224   BP: (!) 154/92   Pulse: 79   Resp: 16   Temp: 98.1 °F (36.7 °C)   SpO2: 97%        Physical Exam:  Cardiac:  [x]WNL  []Comments:  Pulmonary:  [x]WNL   []Comments:  Neuro/Mental Status:  [x]WNL  []Comments:  Abdominal:  [x]WNL    []Comments:  Other:   []WNL  []Comments:    Informed Consent:  The risks and benefits of the procedure have been discussed with either the patient or if they cannot consent, their representative.     Assessment:  Patient examined and appropriate for planned sedation and procedure. Plan:  Proceed with planned sedation and procedure as above. Edie Nina am scribing for and in the presence of Dr. Concha Perry MD.  Electronically signed by Gissell Bocanegra RN on 9/12/2022 at 12:28 PM    I personally performed the services described in this documentation as scribed by Sivan Billingsley, and it appears accurate and complete.      Concha Perry MD  9/12/2022

## 2022-09-12 NOTE — OP NOTE
Patient: Jr Kellogg: 1980  Med Rec#: [id number] Acc#: [id number]   Primary Care Provider aDnny Cervantes MD    Date of Procedure:  9/12/2022    Endoscopist: Adolfo Carvalho MD    Referring Provider: Danny Cervantes MD, DEVEN Zheng    Operation Performed: Colonoscopy     Indications: change in bowel habits- constipation, lower abdominal pain    Anesthesia:  Sedation was administered by anesthesia who monitored the patient during the procedure. I met with Anuel Christopher prior to procedure. We discussed the procedure itself, and I have discussed the risks of endoscopy (including-- but not limited to-- pain, discomfort, bleeding potentially requiring second endoscopic procedure and/or blood transfusion, organ perforation requiring operative repair, damage to organs near the colon, infection, aspiration, cardiopulmonary/allergic reaction), benefits, indications to endoscopy. Additionally, we discussed options other than colonoscopy. The patient expressed understanding. All questions answered. The patient decided to proceed with the procedure. Signed informed consent was placed on the chart. Blood Loss: minimal    Withdrawal time: n/a  Bowel Prep: adequate     Complications: no immediate complications    DESCRIPTION OF PROCEDURE:     A time out was performed. After written informed consent was obtained, the patient was placed in the left lateral position. The perianal area was inspected, and a digital rectal exam was performed. A rectal exam was performed: normal tone, no palpable lesions. At this point, a forward viewing Olympus colonoscope was inserted into the anus and carefully advanced to the terminal ileum. The cecum was identified by the ileocecal valve and the appendiceal orifice. The colonoscope was then slowly withdrawn with careful inspection of the mucosa in a linear and circumferential fashion. The scope was retroflexed in the rectum.  Suction was utilized during the procedure to remove as much air as possible from the bowel. The colonoscope was removed from the patient, and the procedure was terminated. Findings are listed below. Findings: The terminal ileum appeared normal.    The mucosa appeared normal throughout the entire examined colon. Retroflexion in the rectum was normal and revealed no further abnormalities. Recommendations:  1. Repeat colonoscopy: at the age of 46 for CRC screening  2. Miralax daily- start today  3. Trial of Linzess     Findings and recommendations were discussed w/ the patient. A copy of the images was provided. Juarez Kraus am scribing for and in the presence of Dr. Jessica Mckinley MD.  Electronically signed by Alisia Villarreal RN on 9/12/2022 at 12:31 PM    I personally performed the services described in this documentation as scribed by Gypsy Perry, and it appears accurate and complete.      Jessica Mckinley MD  9/12/2022

## 2022-09-12 NOTE — ANESTHESIA POSTPROCEDURE EVALUATION
Department of Anesthesiology  Postprocedure Note    Patient: Parag Portillo  MRN: [id number]  [place]: 1980  Date of evaluation: 9/12/2022      Procedure Summary     Date: 09/12/22 Room / Location: [facility] ENDO 02 / [address]    Anesthesia Start: 1239 Anesthesia Stop:     Procedures:       EGD BIOPSY (Esophagus)      COLONOSCOPY DIAGNOSTIC (Abdomen) Diagnosis:       Epigastric pain      Lower abdominal pain      Dysphagia, unspecified type      Change in bowel habits      Constipation, unspecified constipation type      (Epigastric pain [R10.13])      (Lower abdominal pain [R10.30])      (Dysphagia, unspecified type [R13.10])      (Change in bowel habits [R19.4])      (Constipation, unspecified constipation type [K59.00])    Surgeons: Lauren Naqvi MD Responsible Provider: DEVEN Hinson CRNA    Anesthesia Type: general, TIVA ASA Status: 3          Anesthesia Type: No value filed.     Rebecca Phase I:      Rebecca Phase II:        Anesthesia Post Evaluation    Patient location during evaluation: bedside  Patient participation: complete - patient participated  Level of consciousness: awake  Pain score: 0  Airway patency: patent  Nausea & Vomiting: no nausea and no vomiting  Complications: no  Cardiovascular status: blood pressure returned to baseline  Respiratory status: acceptable, room air and spontaneous ventilation  Hydration status: euvolemic

## 2022-09-12 NOTE — OP NOTE
Endoscopic Procedure Note    Patient: Nikki Tico: 1980  Med Rec#: [id number] Acc#: [id number]     Primary Care Provider Salinas Chanel MD  Referring Provider: DEVEN Almanza    Endoscopist: Beth Miles MD    Date of Procedure:  9/12/2022    Procedure:   1. EGD with biopsy    Indications:   1. Epigastric pain    Anesthesia:  Sedation was administered by anesthesia who monitored the patient during the procedure. Estimated Blood Loss: minimal    Procedure:   After reviewing the patient's chart and obtaining informed consent, the patient was placed in the left lateral decubitus position. A forward-viewing Olympus endoscope was lubricated and inserted through the mouth into the oropharynx. Under direct visualization, the upper esophagus was intubated. The scope was advanced to the level of the third portion of duodenum. Scope was slowly withdrawn with careful inspection of the mucosal surfaces. The scope was retroflexed for inspection of the gastric fundus and incisura. Findings and maneuvers are listed in impression below. The patient tolerated the procedure well. The scope was removed. There were no immediate complications. Findings:   Esophagus: normal:  no focal stricture seen. Multiple random biopsies taken to rule out active disease of the esophagus. There is no hiatal hernia present. Stomach:  Abnormal: Mild mucosal changes suggestive of gastritis noted -  Gastric biopsies were taken from the antrum and body to rule out Helicobacter pylori infection. Duodenum: normal      IMPRESSION:  1. Gastritis- biopsied  2. S/p esophageal biopsies     RECOMMENDATIONS:    1. Await path results  2. Continue PPI      The results were discussed with the patient and family. A copy of the images obtained were given to the patient.      Roshan Urias am scribing for and in the presence of Dr. Beth Miles MD.  Electronically signed by Puneet Hamm RN on 9/12/2022 at 12:28 PM    I personally performed the services described in this documentation as scribed by Syed Carmen, and it appears accurate and complete.      Sofia Cifuentes MD  9/12/2022

## 2022-09-14 ENCOUNTER — OFFICE VISIT (OUTPATIENT)
Dept: PRIMARY CARE CLINIC | Age: 42
End: 2022-09-14
Payer: COMMERCIAL

## 2022-09-14 VITALS
SYSTOLIC BLOOD PRESSURE: 136 MMHG | OXYGEN SATURATION: 97 % | HEIGHT: 65 IN | DIASTOLIC BLOOD PRESSURE: 80 MMHG | BODY MASS INDEX: 36.32 KG/M2 | WEIGHT: 218 LBS | HEART RATE: 73 BPM | TEMPERATURE: 97.6 F

## 2022-09-14 DIAGNOSIS — G62.9 NEUROPATHY: ICD-10-CM

## 2022-09-14 DIAGNOSIS — G35 RELAPSING-REMITTING MULTIPLE SCLEROSIS (HCC): Primary | ICD-10-CM

## 2022-09-14 DIAGNOSIS — M50.30 DEGENERATION OF CERVICAL INTERVERTEBRAL DISC: ICD-10-CM

## 2022-09-14 PROBLEM — M54.16 LUMBAR RADICULOPATHY: Status: ACTIVE | Noted: 2019-03-21

## 2022-09-14 PROCEDURE — 99213 OFFICE O/P EST LOW 20 MIN: CPT | Performed by: FAMILY MEDICINE

## 2022-09-14 PROCEDURE — 1036F TOBACCO NON-USER: CPT | Performed by: FAMILY MEDICINE

## 2022-09-14 PROCEDURE — G8427 DOCREV CUR MEDS BY ELIG CLIN: HCPCS | Performed by: FAMILY MEDICINE

## 2022-09-14 PROCEDURE — G8417 CALC BMI ABV UP PARAM F/U: HCPCS | Performed by: FAMILY MEDICINE

## 2022-09-14 RX ORDER — GABAPENTIN 100 MG/1
CAPSULE ORAL
Qty: 135 CAPSULE | Refills: 2 | Status: SHIPPED | OUTPATIENT
Start: 2022-09-14 | End: 2022-10-14

## 2022-09-14 SDOH — ECONOMIC STABILITY: FOOD INSECURITY: WITHIN THE PAST 12 MONTHS, THE FOOD YOU BOUGHT JUST DIDN'T LAST AND YOU DIDN'T HAVE MONEY TO GET MORE.: NEVER TRUE

## 2022-09-14 SDOH — ECONOMIC STABILITY: FOOD INSECURITY: WITHIN THE PAST 12 MONTHS, YOU WORRIED THAT YOUR FOOD WOULD RUN OUT BEFORE YOU GOT MONEY TO BUY MORE.: NEVER TRUE

## 2022-09-14 ASSESSMENT — SOCIAL DETERMINANTS OF HEALTH (SDOH): HOW HARD IS IT FOR YOU TO PAY FOR THE VERY BASICS LIKE FOOD, HOUSING, MEDICAL CARE, AND HEATING?: NOT HARD AT ALL

## 2022-09-16 RX ORDER — FLUCONAZOLE 150 MG/1
150 TABLET ORAL
Qty: 2 TABLET | Refills: 0 | Status: SHIPPED | OUTPATIENT
Start: 2022-09-16 | End: 2022-09-22

## 2022-09-17 ASSESSMENT — ENCOUNTER SYMPTOMS
RESPIRATORY NEGATIVE: 1
GASTROINTESTINAL NEGATIVE: 1

## 2022-09-17 NOTE — PROGRESS NOTES
Prior to Visit   Medication Sig Dispense Refill    linaCLOtide (LINZESS) 72 MCG CAPS capsule Take 1 capsule by mouth every morning (before breakfast) 30 capsule 2    amoxicillin (AMOXIL) 875 MG tablet Take 1 tablet by mouth 2 times daily for 10 days 20 tablet 0    baclofen (LIORESAL) 20 MG tablet Take 1 tablet by mouth in the morning and 1 tablet at noon and 1 tablet before bedtime. Calcium 500-100 MG-UNIT CHEW Take by mouth daily      albuterol sulfate HFA (VENTOLIN HFA) 108 (90 Base) MCG/ACT inhaler Inhale 2 puffs into the lungs 4 times daily as needed for Wheezing 18 g 5    atorvastatin (LIPITOR) 20 MG tablet TAKE 1 TABLET AT BEDTIME FOR HIGH CHOLESTEROL 90 tablet 3    SUMAtriptan (IMITREX) 6 MG/0.5ML SOLN injection Inject 0.5 mLs into the skin once as needed for Migraine 9 each 3    propranolol (INDERAL LA) 60 MG extended release capsule TAKE 1 CAPSULE ONCE DAILY FOR HEART RATE 90 capsule 3    tiZANidine (ZANAFLEX) 4 MG tablet Take 1 tablet by mouth every 8 hours as needed (for muscle spasms) (Patient taking differently: Take 4 mg by mouth nightly as needed (for muscle spasms)) 270 tablet 1    desonide (DESOWEN) 0.05 % cream in the morning and at bedtime      ocrelizumab (OCREVUS) 300 MG/10ML SOLN injection every 6 months      conjugated estrogens (PREMARIN) 0.625 MG/GM vaginal cream Place 0.5 g vaginally Twice a Week 1 Tube 5    nortriptyline (PAMELOR) 75 MG capsule nightly      topiramate (TOPAMAX) 100 MG tablet Take 1 tablet by mouth 2 times daily 180 tablet 3    promethazine (PHENERGAN) 25 MG tablet Take 25 mg by mouth      cloNIDine (CATAPRES) 0.1 MG tablet 0.1 mg as needed       Cholecalciferol (VITAMIN D-3) 5000 UNITS TABS Take 5,000 Units by mouth daily.       omeprazole (PRILOSEC) 40 MG delayed release capsule Take 40 mg by mouth as needed (Patient not taking: Reported on 9/14/2022)      Probiotic Product (PROBIOTIC DAILY PO) Take by mouth daily (Patient not taking: Reported on 9/14/2022) [DISCONTINUED] bethanechol (URECHOLINE) 25 MG tablet Take 1 tablet by mouth 3 times daily. 270 tablet 3     No current facility-administered medications on file prior to visit. OBJECTIVE:    Wt Readings from Last 3 Encounters:   09/14/22 218 lb (98.9 kg)   09/12/22 210 lb (95.3 kg)   08/30/22 221 lb (100.2 kg)       /80   Pulse 73   Temp 97.6 °F (36.4 °C)   Ht 5' 5\" (1.651 m)   Wt 218 lb (98.9 kg)   SpO2 97%   BMI 36.28 kg/m²     Physical Exam  Vitals and nursing note reviewed. Constitutional:       General: She is not in acute distress. Appearance: Normal appearance. She is well-developed. HENT:      Head: Normocephalic. Right Ear: Tympanic membrane, ear canal and external ear normal.      Left Ear: Tympanic membrane, ear canal and external ear normal.      Nose: Nose normal. No rhinorrhea. Mouth/Throat:      Mouth: Mucous membranes are moist.      Pharynx: No posterior oropharyngeal erythema. Eyes:      Extraocular Movements: Extraocular movements intact. Conjunctiva/sclera: Conjunctivae normal.      Pupils: Pupils are equal, round, and reactive to light. Neck:      Vascular: No carotid bruit. Cardiovascular:      Rate and Rhythm: Normal rate and regular rhythm. Heart sounds: Normal heart sounds. No murmur heard. Pulmonary:      Effort: Pulmonary effort is normal. No respiratory distress. Breath sounds: Normal breath sounds. Musculoskeletal:         General: No swelling. Cervical back: Normal range of motion and neck supple. Comments: Trace edema in lower extremities   Lymphadenopathy:      Cervical: No cervical adenopathy. Skin:     General: Skin is warm and dry. Neurological:      Mental Status: She is alert and oriented to person, place, and time. Psychiatric:         Mood and Affect: Mood normal.         Speech: Speech normal.         Behavior: Behavior normal.         Thought Content:  Thought content normal.         Judgment: Judgment normal.       ASSESSMENT:    1. Relapsing-remitting multiple sclerosis ([facility] [address]    2. Degeneration of cervical intervertebral disc    3. Neuropathy          PLAN:  1. Relapsing-remitting multiple sclerosis (HCC)  -     gabapentin (NEURONTIN) 100 MG capsule; TAKE ONE TO TWO CAPSULES BY MOUTH THREE TIMES DAILY AS NEEDEDTAKE ONE TO TWO CAPSULES BY MOUTH THREE TIMES DAILY AS NEEDED, Disp-135 capsule, R-2Normal  2. Degeneration of cervical intervertebral disc  3. Neuropathy  -     gabapentin (NEURONTIN) 100 MG capsule; TAKE ONE TO TWO CAPSULES BY MOUTH THREE TIMES DAILY AS NEEDEDTAKE ONE TO TWO CAPSULES BY MOUTH THREE TIMES DAILY AS NEEDED, Disp-135 capsule, R-2Normal     If her symptoms worsen she is to let me know. We could not see her Tricia Craig so we will have to get it manually. She is not abusing this medication. Follow-up:  Return in about 6 months (around 3/14/2023) for PE and fasting labs. PATIENT INSTRUCTIONS:  Patient Instructions   We are committed to providing you with the best care possible. In order to help us achieve these goals please remember to bring all medications, herbal products, and over the counter supplements with you to each visit. If your provider has ordered testing for you, please be sure to follow up with our office if you have not received results within 7 days after the testing took place. *If you receive a survey after visiting one of our offices, please take time to share your experience concerning your physician office visit. These surveys are confidential and no health information about you is shared. We are eager to improve for you and we are counting on your feedback to help make that happen. EMR Dragon/transcription disclaimer:  Much of this encounter note is electronic transcription/translation of spoken language to printed texts.   The electronic translation of spoken language may be erroneous, or at times, nonsensical words or phrases may beinadvertently

## 2022-09-26 DIAGNOSIS — G35 RELAPSING-REMITTING MULTIPLE SCLEROSIS (HCC): Primary | ICD-10-CM

## 2022-10-10 ENCOUNTER — OFFICE VISIT (OUTPATIENT)
Dept: PRIMARY CARE CLINIC | Age: 42
End: 2022-10-10
Payer: COMMERCIAL

## 2022-10-10 VITALS
OXYGEN SATURATION: 97 % | HEIGHT: 65 IN | SYSTOLIC BLOOD PRESSURE: 130 MMHG | TEMPERATURE: 97.4 F | DIASTOLIC BLOOD PRESSURE: 80 MMHG | BODY MASS INDEX: 36.32 KG/M2 | HEART RATE: 80 BPM | WEIGHT: 218 LBS

## 2022-10-10 DIAGNOSIS — R09.89 CHEST CONGESTION: ICD-10-CM

## 2022-10-10 DIAGNOSIS — J06.9 VIRAL URI WITH COUGH: Primary | ICD-10-CM

## 2022-10-10 LAB
INFLUENZA A ANTIBODY: NEGATIVE
INFLUENZA B ANTIBODY: NEGATIVE

## 2022-10-10 PROCEDURE — G8427 DOCREV CUR MEDS BY ELIG CLIN: HCPCS | Performed by: FAMILY MEDICINE

## 2022-10-10 PROCEDURE — 1036F TOBACCO NON-USER: CPT | Performed by: FAMILY MEDICINE

## 2022-10-10 PROCEDURE — 99213 OFFICE O/P EST LOW 20 MIN: CPT | Performed by: FAMILY MEDICINE

## 2022-10-10 PROCEDURE — G8417 CALC BMI ABV UP PARAM F/U: HCPCS | Performed by: FAMILY MEDICINE

## 2022-10-10 PROCEDURE — 87804 INFLUENZA ASSAY W/OPTIC: CPT | Performed by: FAMILY MEDICINE

## 2022-10-10 PROCEDURE — G8484 FLU IMMUNIZE NO ADMIN: HCPCS | Performed by: FAMILY MEDICINE

## 2022-10-10 RX ORDER — FLUTICASONE PROPIONATE 50 MCG
2 SPRAY, SUSPENSION (ML) NASAL DAILY
Qty: 16 G | Refills: 0 | Status: SHIPPED | OUTPATIENT
Start: 2022-10-10

## 2022-10-10 RX ORDER — BENZONATATE 100 MG/1
100 CAPSULE ORAL 2 TIMES DAILY PRN
Qty: 20 CAPSULE | Refills: 0 | Status: SHIPPED | OUTPATIENT
Start: 2022-10-10 | End: 2022-10-17

## 2022-10-10 ASSESSMENT — ENCOUNTER SYMPTOMS
CHEST TIGHTNESS: 0
DIARRHEA: 0
SORE THROAT: 0
VOMITING: 0
WHEEZING: 1
RHINORRHEA: 1
NAUSEA: 0
BLOOD IN STOOL: 0
CONSTIPATION: 0
ABDOMINAL PAIN: 0
SHORTNESS OF BREATH: 1

## 2022-10-10 NOTE — PROGRESS NOTES
Brenda Sheets (:  1980) is a 35 y.o. female,Established patient, here for evaluation of the following chief complaint(s):  Congestion (Head and chest with cough with body aches and chills. No fever. Thursday started with just a little bit of a cough during the night. Feels drainage but no productive cough @ this time. Covid @ home test was negative and patient has had 4 Covid vaccinations.  )         ASSESSMENT/PLAN:  1. Viral URI with cough  2. Chest congestion  -     POCT Influenza A/B    Point-of-care flu negative  Patient's symptoms likely secondary to viral URI. Patient is instructed she may use OTC acetaminophen/NSAIDs for symptomatic relief and advised to remain hydrated. Patient advised to keep hands washed and to keep exposure to others minimal until symptoms subside. Patient encouraged to call or return if symptoms worsen or fail to improve    Return if symptoms worsen or fail to improve. Subjective   SUBJECTIVE/OBJECTIVE:  Brenda Sheets is a 35 y.o. female who presents due to ongoing cold symptoms. Patient says her symptoms started on Wednesday and she ended up sleeping the entire day. Patient says she felt mildly better on Thursday however since that time a cough has evolved. Patient says she is not coughing up enough in order to see what it is productive of. Patient has not had any known sick exposures. Patient notes that she has had a subjective fever and has not taken anything at home. Patient has felt mildly short of breath and has had body aches. Patient did a home COVID test which was negative. No other concerns at this time        Review of Systems   Constitutional:  Positive for appetite change, chills and fatigue. Negative for activity change and fever. HENT:  Positive for congestion and rhinorrhea. Negative for sore throat. Eyes:  Negative for visual disturbance. Respiratory:  Positive for shortness of breath and wheezing.  Negative for chest tightness. Cardiovascular:  Negative for chest pain. Gastrointestinal:  Negative for abdominal pain, blood in stool, constipation, diarrhea, nausea and vomiting. Genitourinary:  Negative for difficulty urinating and urgency. Neurological:  Positive for headaches. Negative for weakness and numbness. Psychiatric/Behavioral:  Negative for confusion. Objective   Physical Exam  Vitals reviewed. Constitutional:       General: She is not in acute distress. Appearance: Normal appearance. She is not ill-appearing. HENT:      Head: Normocephalic and atraumatic. Mouth/Throat:      Mouth: Mucous membranes are moist.      Pharynx: No oropharyngeal exudate or posterior oropharyngeal erythema. Cardiovascular:      Rate and Rhythm: Normal rate and regular rhythm. Pulses: Normal pulses. Heart sounds: Normal heart sounds. No murmur heard. Pulmonary:      Effort: Pulmonary effort is normal. No respiratory distress. Breath sounds: Examination of the left-lower field reveals decreased breath sounds. Decreased breath sounds present. No wheezing or rhonchi. Chest:      Chest wall: No tenderness. Abdominal:      General: Abdomen is flat. Bowel sounds are normal. There is no distension. Palpations: Abdomen is soft. Tenderness: There is no abdominal tenderness. Musculoskeletal:         General: No swelling. Skin:     General: Skin is warm and dry. Neurological:      General: No focal deficit present. Mental Status: She is alert.           Vitals:    10/10/22 1408   BP: 130/80   Pulse: 80   Temp: 97.4 °F (36.3 °C)   SpO2: 97%        Current Outpatient Medications   Medication Sig Dispense Refill    fluticasone (FLONASE) 50 MCG/ACT nasal spray 2 sprays by Each Nostril route daily 16 g 0    benzonatate (TESSALON) 100 MG capsule Take 1 capsule by mouth 2 times daily as needed for Cough 20 capsule 0    gabapentin (NEURONTIN) 100 MG capsule TAKE ONE TO TWO CAPSULES BY MOUTH THREE TIMES DAILY AS NEEDEDTAKE ONE TO TWO CAPSULES BY MOUTH THREE TIMES DAILY AS NEEDED 135 capsule 2    linaCLOtide (LINZESS) 72 MCG CAPS capsule Take 1 capsule by mouth every morning (before breakfast) 30 capsule 2    omeprazole (PRILOSEC) 40 MG delayed release capsule Take 40 mg by mouth as needed      baclofen (LIORESAL) 20 MG tablet Take 1 tablet by mouth in the morning and 1 tablet at noon and 1 tablet before bedtime. Calcium 500-100 MG-UNIT CHEW Take by mouth daily      albuterol sulfate HFA (VENTOLIN HFA) 108 (90 Base) MCG/ACT inhaler Inhale 2 puffs into the lungs 4 times daily as needed for Wheezing 18 g 5    atorvastatin (LIPITOR) 20 MG tablet TAKE 1 TABLET AT BEDTIME FOR HIGH CHOLESTEROL 90 tablet 3    propranolol (INDERAL LA) 60 MG extended release capsule TAKE 1 CAPSULE ONCE DAILY FOR HEART RATE 90 capsule 3    tiZANidine (ZANAFLEX) 4 MG tablet Take 1 tablet by mouth every 8 hours as needed (for muscle spasms) (Patient taking differently: Take 4 mg by mouth nightly as needed (for muscle spasms)) 270 tablet 1    desonide (DESOWEN) 0.05 % cream in the morning and at bedtime      ocrelizumab (OCREVUS) 300 MG/10ML SOLN injection every 6 months      conjugated estrogens (PREMARIN) 0.625 MG/GM vaginal cream Place 0.5 g vaginally Twice a Week 1 Tube 5    nortriptyline (PAMELOR) 75 MG capsule nightly      topiramate (TOPAMAX) 100 MG tablet Take 1 tablet by mouth 2 times daily 180 tablet 3    Probiotic Product (PROBIOTIC DAILY PO) Take by mouth daily      promethazine (PHENERGAN) 25 MG tablet Take 25 mg by mouth      cloNIDine (CATAPRES) 0.1 MG tablet 0.1 mg as needed       Cholecalciferol (VITAMIN D-3) 5000 UNITS TABS Take 5,000 Units by mouth daily. SUMAtriptan (IMITREX) 6 MG/0.5ML SOLN injection Inject 0.5 mLs into the skin once as needed for Migraine 9 each 3     No current facility-administered medications for this visit.       Family History   Problem Relation Age of Onset    Diabetes Mother     High Blood Pressure Mother     Colon Polyps Mother     Colon Cancer Neg Hx     Liver Cancer Neg Hx     Liver Disease Neg Hx     Esophageal Cancer Neg Hx     Rectal Cancer Neg Hx     Stomach Cancer Neg Hx       Past Medical History:   Diagnosis Date    B12 deficiency     Cauda equina syndrome (HCC)     Complicated migraine     Depression     GERD (gastroesophageal reflux disease)     Hematuria     wih abdominal pain    Hyperlipidemia     Hypertension     MS (multiple sclerosis) (HCC)     Myofascial pain     Nerve damage     Neutropenia ([address] 10/21/2015    Pelvic floor dysfunction     Postmenopausal HRT (hormone replacement therapy)     POTS (postural orthostatic tachycardia syndrome)       Past Surgical History:   Procedure Laterality Date    APPENDECTOMY      BACK SURGERY      CHOLECYSTECTOMY, LAPAROSCOPIC      COLONOSCOPY  11/16/2015    Dr Duc Mittal hemorrhoids, AP, 5 yr recall    COLONOSCOPY N/A 08/13/2018    Dr Sanjuanita [facility]--10 yr recall    COLONOSCOPY  09/12/2022    Dr Francis Kyle, 6 yr (age [address]) recall    COLONOSCOPY  02/03/2010    Dr Claudette Peto, prn    COLONOSCOPY N/A 09/12/2022    COLONOSCOPY DIAGNOSTIC performed by Shantal Abbasi MD at [facility] OR    EGD      HYSTERECTOMY (CERVIX STATUS UNKNOWN)      OVARY REMOVAL      MN EGD TRANSORAL BIOPSY SINGLE/MULTIPLE N/A 01/29/2018    Dr Sanjuanita Abdul-Active duodenitis, gastritis/gastropathy    UPPER GASTROINTESTINAL ENDOSCOPY  09/12/2022    Dr NORMA Abdul-Gastritis    UPPER GASTROINTESTINAL ENDOSCOPY  11/15/2016    Dr Gage Cruz gastritis, no celiac    UPPER GASTROINTESTINAL ENDOSCOPY  11/16/2015    Dr Alisia Horta neg    UPPER GASTROINTESTINAL ENDOSCOPY N/A 09/12/2022    Dr Sanjuanita Abdul-Gastritis, no h pylori      Allergies   Allergen Reactions    Ropinirole Hcl      Other reaction(s): Mental Status Change    Zolpidem Other (See Comments)    Ropinirole Other (See Comments)     Crazy dreams; sleep walking    Simvastatin Other (See Comments)     Leg cramps Lab Results   Component Value Date     08/22/2022    K 4.5 08/22/2022     08/22/2022    CO2 23 08/22/2022    BUN 11 08/22/2022    CREATININE 0.9 08/22/2022    GLUCOSE 100 08/22/2022    CALCIUM 8.9 08/22/2022    PROT 6.7 08/22/2022    LABALBU 4.3 08/22/2022    BILITOT 0.4 08/22/2022    ALKPHOS 102 08/22/2022    AST 14 08/22/2022    ALT 17 08/22/2022    LABGLOM >60 08/22/2022    GFRAA >59 08/22/2022    GLOB 2.4 01/16/2017        Lab Results   Component Value Date    WBC 7.1 07/19/2022    HGB 14.6 07/19/2022    HCT 42.7 07/19/2022    MCV 91.4 07/19/2022     07/19/2022                EMR Dragon/transcription disclaimer:  Much of this encounter note is electronic transcription/translation of spoken language toprinted texts. The electronic translation of spoken language may be erroneous, or at times, nonsensical words or phrases may be inadvertently transcribed. Although I have reviewed the note for such errors, some may stillexist.      An electronic signature was used to authenticate this note.     --Vahe Chin MD

## 2022-10-14 ENCOUNTER — ANCILLARY PROCEDURE (OUTPATIENT)
Dept: PRIMARY CARE CLINIC | Age: 42
End: 2022-10-14
Payer: COMMERCIAL

## 2022-10-14 ENCOUNTER — OFFICE VISIT (OUTPATIENT)
Dept: PRIMARY CARE CLINIC | Age: 42
End: 2022-10-14
Payer: COMMERCIAL

## 2022-10-14 VITALS
OXYGEN SATURATION: 95 % | HEIGHT: 65 IN | BODY MASS INDEX: 36.19 KG/M2 | WEIGHT: 217.2 LBS | TEMPERATURE: 97.7 F | SYSTOLIC BLOOD PRESSURE: 126 MMHG | RESPIRATION RATE: 16 BRPM | DIASTOLIC BLOOD PRESSURE: 86 MMHG | HEART RATE: 75 BPM

## 2022-10-14 DIAGNOSIS — R05.3 PERSISTENT COUGH: ICD-10-CM

## 2022-10-14 DIAGNOSIS — R05.3 PERSISTENT COUGH: Primary | ICD-10-CM

## 2022-10-14 PROCEDURE — G8417 CALC BMI ABV UP PARAM F/U: HCPCS | Performed by: FAMILY MEDICINE

## 2022-10-14 PROCEDURE — G8484 FLU IMMUNIZE NO ADMIN: HCPCS | Performed by: FAMILY MEDICINE

## 2022-10-14 PROCEDURE — 71046 X-RAY EXAM CHEST 2 VIEWS: CPT | Performed by: FAMILY MEDICINE

## 2022-10-14 PROCEDURE — G8427 DOCREV CUR MEDS BY ELIG CLIN: HCPCS | Performed by: FAMILY MEDICINE

## 2022-10-14 PROCEDURE — 96372 THER/PROPH/DIAG INJ SC/IM: CPT | Performed by: FAMILY MEDICINE

## 2022-10-14 PROCEDURE — 99213 OFFICE O/P EST LOW 20 MIN: CPT | Performed by: FAMILY MEDICINE

## 2022-10-14 PROCEDURE — 1036F TOBACCO NON-USER: CPT | Performed by: FAMILY MEDICINE

## 2022-10-14 RX ORDER — METHYLPREDNISOLONE ACETATE 80 MG/ML
80 INJECTION, SUSPENSION INTRA-ARTICULAR; INTRALESIONAL; INTRAMUSCULAR; SOFT TISSUE ONCE
Status: COMPLETED | OUTPATIENT
Start: 2022-10-14 | End: 2022-10-14

## 2022-10-14 RX ADMIN — METHYLPREDNISOLONE ACETATE 80 MG: 80 INJECTION, SUSPENSION INTRA-ARTICULAR; INTRALESIONAL; INTRAMUSCULAR; SOFT TISSUE at 11:03

## 2022-10-14 ASSESSMENT — ENCOUNTER SYMPTOMS
WHEEZING: 1
SHORTNESS OF BREATH: 0
BLOOD IN STOOL: 0
RHINORRHEA: 1
COUGH: 1
ABDOMINAL PAIN: 0
CHEST TIGHTNESS: 0
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
## 2022-10-14 NOTE — PROGRESS NOTES
Erin Guzman (:  1980) is a 35 y.o. female,Established patient, here for evaluation of the following chief complaint(s):  Cough (Was seen earlier this week, feels worse, not able to cough any thing up, she is wheezing and using inhaler but not helping) and Congestion         ASSESSMENT/PLAN:  1. Persistent cough  -     XR CHEST STANDARD (2 VW); Future    Cough likely secondary to ongoing URI. It is only been 4 days since last visit and duration of illness is not uncommonly over 1 week. Given persistence of symptoms and worsening cough will obtain 2 view chest x-ray at this time. We will also administer IM 80 mg methylprednisolone for symptomatic relief. I discussed with patient that this may result in slight immunosuppression and will not fix the underlying problem and patient was agreeable to this option. Return if symptoms worsen or fail to improve. Subjective   SUBJECTIVE/OBJECTIVE:  Erin Guzman is a 35 y.o. female who presents due to ongoing upper respiratory symptoms. Patient says that she has felt worse since her last visit. Patient is still coughing up copious amounts of clear mucus and thinks that she is coughing more. Patient says she has noticed wheezing occasionally at night on exhalation. Patient denies any diarrhea. But says that she is more fatigued. Patient denies any fevers. Review of Systems   Constitutional:  Positive for fatigue. Negative for activity change and fever. HENT:  Positive for congestion and rhinorrhea. Eyes:  Negative for visual disturbance. Respiratory:  Positive for cough and wheezing. Negative for chest tightness and shortness of breath. Cardiovascular:  Negative for chest pain. Gastrointestinal:  Negative for abdominal pain and blood in stool. Genitourinary:  Negative for difficulty urinating and urgency. Neurological:  Negative for weakness and headaches.    Psychiatric/Behavioral:  Negative for confusion. Objective   Physical Exam  Vitals reviewed. Constitutional:       General: She is not in acute distress. Appearance: Normal appearance. She is not ill-appearing. HENT:      Head: Normocephalic and atraumatic. Mouth/Throat:      Mouth: Mucous membranes are moist.      Pharynx: Posterior oropharyngeal erythema present. Cardiovascular:      Rate and Rhythm: Normal rate and regular rhythm. Pulses: Normal pulses. Heart sounds: Normal heart sounds. No murmur heard. Pulmonary:      Effort: Pulmonary effort is normal. No respiratory distress. Breath sounds: Examination of the left-lower field reveals decreased breath sounds. Decreased breath sounds present. No wheezing or rhonchi. Chest:      Chest wall: No tenderness. Abdominal:      General: Abdomen is flat. Bowel sounds are normal. There is no distension. Palpations: Abdomen is soft. Tenderness: There is no abdominal tenderness. Musculoskeletal:         General: No swelling. Skin:     General: Skin is warm and dry. Neurological:      General: No focal deficit present. Mental Status: She is alert. Vitals:    10/14/22 1040   BP: 126/86   Pulse: 75   Resp: 16   Temp: 97.7 °F (36.5 °C)   SpO2: 95%        Current Outpatient Medications   Medication Sig Dispense Refill    benzonatate (TESSALON) 100 MG capsule Take 1 capsule by mouth 2 times daily as needed for Cough 20 capsule 0    gabapentin (NEURONTIN) 100 MG capsule TAKE ONE TO TWO CAPSULES BY MOUTH THREE TIMES DAILY AS NEEDEDTAKE ONE TO TWO CAPSULES BY MOUTH THREE TIMES DAILY AS NEEDED 135 capsule 2    linaCLOtide (LINZESS) 72 MCG CAPS capsule Take 1 capsule by mouth every morning (before breakfast) 30 capsule 2    omeprazole (PRILOSEC) 40 MG delayed release capsule Take 40 mg by mouth as needed      baclofen (LIORESAL) 20 MG tablet Take 1 tablet by mouth in the morning and 1 tablet at noon and 1 tablet before bedtime.       Calcium 500-100 MG-UNIT CHEW Take by mouth daily      albuterol sulfate HFA (VENTOLIN HFA) 108 (90 Base) MCG/ACT inhaler Inhale 2 puffs into the lungs 4 times daily as needed for Wheezing 18 g 5    atorvastatin (LIPITOR) 20 MG tablet TAKE 1 TABLET AT BEDTIME FOR HIGH CHOLESTEROL 90 tablet 3    SUMAtriptan (IMITREX) 6 MG/0.5ML SOLN injection Inject 0.5 mLs into the skin once as needed for Migraine 9 each 3    propranolol (INDERAL LA) 60 MG extended release capsule TAKE 1 CAPSULE ONCE DAILY FOR HEART RATE 90 capsule 3    tiZANidine (ZANAFLEX) 4 MG tablet Take 1 tablet by mouth every 8 hours as needed (for muscle spasms) (Patient taking differently: Take 4 mg by mouth nightly as needed (for muscle spasms)) 270 tablet 1    desonide (DESOWEN) 0.05 % cream in the morning and at bedtime      ocrelizumab (OCREVUS) 300 MG/10ML SOLN injection every 6 months      conjugated estrogens (PREMARIN) 0.625 MG/GM vaginal cream Place 0.5 g vaginally Twice a Week 1 Tube 5    nortriptyline (PAMELOR) 75 MG capsule nightly      topiramate (TOPAMAX) 100 MG tablet Take 1 tablet by mouth 2 times daily 180 tablet 3    Probiotic Product (PROBIOTIC DAILY PO) Take by mouth daily      promethazine (PHENERGAN) 25 MG tablet Take 25 mg by mouth      cloNIDine (CATAPRES) 0.1 MG tablet 0.1 mg as needed       Cholecalciferol (VITAMIN D-3) 5000 UNITS TABS Take 5,000 Units by mouth daily. fluticasone (FLONASE) 50 MCG/ACT nasal spray 2 sprays by Each Nostril route daily (Patient not taking: Reported on 10/14/2022) 16 g 0     No current facility-administered medications for this visit.       Family History   Problem Relation Age of Onset    Diabetes Mother     High Blood Pressure Mother     Colon Polyps Mother     Colon Cancer Neg Hx     Liver Cancer Neg Hx     Liver Disease Neg Hx     Esophageal Cancer Neg Hx     Rectal Cancer Neg Hx     Stomach Cancer Neg Hx       Past Medical History:   Diagnosis Date    B12 deficiency     Cauda equina syndrome ([facility] [address] Complicated migraine     Depression     GERD (gastroesophageal reflux disease)     Hematuria     wih abdominal pain    Hyperlipidemia     Hypertension     MS (multiple sclerosis) (HCC)     Myofascial pain     Nerve damage     Neutropenia ([address] 10/21/2015    Pelvic floor dysfunction     Postmenopausal HRT (hormone replacement therapy)     POTS (postural orthostatic tachycardia syndrome)       Past Surgical History:   Procedure Laterality Date    APPENDECTOMY      BACK SURGERY      CHOLECYSTECTOMY, LAPAROSCOPIC      COLONOSCOPY  11/16/2015    Dr Beatris Cazares hemorrhoids, AP, 5 yr recall    COLONOSCOPY N/A 08/13/2018    Dr Keyur Abdul-[facility]--10 yr recall    COLONOSCOPY  09/12/2022    Dr Nina Pickard, 6 yr (age 46) recall    COLONOSCOPY  02/03/2010    Dr Manda Frias, prn    COLONOSCOPY N/A 09/12/2022    COLONOSCOPY DIAGNOSTIC performed by Danita Coley MD at [facility]    EGD      HYSTERECTOMY (CERVIX STATUS UNKNOWN)      OVARY REMOVAL      WY EGD TRANSORAL BIOPSY SINGLE/MULTIPLE N/A 01/29/2018    Dr Keyur Abdul-Active duodenitis, gastritis/gastropathy    UPPER GASTROINTESTINAL ENDOSCOPY  09/12/2022    Dr NORMA Abdul-Gastritis    UPPER GASTROINTESTINAL ENDOSCOPY  11/15/2016    Dr Zeny Toro gastritis, no celiac    UPPER GASTROINTESTINAL ENDOSCOPY  11/16/2015    Dr Nereyda Martin neg    UPPER GASTROINTESTINAL ENDOSCOPY N/A 09/12/2022    Dr Keyur Abdul-Gastritis, no h pylori      Allergies   Allergen Reactions    Ropinirole Hcl      Other reaction(s): Mental Status Change    Zolpidem Other (See Comments)    Ropinirole Other (See Comments)     Crazy dreams; sleep walking    Simvastatin Other (See Comments)     Leg cramps        Lab Results   Component Value Date     08/22/2022    K 4.5 08/22/2022     08/22/2022    CO2 23 08/22/2022    BUN 11 08/22/2022    CREATININE 0.9 08/22/2022    GLUCOSE 100 08/22/2022    CALCIUM 8.9 08/22/2022    PROT 6.7 08/22/2022    LABALBU 4.3 08/22/2022    BILITOT 0.4 08/22/2022    ALKPHOS 102 08/22/2022    AST 14 08/22/2022    ALT 17 08/22/2022    LABGLOM >60 08/22/2022    GFRAA >59 08/22/2022    GLOB 2.4 01/16/2017        Lab Results   Component Value Date    WBC 7.1 07/19/2022    HGB 14.6 07/19/2022    HCT 42.7 07/19/2022    MCV 91.4 07/19/2022     07/19/2022                EMR Dragon/transcription disclaimer:  Much of this encounter note is electronic transcription/translation of spoken language toprinted texts. The electronic translation of spoken language may be erroneous, or at times, nonsensical words or phrases may be inadvertently transcribed. Although I have reviewed the note for such errors, some may stillexist.      An electronic signature was used to authenticate this note.     --Mayra Pike MD

## 2022-10-19 ENCOUNTER — OFFICE VISIT (OUTPATIENT)
Dept: UROLOGY | Facility: CLINIC | Age: 42
End: 2022-10-19

## 2022-10-19 VITALS — BODY MASS INDEX: 34.99 KG/M2 | HEIGHT: 65 IN | TEMPERATURE: 97.8 F | WEIGHT: 210 LBS

## 2022-10-19 DIAGNOSIS — N20.0 NEPHROLITHIASIS: Primary | ICD-10-CM

## 2022-10-19 DIAGNOSIS — G35 MULTIPLE SCLEROSIS: ICD-10-CM

## 2022-10-19 DIAGNOSIS — N31.9 NEUROGENIC BLADDER: ICD-10-CM

## 2022-10-19 PROCEDURE — 81001 URINALYSIS AUTO W/SCOPE: CPT | Performed by: UROLOGY

## 2022-10-19 PROCEDURE — 99214 OFFICE O/P EST MOD 30 MIN: CPT | Performed by: UROLOGY

## 2022-10-31 RX ORDER — BACLOFEN 20 MG/1
20 TABLET ORAL 3 TIMES DAILY
Qty: 270 TABLET | Refills: 0 | Status: SHIPPED | OUTPATIENT
Start: 2022-10-31 | End: 2023-01-29

## 2022-10-31 NOTE — TELEPHONE ENCOUNTER
10-31-22    Last visit [facility] aprn 8-30-22. No FU scheduled. Egd/Cln  10-13-22. [facility] faxe a request for a 90 day supply with refills on Linzess 72 mcg daily, this will save the patient money on co-pays. Last RF  9-12-22 # 30 x 2.  [facility]

## 2022-11-07 ENCOUNTER — OFFICE VISIT (OUTPATIENT)
Dept: PULMONOLOGY | Age: 42
End: 2022-11-07
Payer: COMMERCIAL

## 2022-11-07 ENCOUNTER — TELEPHONE (OUTPATIENT)
Dept: PULMONOLOGY | Age: 42
End: 2022-11-07

## 2022-11-07 ENCOUNTER — HOSPITAL ENCOUNTER (OUTPATIENT)
Dept: GENERAL RADIOLOGY | Age: 42
Discharge: HOME | End: 2022-11-07
Payer: COMMERCIAL

## 2022-11-07 VITALS
WEIGHT: 217.2 LBS | TEMPERATURE: 97.9 F | BODY MASS INDEX: 36.19 KG/M2 | HEART RATE: 78 BPM | OXYGEN SATURATION: 96 % | DIASTOLIC BLOOD PRESSURE: 78 MMHG | SYSTOLIC BLOOD PRESSURE: 127 MMHG | HEIGHT: 65 IN

## 2022-11-07 DIAGNOSIS — R06.09 DOE (DYSPNEA ON EXERTION): ICD-10-CM

## 2022-11-07 DIAGNOSIS — G47.33 OSA (OBSTRUCTIVE SLEEP APNEA): Primary | ICD-10-CM

## 2022-11-07 DIAGNOSIS — J20.9 ACUTE BRONCHITIS, UNSPECIFIED ORGANISM: ICD-10-CM

## 2022-11-07 DIAGNOSIS — J98.6 ELEVATED HEMIDIAPHRAGM: ICD-10-CM

## 2022-11-07 DIAGNOSIS — K21.9 GASTROESOPHAGEAL REFLUX DISEASE WITHOUT ESOPHAGITIS: ICD-10-CM

## 2022-11-07 PROCEDURE — 99214 OFFICE O/P EST MOD 30 MIN: CPT | Performed by: INTERNAL MEDICINE

## 2022-11-07 PROCEDURE — G8427 DOCREV CUR MEDS BY ELIG CLIN: HCPCS | Performed by: INTERNAL MEDICINE

## 2022-11-07 PROCEDURE — 3078F DIAST BP <80 MM HG: CPT | Performed by: INTERNAL MEDICINE

## 2022-11-07 PROCEDURE — G8417 CALC BMI ABV UP PARAM F/U: HCPCS | Performed by: INTERNAL MEDICINE

## 2022-11-07 PROCEDURE — G8484 FLU IMMUNIZE NO ADMIN: HCPCS | Performed by: INTERNAL MEDICINE

## 2022-11-07 PROCEDURE — 71046 X-RAY EXAM CHEST 2 VIEWS: CPT

## 2022-11-07 PROCEDURE — 1036F TOBACCO NON-USER: CPT | Performed by: INTERNAL MEDICINE

## 2022-11-07 PROCEDURE — 3074F SYST BP LT 130 MM HG: CPT | Performed by: INTERNAL MEDICINE

## 2022-11-07 RX ORDER — PREDNISONE 10 MG/1
TABLET ORAL
Qty: 35 TABLET | Refills: 0 | Status: SHIPPED | OUTPATIENT
Start: 2022-11-07 | End: 2022-11-07

## 2022-11-07 RX ORDER — CEFUROXIME AXETIL 500 MG/1
500 TABLET ORAL 2 TIMES DAILY
Qty: 14 TABLET | Refills: 0 | Status: SHIPPED | OUTPATIENT
Start: 2022-11-07 | End: 2022-11-07 | Stop reason: SDUPTHER

## 2022-11-07 RX ORDER — CEFUROXIME AXETIL 500 MG/1
500 TABLET ORAL 2 TIMES DAILY
Qty: 14 TABLET | Refills: 0 | Status: CANCELLED | OUTPATIENT
Start: 2022-11-07 | End: 2022-11-14

## 2022-11-07 RX ORDER — PREDNISONE 10 MG/1
TABLET ORAL
Qty: 35 TABLET | Refills: 0 | Status: SHIPPED | OUTPATIENT
Start: 2022-11-07

## 2022-11-07 RX ORDER — CEFUROXIME AXETIL 500 MG/1
500 TABLET ORAL 2 TIMES DAILY
Qty: 14 TABLET | Refills: 0 | Status: SHIPPED | OUTPATIENT
Start: 2022-11-07 | End: 2022-11-14

## 2022-11-07 ASSESSMENT — ENCOUNTER SYMPTOMS
BACK PAIN: 0
SHORTNESS OF BREATH: 0
APNEA: 1
ABDOMINAL DISTENTION: 0
ANAL BLEEDING: 0
COUGH: 1
RHINORRHEA: 0
WHEEZING: 0
CHEST TIGHTNESS: 0
ABDOMINAL PAIN: 0

## 2022-11-07 NOTE — PROGRESS NOTES
Pulmonary and Sleep Medicine    Erin Guzman (:  1980) is a 35 y.o. female,Established patient, here for evaluation of the following chief complaint(s):  Follow-up (Follow up- SHYAM )      Referring physician:  No referring provider defined for this encounter. ASSESSMENT/PLAN:  1. SHYAM (obstructive sleep apnea) on CPAP  2. Elevated hemidiaphragm (no paralysis)  3. LÓPEZ (dyspnea on exertion)  4. Gastroesophageal reflux disease without esophagitis  5. Acute bronchitis, unspecified organism  -     XR CHEST (2 VW); Future  -     cefUROXime (CEFTIN) 500 MG tablet; Take 1 tablet by mouth 2 times daily for 7 days, Disp-14 tablet, R-0Normal      We will treat for acute bronchitis. Repeat chest x-ray. Continue current management with the CPAP she is compliant with the CPAP she feels it helps. CPAP compliance data was reviewed. She is having difficulty with her current fullface mask. Dustin Banuelos MD, [facility], [facility]    Return in about 4 weeks (around 2022). SUBJECTIVE/OBJECTIVE:  The patient is here for follow-up on obstructive sleep apnea. She is using her CPAP she is compliant with the CPAP she feels it helps. She has been coughing and feeling congested for some time. She had a chest x-ray done in October that was unremarkable. She says she had a low-grade fever. She was coughing with bloody taste in her mouth. However she did not actually see bloody sputum. Prior to Visit Medications    Medication Sig Taking?  Authorizing Provider   linaCLOtide (LINZESS) 72 MCG CAPS capsule Take 1 capsule by mouth every morning (before breakfast) Yes DEVEN Madden   baclofen (LIORESAL) 20 MG tablet Take 1 tablet by mouth 3 times daily Yes Francisca Diaz MD   gabapentin (NEURONTIN) 100 MG capsule TAKE ONE TO TWO CAPSULES BY MOUTH THREE TIMES DAILY AS NEEDEDTAKE ONE TO TWO CAPSULES BY MOUTH THREE TIMES DAILY AS NEEDED Yes Francisca Diaz MD   omeprazole (PRILOSEC) 40 MG delayed release capsule Take 40 mg by mouth as needed Yes Historical Provider, MD   Calcium 500-100 MG-UNIT CHEW Take by mouth daily Yes Historical Provider, MD   albuterol sulfate HFA (VENTOLIN HFA) 108 (90 Base) MCG/ACT inhaler Inhale 2 puffs into the lungs 4 times daily as needed for Wheezing Yes Saurabh Sneed MD   atorvastatin (LIPITOR) 20 MG tablet TAKE 1 TABLET AT BEDTIME FOR HIGH CHOLESTEROL Yes Aviva Cardona MD   SUMAtriptan (IMITREX) 6 MG/0.5ML SOLN injection Inject 0.5 mLs into the skin once as needed for Migraine Yes Aviva Cardona MD   propranolol (INDERAL LA) 60 MG extended release capsule TAKE 1 CAPSULE ONCE DAILY FOR HEART RATE Yes Aviva Cardona MD   tiZANidine (ZANAFLEX) 4 MG tablet Take 1 tablet by mouth every 8 hours as needed (for muscle spasms)  Patient taking differently: Take 4 mg by mouth nightly as needed (for muscle spasms) Yes Aviva Cardona MD   desonide (DESOWEN) 0.05 % cream in the morning and at bedtime Yes Historical Provider, MD   ocrelizumab (OCREVUS) 300 MG/10ML SOLN injection every 6 months Yes Historical Provider, MD   conjugated estrogens (PREMARIN) 0.625 MG/GM vaginal cream Place 0.5 g vaginally Twice a Week Yes DEVEN Davis - CNP   nortriptyline (PAMELOR) 75 MG capsule nightly Yes Historical Provider, MD   topiramate (TOPAMAX) 100 MG tablet Take 1 tablet by mouth 2 times daily Yes Sintia Neves MD   Probiotic Product (PROBIOTIC DAILY PO) Take by mouth daily Yes Historical Provider, MD   promethazine (PHENERGAN) 25 MG tablet Take 25 mg by mouth Yes Historical Provider, MD   cloNIDine (CATAPRES) 0.1 MG tablet 0.1 mg as needed  Yes Historical Provider, MD   Cholecalciferol (VITAMIN D-3) 5000 UNITS TABS Take 5,000 Units by mouth daily. Yes Historical Provider, MD   bethanechol (URECHOLINE) 25 MG tablet Take 1 tablet by mouth 3 times daily.   Aviva Cardona MD        Review of Systems   Constitutional:  Negative for activity change, appetite change, chills, diaphoresis and fatigue. HENT:  Negative for congestion, dental problem, drooling, ear discharge, postnasal drip and rhinorrhea. Eyes:  Negative for visual disturbance. Respiratory:  Positive for apnea and cough. Negative for chest tightness, shortness of breath and wheezing. Gastrointestinal:  Negative for abdominal distention, abdominal pain and anal bleeding. Endocrine: Negative for cold intolerance, heat intolerance and polydipsia. Genitourinary:  Negative for difficulty urinating, dysuria, enuresis and flank pain. Musculoskeletal:  Negative for arthralgias, back pain and gait problem. Allergic/Immunologic: Negative for environmental allergies. Neurological:  Negative for dizziness, facial asymmetry, light-headedness and headaches. Vitals:    11/07/22 1131   BP: 127/78   Pulse: 78   Temp: 97.9 °F (36.6 °C)   SpO2: 96%     BMI Readings from Last 1 Encounters:   11/07/22 36.14 kg/m²         Physical Exam  Vitals reviewed. Constitutional:       Appearance: Normal appearance. HENT:      Head: Normocephalic and atraumatic. Nose: Nose normal.   Eyes:      Extraocular Movements: Extraocular movements intact. Conjunctiva/sclera: Conjunctivae normal.   Cardiovascular:      Rate and Rhythm: Normal rate and regular rhythm. Heart sounds: No murmur heard. No friction rub. Pulmonary:      Effort: Pulmonary effort is normal. No respiratory distress. Breath sounds: Normal breath sounds. No stridor. No wheezing, rhonchi or rales. Abdominal:      General: There is no distension. Palpations: There is no mass. Tenderness: There is no abdominal tenderness. There is no guarding or rebound. Musculoskeletal:      Cervical back: Normal range of motion and neck supple. Neurological:      Mental Status: She is alert and oriented to person, place, and time. This note was generated using a voice recognition software. Errors in voice recognition may have occurred. An electronic signature was used to authenticate this note.     --Moira Terry MD

## 2022-11-09 DIAGNOSIS — G62.9 NEUROPATHY: ICD-10-CM

## 2022-11-09 DIAGNOSIS — G35 RELAPSING-REMITTING MULTIPLE SCLEROSIS (HCC): ICD-10-CM

## 2022-11-09 RX ORDER — GABAPENTIN 100 MG/1
CAPSULE ORAL
Qty: 135 CAPSULE | Refills: 2 | Status: SHIPPED | OUTPATIENT
Start: 2022-11-09 | End: 2022-12-09

## 2022-11-09 NOTE — TELEPHONE ENCOUNTER
Provider needs to review PDMP    PDMP Monitoring:    Last PDMP Yrn [place] as Reviewed [facility]):  Review User Review Instant Review Result   Monica Melo 9/17/2022  2:50 PM Reviewed PDMP [1]     Urine Drug Screenings (1 yr)     POCT Rapid Drug Screen  Collected: 3/14/2022 (Final result)          POCT Rapid Drug Screen  Collected: 3/11/2021  1:39 PM (Final result)          POCT Rapid Drug Screen  Collected: 2/12/2020 10:27 AM (Final result)          POCT Rapid Drug Screen  Collected: 8/3/2018  2:47 PM (Final result)              Medication Contract and Consent for Opioid Use Documents Filed     Patient Documents     Type of Document Status Date Received Received By Description    Medication Contract Signed 8/9/2019  3:11 PM AVEL RILEY     Medication Contract Signed 8/12/2020 12:03 PM Kyle Noon

## 2022-11-22 ENCOUNTER — TRANSCRIBE ORDERS (OUTPATIENT)
Dept: ADMINISTRATIVE | Facility: HOSPITAL | Age: 42
End: 2022-11-22

## 2022-11-22 DIAGNOSIS — G35 MULTIPLE SCLEROSIS: Primary | ICD-10-CM

## 2022-12-04 DIAGNOSIS — G62.9 NEUROPATHY: ICD-10-CM

## 2022-12-04 DIAGNOSIS — G35 RELAPSING-REMITTING MULTIPLE SCLEROSIS (HCC): ICD-10-CM

## 2022-12-04 RX ORDER — GABAPENTIN 100 MG/1
CAPSULE ORAL
Qty: 135 CAPSULE | Refills: 2 | Status: SHIPPED | OUTPATIENT
Start: 2022-12-04 | End: 2023-01-03

## 2022-12-05 ENCOUNTER — OFFICE VISIT (OUTPATIENT)
Dept: PULMONOLOGY | Age: 42
End: 2022-12-05
Payer: COMMERCIAL

## 2022-12-05 VITALS
WEIGHT: 220.2 LBS | SYSTOLIC BLOOD PRESSURE: 126 MMHG | HEART RATE: 78 BPM | DIASTOLIC BLOOD PRESSURE: 66 MMHG | OXYGEN SATURATION: 96 % | HEIGHT: 65 IN | TEMPERATURE: 97.8 F | BODY MASS INDEX: 36.69 KG/M2

## 2022-12-05 DIAGNOSIS — E66.9 OBESITY (BMI 35.0-39.9 WITHOUT COMORBIDITY): ICD-10-CM

## 2022-12-05 DIAGNOSIS — J98.6 ELEVATED HEMIDIAPHRAGM: ICD-10-CM

## 2022-12-05 DIAGNOSIS — G47.33 OSA (OBSTRUCTIVE SLEEP APNEA): ICD-10-CM

## 2022-12-05 DIAGNOSIS — R06.09 DOE (DYSPNEA ON EXERTION): Primary | ICD-10-CM

## 2022-12-05 DIAGNOSIS — K21.9 GASTROESOPHAGEAL REFLUX DISEASE WITHOUT ESOPHAGITIS: ICD-10-CM

## 2022-12-05 PROCEDURE — G8484 FLU IMMUNIZE NO ADMIN: HCPCS | Performed by: INTERNAL MEDICINE

## 2022-12-05 PROCEDURE — 99214 OFFICE O/P EST MOD 30 MIN: CPT | Performed by: INTERNAL MEDICINE

## 2022-12-05 PROCEDURE — G8417 CALC BMI ABV UP PARAM F/U: HCPCS | Performed by: INTERNAL MEDICINE

## 2022-12-05 PROCEDURE — 1036F TOBACCO NON-USER: CPT | Performed by: INTERNAL MEDICINE

## 2022-12-05 PROCEDURE — 3074F SYST BP LT 130 MM HG: CPT | Performed by: INTERNAL MEDICINE

## 2022-12-05 PROCEDURE — G8427 DOCREV CUR MEDS BY ELIG CLIN: HCPCS | Performed by: INTERNAL MEDICINE

## 2022-12-05 PROCEDURE — 3078F DIAST BP <80 MM HG: CPT | Performed by: INTERNAL MEDICINE

## 2022-12-05 ASSESSMENT — ENCOUNTER SYMPTOMS
COUGH: 1
WHEEZING: 0
ABDOMINAL PAIN: 0
ANAL BLEEDING: 0
RHINORRHEA: 0
SHORTNESS OF BREATH: 0
CHEST TIGHTNESS: 0
BACK PAIN: 0
APNEA: 1
ABDOMINAL DISTENTION: 0

## 2022-12-05 NOTE — PROGRESS NOTES
Pulmonary and Sleep Medicine    Vivi Connolly (:  1980) is a 38 y.o. female,Established patient, here for evaluation of the following chief complaint(s):  Follow-up (Follow up- Chest Xray )      Referring physician:  No referring provider defined for this encounter. ASSESSMENT/PLAN:  1. LÓPEZ (dyspnea on exertion)  2. SHYAM (obstructive sleep apnea) on CPAP  3. Elevated hemidiaphragm (no paralysis)  4. Gastroesophageal reflux disease without esophagitis  5. Obesity (BMI 35.0-39.9 without comorbidity)      Continue current management with the CPAP. She is compliant with the CPAP she feels it helps. GERD and dysphagia. She follows with GI. Discussed using PPI. Coni Gordon MD, [facility], [facility]    Return in about 6 months (around 2023). SUBJECTIVE/OBJECTIVE:  Patient is here for follow-up on shortness of breath and she had a chest x-ray done that showed no acute findings. Chest x-ray was unremarkable. She describes 1 event of the feeling like the food and medicine is getting stuck in her throat. She also had 1 episode of stabbing pain in her chest that subsided after taking Prilosec. No symptoms today. She is using her CPAP she is compliant with the CPAP she feels it helps. Prior to Visit Medications    Medication Sig Taking?  Authorizing Provider   gabapentin (NEURONTIN) 100 MG capsule TAKE ONE TO TWO CAPSULES BY MOUTH THREE TIMES DAILY AS NEEDED Yes Jaime Segovia MD   linaCLOtide (LINZESS) 72 MCG CAPS capsule Take 1 capsule by mouth every morning (before breakfast) Yes DEVEN Lee   baclofen (LIORESAL) 20 MG tablet Take 1 tablet by mouth 3 times daily Yes Jaime Segovia MD   omeprazole (PRILOSEC) 40 MG delayed release capsule Take 40 mg by mouth as needed Yes Historical Provider, MD   Calcium 500-100 MG-UNIT CHEW Take by mouth daily Yes Historical Provider, MD   albuterol sulfate HFA (VENTOLIN HFA) 108 (90 Base) MCG/ACT inhaler Inhale 2 puffs into the lungs 4 times daily as needed for Wheezing Yes Saurabh Sneed MD   atorvastatin (LIPITOR) 20 MG tablet TAKE 1 TABLET AT BEDTIME FOR HIGH CHOLESTEROL Yes Ghislaine Engle MD   SUMAtriptan (IMITREX) 6 MG/0.5ML SOLN injection Inject 0.5 mLs into the skin once as needed for Migraine Yes Ghislaine Engle MD   propranolol (INDERAL LA) 60 MG extended release capsule TAKE 1 CAPSULE ONCE DAILY FOR HEART RATE Yes Ghislaine Engle MD   tiZANidine (ZANAFLEX) 4 MG tablet Take 1 tablet by mouth every 8 hours as needed (for muscle spasms)  Patient taking differently: Take 4 mg by mouth nightly as needed (for muscle spasms) Yes Ghislaine Engle MD   conjugated estrogens (PREMARIN) 0.625 MG/GM vaginal cream Place 0.5 g vaginally Twice a Week Yes DEVEN Neri CNP   nortriptyline (PAMELOR) 75 MG capsule nightly Yes Historical Provider, MD   topiramate (TOPAMAX) 100 MG tablet Take 1 tablet by mouth 2 times daily Yes Florentin Salazar MD   Probiotic Product (PROBIOTIC DAILY PO) Take by mouth daily Yes Historical Provider, MD   promethazine (PHENERGAN) 25 MG tablet Take 25 mg by mouth Yes Historical Provider, MD   cloNIDine (CATAPRES) 0.1 MG tablet 0.1 mg as needed  Yes Historical Provider, MD   Cholecalciferol (VITAMIN D-3) 5000 UNITS TABS Take 5,000 Units by mouth daily. Yes Historical Provider, MD   bethanechol (URECHOLINE) 25 MG tablet Take 1 tablet by mouth 3 times daily. Yes Ghislaine Engle MD        Review of Systems   Constitutional:  Negative for activity change, appetite change, chills, diaphoresis and fatigue. HENT:  Negative for congestion, dental problem, drooling, ear discharge, postnasal drip and rhinorrhea. Eyes:  Negative for visual disturbance. Respiratory:  Positive for apnea and cough. Negative for chest tightness, shortness of breath and wheezing. Gastrointestinal:  Negative for abdominal distention, abdominal pain and anal bleeding.    Endocrine: Negative for cold intolerance, heat intolerance and polydipsia. Genitourinary:  Negative for difficulty urinating, dysuria, enuresis and flank pain. Musculoskeletal:  Negative for arthralgias, back pain and gait problem. Allergic/Immunologic: Negative for environmental allergies. Neurological:  Negative for dizziness, facial asymmetry, light-headedness and headaches. Vitals:    12/05/22 1144   BP: 126/66   Pulse: 78   Temp: 97.8 °F (36.6 °C)   SpO2: 96%     BMI Readings from Last 1 Encounters:   12/05/22 36.64 kg/m²         Physical Exam  Vitals reviewed. Constitutional:       Appearance: Normal appearance. HENT:      Head: Normocephalic and atraumatic. Nose: Nose normal.   Eyes:      Extraocular Movements: Extraocular movements intact. Conjunctiva/sclera: Conjunctivae normal.   Cardiovascular:      Rate and Rhythm: Normal rate and regular rhythm. Heart sounds: No murmur heard. No friction rub. Pulmonary:      Effort: Pulmonary effort is normal. No respiratory distress. Breath sounds: Normal breath sounds. No stridor. No wheezing, rhonchi or rales. Abdominal:      General: There is no distension. Palpations: There is no mass. Tenderness: There is no abdominal tenderness. There is no guarding or rebound. Musculoskeletal:      Cervical back: Normal range of motion and neck supple. Neurological:      Mental Status: She is alert and oriented to person, place, and time. This note was generated using a voice recognition software. Errors in voice recognition may have occurred. An electronic signature was used to authenticate this note.     --Mirna Gonzalez MD

## 2022-12-07 ENCOUNTER — HOSPITAL ENCOUNTER (OUTPATIENT)
Dept: MRI IMAGING | Facility: HOSPITAL | Age: 42
Discharge: HOME | End: 2022-12-07

## 2022-12-07 DIAGNOSIS — G35 MULTIPLE SCLEROSIS: ICD-10-CM

## 2022-12-07 PROCEDURE — 0 GADOBENATE DIMEGLUMINE 529 MG/ML SOLUTION: Performed by: PSYCHIATRY & NEUROLOGY

## 2022-12-07 PROCEDURE — 70553 MRI BRAIN STEM W/O & W/DYE: CPT

## 2022-12-07 PROCEDURE — 72156 MRI NECK SPINE W/O & W/DYE: CPT

## 2022-12-07 PROCEDURE — 82565 ASSAY OF CREATININE: CPT

## 2022-12-07 PROCEDURE — A9577 INJ MULTIHANCE: HCPCS | Performed by: PSYCHIATRY & NEUROLOGY

## 2022-12-07 RX ADMIN — GADOBENATE DIMEGLUMINE 20 ML: 529 INJECTION, SOLUTION INTRAVENOUS at 11:49

## 2022-12-09 LAB — CREAT BLDA-MCNC: 1 MG/DL (ref 0.6–1.3)

## 2023-01-05 ENCOUNTER — HOSPITAL ENCOUNTER (OUTPATIENT)
Dept: WOMENS IMAGING | Age: 43
Discharge: HOME | End: 2023-01-05
Payer: COMMERCIAL

## 2023-01-05 VITALS — WEIGHT: 210 LBS | BODY MASS INDEX: 34.95 KG/M2

## 2023-01-05 DIAGNOSIS — Z12.31 BREAST CANCER SCREENING BY MAMMOGRAM: ICD-10-CM

## 2023-01-05 PROCEDURE — 77067 SCR MAMMO BI INCL CAD: CPT

## 2023-01-30 ENCOUNTER — OFFICE VISIT (OUTPATIENT)
Dept: PRIMARY CARE CLINIC | Age: 43
End: 2023-01-30
Payer: COMMERCIAL

## 2023-01-30 VITALS
TEMPERATURE: 97.6 F | SYSTOLIC BLOOD PRESSURE: 118 MMHG | DIASTOLIC BLOOD PRESSURE: 84 MMHG | HEART RATE: 83 BPM | WEIGHT: 217.2 LBS | BODY MASS INDEX: 36.19 KG/M2 | HEIGHT: 65 IN | OXYGEN SATURATION: 97 %

## 2023-01-30 DIAGNOSIS — J06.9 VIRAL URI: Primary | ICD-10-CM

## 2023-01-30 DIAGNOSIS — R30.0 DYSURIA: ICD-10-CM

## 2023-01-30 LAB
APPEARANCE FLUID: ABNORMAL
BILIRUBIN, POC: ABNORMAL
BLOOD URINE, POC: ABNORMAL
CLARITY, POC: ABNORMAL
COLOR, POC: ABNORMAL
GLUCOSE URINE, POC: ABNORMAL
KETONES, POC: ABNORMAL
LEUKOCYTE EST, POC: ABNORMAL
NITRITE, POC: ABNORMAL
PH, POC: 5
PROTEIN, POC: 30
SARS-COV-2, PCR: DETECTED
SPECIFIC GRAVITY, POC: 1.03
UROBILINOGEN, POC: ABNORMAL

## 2023-01-30 PROCEDURE — G8484 FLU IMMUNIZE NO ADMIN: HCPCS | Performed by: FAMILY MEDICINE

## 2023-01-30 PROCEDURE — 81002 URINALYSIS NONAUTO W/O SCOPE: CPT | Performed by: FAMILY MEDICINE

## 2023-01-30 PROCEDURE — 1036F TOBACCO NON-USER: CPT | Performed by: FAMILY MEDICINE

## 2023-01-30 PROCEDURE — G8427 DOCREV CUR MEDS BY ELIG CLIN: HCPCS | Performed by: FAMILY MEDICINE

## 2023-01-30 PROCEDURE — 3074F SYST BP LT 130 MM HG: CPT | Performed by: FAMILY MEDICINE

## 2023-01-30 PROCEDURE — G8417 CALC BMI ABV UP PARAM F/U: HCPCS | Performed by: FAMILY MEDICINE

## 2023-01-30 PROCEDURE — 99214 OFFICE O/P EST MOD 30 MIN: CPT | Performed by: FAMILY MEDICINE

## 2023-01-30 PROCEDURE — 3079F DIAST BP 80-89 MM HG: CPT | Performed by: FAMILY MEDICINE

## 2023-01-30 RX ORDER — FEXOFENADINE HCL AND PSEUDOEPHEDRINE HCI 180; 240 MG/1; MG/1
1 TABLET, EXTENDED RELEASE ORAL DAILY
Qty: 30 TABLET | Refills: 0 | Status: SHIPPED | OUTPATIENT
Start: 2023-01-30

## 2023-01-30 RX ORDER — BENZONATATE 200 MG/1
200 CAPSULE ORAL 3 TIMES DAILY PRN
Qty: 30 CAPSULE | Refills: 0 | Status: SHIPPED | OUTPATIENT
Start: 2023-01-30 | End: 2023-02-06

## 2023-01-30 ASSESSMENT — PATIENT HEALTH QUESTIONNAIRE - PHQ9
6. FEELING BAD ABOUT YOURSELF - OR THAT YOU ARE A FAILURE OR HAVE LET YOURSELF OR YOUR FAMILY DOWN: 0
SUM OF ALL RESPONSES TO PHQ QUESTIONS 1-9: 0
SUM OF ALL RESPONSES TO PHQ QUESTIONS 1-9: 0
10. IF YOU CHECKED OFF ANY PROBLEMS, HOW DIFFICULT HAVE THESE PROBLEMS MADE IT FOR YOU TO DO YOUR WORK, TAKE CARE OF THINGS AT HOME, OR GET ALONG WITH OTHER PEOPLE: 0
1. LITTLE INTEREST OR PLEASURE IN DOING THINGS: 0
SUM OF ALL RESPONSES TO PHQ QUESTIONS 1-9: 0
SUM OF ALL RESPONSES TO PHQ9 QUESTIONS 1 & 2: 0
2. FEELING DOWN, DEPRESSED OR HOPELESS: 0
SUM OF ALL RESPONSES TO PHQ QUESTIONS 1-9: 0
8. MOVING OR SPEAKING SO SLOWLY THAT OTHER PEOPLE COULD HAVE NOTICED. OR THE OPPOSITE, BEING SO FIGETY OR RESTLESS THAT YOU HAVE BEEN MOVING AROUND A LOT MORE THAN USUAL: 0
5. POOR APPETITE OR OVEREATING: 0
3. TROUBLE FALLING OR STAYING ASLEEP: 0
7. TROUBLE CONCENTRATING ON THINGS, SUCH AS READING THE NEWSPAPER OR WATCHING TELEVISION: 0
9. THOUGHTS THAT YOU WOULD BE BETTER OFF DEAD, OR OF HURTING YOURSELF: 0
4. FEELING TIRED OR HAVING LITTLE ENERGY: 0

## 2023-01-30 ASSESSMENT — ENCOUNTER SYMPTOMS
NAUSEA: 1
BLOOD IN STOOL: 0
ABDOMINAL PAIN: 1
RHINORRHEA: 1
CHEST TIGHTNESS: 0
SHORTNESS OF BREATH: 0
VOMITING: 0
WHEEZING: 0
COUGH: 1
SORE THROAT: 1

## 2023-01-30 NOTE — PROGRESS NOTES
Harris Polanco (:  1980) is a 38 y.o. female,Established patient, here for evaluation of the following chief complaint(s):  Congestion (Pt voices that all of these sx began Friday), Cough (Pt voices it has yellow drainage with it), Pharyngitis, Generalized Body Aches, and Headache         ASSESSMENT/PLAN:  1. Viral URI  2. Dysuria  -     POCT Urinalysis no Micro    Strong suspicion for viral upper respiratory infection. COVID swab obtained in office. Patient notified that if COVID results is positive she will need to remain quarantined for 5 days with an additional 5 days of mask wearing. Benzonatate, benzocaine, and pseudoephedrine sent to pharmacy for symptomatic management. Patient advised she will need to remain out of work for least for another 3 to 4 days or until symptoms and prove so as to help mitigate spread. Many illnesses are caused by viruses. These conditions usually run their course in 7-14 days. Antibiotics do not help fight viral infections and are not needed at this time. Viral syndromes are treated with symptomatic support. You may take tylenol or ibuprofen for fever or aches and pains. Stay hydrated by taking sips of water or non caffeinated, noncarbonated, and nonalcoholic beverages throughout the day. For sore throat, you may gargle with warm salt water, use lozenges or sprays. Using a daily antihistamine such as Claritin, Zyrtec or Allegra can help with upper respiratory symptoms. Benadryl can be sedating but is helpful at drying secretions and may be taken at night. Call if you have a fever greater than 102 F or if symptoms do not improve. Your provider may also send you in prescription medications depending on your symptoms and their severity. Take all medications as directed on package unless specifically told otherwise. UA completed in office notable only for small blood.   Discussed with patient that this can sometimes occur due to stress such as this infection. This is likely benign and probably will self resolve however recommended patient recheck her urine in the near future with us    Return if symptoms worsen or fail to improve. Subjective   SUBJECTIVE/OBJECTIVE:  Justus Patel is a 38 y.o. female who presents due to ongoing upper respiratory symptoms since last Friday. Patient states she feels as though it is going into her chest.  Patient says she has had a cough which has been overall nonproductive. Patient took some Coricidin and Tylenol at home with minimal improvement. Patient is unaware of any known sick contacts. Further details as listed below  Patient notes he is also experiencing some burning with urination is hoping to have her urine checked. URI   This is a new problem. The current episode started in the past 7 days. The problem has been gradually worsening. There has been no fever. Associated symptoms include abdominal pain, congestion, coughing, headaches, nausea, a plugged ear sensation, rhinorrhea and a sore throat. Pertinent negatives include no chest pain, vomiting or wheezing. She has tried NSAIDs for the symptoms. The treatment provided mild relief. Review of Systems   Constitutional:  Positive for chills and fatigue. Negative for activity change and fever. HENT:  Positive for congestion, rhinorrhea and sore throat. Eyes:  Negative for visual disturbance. Respiratory:  Positive for cough. Negative for chest tightness, shortness of breath and wheezing. Cardiovascular:  Negative for chest pain. Gastrointestinal:  Positive for abdominal pain and nausea. Negative for blood in stool and vomiting. Genitourinary:  Negative for difficulty urinating and urgency. Neurological:  Positive for headaches. Negative for weakness. Psychiatric/Behavioral:  Negative for confusion. Objective   Physical Exam  Vitals reviewed. Constitutional:       General: She is not in acute distress. Appearance: Normal appearance. She is not ill-appearing. HENT:      Head: Normocephalic and atraumatic. Right Ear: Tympanic membrane, ear canal and external ear normal.      Left Ear: Ear canal and external ear normal. Swelling present. Mouth/Throat:      Mouth: Mucous membranes are dry. Pharynx: Posterior oropharyngeal erythema present. No oropharyngeal exudate. Cardiovascular:      Rate and Rhythm: Normal rate and regular rhythm. Pulses: Normal pulses. Heart sounds: Normal heart sounds. No murmur heard. Pulmonary:      Effort: Pulmonary effort is normal. No respiratory distress. Breath sounds: Normal breath sounds. No wheezing or rhonchi. Chest:      Chest wall: No tenderness. Abdominal:      General: Abdomen is flat. Bowel sounds are normal. There is no distension. Palpations: Abdomen is soft. Tenderness: There is no abdominal tenderness. Musculoskeletal:         General: No swelling. Skin:     General: Skin is warm and dry. Neurological:      General: No focal deficit present. Mental Status: She is alert.           Vitals:    01/30/23 1025   BP: 118/84   Pulse:    Temp:    SpO2:         Current Outpatient Medications   Medication Sig Dispense Refill    benzonatate (TESSALON) 200 MG capsule Take 1 capsule by mouth 3 times daily as needed for Cough 30 capsule 0    Benzocaine 15 MG LOZG Take 1 lozenge by mouth every 3-4 hours as needed (sore throat) 25 lozenge 0    fexofenadine-pseudoephedrine (ALLEGRA-D 24HR) 180-240 MG per extended release tablet Take 1 tablet by mouth daily 30 tablet 0    linaCLOtide (LINZESS) 72 MCG CAPS capsule Take 1 capsule by mouth every morning (before breakfast) 90 capsule 0    omeprazole (PRILOSEC) 40 MG delayed release capsule Take 40 mg by mouth as needed      Calcium 500-100 MG-UNIT CHEW Take by mouth daily      albuterol sulfate HFA (VENTOLIN HFA) 108 (90 Base) MCG/ACT inhaler Inhale 2 puffs into the lungs 4 times daily as needed for Wheezing 18 g 5    atorvastatin (LIPITOR) 20 MG tablet TAKE 1 TABLET AT BEDTIME FOR HIGH CHOLESTEROL 90 tablet 3    propranolol (INDERAL LA) 60 MG extended release capsule TAKE 1 CAPSULE ONCE DAILY FOR HEART RATE 90 capsule 3    tiZANidine (ZANAFLEX) 4 MG tablet Take 1 tablet by mouth every 8 hours as needed (for muscle spasms) (Patient taking differently: Take 4 mg by mouth nightly as needed (for muscle spasms)) 270 tablet 1    conjugated estrogens (PREMARIN) 0.625 MG/GM vaginal cream Place 0.5 g vaginally Twice a Week 1 Tube 5    topiramate (TOPAMAX) 100 MG tablet Take 1 tablet by mouth 2 times daily 180 tablet 3    Probiotic Product (PROBIOTIC DAILY PO) Take by mouth daily      cloNIDine (CATAPRES) 0.1 MG tablet 0.1 mg as needed       Cholecalciferol (VITAMIN D-3) 5000 UNITS TABS Take 5,000 Units by mouth daily. gabapentin (NEURONTIN) 100 MG capsule TAKE ONE TO TWO CAPSULES BY MOUTH THREE TIMES DAILY AS NEEDED 135 capsule 2    SUMAtriptan (IMITREX) 6 MG/0.5ML SOLN injection Inject 0.5 mLs into the skin once as needed for Migraine 9 each 3    promethazine (PHENERGAN) 25 MG tablet Take 25 mg by mouth (Patient not taking: Reported on 1/30/2023)       No current facility-administered medications for this visit.       Family History   Problem Relation Age of Onset    Diabetes Mother     High Blood Pressure Mother     Colon Polyps Mother     Colon Cancer Neg Hx     Liver Cancer Neg Hx     Liver Disease Neg Hx     Esophageal Cancer Neg Hx     Rectal Cancer Neg Hx     Stomach Cancer Neg Hx       Past Medical History:   Diagnosis Date    B12 deficiency     Cauda equina syndrome (HCC)     Complicated migraine     Depression     GERD (gastroesophageal reflux disease)     Hematuria     wih abdominal pain    Hyperlipidemia     Hypertension     MS (multiple sclerosis) (HCC)     Myofascial pain     Nerve damage     Neutropenia ([address] 10/21/2015    Pelvic floor dysfunction     Postmenopausal HRT (hormone replacement therapy)     POTS (postural orthostatic tachycardia syndrome)       Past Surgical History:   Procedure Laterality Date    APPENDECTOMY      BACK SURGERY      CHOLECYSTECTOMY, LAPAROSCOPIC      COLONOSCOPY  11/16/2015    Dr Brown Patella hemorrhoids, AP, 5 yr recall    COLONOSCOPY N/A 08/13/2018    Dr Mariana Abdul-[facility]--10 yr recall    COLONOSCOPY  09/12/2022    Dr Matilde Avendano, 6 yr (age 46) recall    COLONOSCOPY  02/03/2010    Dr Chelsie Dewitt, prn    COLONOSCOPY N/A 09/12/2022    COLONOSCOPY DIAGNOSTIC performed by Sofya Mann MD at [facility] OR    EGD      HYSTERECTOMY (CERVIX STATUS UNKNOWN)      OVARY REMOVAL      [place] EGD TRANSORAL BIOPSY SINGLE/MULTIPLE N/A 01/29/2018    Dr NORMA Abdul-Active duodenitis, gastritis/gastropathy    UPPER GASTROINTESTINAL ENDOSCOPY  09/12/2022    Dr NORMA Abdul-Gastritis    UPPER GASTROINTESTINAL ENDOSCOPY  11/15/2016    Dr Dakota Real gastritis, no celiac    UPPER GASTROINTESTINAL ENDOSCOPY  11/16/2015    Dr Boogie Miles neg    UPPER GASTROINTESTINAL ENDOSCOPY N/A 09/12/2022    Dr Mariana Abdul-Gastritis, no h pylori      Allergies   Allergen Reactions    Ropinirole Hcl      Other reaction(s): Mental Status Change    Zolpidem Other (See Comments)    Ropinirole Other (See Comments)     Crazy dreams; sleep walking    Simvastatin Other (See Comments)     Leg cramps        Lab Results   Component Value Date     08/22/2022    K 4.5 08/22/2022     08/22/2022    CO2 23 08/22/2022    BUN 11 08/22/2022    CREATININE 0.9 08/22/2022    GLUCOSE 100 08/22/2022    CALCIUM 8.9 08/22/2022    PROT 6.7 08/22/2022    LABALBU 4.3 08/22/2022    BILITOT 0.4 08/22/2022    ALKPHOS 102 08/22/2022    AST 14 08/22/2022    ALT 17 08/22/2022    LABGLOM >60 08/22/2022    GFRAA >59 08/22/2022    GLOB 2.4 01/16/2017        Lab Results   Component Value Date    WBC 7.1 07/19/2022    HGB 14.6 07/19/2022    HCT 42.7 07/19/2022    MCV 91.4 07/19/2022     07/19/2022                EMR Dragon/transcription disclaimer:  Much of this encounter note is electronic transcription/translation of spoken language toprinted texts. The electronic translation of spoken language may be erroneous, or at times, nonsensical words or phrases may be inadvertently transcribed. Although I have reviewed the note for such errors, some may stillexist.      An electronic signature was used to authenticate this note.     --Ramon Martinez MD

## 2023-01-31 ENCOUNTER — TELEPHONE (OUTPATIENT)
Dept: PRIMARY CARE CLINIC | Age: 43
End: 2023-01-31

## 2023-02-02 NOTE — TELEPHONE ENCOUNTER
Patient had a positive COVID test.  She desired Paxlovid. She was told to stop the omeprazole and the statin. This was called into [facility] pharmacy.

## 2023-02-10 ENCOUNTER — OFFICE VISIT (OUTPATIENT)
Dept: PRIMARY CARE CLINIC | Age: 43
End: 2023-02-10
Payer: COMMERCIAL

## 2023-02-10 VITALS
OXYGEN SATURATION: 96 % | SYSTOLIC BLOOD PRESSURE: 122 MMHG | TEMPERATURE: 97 F | HEART RATE: 67 BPM | DIASTOLIC BLOOD PRESSURE: 76 MMHG | WEIGHT: 214 LBS | HEIGHT: 65 IN | BODY MASS INDEX: 35.65 KG/M2

## 2023-02-10 DIAGNOSIS — U07.1 COVID: ICD-10-CM

## 2023-02-10 DIAGNOSIS — R10.9 ABDOMINAL PAIN, UNSPECIFIED ABDOMINAL LOCATION: ICD-10-CM

## 2023-02-10 DIAGNOSIS — N30.01 ACUTE CYSTITIS WITH HEMATURIA: Primary | ICD-10-CM

## 2023-02-10 DIAGNOSIS — R19.7 DIARRHEA, UNSPECIFIED TYPE: ICD-10-CM

## 2023-02-10 LAB
ALBUMIN SERPL-MCNC: 4.6 G/DL (ref 3.5–5.2)
ALP BLD-CCNC: 105 U/L (ref 35–104)
ALT SERPL-CCNC: 110 U/L (ref 5–33)
ANION GAP SERPL CALCULATED.3IONS-SCNC: 12 MMOL/L (ref 7–19)
APPEARANCE FLUID: ABNORMAL
AST SERPL-CCNC: 38 U/L (ref 5–32)
BILIRUB SERPL-MCNC: 0.5 MG/DL (ref 0.2–1.2)
BILIRUBIN, POC: POSITIVE
BLOOD URINE, POC: ABNORMAL
BUN BLDV-MCNC: 17 MG/DL (ref 6–20)
CALCIUM SERPL-MCNC: 9.7 MG/DL (ref 8.6–10)
CHLORIDE BLD-SCNC: 108 MMOL/L (ref 98–111)
CLARITY, POC: ABNORMAL
CO2: 22 MMOL/L (ref 22–29)
COLOR, POC: ABNORMAL
CREAT SERPL-MCNC: 0.9 MG/DL (ref 0.5–0.9)
GFR SERPL CREATININE-BSD FRML MDRD: >60 ML/MIN/{1.73_M2}
GLUCOSE BLD-MCNC: 98 MG/DL (ref 74–109)
GLUCOSE URINE, POC: ABNORMAL
HCT VFR BLD CALC: 42.3 % (ref 37–47)
HEMOGLOBIN: 14.1 G/DL (ref 12–16)
KETONES, POC: ABNORMAL
LEUKOCYTE EST, POC: POSITIVE
LIPASE: 54 U/L (ref 13–60)
MCH RBC QN AUTO: 31.5 PG (ref 27–31)
MCHC RBC AUTO-ENTMCNC: 33.3 G/DL (ref 33–37)
MCV RBC AUTO: 94.4 FL (ref 81–99)
NITRITE, POC: POSITIVE
PDW BLD-RTO: 12.7 % (ref 11.5–14.5)
PH, POC: 5
PLATELET # BLD: 314 K/UL (ref 130–400)
PMV BLD AUTO: 10.2 FL (ref 9.4–12.3)
POTASSIUM SERPL-SCNC: 4.5 MMOL/L (ref 3.5–5)
PROTEIN, POC: ABNORMAL
RBC # BLD: 4.48 M/UL (ref 4.2–5.4)
SODIUM BLD-SCNC: 142 MMOL/L (ref 136–145)
SPECIFIC GRAVITY, POC: 1.03
TOTAL PROTEIN: 7.7 G/DL (ref 6.6–8.7)
UROBILINOGEN, POC: ABNORMAL
WBC # BLD: 6.2 K/UL (ref 4.8–10.8)

## 2023-02-10 PROCEDURE — 99214 OFFICE O/P EST MOD 30 MIN: CPT | Performed by: NURSE PRACTITIONER

## 2023-02-10 PROCEDURE — 3074F SYST BP LT 130 MM HG: CPT | Performed by: NURSE PRACTITIONER

## 2023-02-10 PROCEDURE — 3078F DIAST BP <80 MM HG: CPT | Performed by: NURSE PRACTITIONER

## 2023-02-10 PROCEDURE — G8427 DOCREV CUR MEDS BY ELIG CLIN: HCPCS | Performed by: NURSE PRACTITIONER

## 2023-02-10 PROCEDURE — 81002 URINALYSIS NONAUTO W/O SCOPE: CPT | Performed by: NURSE PRACTITIONER

## 2023-02-10 PROCEDURE — G8417 CALC BMI ABV UP PARAM F/U: HCPCS | Performed by: NURSE PRACTITIONER

## 2023-02-10 PROCEDURE — 1036F TOBACCO NON-USER: CPT | Performed by: NURSE PRACTITIONER

## 2023-02-10 PROCEDURE — G8484 FLU IMMUNIZE NO ADMIN: HCPCS | Performed by: NURSE PRACTITIONER

## 2023-02-10 RX ORDER — NITROFURANTOIN 25; 75 MG/1; MG/1
100 CAPSULE ORAL 2 TIMES DAILY
Qty: 20 CAPSULE | Refills: 0 | Status: SHIPPED | OUTPATIENT
Start: 2023-02-10 | End: 2023-02-20

## 2023-02-10 RX ORDER — FLUCONAZOLE 150 MG/1
150 TABLET ORAL
Qty: 2 TABLET | Refills: 0 | Status: SHIPPED | OUTPATIENT
Start: 2023-02-10 | End: 2023-02-16

## 2023-02-10 SDOH — ECONOMIC STABILITY: FOOD INSECURITY: WITHIN THE PAST 12 MONTHS, THE FOOD YOU BOUGHT JUST DIDN'T LAST AND YOU DIDN'T HAVE MONEY TO GET MORE.: NEVER TRUE

## 2023-02-10 SDOH — ECONOMIC STABILITY: INCOME INSECURITY: HOW HARD IS IT FOR YOU TO PAY FOR THE VERY BASICS LIKE FOOD, HOUSING, MEDICAL CARE, AND HEATING?: NOT HARD AT ALL

## 2023-02-10 SDOH — ECONOMIC STABILITY: FOOD INSECURITY: WITHIN THE PAST 12 MONTHS, YOU WORRIED THAT YOUR FOOD WOULD RUN OUT BEFORE YOU GOT MONEY TO BUY MORE.: NEVER TRUE

## 2023-02-10 SDOH — ECONOMIC STABILITY: HOUSING INSECURITY
IN THE LAST 12 MONTHS, WAS THERE A TIME WHEN YOU DID NOT HAVE A STEADY PLACE TO SLEEP OR SLEPT IN A SHELTER (INCLUDING NOW)?: NO

## 2023-02-10 ASSESSMENT — ENCOUNTER SYMPTOMS
VOMITING: 0
NAUSEA: 1
ABDOMINAL DISTENTION: 1
ABDOMINAL PAIN: 1
EYES NEGATIVE: 1
COUGH: 0
SHORTNESS OF BREATH: 0
ALLERGIC/IMMUNOLOGIC NEGATIVE: 1
DIARRHEA: 1
RESPIRATORY NEGATIVE: 1
WHEEZING: 0

## 2023-02-10 NOTE — PATIENT INSTRUCTIONS
Avoid diary, tomato based foods ans sauces, spicy foods and citrus drinks and fruits   May use Mylanta or pepto OTC

## 2023-02-10 NOTE — PROGRESS NOTES
[facility] PHYSICIAN SERVICES  [facility]  [address]  [address] [address]  Dept: [phone]  Dept Fax: [phone]  Loc: [phone]    Eduardo Deleon is a 38 y.o. female who presents today for her medical conditions/complaints as noted below. Eduardo Deleon is c/o of Abdominal Pain (Low abdominal pain. Right side pain. ) and Diarrhea        HPI:     HPI this 40-year-old female presents today for abdominal pain that she states started around her bellybutton and goes down to her genitals. She states that she is also had increased right-sided upper quadrant pain. She states that she does not have a gallbladder or a appendix. She states that she has had COVID for the last 10 days. She states she did take the Paxlovid. She states she does have a history of myasthenia gravis and still cannot take the zinc.  She is taking vitamin D3 supplements. States that she is trying to drink more water but has probably not drink enough. She also states she uses the crystallite powder. Chief Complaint   Patient presents with    Abdominal Pain     Low abdominal pain. Right side pain.      Diarrhea     Past Medical History:   Diagnosis Date    B12 deficiency     Cauda equina syndrome (HCC)     Complicated migraine     Depression     GERD (gastroesophageal reflux disease)     Hematuria     wih abdominal pain    Hyperlipidemia     Hypertension     MS (multiple sclerosis) (HCC)     Myofascial pain     Nerve damage     Neutropenia ([address] 10/21/2015    Obesity     Pelvic floor dysfunction     Postmenopausal HRT (hormone replacement therapy)     POTS (postural orthostatic tachycardia syndrome)     Sleep apnea 2021      Past Surgical History:   Procedure Laterality Date    APPENDECTOMY      BACK SURGERY      CHOLECYSTECTOMY, LAPAROSCOPIC      COLONOSCOPY  11/16/2015    Dr Mukesh Garcia hemorrhoids, AP, 5 yr recall    COLONOSCOPY N/A 08/13/2018    Dr Vickie Abdul-[facility]--10 yr recall    COLONOSCOPY 09/12/2022    Dr Pam Vick, 6 yr (age 46) recall    COLONOSCOPY  02/03/2010    Dr Apolonio Boeck, prn    COLONOSCOPY N/A 09/12/2022    COLONOSCOPY DIAGNOSTIC performed by Mitchell Dai MD at [address] [facility] OR    EGD      HYSTERECTOMY (CERVIX STATUS UNKNOWN)      HYSTERECTOMY, TOTAL ABDOMINAL (CERVIX REMOVED)  2004    OVARY REMOVAL      [place] EGD TRANSORAL BIOPSY SINGLE/MULTIPLE N/A 01/29/2018    Dr Vivi Abdul-Active duodenitis, gastritis/gastropathy    UPPER GASTROINTESTINAL ENDOSCOPY  09/12/2022    Dr Vivi Abdul-Gastritis    UPPER GASTROINTESTINAL ENDOSCOPY  11/15/2016    Dr Hilton Medici gastritis, no celiac    UPPER GASTROINTESTINAL ENDOSCOPY  11/16/2015    Dr Yamilka Madrigal neg    UPPER GASTROINTESTINAL ENDOSCOPY N/A 09/12/2022    Dr Vivi Abdul-Gastritis, no h pylori       Vitals 2/10/2023 1/30/2023 1/30/2023 1/5/2023 12/5/2022 10/4/1594   SYSTOLIC [phone] - [phone]   DIASTOLIC 76 [phone] - 66 78   Site Left Upper Arm - - - - -   Position Sitting - - - - -   Cuff Size Large Adult - - - - -   Pulse 67 - 83 - 78 78   Temp 97 - 97.6 - 97.8 97.9   Resp - - - - - -   SpO2 96 - 97 - 96 96   Weight 214 lb - 217 lb 3.2 oz 210 lb 220 lb 3.2 oz 217 lb 3.2 oz   Height 5' 5\" - 5' 5\" - 5' 5\" 5' 5\"   Body mass index 35.61 kg/m2 - 36.14 kg/m2 - 36.64 kg/m2 36.14 kg/m2   Some recent data might be hidden       Family History   Problem Relation Age of Onset    Diabetes Mother     High Blood Pressure Mother     Colon Polyps Mother     Coronary Art Dis Father     High Blood Pressure Father     Stroke Maternal Grandfather         1997    Arthritis Maternal Grandmother     Asthma Maternal Grandmother     Osteoporosis Maternal Grandmother     High Blood Pressure Paternal Grandmother     Colon Cancer Neg Hx     Liver Cancer Neg Hx     Liver Disease Neg Hx     Esophageal Cancer Neg Hx     Rectal Cancer Neg Hx     Stomach Cancer Neg Hx        Social History     Tobacco Use    Smoking status: Never    Smokeless tobacco: Never   Substance Use Topics Alcohol use: No      Current Outpatient Medications on File Prior to Visit   Medication Sig Dispense Refill    Benzocaine 15 MG LOZG Take 1 lozenge by mouth every 3-4 hours as needed (sore throat) 25 lozenge 0    fexofenadine-pseudoephedrine (ALLEGRA-D 24HR) 180-240 MG per extended release tablet Take 1 tablet by mouth daily 30 tablet 0    linaCLOtide (LINZESS) 72 MCG CAPS capsule Take 1 capsule by mouth every morning (before breakfast) 90 capsule 0    gabapentin (NEURONTIN) 100 MG capsule TAKE ONE TO TWO CAPSULES BY MOUTH THREE TIMES DAILY AS NEEDED 135 capsule 2    omeprazole (PRILOSEC) 40 MG delayed release capsule Take 40 mg by mouth as needed      Calcium 500-100 MG-UNIT CHEW Take by mouth daily      albuterol sulfate HFA (VENTOLIN HFA) 108 (90 Base) MCG/ACT inhaler Inhale 2 puffs into the lungs 4 times daily as needed for Wheezing 18 g 5    atorvastatin (LIPITOR) 20 MG tablet TAKE 1 TABLET AT BEDTIME FOR HIGH CHOLESTEROL 90 tablet 3    SUMAtriptan (IMITREX) 6 MG/0.5ML SOLN injection Inject 0.5 mLs into the skin once as needed for Migraine 9 each 3    propranolol (INDERAL LA) 60 MG extended release capsule TAKE 1 CAPSULE ONCE DAILY FOR HEART RATE 90 capsule 3    tiZANidine (ZANAFLEX) 4 MG tablet Take 1 tablet by mouth every 8 hours as needed (for muscle spasms) (Patient taking differently: Take 4 mg by mouth nightly as needed (for muscle spasms)) 270 tablet 1    conjugated estrogens (PREMARIN) 0.625 MG/GM vaginal cream Place 0.5 g vaginally Twice a Week 1 Tube 5    topiramate (TOPAMAX) 100 MG tablet Take 1 tablet by mouth 2 times daily 180 tablet 3    Probiotic Product (PROBIOTIC DAILY PO) Take by mouth daily      promethazine (PHENERGAN) 25 MG tablet Take 25 mg by mouth      cloNIDine (CATAPRES) 0.1 MG tablet 0.1 mg as needed       Cholecalciferol (VITAMIN D-3) 5000 UNITS TABS Take 5,000 Units by mouth daily. [DISCONTINUED] bethanechol (URECHOLINE) 25 MG tablet Take 1 tablet by mouth 3 times daily.  [address] tablet 3     No current facility-administered medications on file prior to visit. Allergies   Allergen Reactions    Ropinirole Hcl      Other reaction(s): Mental Status Change    Zolpidem Other (See Comments)    Ropinirole Other (See Comments)     Crazy dreams; sleep walking    Simvastatin Other (See Comments)     Leg cramps       Health Maintenance   Topic Date Due    Hepatitis C screen  Never done    Flu vaccine (1) 08/01/2022    COVID-19 Vaccine (5 - Booster for Moderna series) 09/14/2022    Lipids  03/14/2023    DTaP/Tdap/Td vaccine (2 - Td or Tdap) 07/09/2023    Depression Monitoring  01/30/2024    Colorectal Cancer Screen  09/12/2028    HIV screen  Addressed    Hepatitis A vaccine  Aged Out    Hib vaccine  Aged Out    Meningococcal (ACWY) vaccine  Aged Out    Pneumococcal 0-64 years Vaccine  Aged Out       Subjective:      Review of Systems   Constitutional:  Positive for fatigue. Negative for chills and fever. HENT: Negative. Eyes: Negative. Respiratory: Negative. Negative for cough, shortness of breath and wheezing. Cardiovascular: Negative. Negative for chest pain, palpitations and leg swelling. Gastrointestinal:  Positive for abdominal distention, abdominal pain, diarrhea and nausea. Negative for vomiting. Endocrine: Negative. Genitourinary:  Positive for difficulty urinating, dysuria, flank pain and frequency. Musculoskeletal:  Positive for myalgias. Skin: Negative. Allergic/Immunologic: Negative. Neurological:  Positive for headaches. Hematological: Negative. Psychiatric/Behavioral: Negative. Negative for self-injury and suicidal ideas. Objective:     Physical Exam  Vitals and nursing note reviewed. Constitutional:       General: She is not in acute distress. Appearance: Normal appearance. She is not ill-appearing or toxic-appearing. HENT:      Head: Normocephalic and atraumatic.       Nose: Nose normal.      Mouth/Throat:      Mouth: Mucous membranes are moist.   Eyes:      Pupils: Pupils are equal, round, and reactive to light. Cardiovascular:      Rate and Rhythm: Normal rate and regular rhythm. Pulses: Normal pulses. Heart sounds: Normal heart sounds. Pulmonary:      Effort: Pulmonary effort is normal. No respiratory distress. Breath sounds: Normal breath sounds. No wheezing, rhonchi or rales. Abdominal:      General: Bowel sounds are normal. There is no distension. Palpations: Abdomen is soft. There is no mass. Tenderness: There is abdominal tenderness. There is no guarding or rebound. Hernia: No hernia is present. Musculoskeletal:         General: Normal range of motion. Cervical back: Normal range of motion and neck supple. No rigidity or tenderness. Lymphadenopathy:      Cervical: No cervical adenopathy. Skin:     General: Skin is warm and dry. Coloration: Skin is not jaundiced or pale. Findings: No erythema or rash. Neurological:      Mental Status: She is alert and oriented to person, place, and time. Mental status is at baseline. Psychiatric:         Mood and Affect: Mood normal.         Behavior: Behavior normal.         Thought Content: Thought content normal.         Judgment: Judgment normal.     /76 (Site: Left Upper Arm, Position: Sitting, Cuff Size: Large Adult)   Pulse 67   Temp 97 °F (36.1 °C)   Ht 5' 5\" (1.651 m)   Wt 214 lb (97.1 kg)   SpO2 96%   BMI 35.61 kg/m²     Assessment:       Diagnosis Orders   1. Acute cystitis with hematuria        2. Abdominal pain, unspecified abdominal location  POCT Urinalysis no Micro    Culture, Urine    CBC    Comprehensive Metabolic Panel    Lipase      3. Diarrhea, unspecified type        4. COVID              Plan:   POCT urinalysis in office results reviewed urine is tea colored  Results reviewed pH was 5.0 positive for protein positive for leukocytes and nitrites blood.   Empiric coverage with Macrobid take as directed to complete sample will be sent for culture and sensitivity. Antibiotic Therapy  Take your antibiotic as prescribed. Take all of your antibiotics. You should not have any left over. Many antibiotics may cause diarrhea. . you can start an OTC probiotic to support normal gut bacteria. If you are on birth control, you need to use an alternative method of contraceptive for one month as antibiotics can reduce the effectiveness of oral BC. Always monitor for signs of allergic reaction such as itching, hives or any difficulty swallowing or breathing STOP THE MEDICATION IMMEDIATELY. If difficulty breathing, call 911 and go to the ER. If hives and itching, contact provider through [address] or phone for alternative antibiotics or be seen if necessary. Diflucan prescription sent to pharmacy. Patient was instructed to take the first tablet at the end of the antibiotic therapy. Then if she is having symptoms in 5 to 7 days she can take the second dose.    2.-3. Acute undiagnosed condition  I suspect that the diarrhea and lower abdominal pain is secondary to acute COVID infection and Paxlovid therapy. Patient was encouraged to hydrate well. She was also encouraged to not use only trying to double her normal water intake    She was into encouraged to drink electrolyte-containing hurtado. She can use either the mixes or propel or other electrolyte containing hydrating products. CBC CMP and lipase in office today. 4.  Focus on hydration as above  Continue vitamin D3 supplementation at least 5000 units a day. Patient is encouraged to slowly increase physical activity and get direct sunlight when possible. Recovery from COVID can be a lengthy process. Start with your basics of good rest, hydration and nutrition. Patient given educational materials -see patient instructions. Discussed use, benefit, and side effects of prescribed medications. All patient questions answered. Pt voiced understanding.  Reviewed health maintenance. Instructed to continue currentmedications, diet and exercise. Patient agreed with treatment plan. Follow up as directed. MEDICATIONS:  Orders Placed This Encounter   Medications    nitrofurantoin, macrocrystal-monohydrate, (MACROBID) 100 MG capsule     Sig: Take 1 capsule by mouth 2 times daily for 10 days     Dispense:  20 capsule     Refill:  0    fluconazole (DIFLUCAN) 150 MG tablet     Sig: Take 1 tablet by mouth every 72 hours for 6 days     Dispense:  2 tablet     Refill:  0         ORDERS:  Orders Placed This Encounter   Procedures    Culture, Urine    CBC    Comprehensive Metabolic Panel    Lipase    POCT Urinalysis no Micro       Follow-up:  Return if symptoms worsen or fail to improve. PATIENT INSTRUCTIONS:  Patient Instructions   Avoid diary, tomato based foods ans sauces, spicy foods and citrus drinks and fruits   May use Mylanta or pepto OTC   Electronically signed by DEVEN Fried NP on 2/10/2023 at 9:35 AM    EMR Dragon/transcription disclaimer:  Much of thisencounter note is electronic transcription/translation of spoken language to printed texts. The electronic translation of spoken language may be erroneous, or at times, nonsensical words or phrases may be inadvertentlytranscribed.   Although I have reviewed the note for such errors, some may still exist.

## 2023-02-12 LAB
ORGANISM: ABNORMAL
ORGANISM: ABNORMAL
URINE CULTURE, ROUTINE: ABNORMAL

## 2023-02-13 ENCOUNTER — OFFICE VISIT (OUTPATIENT)
Dept: OBGYN CLINIC | Age: 43
End: 2023-02-13
Payer: COMMERCIAL

## 2023-02-13 VITALS
HEART RATE: 86 BPM | BODY MASS INDEX: 35.45 KG/M2 | WEIGHT: 213 LBS | SYSTOLIC BLOOD PRESSURE: 131 MMHG | DIASTOLIC BLOOD PRESSURE: 87 MMHG

## 2023-02-13 DIAGNOSIS — R74.8 ELEVATED LIVER ENZYMES: Primary | ICD-10-CM

## 2023-02-13 DIAGNOSIS — G35 MS (MULTIPLE SCLEROSIS) (HCC): ICD-10-CM

## 2023-02-13 DIAGNOSIS — Z12.39 ENCOUNTER FOR SCREENING BREAST EXAMINATION: ICD-10-CM

## 2023-02-13 DIAGNOSIS — Z01.419 ENCOUNTER FOR WELL WOMAN EXAM: Primary | ICD-10-CM

## 2023-02-13 DIAGNOSIS — R10.9 ACUTE LEFT FLANK PAIN: ICD-10-CM

## 2023-02-13 DIAGNOSIS — Z82.62 FAMILY HX OSTEOPOROSIS: ICD-10-CM

## 2023-02-13 DIAGNOSIS — Z12.31 ENCOUNTER FOR SCREENING MAMMOGRAM FOR MALIGNANT NEOPLASM OF BREAST: ICD-10-CM

## 2023-02-13 PROCEDURE — 1036F TOBACCO NON-USER: CPT | Performed by: NURSE PRACTITIONER

## 2023-02-13 PROCEDURE — 3078F DIAST BP <80 MM HG: CPT | Performed by: NURSE PRACTITIONER

## 2023-02-13 PROCEDURE — 3074F SYST BP LT 130 MM HG: CPT | Performed by: NURSE PRACTITIONER

## 2023-02-13 PROCEDURE — G8484 FLU IMMUNIZE NO ADMIN: HCPCS | Performed by: NURSE PRACTITIONER

## 2023-02-13 PROCEDURE — 99396 PREV VISIT EST AGE 40-64: CPT | Performed by: NURSE PRACTITIONER

## 2023-02-13 PROCEDURE — G8417 CALC BMI ABV UP PARAM F/U: HCPCS | Performed by: NURSE PRACTITIONER

## 2023-02-13 PROCEDURE — 99213 OFFICE O/P EST LOW 20 MIN: CPT | Performed by: NURSE PRACTITIONER

## 2023-02-13 PROCEDURE — G8427 DOCREV CUR MEDS BY ELIG CLIN: HCPCS | Performed by: NURSE PRACTITIONER

## 2023-02-13 ASSESSMENT — ENCOUNTER SYMPTOMS
ALLERGIC/IMMUNOLOGIC NEGATIVE: 1
RESPIRATORY NEGATIVE: 1
GASTROINTESTINAL NEGATIVE: 1
EYES NEGATIVE: 1

## 2023-02-13 NOTE — PATIENT INSTRUCTIONS
Patient Education        Well Visit, Ages 25 to 72: Care Instructions  Well visits can help you stay healthy. Your doctor has checked your overall health and may have suggested ways to take good care of yourself. Your doctor also may have recommended tests. You can help prevent illness with healthy eating, good sleep, vaccinations, regular exercise, and other steps. Get the tests that you and your doctor decide on. Depending on your age and risks, examples might include screening for diabetes; hepatitis C; HIV; and cervical, breast, lung, and colon cancer. Screening helps find diseases before any symptoms appear. Eat healthy foods. Choose fruits, vegetables, whole grains, lean protein, and low-fat dairy foods. Limit saturated fat and reduce salt. Limit alcohol. Men should have no more than 2 drinks a day. Women should have no more than 1. For some people, no alcohol is the best choice. Exercise. Get at least 30 minutes of exercise on most days of the week. Walking can be a good choice. Reach and stay at your healthy weight. This will lower your risk for many health problems. Take care of your mental health. Try to stay connected with friends, family, and community, and find ways to manage stress. If you're feeling depressed or hopeless, talk to someone. A counselor can help. If you don't have a counselor, talk to your doctor. Talk to your doctor if you think you may have a problem with alcohol or drug use. This includes prescription medicines and illegal drugs. Avoid tobacco and nicotine: Don't smoke, vape, or chew. If you need help quitting, talk to your doctor. Practice safer sex. Getting tested, using condoms or dental dams, and limiting sex partners can help prevent STIs. Use birth control if it's important to you to prevent pregnancy. Talk with your doctor about your choices and what might be best for you. Prevent problems where you can.  Protect your skin from too much sun, wash your hands, brush your teeth twice a day, and wear a seat belt in the car. Where can you learn more? Go to http://www.corcoran.com/ and enter P072 to learn more about \"Well Visit, Ages 25 to 72: Care Instructions. \"  Current as of: March 9, 2022               Content Version: 13.5  © 2006-2022 [employer]. Care instructions adapted under license by [facility] ([facility]). If you have questions about a medical condition or this instruction, always ask your healthcare professional. [place] [address] any warranty or liability for your use of this information. Patient Education        Breast Self-Exam: Care Instructions  Your Care Instructions     A breast self-exam is when you check your breasts for lumps or changes. This regular exam helps you learn how your breasts normally look and feel. Most breast problems or changes are not because of cancer. Breast self-exam is not a substitute for a mammogram. Having regular breast exams by your doctor and regular mammograms improve your chances of finding any problems with your breasts. Some women set a time each month to do a step-by-step breast self-exam. Other women like a less formal system. They might look at their breasts as they brush their teeth, or feel their breasts once in a while in the shower. If you notice a change in your breast, tell your doctor. Follow-up care is a key part of your treatment and safety. Be sure to make and go to all appointments, and call your doctor if you are having problems. It's also a good idea to know your test results and keep a list of the medicines you take. How do you do a breast self-exam?  The best time to examine your breasts is usually one week after your menstrual period begins. Your breasts should not be tender then. If you do not have periods, you might do your exam on a day of the month that is easy to remember.   To examine your breasts:  Remove all your clothes above the waist and lie down. When you are lying down, your breast tissue spreads evenly over your chest wall, which makes it easier to feel all your breast tissue. Use the pads--not the fingertips--of the 3 middle fingers of your left hand to check your right breast. Move your fingers slowly in small coin-sized circles that overlap. Use three levels of pressure to feel of all your breast tissue. Use light pressure to feel the tissue close to the skin surface. Use medium pressure to feel a little deeper. Use firm pressure to feel your tissue close to your breastbone and ribs. Use each pressure level to feel your breast tissue before moving on to the next spot. Check your entire breast, moving up and down as if following a strip from the collarbone to the bra line, and from the armpit to the ribs. Repeat until you have covered the entire breast.  Repeat this procedure for your left breast, using the pads of the 3 middle fingers of your right hand. To examine your breasts while in the shower:  Place one arm over your head and lightly soap your breast on that side. Using the pads of your fingers, gently move your hand over your breast (in the strip pattern described above), feeling carefully for any lumps or changes. Repeat for the other breast.  Have your doctor inspect anything you notice to see if you need further testing. Where can you learn more? Go to http://www.woods.com/ and enter P148 to learn more about \"Breast Self-Exam: Care Instructions. \"  Current as of: August 2, 2022               Content Version: 13.5  © 2006-2022 [employer]. Care instructions adapted under license by [facility] ([facility]). If you have questions about a medical condition or this instruction, always ask your healthcare professional. [place] [address] any warranty or liability for your use of this information.

## 2023-02-13 NOTE — PROGRESS NOTES
[facility]  CNM Office Note    Юлия Yang is a 38 y.o. female who presents today for her medical conditions/ complaints as noted below. Chief Complaint   Patient presents with    Annual Exam         HPI  Pt presents today for annual visit with pelvic and breast exam.  She also complains of pelvic pain that started Fri on the right side and pcp treated like a uti. Culture came back with e coli, she is on antibiotic and complains now left side is hurting as well. Denies dysuria. PCP thought could be pt is getting over covid as the reasoning for these issues. Last mammogram:  2023  Last pap smear:    Contraception:  TLH due to endometriosis   :  1  Para:  1  AB:  0  Last bone density:  2022  Last colonoscopy:   2022      Patient Active Problem List   Diagnosis    Complicated migraine    Hematuria    Depression    Nerve damage    Cauda equina syndrome (HCC)    Elevated cholesterol    Postural orthostatic tachycardia syndrome    Vitamin B12 deficiency (non anemic)    Pelvic floor dysfunction    Myofascial pain    Relapsing-remitting multiple sclerosis ([address]    Hypertensive disorder    Insomnia    Right kidney stone    Trigeminal neuralgia    Urinary incontinence    Vitamin D deficiency    RUQ abdominal pain    Elevated hemidiaphragm    LÓPEZ (dyspnea on exertion)    Snoring    Orthopnea    Non-restorative sleep    SHYAM (obstructive sleep apnea)    Obesity (BMI 35.0-39.9 without comorbidity)    Pleurisy    Neurogenic bladder    Radial styloid tenosynovitis    Degeneration of cervical intervertebral disc    Stress incontinence    Gastroesophageal reflux disease without esophagitis    Change in bowel habits    Constipation    Chronic bilateral lower abdominal pain    Epigastric pain    Dysphagia    Lumbar radiculopathy       No LMP recorded. Patient has had a hysterectomy.   [id number]    Past Medical History:   Diagnosis Date    B12 deficiency     Cauda equina syndrome ([address] Complicated migraine     Depression     GERD (gastroesophageal reflux disease)     Hematuria     wih abdominal pain    Hyperlipidemia     Hypertension     MS (multiple sclerosis) (HCC)     Myofascial pain     Nerve damage     Neutropenia ([address] 10/21/2015    Obesity     Pelvic floor dysfunction     Postmenopausal HRT (hormone replacement therapy)     POTS (postural orthostatic tachycardia syndrome)     Sleep apnea 2021     Past Surgical History:   Procedure Laterality Date    APPENDECTOMY      BACK SURGERY      CHOLECYSTECTOMY, LAPAROSCOPIC      COLONOSCOPY  11/16/2015    Dr Izabella De Anda hemorrhoids, AP, 5 yr recall    COLONOSCOPY N/A 08/13/2018    Dr Leatha [facility]--10 yr recall    COLONOSCOPY  09/12/2022    Dr Bethel Koyanagi, 6 yr (age 46) recall    COLONOSCOPY  02/03/2010    Dr Won Castillo, prn    COLONOSCOPY N/A 09/12/2022    COLONOSCOPY DIAGNOSTIC performed by Beth Bruce MD at [facility]    EGD      HYSTERECTOMY (CERVIX STATUS UNKNOWN)      HYSTERECTOMY, TOTAL ABDOMINAL (CERVIX REMOVED)  2004    OVARY REMOVAL      WY EGD TRANSORAL BIOPSY SINGLE/MULTIPLE N/A 01/29/2018    Dr Leatha Abdul-Active duodenitis, gastritis/gastropathy    UPPER GASTROINTESTINAL ENDOSCOPY  09/12/2022    Dr NORMA Abdul-Gastritis    UPPER GASTROINTESTINAL ENDOSCOPY  11/15/2016    Dr Power Kirby gastritis, no celiac    UPPER GASTROINTESTINAL ENDOSCOPY  11/16/2015    Dr Samira Ruby neg    UPPER GASTROINTESTINAL ENDOSCOPY N/A 09/12/2022    Dr Leatha Abdul-Gastritis, no h pylori     Family History   Problem Relation Age of Onset    Diabetes Mother     High Blood Pressure Mother     Colon Polyps Mother     Coronary Art Dis Father     High Blood Pressure Father     Stroke Maternal Grandfather         1997    Arthritis Maternal Grandmother     Asthma Maternal Grandmother     Osteoporosis Maternal Grandmother     High Blood Pressure Paternal Grandmother     Colon Cancer Neg Hx     Liver Cancer Neg Hx     Liver Disease Neg Hx     Esophageal Cancer Neg Hx     Rectal Cancer Neg Hx     Stomach Cancer Neg Hx      Social History     Tobacco Use    Smoking status: Never    Smokeless tobacco: Never   Substance Use Topics    Alcohol use: No       Current Outpatient Medications   Medication Sig Dispense Refill    fexofenadine-pseudoephedrine (ALLEGRA-D 24HR) 180-240 MG per extended release tablet Take 1 tablet by mouth daily 30 tablet 0    linaCLOtide (LINZESS) 72 MCG CAPS capsule Take 1 capsule by mouth every morning (before breakfast) 90 capsule 0    Calcium 500-100 MG-UNIT CHEW Take by mouth daily      albuterol sulfate HFA (VENTOLIN HFA) 108 (90 Base) MCG/ACT inhaler Inhale 2 puffs into the lungs 4 times daily as needed for Wheezing 18 g 5    SUMAtriptan (IMITREX) 6 MG/0.5ML SOLN injection Inject 0.5 mLs into the skin once as needed for Migraine 9 each 3    conjugated estrogens (PREMARIN) 0.625 MG/GM vaginal cream Place 0.5 g vaginally Twice a Week 1 Tube 5    topiramate (TOPAMAX) 100 MG tablet Take 1 tablet by mouth 2 times daily 180 tablet 3    Probiotic Product (PROBIOTIC DAILY PO) Take by mouth daily      promethazine (PHENERGAN) 25 MG tablet Take 25 mg by mouth      cloNIDine (CATAPRES) 0.1 MG tablet 0.1 mg as needed       Cholecalciferol (VITAMIN D-3) 5000 UNITS TABS Take 5,000 Units by mouth daily. tiZANidine (ZANAFLEX) 4 MG tablet TAKE 1 TABLET EVERY 8 HOURS AS NEEDED FOR MUSCLE SPASMS 270 tablet 3    baclofen (LIORESAL) 20 MG tablet TAKE 1 TABLET THREE TIMES A  tablet 3    gabapentin (NEURONTIN) 100 MG capsule TAKE ONE TO TWO CAPSULES BY MOUTH THREE TIMES DAILY AS NEEDED 135 capsule 2    propranolol (INDERAL LA) 60 MG extended release capsule TAKE 1 CAPSULE ONCE DAILY FOR HEART RATE 90 capsule 3     No current facility-administered medications for this visit.      Allergies   Allergen Reactions    Ropinirole Hcl      Other reaction(s): Mental Status Change    Zolpidem Other (See Comments)    Ropinirole Other (See Comments)     Crazy dreams; sleep walking    Simvastatin Other (See Comments)     Leg cramps     Vitals:    02/13/23 1051   BP: 131/87   Pulse: 86     Body mass index is 35.45 kg/m². Review of Systems   Constitutional:  Positive for fatigue. HENT: Negative. Eyes: Negative. Respiratory: Negative. Cardiovascular: Negative. Gastrointestinal: Negative. Endocrine: Negative. Genitourinary:  Positive for flank pain (left), frequency, pelvic pain and urgency. Negative for difficulty urinating, dysuria, vaginal bleeding, vaginal discharge and vaginal pain. Skin: Negative. Allergic/Immunologic: Negative. Neurological:  Positive for headaches. Hematological: Negative. Psychiatric/Behavioral: Negative. Physical Exam  Vitals and nursing note reviewed. Constitutional:       Appearance: She is well-developed. HENT:      Head: Normocephalic and atraumatic. Eyes:      Conjunctiva/sclera: Conjunctivae normal.      Pupils: Pupils are equal, round, and reactive to light. Cardiovascular:      Rate and Rhythm: Normal rate and regular rhythm. Heart sounds: Normal heart sounds. Pulmonary:      Effort: Pulmonary effort is normal.   Chest:      Comments: Breasts symmetrical without tenderness, masses, or nipple discharge. Nipples everted bilaterally. Abdominal:      Palpations: Abdomen is soft. Tenderness: There is no abdominal tenderness. Genitourinary:     Vagina: Normal.      Comments: Uterus: surgically absent  Cervix: surgically absent  Adnexa: surgically absent  Musculoskeletal:      Cervical back: Normal range of motion and neck supple. Lumbar back: Tenderness (left flank) present. Skin:     General: Skin is warm and dry. Neurological:      Mental Status: She is alert and oriented to person, place, and time. Diagnosis Orders   1. Encounter for well woman exam        2. Encounter for screening breast examination        3.  Encounter for screening mammogram for malignant neoplasm of breast        4. MS (multiple sclerosis) ([facility] [address]        5. Family hx osteoporosis        6. Acute left flank pain  CT ABDOMEN PELVIS W WO CONTRAST Additional Contrast? None          MEDICATIONS:  No orders of the defined types were placed in this encounter. ORDERS:  Orders Placed This Encounter   Procedures    CT ABDOMEN PELVIS W WO CONTRAST Additional Contrast? None       PLAN:  WWE- pap not indicated, SBE reviewed and CBE performed. Mammogram UTD and annual labs with PCP  Left flank pain- CT abdomen and pelvis ordered.

## 2023-02-15 ENCOUNTER — TRANSCRIBE ORDERS (OUTPATIENT)
Dept: ADMINISTRATIVE | Facility: HOSPITAL | Age: 43
End: 2023-02-15
Payer: COMMERCIAL

## 2023-02-15 DIAGNOSIS — R10.9 FLANK PAIN: Primary | ICD-10-CM

## 2023-02-21 RX ORDER — PROPRANOLOL HCL 60 MG
CAPSULE, EXTENDED RELEASE 24HR ORAL
Qty: 90 CAPSULE | Refills: 3 | Status: SHIPPED | OUTPATIENT
Start: 2023-02-21

## 2023-02-23 ENCOUNTER — HOSPITAL ENCOUNTER (OUTPATIENT)
Dept: CT IMAGING | Facility: HOSPITAL | Age: 43
Discharge: HOME | End: 2023-02-23
Admitting: NURSE PRACTITIONER
Payer: COMMERCIAL

## 2023-02-23 DIAGNOSIS — R10.9 FLANK PAIN: ICD-10-CM

## 2023-02-23 LAB — CREAT BLDA-MCNC: 1 MG/DL (ref 0.6–1.3)

## 2023-02-23 PROCEDURE — 82565 ASSAY OF CREATININE: CPT

## 2023-02-23 PROCEDURE — 25010000002 IOPAMIDOL 61 % SOLUTION: Performed by: NURSE PRACTITIONER

## 2023-02-23 PROCEDURE — 74178 CT ABD&PLV WO CNTR FLWD CNTR: CPT

## 2023-02-23 RX ADMIN — IOPAMIDOL 100 ML: 612 INJECTION, SOLUTION INTRAVENOUS at 09:05

## 2023-03-02 DIAGNOSIS — G35 RELAPSING-REMITTING MULTIPLE SCLEROSIS (HCC): ICD-10-CM

## 2023-03-02 DIAGNOSIS — G62.9 NEUROPATHY: ICD-10-CM

## 2023-03-02 RX ORDER — BACLOFEN 20 MG/1
TABLET ORAL
Qty: 270 TABLET | Refills: 3 | Status: SHIPPED | OUTPATIENT
Start: 2023-03-02

## 2023-03-02 RX ORDER — GABAPENTIN 100 MG/1
CAPSULE ORAL
Qty: 135 CAPSULE | Refills: 2 | Status: SHIPPED | OUTPATIENT
Start: 2023-03-02 | End: 2023-04-02

## 2023-03-02 RX ORDER — TIZANIDINE 4 MG/1
TABLET ORAL
Qty: 270 TABLET | Refills: 3 | Status: SHIPPED | OUTPATIENT
Start: 2023-03-02

## 2023-03-02 NOTE — TELEPHONE ENCOUNTER
Provider needs to review PDMP    PDMP Monitoring:    Last PDMP [place] Kevin as Reviewed [facility]):  Review User Review Instant Review Result   Willow Manrique 11/9/2022  5:29 PM Reviewed PDMP [1]     Urine Drug Screenings (1 yr)     POCT Rapid Drug Screen  Collected: 3/14/2022 (Final result)          POCT Rapid Drug Screen  Collected: 3/11/2021  1:39 PM (Final result)          POCT Rapid Drug Screen  Collected: 2/12/2020 10:27 AM (Final result)          POCT Rapid Drug Screen  Collected: 8/3/2018  2:47 PM (Final result)              Medication Contract and Consent for Opioid Use Documents Filed     Patient Documents     Type of Document Status Date Received Received By Description    Medication Contract Signed 8/9/2019  3:11 PM AVEL RILEY     Medication Contract Signed 8/12/2020 12:03 PM Sana Chen

## 2023-03-22 ENCOUNTER — OFFICE VISIT (OUTPATIENT)
Dept: PRIMARY CARE CLINIC | Age: 43
End: 2023-03-22
Payer: COMMERCIAL

## 2023-03-22 VITALS
HEART RATE: 75 BPM | TEMPERATURE: 97 F | OXYGEN SATURATION: 97 % | DIASTOLIC BLOOD PRESSURE: 90 MMHG | RESPIRATION RATE: 18 BRPM | SYSTOLIC BLOOD PRESSURE: 140 MMHG | WEIGHT: 212.4 LBS | HEIGHT: 65 IN | BODY MASS INDEX: 35.39 KG/M2

## 2023-03-22 DIAGNOSIS — E55.9 VITAMIN D DEFICIENCY: ICD-10-CM

## 2023-03-22 DIAGNOSIS — R10.11 RUQ PAIN: ICD-10-CM

## 2023-03-22 DIAGNOSIS — E53.8 VITAMIN B12 DEFICIENCY (NON ANEMIC): ICD-10-CM

## 2023-03-22 DIAGNOSIS — I10 ESSENTIAL HYPERTENSION: ICD-10-CM

## 2023-03-22 DIAGNOSIS — Z00.00 WELL FEMALE EXAM WITHOUT GYNECOLOGICAL EXAM: Primary | ICD-10-CM

## 2023-03-22 DIAGNOSIS — G62.9 NEUROPATHY: ICD-10-CM

## 2023-03-22 DIAGNOSIS — Z79.899 MEDICATION MANAGEMENT: ICD-10-CM

## 2023-03-22 DIAGNOSIS — G35 RELAPSING-REMITTING MULTIPLE SCLEROSIS (HCC): ICD-10-CM

## 2023-03-22 DIAGNOSIS — M54.16 LUMBAR RADICULOPATHY: ICD-10-CM

## 2023-03-22 LAB
25(OH)D3 SERPL-MCNC: 36 NG/ML
ALBUMIN SERPL-MCNC: 4.6 G/DL (ref 3.5–5.2)
ALP SERPL-CCNC: 95 U/L (ref 35–104)
ALT SERPL-CCNC: 22 U/L (ref 5–33)
ANION GAP SERPL CALCULATED.3IONS-SCNC: 13 MMOL/L (ref 7–19)
AST SERPL-CCNC: 19 U/L (ref 5–32)
BILIRUB SERPL-MCNC: 0.7 MG/DL (ref 0.2–1.2)
BUN SERPL-MCNC: 13 MG/DL (ref 6–20)
CALCIUM SERPL-MCNC: 9.6 MG/DL (ref 8.6–10)
CHLORIDE SERPL-SCNC: 107 MMOL/L (ref 98–111)
CHOLEST SERPL-MCNC: 160 MG/DL (ref 160–199)
CO2 SERPL-SCNC: 22 MMOL/L (ref 22–29)
CREAT SERPL-MCNC: 0.9 MG/DL (ref 0.5–0.9)
ERYTHROCYTE [DISTWIDTH] IN BLOOD BY AUTOMATED COUNT: 12.5 % (ref 11.5–14.5)
GLUCOSE SERPL-MCNC: 91 MG/DL (ref 74–109)
HCT VFR BLD AUTO: 45 % (ref 37–47)
HDLC SERPL-MCNC: 47 MG/DL (ref 65–121)
HGB BLD-MCNC: 15 G/DL (ref 12–16)
LDLC SERPL CALC-MCNC: 84 MG/DL
LIPASE SERPL-CCNC: 47 U/L (ref 13–60)
MCH RBC QN AUTO: 31.4 PG (ref 27–31)
MCHC RBC AUTO-ENTMCNC: 33.3 G/DL (ref 33–37)
MCV RBC AUTO: 94.1 FL (ref 81–99)
PLATELET # BLD AUTO: 292 K/UL (ref 130–400)
PMV BLD AUTO: 10.8 FL (ref 9.4–12.3)
POTASSIUM SERPL-SCNC: 4.3 MMOL/L (ref 3.5–5)
PROT SERPL-MCNC: 7.7 G/DL (ref 6.6–8.7)
RBC # BLD AUTO: 4.78 M/UL (ref 4.2–5.4)
SODIUM SERPL-SCNC: 142 MMOL/L (ref 136–145)
TRIGL SERPL-MCNC: 145 MG/DL (ref 0–149)
VIT B12 SERPL-MCNC: 314 PG/ML (ref 211–946)
WBC # BLD AUTO: 6.9 K/UL (ref 4.8–10.8)

## 2023-03-22 PROCEDURE — 99396 PREV VISIT EST AGE 40-64: CPT | Performed by: FAMILY MEDICINE

## 2023-03-22 PROCEDURE — 3078F DIAST BP <80 MM HG: CPT | Performed by: FAMILY MEDICINE

## 2023-03-22 PROCEDURE — 80305 DRUG TEST PRSMV DIR OPT OBS: CPT | Performed by: FAMILY MEDICINE

## 2023-03-22 PROCEDURE — 3074F SYST BP LT 130 MM HG: CPT | Performed by: FAMILY MEDICINE

## 2023-03-22 PROCEDURE — G8484 FLU IMMUNIZE NO ADMIN: HCPCS | Performed by: FAMILY MEDICINE

## 2023-03-22 RX ORDER — PROPRANOLOL HYDROCHLORIDE 80 MG/1
80 CAPSULE, EXTENDED RELEASE ORAL DAILY
Qty: 30 CAPSULE | Refills: 2 | Status: SHIPPED | OUTPATIENT
Start: 2023-03-22

## 2023-03-22 RX ORDER — GABAPENTIN 100 MG/1
CAPSULE ORAL
Qty: 150 CAPSULE | Refills: 2 | Status: SHIPPED | OUTPATIENT
Start: 2023-03-22 | End: 2023-04-22

## 2023-03-22 RX ORDER — CALCIUM CARB/VITAMIN D3/VIT K1 500-100-40
500 TABLET,CHEWABLE ORAL DAILY
Qty: 30 TABLET | Refills: 3 | Status: SHIPPED | OUTPATIENT
Start: 2023-03-22 | End: 2023-03-23 | Stop reason: SDUPTHER

## 2023-03-22 RX ORDER — ATORVASTATIN CALCIUM 20 MG/1
20 TABLET, FILM COATED ORAL DAILY
Qty: 90 TABLET | Refills: 3 | Status: SHIPPED | OUTPATIENT
Start: 2023-03-22

## 2023-03-22 NOTE — PATIENT INSTRUCTIONS
Wt Readings from Last 3 Encounters:   03/22/23 212 lb 6.4 oz (96.3 kg)   02/13/23 213 lb (96.6 kg)   02/10/23 214 lb (97.1 kg)              We are committed to providing you with the best care possible. In order to help us achieve these goals please remember to bring all medications, herbal products, and over the counter supplements with you to each visit. If your provider has ordered testing for you, please be sure to follow up with our office if you have not received results within 7 days after the testing took place. *If you receive a survey after visiting one of our offices, please take time to share your experience concerning your physician office visit. These surveys are confidential and no health information about you is shared. We are eager to improve for you and we are counting on your feedback to help make that happen.

## 2023-03-23 RX ORDER — CALCIUM CARB/VITAMIN D3/VIT K1 500-100-40
500 TABLET,CHEWABLE ORAL DAILY
Qty: 30 TABLET | Refills: 3 | Status: SHIPPED | OUTPATIENT
Start: 2023-03-23

## 2023-04-03 ENCOUNTER — TELEPHONE (OUTPATIENT)
Dept: GASTROENTEROLOGY | Age: 43
End: 2023-04-03

## 2023-04-03 NOTE — PROGRESS NOTES
SUBJECTIVE:   38 y.o. female for annual routine physical.  Does see a GYN. She recently fell off a boardwalk on Saturday. Sunday she noticed some swelling in her ankle. She has an appointment coming up with a Dr. Rogers President in [facility] we will be her new neurologist for her MS. She recently saw PJ for right rib pain. She also has occasional ruq pain - no nausea and vomiting     She is complaining of diarrhea. She has occasional nausea but she is status postcholecystectomy. No vomiting or diarrhea. Her space is still flushing at times and her scalp feels irritated at the present time. She has not noticed any blisters. LMP: No LMP recorded. Patient has had a hysterectomy.   Patient Active Problem List    Diagnosis Date Noted    Change in bowel habits 08/30/2022    Constipation 08/30/2022    Chronic bilateral lower abdominal pain 08/30/2022    Epigastric pain 08/30/2022    Dysphagia 08/30/2022    Gastroesophageal reflux disease without esophagitis 08/03/2022    Lumbar radiculopathy 03/21/2019    Pleurisy 03/08/2022    Elevated hemidiaphragm 09/09/2021    LÓPEZ (dyspnea on exertion) 09/09/2021    Snoring 09/09/2021    Orthopnea 09/09/2021    Non-restorative sleep 09/09/2021    SHYAM (obstructive sleep apnea) 09/09/2021    Obesity (BMI 35.0-39.9 without comorbidity) 09/09/2021    Stress incontinence 05/28/2020    Neurogenic bladder 10/02/2018    RUQ abdominal pain 08/08/2018    Degeneration of cervical intervertebral disc 11/28/2017    Insomnia 02/16/2017    Trigeminal neuralgia 02/16/2017    Vitamin D deficiency 02/16/2017    Right kidney stone 11/29/2016    Urinary incontinence 11/29/2016    Hypertensive disorder 06/10/2016    Radial styloid tenosynovitis 04/20/2016    Relapsing-remitting multiple sclerosis (HCC)     Complicated migraine     Hematuria     Depression     Nerve damage     Cauda equina syndrome (HCC)     Elevated cholesterol     Postural orthostatic tachycardia syndrome     Vitamin B12

## 2023-04-03 NOTE — TELEPHONE ENCOUNTER
Recommendations:  1. Repeat colonoscopy: at the age of 46 for CRC screening  2. Miralax daily- start today  3. Trial of Linzess      Findings and recommendations were discussed w/ the patient. A copy of the images was provided. Mayte Arriaga am scribing for and in the presence of Dr. Jazlyn Mendez MD.  Electronically signed by Nneka Cotto RN on 9/12/2022 at 12:31 PM     I personally performed the services described in this documentation as scribed by Jose F Neff, and it appears accurate and complete. Jazlyn Mendez MD  9/12/2022      Last visit [facility] aprn 8-30-22. Egd/Cln  10-13-22. FU scheduled with CT aprn on 8-1-23. Patient said she was given a trial of Linzess 72 mcg daily per  post CLN and at first it worked really well but as of late she has noticed increased bowels sounds, diarrhea 4-5 times a day, right sided abdominal pain which can go from sharp and stabbing to a dull discomfort, increased gas, no swelling and does have small spots of BR blood on the tissue after a BM. Patient said she may go to QOD till she can hear back from CT aprn and what advice she can recommend. Patient said she has never tried any other Rx's or OTC medications for constipation in her past. We did discuss Miralax and she was interested in that information but again we will see what CT will suggest at this point for this patient. Patient is aware that CT is out of Clinic till Wednesday.   nury

## 2023-04-05 RX ORDER — PLECANATIDE 3 MG/1
3 TABLET ORAL DAILY
Qty: 30 TABLET | Refills: 3 | Status: SHIPPED | OUTPATIENT
Start: 2023-04-05

## 2023-04-05 NOTE — TELEPHONE ENCOUNTER
Thanks Odette, this patient has been notified. Patient did say she is going to call [facility] and check their price on the Rx vs her local Pharmacy, if PA is required I told the patient to have [facility] notify us.  Raúl arrington
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
## 2023-04-19 ENCOUNTER — HOSPITAL ENCOUNTER (OUTPATIENT)
Dept: CT IMAGING | Facility: HOSPITAL | Age: 43
Discharge: HOME | End: 2023-04-19
Payer: MEDICARE

## 2023-04-19 ENCOUNTER — OFFICE VISIT (OUTPATIENT)
Dept: UROLOGY | Facility: CLINIC | Age: 43
End: 2023-04-19
Payer: COMMERCIAL

## 2023-04-19 VITALS — HEIGHT: 65 IN | TEMPERATURE: 96.7 F | WEIGHT: 211.2 LBS | BODY MASS INDEX: 35.19 KG/M2

## 2023-04-19 DIAGNOSIS — R10.2 SUPRAPUBIC PRESSURE: ICD-10-CM

## 2023-04-19 DIAGNOSIS — N31.9 NEUROGENIC BLADDER: ICD-10-CM

## 2023-04-19 DIAGNOSIS — N20.0 NEPHROLITHIASIS: ICD-10-CM

## 2023-04-19 DIAGNOSIS — R30.0 DYSURIA: ICD-10-CM

## 2023-04-19 DIAGNOSIS — N20.0 NEPHROLITHIASIS: Primary | ICD-10-CM

## 2023-04-19 DIAGNOSIS — G35 MULTIPLE SCLEROSIS: ICD-10-CM

## 2023-04-19 LAB
BILIRUB BLD-MCNC: ABNORMAL MG/DL
CLARITY, POC: CLEAR
COLOR UR: ABNORMAL
GLUCOSE UR STRIP-MCNC: NEGATIVE MG/DL
KETONES UR QL: NEGATIVE
LEUKOCYTE EST, POC: ABNORMAL
NITRITE UR-MCNC: NEGATIVE MG/ML
PH UR: 5 [PH] (ref 5–8)
PROT UR STRIP-MCNC: NEGATIVE MG/DL
RBC # UR STRIP: NEGATIVE /UL
SP GR UR: 1.02 (ref 1–1.03)
UROBILINOGEN UR QL: NORMAL

## 2023-04-19 PROCEDURE — 81001 URINALYSIS AUTO W/SCOPE: CPT

## 2023-04-19 PROCEDURE — 99214 OFFICE O/P EST MOD 30 MIN: CPT

## 2023-04-19 PROCEDURE — 74176 CT ABD & PELVIS W/O CONTRAST: CPT

## 2023-04-19 PROCEDURE — 87086 URINE CULTURE/COLONY COUNT: CPT

## 2023-04-19 RX ORDER — CHOLECALCIFEROL (VITAMIN D3) 1250 MCG
CAPSULE ORAL
COMMUNITY

## 2023-04-19 RX ORDER — PLECANATIDE 3 MG/1
TABLET ORAL
COMMUNITY

## 2023-04-19 RX ORDER — PHENAZOPYRIDINE HYDROCHLORIDE 100 MG/1
100 TABLET, FILM COATED ORAL 3 TIMES DAILY PRN
Qty: 20 TABLET | Refills: 0 | Status: SHIPPED | OUTPATIENT
Start: 2023-04-19

## 2023-04-20 LAB — BACTERIA SPEC AEROBE CULT: NORMAL

## 2023-04-20 NOTE — PROGRESS NOTES
Please let pt know no ureteral stones seen to explain pt pain.will contact pt when the urine culture result is back. Pt does appear to have the known bilateral renal stones

## 2023-04-20 NOTE — PROGRESS NOTES
Urine culture showing contaminated specimen if pt still having symptoms recommend straight cath specimen

## 2023-04-21 ENCOUNTER — TELEPHONE (OUTPATIENT)
Dept: UROLOGY | Facility: CLINIC | Age: 43
End: 2023-04-21
Payer: COMMERCIAL

## 2023-04-21 NOTE — TELEPHONE ENCOUNTER
----- Message from SID Cheek sent at 4/20/2023  1:12 PM CDT -----  Urine culture showing contaminated specimen if pt still having symptoms recommend straight cath specimen

## 2023-04-24 NOTE — PROGRESS NOTES
Patient called about ct results,I read her the results to her, she still has c/o right sided pain. I set her up as a nurse visit to do an I&O catheterized specimen

## 2023-04-25 ENCOUNTER — PROCEDURE VISIT (OUTPATIENT)
Dept: UROLOGY | Facility: CLINIC | Age: 43
End: 2023-04-25
Payer: MEDICARE

## 2023-04-25 DIAGNOSIS — N20.0 NEPHROLITHIASIS: Primary | ICD-10-CM

## 2023-04-25 PROCEDURE — 87086 URINE CULTURE/COLONY COUNT: CPT

## 2023-04-25 PROCEDURE — 87186 SC STD MICRODIL/AGAR DIL: CPT

## 2023-04-25 PROCEDURE — 51701 INSERT BLADDER CATHETER: CPT

## 2023-04-25 PROCEDURE — 87088 URINE BACTERIA CULTURE: CPT

## 2023-04-25 NOTE — PROGRESS NOTES
Patient of Margo Briceno is here today complaining of flank pain. The patient denies any n/v. Clean catch urine obtained. UA Auto Dip ran and urine sent for C&S.   SID Stevenson was in the office at the time of procedure. Patient was advised that we will call with results. Patient verbalized understanding. Regina PERALTA MA    I have reviewed and agree with medical assistance documentation above

## 2023-04-27 NOTE — PROGRESS NOTES
Pt is symptomatic therefore would like this specimen to be sent for sensitivity testing. Please contact lab and let them know

## 2023-04-28 ENCOUNTER — TELEPHONE (OUTPATIENT)
Dept: UROLOGY | Facility: CLINIC | Age: 43
End: 2023-04-28
Payer: COMMERCIAL

## 2023-04-28 DIAGNOSIS — N30.00 ACUTE CYSTITIS WITHOUT HEMATURIA: Primary | ICD-10-CM

## 2023-04-28 LAB — BACTERIA SPEC AEROBE CULT: ABNORMAL

## 2023-04-28 RX ORDER — CEFDINIR 300 MG/1
300 CAPSULE ORAL 2 TIMES DAILY
Qty: 20 CAPSULE | Refills: 0 | Status: SHIPPED | OUTPATIENT
Start: 2023-04-28

## 2023-04-28 NOTE — TELEPHONE ENCOUNTER
Spoke with patient about Margo sending a antibiotic to her pharmacy because bacteria was found in her urine. Patient verbalized understanding. Patient also had questions her CT scan she had done. I told patient is would talk to Margo and let her know.

## 2023-04-28 NOTE — TELEPHONE ENCOUNTER
----- Message from SID Cheek sent at 4/28/2023  8:37 AM CDT -----  Please let pt know I am sending in omnicef to pharmacy listed d/t e coli In urine

## 2023-06-05 ENCOUNTER — OFFICE VISIT (OUTPATIENT)
Dept: PRIMARY CARE CLINIC | Age: 43
End: 2023-06-05

## 2023-06-05 ENCOUNTER — OFFICE VISIT (OUTPATIENT)
Dept: PULMONOLOGY | Age: 43
End: 2023-06-05
Payer: COMMERCIAL

## 2023-06-05 VITALS
WEIGHT: 199.4 LBS | DIASTOLIC BLOOD PRESSURE: 86 MMHG | TEMPERATURE: 97.6 F | SYSTOLIC BLOOD PRESSURE: 129 MMHG | HEIGHT: 65 IN | OXYGEN SATURATION: 98 % | HEART RATE: 63 BPM | BODY MASS INDEX: 33.22 KG/M2

## 2023-06-05 VITALS
TEMPERATURE: 97.4 F | HEART RATE: 67 BPM | BODY MASS INDEX: 33.26 KG/M2 | DIASTOLIC BLOOD PRESSURE: 86 MMHG | HEIGHT: 65 IN | SYSTOLIC BLOOD PRESSURE: 116 MMHG | RESPIRATION RATE: 18 BRPM | WEIGHT: 199.6 LBS | OXYGEN SATURATION: 97 %

## 2023-06-05 DIAGNOSIS — R06.09 DOE (DYSPNEA ON EXERTION): ICD-10-CM

## 2023-06-05 DIAGNOSIS — G47.33 OSA (OBSTRUCTIVE SLEEP APNEA): Primary | ICD-10-CM

## 2023-06-05 DIAGNOSIS — K21.9 GASTROESOPHAGEAL REFLUX DISEASE WITHOUT ESOPHAGITIS: ICD-10-CM

## 2023-06-05 DIAGNOSIS — R82.998 LEUKOCYTES IN URINE: ICD-10-CM

## 2023-06-05 DIAGNOSIS — M25.542 ARTHRALGIA OF BOTH HANDS: ICD-10-CM

## 2023-06-05 DIAGNOSIS — M25.541 ARTHRALGIA OF BOTH HANDS: ICD-10-CM

## 2023-06-05 DIAGNOSIS — R30.0 DYSURIA: Primary | ICD-10-CM

## 2023-06-05 DIAGNOSIS — G62.9 NEUROPATHY: ICD-10-CM

## 2023-06-05 DIAGNOSIS — E66.9 OBESITY (BMI 35.0-39.9 WITHOUT COMORBIDITY): ICD-10-CM

## 2023-06-05 DIAGNOSIS — J98.6 ELEVATED HEMIDIAPHRAGM: ICD-10-CM

## 2023-06-05 DIAGNOSIS — G35 MULTIPLE SCLEROSIS (HCC): ICD-10-CM

## 2023-06-05 DIAGNOSIS — R35.0 URINARY FREQUENCY: ICD-10-CM

## 2023-06-05 LAB
ALBUMIN SERPL-MCNC: 4.8 G/DL (ref 3.5–5.2)
ALP SERPL-CCNC: 86 U/L (ref 35–104)
ALT SERPL-CCNC: 16 U/L (ref 5–33)
ANION GAP SERPL CALCULATED.3IONS-SCNC: 13 MMOL/L (ref 7–19)
APPEARANCE FLUID: ABNORMAL
AST SERPL-CCNC: 17 U/L (ref 5–32)
BILIRUB SERPL-MCNC: 0.8 MG/DL (ref 0.2–1.2)
BILIRUBIN, POC: ABNORMAL
BLOOD URINE, POC: ABNORMAL
BUN SERPL-MCNC: 17 MG/DL (ref 6–20)
CALCIUM SERPL-MCNC: 9.8 MG/DL (ref 8.6–10)
CHLORIDE SERPL-SCNC: 105 MMOL/L (ref 98–111)
CLARITY, POC: ABNORMAL
CO2 SERPL-SCNC: 20 MMOL/L (ref 22–29)
COLOR, POC: YELLOW
CREAT SERPL-MCNC: 0.9 MG/DL (ref 0.5–0.9)
CRP SERPL HS-MCNC: 0.31 MG/DL (ref 0–0.5)
ERYTHROCYTE [DISTWIDTH] IN BLOOD BY AUTOMATED COUNT: 12.1 % (ref 11.5–14.5)
ERYTHROCYTE [SEDIMENTATION RATE] IN BLOOD BY WESTERGREN METHOD: 18 MM/HR (ref 0–20)
GLUCOSE SERPL-MCNC: 94 MG/DL (ref 74–109)
GLUCOSE URINE, POC: ABNORMAL
HCT VFR BLD AUTO: 40.7 % (ref 37–47)
HGB BLD-MCNC: 14.2 G/DL (ref 12–16)
KETONES, POC: 80
LEUKOCYTE EST, POC: ABNORMAL
MCH RBC QN AUTO: 31.4 PG (ref 27–31)
MCHC RBC AUTO-ENTMCNC: 34.9 G/DL (ref 33–37)
MCV RBC AUTO: 90 FL (ref 81–99)
NITRITE, POC: ABNORMAL
PH, POC: 5.5
PLATELET # BLD AUTO: 318 K/UL (ref 130–400)
PMV BLD AUTO: 11 FL (ref 9.4–12.3)
POTASSIUM SERPL-SCNC: 3.9 MMOL/L (ref 3.5–5)
PROT SERPL-MCNC: 7.8 G/DL (ref 6.6–8.7)
PROTEIN, POC: 30
RBC # BLD AUTO: 4.52 M/UL (ref 4.2–5.4)
SODIUM SERPL-SCNC: 138 MMOL/L (ref 136–145)
SPECIFIC GRAVITY, POC: 1.03
UROBILINOGEN, POC: 0.2
WBC # BLD AUTO: 7.4 K/UL (ref 4.8–10.8)

## 2023-06-05 PROCEDURE — 1036F TOBACCO NON-USER: CPT | Performed by: INTERNAL MEDICINE

## 2023-06-05 PROCEDURE — 3079F DIAST BP 80-89 MM HG: CPT | Performed by: INTERNAL MEDICINE

## 2023-06-05 PROCEDURE — 3074F SYST BP LT 130 MM HG: CPT | Performed by: INTERNAL MEDICINE

## 2023-06-05 PROCEDURE — G8427 DOCREV CUR MEDS BY ELIG CLIN: HCPCS | Performed by: INTERNAL MEDICINE

## 2023-06-05 PROCEDURE — G8417 CALC BMI ABV UP PARAM F/U: HCPCS | Performed by: INTERNAL MEDICINE

## 2023-06-05 PROCEDURE — 99214 OFFICE O/P EST MOD 30 MIN: CPT | Performed by: INTERNAL MEDICINE

## 2023-06-05 RX ORDER — ERENUMAB-AOOE 70 MG/ML
INJECTION SUBCUTANEOUS
COMMUNITY
Start: 2023-05-09

## 2023-06-05 ASSESSMENT — ENCOUNTER SYMPTOMS
CHEST TIGHTNESS: 0
RHINORRHEA: 0
COUGH: 0
ABDOMINAL PAIN: 0
WHEEZING: 0
BACK PAIN: 0
SHORTNESS OF BREATH: 1
ANAL BLEEDING: 0
ABDOMINAL DISTENTION: 0
APNEA: 1

## 2023-06-05 NOTE — PROGRESS NOTES
[facility] PHYSICIAN SERVICES  [facility]  [address]  [address] [address]  Dept: [phone]  Dept Fax: [phone]  Loc: [phone]    Lillie Ba is a 38 y.o. female who presents today for her medical conditions/complaints as noted below. Lillie Ba is c/o of Muscle Pain (Pt is here for joint and muscle pain. She states it has been off and on for a while and has seen Dr. Valentina Novoa in the past for it but this past weekend was the worst. It was mostly on her hands. PT states the left side of her neck and her arm and her shoulder is hurting. ), Dysuria (Pt is having burning when urinating. ), and Urinary Frequency        HPI:     HPI   This 49-year-old female presents today for burning with urination, frequency of urination. States that she has a history of kidney stones and a recent E. coli UTI. Seeing urology for kidney stones and had cath urinalysis which showed e-coli in April and she states that she has just not felt fully recovered since that time. Also reports that she has a history of MS and has having a flare of severe pain and swelling to both hands as well as to the muscles in her neck. She denies any dyspnea or shortness of breath. She request referral to rheumatology. States she is been seen in the past for this but that her labs were normal  Chief Complaint   Patient presents with    Muscle Pain     Pt is here for joint and muscle pain. She states it has been off and on for a while and has seen Dr. Valentina Novoa in the past for it but this past weekend was the worst. It was mostly on her hands. PT states the left side of her neck and her arm and her shoulder is hurting. Dysuria     Pt is having burning when urinating.      Urinary Frequency     Past Medical History:   Diagnosis Date    B12 deficiency     Cauda equina syndrome (HCC)     Complicated migraine     Depression     GERD (gastroesophageal reflux disease)     Hematuria     wih abdominal pain

## 2023-06-05 NOTE — PROGRESS NOTES
Pulmonary and Sleep Medicine    Shannan Wilcox (:  1980) is a 38 y.o. female,Established patient, here for evaluation of the following chief complaint(s):  Follow-up (Follow up- SHYAM, LÓPEZ )      Referring physician:  No referring provider defined for this encounter. ASSESSMENT/PLAN:  1. SHYAM (obstructive sleep apnea) on CPAP  2. Gastroesophageal reflux disease without esophagitis  3. LÓPEZ (dyspnea on exertion)  4. 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 Obesity (BMI 35.0-39.9 without comorbidity)        Continue current management with the CPAP. She is compliant with the CPAP and feels it help. Discussed her LÓPEZ. ? Inderal is a factor. We also recommended to her to avoid medications with \"D\". She is on Claritin D otc. Pablo Molina MD, [facility], [facility]    Return in about 6 months (around 2023). SUBJECTIVE/OBJECTIVE:  The patient is here for follow up on SHYAM. She is using the CPAP and is compliant with the CPAP and feels the CPAP is helping her. Prior to Visit Medications    Medication Sig Taking?  Authorizing Provider   Plecanatide (TRULANCE) 3 MG TABS Take 3 mg by mouth daily Yes Odette Norman, APRROLAND   Calcium-Vitamin D-Vitamin K (CALCIUM FOR WOMEN) 500-100-40 MG-UNT-MCG CHEW Take 500 mg by mouth daily Yes DEVEN Del Cid - CNM   atorvastatin (LIPITOR) 20 MG tablet Take 1 tablet by mouth daily For cholesterol Yes Chana Lauren MD   propranolol (INDERAL LA) 80 MG extended release capsule Take 1 capsule by mouth daily Blood pressure Yes Chana Lauren MD   tiZANidine (ZANAFLEX) 4 MG tablet TAKE 1 TABLET EVERY 8 HOURS AS NEEDED FOR MUSCLE SPASMS Yes Chana Lauren MD   baclofen (LIORESAL) 20 MG tablet TAKE 1 TABLET THREE TIMES A DAY Yes Chana Lauren MD   fexofenadine-pseudoephedrine (ALLEGRA-D 24HR) 180-240 MG per extended release tablet Take 1 tablet by mouth daily Yes Jimenez Dow MD   Calcium 500-100 MG-UNIT CHEW Take by mouth daily Yes Historical

## 2023-06-06 RX ORDER — METOPROLOL SUCCINATE 25 MG/1
12.5 TABLET, EXTENDED RELEASE ORAL DAILY
Qty: 15 TABLET | Refills: 5 | Status: SHIPPED | OUTPATIENT
Start: 2023-06-06

## 2023-06-06 ASSESSMENT — ENCOUNTER SYMPTOMS
EYES NEGATIVE: 1
ALLERGIC/IMMUNOLOGIC NEGATIVE: 1
WHEEZING: 0
SHORTNESS OF BREATH: 0
COUGH: 0
GASTROINTESTINAL NEGATIVE: 1
COLOR CHANGE: 0
RESPIRATORY NEGATIVE: 1

## 2023-06-07 LAB
ATOPOBIUM VAGINAE: NORMAL SCORE
BACTERIA UR CULT: ABNORMAL
BACTERIA UR CULT: ABNORMAL
C TRACH DNA CVX QL NAA+PROBE: NEGATIVE
CANDIDA ALBICANS, NAA: NEGATIVE
CANDIDA GLABRATA: NEGATIVE
MEGASHAERA: NORMAL SCORE
NEISSERIA GONORRHOEAE, DNA: NEGATIVE
ORGANISM: ABNORMAL
VAGINAL PATHOGENS DNA PANEL: NEGATIVE
VAGINOSIS/VAGINITIS, DNA PROBE: NORMAL SCORE

## 2023-06-08 RX ORDER — FLUCONAZOLE 150 MG/1
150 TABLET ORAL
Qty: 2 TABLET | Refills: 0 | Status: SHIPPED | OUTPATIENT
Start: 2023-06-08 | End: 2023-06-14

## 2023-06-08 RX ORDER — NITROFURANTOIN 25; 75 MG/1; MG/1
100 CAPSULE ORAL 2 TIMES DAILY
Qty: 20 CAPSULE | Refills: 0 | Status: SHIPPED | OUTPATIENT
Start: 2023-06-08 | End: 2023-06-18

## 2023-06-26 ENCOUNTER — NURSE ONLY (OUTPATIENT)
Dept: PRIMARY CARE CLINIC | Age: 43
End: 2023-06-26
Payer: COMMERCIAL

## 2023-06-26 DIAGNOSIS — R30.0 DYSURIA: Primary | ICD-10-CM

## 2023-06-26 DIAGNOSIS — R35.0 URINARY FREQUENCY: ICD-10-CM

## 2023-06-26 LAB
APPEARANCE FLUID: CLEAR
BILIRUBIN, POC: NORMAL
BLOOD URINE, POC: NORMAL
CLARITY, POC: CLEAR
COLOR, POC: YELLOW
GLUCOSE URINE, POC: NORMAL
KETONES, POC: NORMAL
LEUKOCYTE EST, POC: NORMAL
NITRITE, POC: NORMAL
PH, POC: 7
PROTEIN, POC: NORMAL
SPECIFIC GRAVITY, POC: 1.02
UROBILINOGEN, POC: NORMAL

## 2023-06-26 PROCEDURE — 81002 URINALYSIS NONAUTO W/O SCOPE: CPT | Performed by: FAMILY MEDICINE

## 2023-06-26 PROCEDURE — 99999 PR OFFICE/OUTPT VISIT,PROCEDURE ONLY: CPT | Performed by: FAMILY MEDICINE

## 2023-06-28 RX ORDER — CALCIUM CARB/VITAMIN D3/VIT K1 650MG-12.5
TABLET,CHEWABLE ORAL
Qty: 20 TABLET | Refills: 3 | Status: SHIPPED | OUTPATIENT
Start: 2023-06-28

## 2023-07-05 ENCOUNTER — OFFICE VISIT (OUTPATIENT)
Dept: PRIMARY CARE CLINIC | Age: 43
End: 2023-07-05
Payer: COMMERCIAL

## 2023-07-05 ENCOUNTER — HOSPITAL ENCOUNTER (OUTPATIENT)
Dept: GENERAL RADIOLOGY | Age: 43
Discharge: HOME | End: 2023-07-05
Attending: FAMILY MEDICINE
Payer: COMMERCIAL

## 2023-07-05 VITALS
BODY MASS INDEX: 33.29 KG/M2 | HEIGHT: 65 IN | RESPIRATION RATE: 18 BRPM | DIASTOLIC BLOOD PRESSURE: 85 MMHG | HEART RATE: 72 BPM | WEIGHT: 199.8 LBS | TEMPERATURE: 96.9 F | SYSTOLIC BLOOD PRESSURE: 120 MMHG | OXYGEN SATURATION: 98 %

## 2023-07-05 DIAGNOSIS — M25.541 ARTHRALGIA OF BOTH HANDS: ICD-10-CM

## 2023-07-05 DIAGNOSIS — M25.542 ARTHRALGIA OF BOTH HANDS: ICD-10-CM

## 2023-07-05 DIAGNOSIS — M85.88 OSTEOPENIA OF LUMBAR SPINE: ICD-10-CM

## 2023-07-05 DIAGNOSIS — M89.9 PUBIC BONE PAIN: Primary | ICD-10-CM

## 2023-07-05 DIAGNOSIS — M89.9 PUBIC BONE PAIN: ICD-10-CM

## 2023-07-05 DIAGNOSIS — G83.4 CAUDA EQUINA SYNDROME (HCC): ICD-10-CM

## 2023-07-05 DIAGNOSIS — M25.651 DECREASED RANGE OF RIGHT HIP MOVEMENT: ICD-10-CM

## 2023-07-05 PROCEDURE — 99214 OFFICE O/P EST MOD 30 MIN: CPT | Performed by: FAMILY MEDICINE

## 2023-07-05 PROCEDURE — 1036F TOBACCO NON-USER: CPT | Performed by: FAMILY MEDICINE

## 2023-07-05 PROCEDURE — G8417 CALC BMI ABV UP PARAM F/U: HCPCS | Performed by: FAMILY MEDICINE

## 2023-07-05 PROCEDURE — G8427 DOCREV CUR MEDS BY ELIG CLIN: HCPCS | Performed by: FAMILY MEDICINE

## 2023-07-05 PROCEDURE — 72192 CT PELVIS W/O DYE: CPT

## 2023-07-05 PROCEDURE — 3079F DIAST BP 80-89 MM HG: CPT | Performed by: FAMILY MEDICINE

## 2023-07-05 PROCEDURE — 3074F SYST BP LT 130 MM HG: CPT | Performed by: FAMILY MEDICINE

## 2023-07-05 ASSESSMENT — ENCOUNTER SYMPTOMS
GASTROINTESTINAL NEGATIVE: 1
RESPIRATORY NEGATIVE: 1

## 2023-07-05 NOTE — PATIENT INSTRUCTIONS
Wt Readings from Last 3 Encounters:   07/05/23 199 lb 12.8 oz (90.6 kg)   06/05/23 199 lb 9.6 oz (90.5 kg)   06/05/23 199 lb 6.4 oz (90.4 kg)          We are committed to providing you with the best care possible. In order to help us achieve these goals please remember to bring all medications, herbal products, and over the counter supplements with you to each visit. If your provider has ordered testing for you, please be sure to follow up with our office if you have not received results within 7 days after the testing took place. *If you receive a survey after visiting one of our offices, please take time to share your experience concerning your physician office visit. These surveys are confidential and no health information about you is shared. We are eager to improve for you and we are counting on your feedback to help make that happen.

## 2023-07-05 NOTE — PROGRESS NOTES
of  the femoral neck is 0.675 g/cm Square and a T score of -1.6. The WHO  classification is osteopenia. There is increased fracture risk. IMPRESSION:  1. The osteopenia and increased fracture risk. Signed by Dr Henrry Noel  Follow-up:  Return if symptoms worsen or fail to improve. PATIENT INSTRUCTIONS:  Patient Instructions     Wt Readings from Last 3 Encounters:   07/05/23 199 lb 12.8 oz (90.6 kg)   06/05/23 199 lb 9.6 oz (90.5 kg)   06/05/23 199 lb 6.4 oz (90.4 kg)          We are committed to providing you with the best care possible. In order to help us achieve these goals please remember to bring all medications, herbal products, and over the counter supplements with you to each visit. If your provider has ordered testing for you, please be sure to follow up with our office if you have not received results within 7 days after the testing took place. *If you receive a survey after visiting one of our offices, please take time to share your experience concerning your physician office visit. These surveys are confidential and no health information about you is shared. We are eager to improve for you and we are counting on your feedback to help make that happen. EMR Dragon/transcription disclaimer:  Much of this encounter note is electronic transcription/translation of spoken language to printed texts. The electronic translation of spoken language may be erroneous, or at times, nonsensical words or phrases may beinadvertently transcribed.   Although I have reviewed the note for such errors, some may still exist.

## 2023-07-12 ENCOUNTER — OFFICE VISIT (OUTPATIENT)
Dept: PRIMARY CARE CLINIC | Age: 43
End: 2023-07-12

## 2023-07-12 VITALS
RESPIRATION RATE: 18 BRPM | OXYGEN SATURATION: 97 % | HEART RATE: 86 BPM | WEIGHT: 199.2 LBS | BODY MASS INDEX: 33.19 KG/M2 | HEIGHT: 65 IN | DIASTOLIC BLOOD PRESSURE: 84 MMHG | SYSTOLIC BLOOD PRESSURE: 138 MMHG | TEMPERATURE: 97.4 F

## 2023-07-12 DIAGNOSIS — M79.10 MYALGIA: ICD-10-CM

## 2023-07-12 DIAGNOSIS — M70.62 TROCHANTERIC BURSITIS OF BOTH HIPS: ICD-10-CM

## 2023-07-12 DIAGNOSIS — R20.0 NUMBNESS AND TINGLING: ICD-10-CM

## 2023-07-12 DIAGNOSIS — R20.2 NUMBNESS AND TINGLING: ICD-10-CM

## 2023-07-12 DIAGNOSIS — M70.61 TROCHANTERIC BURSITIS OF BOTH HIPS: ICD-10-CM

## 2023-07-12 DIAGNOSIS — M25.552 BILATERAL HIP PAIN: ICD-10-CM

## 2023-07-12 DIAGNOSIS — M79.10 MYALGIA: Primary | ICD-10-CM

## 2023-07-12 DIAGNOSIS — M25.551 BILATERAL HIP PAIN: ICD-10-CM

## 2023-07-12 LAB — CK SERPL-CCNC: 50 U/L (ref 26–192)

## 2023-07-12 RX ORDER — GABAPENTIN 300 MG/1
300 CAPSULE ORAL 3 TIMES DAILY
Qty: 90 CAPSULE | Refills: 5 | Status: SHIPPED | OUTPATIENT
Start: 2023-07-12 | End: 2024-01-08

## 2023-07-12 RX ORDER — PREDNISONE 20 MG/1
20 TABLET ORAL DAILY
Qty: 7 TABLET | Refills: 0 | Status: SHIPPED | OUTPATIENT
Start: 2023-07-12 | End: 2023-07-19

## 2023-07-13 RX ORDER — TOPIRAMATE 100 MG/1
100 TABLET, FILM COATED ORAL 2 TIMES DAILY
Qty: 180 TABLET | Refills: 3 | Status: SHIPPED | OUTPATIENT
Start: 2023-07-13

## 2023-07-22 ASSESSMENT — ENCOUNTER SYMPTOMS
BACK PAIN: 1
GASTROINTESTINAL NEGATIVE: 1
RESPIRATORY NEGATIVE: 1

## 2023-07-22 NOTE — PROGRESS NOTES
normal.       ASSESSMENT:    1. Myalgia    2. Bilateral hip pain    3. Numbness and tingling    4. Trochanteric bursitis of both hips          PLAN:  1. Myalgia  -     External Referral To Neurology  -     CK; Future  2. Bilateral hip pain  3. Numbness and tingling  -     External Referral To Neurology  -     gabapentin (NEURONTIN) 300 MG capsule; Take 1 capsule by mouth 3 times daily for 180 days. Intended supply: 30 days Max Daily Amount: 900 mg, Disp-90 capsule, R-5Normal  4. Trochanteric bursitis of both hips  -     predniSONE (DELTASONE) 20 MG tablet; Take 1 tablet by mouth daily for 7 days, Disp-7 tablet, R-0Normal     I have not not checked a CK level so I would like to do that. I am also going to refer her to neurology. Hopefully we can find someone that can help us figure out what is going on and how to treat it. I refilled her gabapentin. We will try for her trochanteric bursitis  PDMP Monitoring:    Last PDMP David as Reviewed:  Review User Review Instant Review Result   Sae Sterling 7/22/2023  5:12 PM Reviewed PDMP [1]     Last Controlled Substance Monitoring Documentation      Two [facility] Office Visit from 3/22/2023 in [place]   Periodic Controlled Substance Monitoring Possible medication side effects, risk of tolerance/dependence & alternative treatments discussed., No signs of potential drug abuse or diversion identified. , Assessed functional status., Obtaining appropriate analgesic effect of treatment. , Random urine drug screen sent today.  filed at 04/02/2023 1936          Urine Drug Screenings (1 yr)       POCT Rapid Drug Screen  Collected: 3/14/2022 (Final result)              POCT Rapid Drug Screen  Collected: 3/11/2021  1:39 PM (Final result)              POCT Rapid Drug Screen  Collected: 2/12/2020 10:27 AM (Final result)              POCT Rapid Drug Screen  Collected: 8/3/2018  2:47 PM (Final result)                  Medication Contract and Consent for Opioid Use

## 2023-07-27 ENCOUNTER — NURSE ONLY (OUTPATIENT)
Dept: PRIMARY CARE CLINIC | Age: 43
End: 2023-07-27
Payer: COMMERCIAL

## 2023-07-27 DIAGNOSIS — N39.0 URINARY TRACT INFECTION WITHOUT HEMATURIA, SITE UNSPECIFIED: Primary | ICD-10-CM

## 2023-07-27 DIAGNOSIS — R30.0 BURNING WITH URINATION: Primary | ICD-10-CM

## 2023-07-27 LAB
APPEARANCE FLUID: CLEAR
BILIRUBIN, POC: ABNORMAL
BLOOD URINE, POC: ABNORMAL
CLARITY, POC: CLEAR
COLOR, POC: YELLOW
GLUCOSE URINE, POC: ABNORMAL
KETONES, POC: ABNORMAL
LEUKOCYTE EST, POC: ABNORMAL
NITRITE, POC: ABNORMAL
PH, POC: ABNORMAL
PROTEIN, POC: ABNORMAL
SPECIFIC GRAVITY, POC: ABNORMAL
UROBILINOGEN, POC: ABNORMAL

## 2023-07-27 PROCEDURE — 81002 URINALYSIS NONAUTO W/O SCOPE: CPT | Performed by: FAMILY MEDICINE

## 2023-07-27 RX ORDER — SULFAMETHOXAZOLE AND TRIMETHOPRIM 800; 160 MG/1; MG/1
1 TABLET ORAL 2 TIMES DAILY
Qty: 14 TABLET | Refills: 0 | Status: SHIPPED | OUTPATIENT
Start: 2023-07-27 | End: 2023-08-03

## 2023-07-30 LAB
BACTERIA UR CULT: ABNORMAL
ORGANISM: ABNORMAL
ORGANISM: ABNORMAL

## 2023-07-31 LAB
BACTERIA UR CULT: ABNORMAL
ORGANISM: ABNORMAL
ORGANISM: ABNORMAL

## 2023-08-08 ENCOUNTER — OFFICE VISIT (OUTPATIENT)
Dept: GASTROENTEROLOGY | Age: 43
End: 2023-08-08
Payer: COMMERCIAL

## 2023-08-08 VITALS
SYSTOLIC BLOOD PRESSURE: 120 MMHG | BODY MASS INDEX: 32.62 KG/M2 | DIASTOLIC BLOOD PRESSURE: 80 MMHG | HEART RATE: 77 BPM | HEIGHT: 65 IN | OXYGEN SATURATION: 98 % | WEIGHT: 195.8 LBS

## 2023-08-08 DIAGNOSIS — K58.1 IRRITABLE BOWEL SYNDROME WITH CONSTIPATION: Primary | ICD-10-CM

## 2023-08-08 PROCEDURE — 99214 OFFICE O/P EST MOD 30 MIN: CPT | Performed by: NURSE PRACTITIONER

## 2023-08-08 PROCEDURE — 3078F DIAST BP <80 MM HG: CPT | Performed by: NURSE PRACTITIONER

## 2023-08-08 PROCEDURE — 1036F TOBACCO NON-USER: CPT | Performed by: NURSE PRACTITIONER

## 2023-08-08 PROCEDURE — G8417 CALC BMI ABV UP PARAM F/U: HCPCS | Performed by: NURSE PRACTITIONER

## 2023-08-08 PROCEDURE — 3074F SYST BP LT 130 MM HG: CPT | Performed by: NURSE PRACTITIONER

## 2023-08-08 PROCEDURE — G8427 DOCREV CUR MEDS BY ELIG CLIN: HCPCS | Performed by: NURSE PRACTITIONER

## 2023-08-08 RX ORDER — CHOLECALCIFEROL (VITAMIN D3) 125 MCG
500 CAPSULE ORAL DAILY
COMMUNITY

## 2023-08-08 RX ORDER — LUBIPROSTONE 24 UG/1
24 CAPSULE ORAL 2 TIMES DAILY WITH MEALS
Qty: 60 CAPSULE | Refills: 11 | Status: SHIPPED | OUTPATIENT
Start: 2023-08-08

## 2023-08-08 NOTE — PROGRESS NOTES
Subjective:     Patient ID: Luis F Ledezma is a 38 y.o. female  PCP: Larissa Jara MD  Referring Provider: No ref. provider found    HPI  Patient presents to the office today with the following complaints: No chief complaint on file. Patient seen in the office today for annual follow up   She has chronic constipation   She has been taking Trulance and this was working, but lately she is going 4-5 days with no bowel movement   She is continuing with her fiber supplement as well. Colonoscopy last performed 9/2022 and was reported normal with a 6 year recall    Denies upper GI issues at this time  Assessment:     1. Irritable bowel syndrome with constipation  -     lubiprostone (AMITIZA) 24 MCG capsule; Take 1 capsule by mouth 2 times daily (with meals), Disp-60 capsule, R-11Normal           Plan:   Continue fiber supplement   Continue probiotic  Miralax PRN  Stop Trulance and start Amitizia. Follow up in office in 1 year or sooner if needed   Orders  No orders of the defined types were placed in this encounter.     Medications  Orders Placed This Encounter   Medications    lubiprostone (AMITIZA) 24 MCG capsule     Sig: Take 1 capsule by mouth 2 times daily (with meals)     Dispense:  60 capsule     Refill:  11         Patient History:     Past Medical History:   Diagnosis Date    B12 deficiency     Cauda equina syndrome ([address])     Complicated migraine     Depression     GERD (gastroesophageal reflux disease)     Hematuria     wih abdominal pain    Hyperlipidemia     Hypertension     MS (multiple sclerosis) (HCC)     Myofascial pain     Nerve damage     Neutropenia ([address]) 10/21/2015    Obesity     Pelvic floor dysfunction     Postmenopausal HRT (hormone replacement therapy)     POTS (postural orthostatic tachycardia syndrome)     Sleep apnea 2021       Past Surgical History:   Procedure Laterality Date    APPENDECTOMY      BACK SURGERY      CHOLECYSTECTOMY, LAPAROSCOPIC      COLONOSCOPY  11/16/2015

## 2023-08-09 ASSESSMENT — ENCOUNTER SYMPTOMS
SHORTNESS OF BREATH: 0
CHOKING: 0
RECTAL PAIN: 0
BLOOD IN STOOL: 0
CONSTIPATION: 1
VOMITING: 0
ABDOMINAL PAIN: 0
TROUBLE SWALLOWING: 0
NAUSEA: 0
DIARRHEA: 0
COUGH: 0
ANAL BLEEDING: 0
ABDOMINAL DISTENTION: 0

## 2023-08-14 ENCOUNTER — NURSE ONLY (OUTPATIENT)
Dept: PRIMARY CARE CLINIC | Age: 43
End: 2023-08-14
Payer: COMMERCIAL

## 2023-08-14 ENCOUNTER — OFFICE VISIT (OUTPATIENT)
Dept: OBGYN CLINIC | Age: 43
End: 2023-08-14
Payer: COMMERCIAL

## 2023-08-14 VITALS
SYSTOLIC BLOOD PRESSURE: 133 MMHG | WEIGHT: 192 LBS | HEART RATE: 98 BPM | BODY MASS INDEX: 31.95 KG/M2 | DIASTOLIC BLOOD PRESSURE: 84 MMHG

## 2023-08-14 DIAGNOSIS — N63.11 MASS OF UPPER OUTER QUADRANT OF RIGHT BREAST: Primary | ICD-10-CM

## 2023-08-14 DIAGNOSIS — R30.0 DYSURIA: Primary | ICD-10-CM

## 2023-08-14 LAB
APPEARANCE FLUID: ABNORMAL
BILIRUBIN, POC: ABNORMAL
BLOOD URINE, POC: ABNORMAL
CLARITY, POC: ABNORMAL
COLOR, POC: YELLOW
GLUCOSE URINE, POC: ABNORMAL
KETONES, POC: ABNORMAL
LEUKOCYTE EST, POC: ABNORMAL
NITRITE, POC: ABNORMAL
PH, POC: 6
PROTEIN, POC: ABNORMAL
SPECIFIC GRAVITY, POC: 1.03
UROBILINOGEN, POC: 0.2

## 2023-08-14 PROCEDURE — 1036F TOBACCO NON-USER: CPT | Performed by: NURSE PRACTITIONER

## 2023-08-14 PROCEDURE — 3078F DIAST BP <80 MM HG: CPT | Performed by: NURSE PRACTITIONER

## 2023-08-14 PROCEDURE — 81002 URINALYSIS NONAUTO W/O SCOPE: CPT | Performed by: FAMILY MEDICINE

## 2023-08-14 PROCEDURE — G8427 DOCREV CUR MEDS BY ELIG CLIN: HCPCS | Performed by: NURSE PRACTITIONER

## 2023-08-14 PROCEDURE — 99213 OFFICE O/P EST LOW 20 MIN: CPT | Performed by: NURSE PRACTITIONER

## 2023-08-14 PROCEDURE — 3074F SYST BP LT 130 MM HG: CPT | Performed by: NURSE PRACTITIONER

## 2023-08-14 PROCEDURE — G8417 CALC BMI ABV UP PARAM F/U: HCPCS | Performed by: NURSE PRACTITIONER

## 2023-08-14 NOTE — PROGRESS NOTES
[facility]  CNM Office Note    Tere Councilman is a 38 y.o. female who presents today for her medical conditions/ complaints as noted below. Chief Complaint   Patient presents with    Breast Problem     Pt is here c/o about lump on right breast. The lump is located above the nipple area, she mentions it is a little tender. She did have mammo in 1/2023 which was negative. Breast Problem      The patient is in the office today because of breast lump she has discovered. She has found the lump appx 5 days ago. It is located in  her right breast in the upper, outer quadrant 10 oclock region. The size of the lump is approximately 2 cm. Since she found it, is not progressively getting bigger. She does feel it is painful. She does not think it is related to any injury or excessive exercise. There has not been burning or itching. There is no skin rash or redness noted. She denies any increased caffeine intake recently nor stress. Her last mammography was done  January 2023. She has been on totally caffeine free, taking Vitamin E 400 IU daily and frequent SBE.   Patient Active Problem List   Diagnosis    Complicated migraine    Hematuria    Depression    Nerve damage    Cauda equina syndrome (HCC)    Elevated cholesterol    Postural orthostatic tachycardia syndrome    Vitamin B12 deficiency (non anemic)    Pelvic floor dysfunction    Myofascial pain    Relapsing-remitting multiple sclerosis (HCC)    Hypertensive disorder    Insomnia    Right kidney stone    Trigeminal neuralgia    Urinary incontinence    Vitamin D deficiency    RUQ abdominal pain    Elevated hemidiaphragm    LÓPEZ (dyspnea on exertion)    Snoring    Orthopnea    Non-restorative sleep    SHYAM (obstructive sleep apnea)    Obesity (BMI 35.0-39.9 without comorbidity)    Pleurisy    Neurogenic bladder    Radial styloid tenosynovitis    Degeneration of cervical intervertebral disc    Stress incontinence    Gastroesophageal

## 2023-08-15 LAB
BACTERIA UR CULT: ABNORMAL
BACTERIA UR CULT: ABNORMAL
ORGANISM: ABNORMAL

## 2023-08-16 RX ORDER — AMOXICILLIN 875 MG/1
875 TABLET, COATED ORAL 2 TIMES DAILY
Qty: 20 TABLET | Refills: 0 | Status: SHIPPED | OUTPATIENT
Start: 2023-08-16 | End: 2023-08-26
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
## 2023-08-21 ENCOUNTER — TELEPHONE (OUTPATIENT)
Dept: PRIMARY CARE CLINIC | Age: 43
End: 2023-08-21

## 2023-08-21 ASSESSMENT — ENCOUNTER SYMPTOMS
ALLERGIC/IMMUNOLOGIC NEGATIVE: 1
GASTROINTESTINAL NEGATIVE: 1
RESPIRATORY NEGATIVE: 1
EYES NEGATIVE: 1

## 2023-08-21 NOTE — TELEPHONE ENCOUNTER
[facility] states since Pt saw Dr. Jessica Prasad they will not be able to see Pt. They states they do not see Pt that saw him.

## 2023-08-23 ENCOUNTER — OFFICE VISIT (OUTPATIENT)
Dept: UROLOGY | Facility: CLINIC | Age: 43
End: 2023-08-23
Payer: COMMERCIAL

## 2023-08-23 ENCOUNTER — HOSPITAL ENCOUNTER (OUTPATIENT)
Dept: GENERAL RADIOLOGY | Facility: HOSPITAL | Age: 43
Discharge: HOME | End: 2023-08-23
Payer: COMMERCIAL

## 2023-08-23 VITALS — HEIGHT: 65 IN | TEMPERATURE: 97.2 F | WEIGHT: 198 LBS | BODY MASS INDEX: 32.99 KG/M2

## 2023-08-23 DIAGNOSIS — R20.2 NUMBNESS AND TINGLING: ICD-10-CM

## 2023-08-23 DIAGNOSIS — N20.0 NEPHROLITHIASIS: ICD-10-CM

## 2023-08-23 DIAGNOSIS — M54.16 LUMBAR RADICULOPATHY: ICD-10-CM

## 2023-08-23 DIAGNOSIS — R10.9 LEFT FLANK PAIN: ICD-10-CM

## 2023-08-23 DIAGNOSIS — M79.10 MYALGIA: ICD-10-CM

## 2023-08-23 DIAGNOSIS — R20.0 NUMBNESS AND TINGLING: ICD-10-CM

## 2023-08-23 DIAGNOSIS — N31.9 NEUROGENIC BLADDER: Primary | ICD-10-CM

## 2023-08-23 DIAGNOSIS — N30.00 ACUTE CYSTITIS WITHOUT HEMATURIA: Primary | ICD-10-CM

## 2023-08-23 PROCEDURE — 74018 RADEX ABDOMEN 1 VIEW: CPT

## 2023-08-23 PROCEDURE — 87086 URINE CULTURE/COLONY COUNT: CPT

## 2023-08-23 RX ORDER — ERENUMAB-AOOE 70 MG/ML
70 INJECTION SUBCUTANEOUS
COMMUNITY
Start: 2023-05-09

## 2023-08-23 RX ORDER — LUBIPROSTONE 24 UG/1
24 CAPSULE ORAL
COMMUNITY
Start: 2023-08-08

## 2023-08-23 RX ORDER — GABAPENTIN 300 MG/1
300 CAPSULE ORAL 3 TIMES DAILY
COMMUNITY
Start: 2023-07-12 | End: 2024-01-09

## 2023-08-23 RX ORDER — AMOXICILLIN 875 MG/1
875 TABLET, COATED ORAL 2 TIMES DAILY
COMMUNITY
Start: 2023-08-16 | End: 2023-08-27

## 2023-08-23 RX ORDER — METOPROLOL SUCCINATE 25 MG/1
12.5 TABLET, EXTENDED RELEASE ORAL DAILY
COMMUNITY

## 2023-08-23 RX ORDER — PROMETHAZINE HYDROCHLORIDE 25 MG/1
25 TABLET ORAL EVERY 6 HOURS PRN
COMMUNITY

## 2023-08-24 LAB — BACTERIA SPEC AEROBE CULT: NO GROWTH

## 2023-08-24 NOTE — PROGRESS NOTES
Small questionable area within the left distal ureter. If pt still experiencing a lot of left flank pain recommend ct stone protocol. If pt not under a lot of pain at present recommend f/u in 3 weeks with renal us and kub

## 2023-08-25 ENCOUNTER — HOSPITAL ENCOUNTER (OUTPATIENT)
Dept: CT IMAGING | Facility: HOSPITAL | Age: 43
Discharge: HOME | End: 2023-08-25
Payer: COMMERCIAL

## 2023-08-25 ENCOUNTER — TELEPHONE (OUTPATIENT)
Dept: UROLOGY | Facility: CLINIC | Age: 43
End: 2023-08-25
Payer: COMMERCIAL

## 2023-08-25 DIAGNOSIS — N20.0 KIDNEY STONES: Primary | ICD-10-CM

## 2023-08-25 DIAGNOSIS — N20.0 KIDNEY STONES: ICD-10-CM

## 2023-08-25 DIAGNOSIS — R10.9 LEFT FLANK PAIN: Primary | ICD-10-CM

## 2023-08-25 PROCEDURE — 74176 CT ABD & PELVIS W/O CONTRAST: CPT

## 2023-08-25 NOTE — TELEPHONE ENCOUNTER
Spoke with patient to let her know her scan showed Small questionable area within the left distal ureter.. I asked this patient if she was still having symptoms and since the patient was I got a order for a ct with stone protocol. I let the patient know that the scheduling department will call to get that scheduled. Patient verbalized understanding.

## 2023-08-25 NOTE — TELEPHONE ENCOUNTER
----- Message from SID Cheek sent at 8/24/2023  8:22 AM CDT -----  Small questionable area within the left distal ureter. If pt still experiencing a lot of left flank pain recommend ct stone protocol. If pt not under a lot of pain at present recommend f/u in 3 weeks with renal us and kub

## 2023-08-28 NOTE — TELEPHONE ENCOUNTER
Mirella Burgos (: 1952) is a 66 y.o. female here for evaluation of the following chief complaint(s):  No chief complaint on file. SUBJECTIVE/OBJECTIVE:  HPI    Ms. Denia Cao, 65 yo female, here today for follow-up on hypertension, CKD stage 3a, hyperlipidemia, prediabetes. Bp validate. org  BP at home 120s/80s-  continue the same    Cough returned-  -  Claritin helps this. Not on File    Current Outpatient Medications   Medication Sig Dispense Refill    metoprolol succinate (TOPROL XL) 25 MG extended release tablet TAKE 1 TABLET BY MOUTH EVERY DAY 90 tablet 3    atorvastatin (LIPITOR) 20 MG tablet TAKE 1 TABLET BY MOUTH EVERY DAY AT NIGHT 90 tablet 0    amLODIPine (NORVASC) 10 MG tablet TAKE 1 TABLET BY MOUTH EVERY DAY 90 tablet 1    vitamin D (CHOLECALCIFEROL) 25 MCG (1000 UT) TABS tablet Take 1,000 Units by mouth daily      zoster recombinant adjuvanted vaccine [facility]) 50 MCG/0.5ML SUSR injection 0.5mL by IntraMUSCular route once now and then repeat in 2-6 months       No current facility-administered medications for this visit. Past Medical History:   Diagnosis Date    Dyslipidemia 2012    History of acute renal failure 2012    History of chickenpox     History of measles childhood    History of mumps childhood    Obesity, Class I, BMI 30-34.9 2012    White coat syndrome without diagnosis of hypertension     exacerbated by stress     Past Surgical History:   Procedure Laterality Date    BIOPSY BREAST Right 2011    benign     BREAST BIOPSY Right     us bx negative     CATARACT REMOVAL      COLONOSCOPY  3/29/2011         HYSTERECTOMY, TOTAL ABDOMINAL (CERVIX REMOVED)      with Unilateral oopherectomy, b/l salpingectomy due to ectopic pregnancy.      Family History   Problem Relation Age of Onset    Liver Disease Brother         cirrhosis    Cancer Neg Hx     Heart Disease Mother     Diabetes Mother     Stroke Father 37        heavy alcohol Provider needs to review PDMP    PDMP Monitoring:    Last PDMP Jefferson Rhodes as Reviewed [facility]):  Review User Review Instant Review Result   Oziel Ochoa 3/14/2022  2:52 PM Reviewed PDMP [1]     Urine Drug Screenings (1 yr)     POCT Rapid Drug Screen  Collected: 3/14/2022 (Final result)          POCT Rapid Drug Screen  Collected: 3/11/2021  1:39 PM (Final result)          POCT Rapid Drug Screen  Collected: 2/12/2020 10:27 AM (Final result)          POCT Rapid Drug Screen  Collected: 8/3/2018  2:47 PM (Final result)              Medication Contract and Consent for Opioid Use Documents Filed     Patient Documents     Type of Document Status Date Received Received By Description    Medication Contract Signed 8/9/2019  3:11 PM AVEL RILEY     Medication Contract Signed 8/12/2020 12:03 PM Kylah Roberson

## 2023-08-31 ENCOUNTER — HOSPITAL ENCOUNTER (OUTPATIENT)
Dept: WOMENS IMAGING | Age: 43
Discharge: HOME | End: 2023-08-31
Payer: COMMERCIAL

## 2023-08-31 DIAGNOSIS — N63.11 MASS OF UPPER OUTER QUADRANT OF RIGHT BREAST: ICD-10-CM

## 2023-08-31 PROCEDURE — 76642 ULTRASOUND BREAST LIMITED: CPT

## 2023-08-31 PROCEDURE — G0279 TOMOSYNTHESIS, MAMMO: HCPCS

## 2023-08-31 RX ORDER — METOPROLOL SUCCINATE 25 MG/1
TABLET, EXTENDED RELEASE ORAL
Qty: 15 TABLET | Refills: 5 | Status: SHIPPED | OUTPATIENT
Start: 2023-08-31 | End: 2023-09-01 | Stop reason: SDUPTHER

## 2023-09-01 ENCOUNTER — TELEPHONE (OUTPATIENT)
Dept: UROLOGY | Facility: CLINIC | Age: 43
End: 2023-09-01
Payer: COMMERCIAL

## 2023-09-01 RX ORDER — METOPROLOL SUCCINATE 25 MG/1
12.5 TABLET, EXTENDED RELEASE ORAL DAILY
Qty: 45 TABLET | Refills: 5 | Status: SHIPPED | OUTPATIENT
Start: 2023-09-01

## 2023-09-01 NOTE — TELEPHONE ENCOUNTER
----- Message from SID Cheek sent at 8/27/2023  7:12 AM CDT -----  No ureteral stone seen to explain pain. Pt with bilateral non obstructing stones. Pt appears to have moderate amount of stools throughout the colon. Recommend a bowel regimen

## 2023-09-01 NOTE — TELEPHONE ENCOUNTER
Spoke with patient about her CT scan and to let her know...No ureteral stone seen to explain pain. Pt with bilateral non obstructing stones. Pt appears to have moderate amount of stools throughout the colon. Recommend a bowel regimen     Patient verbalized understanding.

## 2023-09-09 RX ORDER — ERENUMAB-AOOE 70 MG/ML
INJECTION SUBCUTANEOUS
Qty: 1 ML | Refills: 5 | Status: SHIPPED | OUTPATIENT
Start: 2023-09-09

## 2023-10-20 RX ORDER — ERENUMAB-AOOE 70 MG/ML
INJECTION SUBCUTANEOUS
Qty: 1 ML | Refills: 5 | Status: SHIPPED | OUTPATIENT
Start: 2023-10-20

## 2023-10-23 RX ORDER — ERENUMAB-AOOE 70 MG/ML
INJECTION SUBCUTANEOUS
Qty: 1 ML | Refills: 5 | OUTPATIENT
Start: 2023-10-23

## 2023-10-23 RX ORDER — ERENUMAB-AOOE 70 MG/ML
INJECTION SUBCUTANEOUS
Qty: 1 ML | Refills: 5 | Status: SHIPPED | OUTPATIENT
Start: 2023-10-23

## 2023-10-31 ENCOUNTER — HOSPITAL ENCOUNTER (OUTPATIENT)
Dept: GENERAL RADIOLOGY | Facility: HOSPITAL | Age: 43
Discharge: HOME | End: 2023-10-31
Admitting: INTERNAL MEDICINE
Payer: COMMERCIAL

## 2023-10-31 ENCOUNTER — TRANSCRIBE ORDERS (OUTPATIENT)
Dept: ADMINISTRATIVE | Facility: HOSPITAL | Age: 43
End: 2023-10-31
Payer: COMMERCIAL

## 2023-10-31 DIAGNOSIS — M25.50 ARTHRALGIA OF MULTIPLE SITES: ICD-10-CM

## 2023-10-31 DIAGNOSIS — M25.50 ARTHRALGIA OF MULTIPLE SITES: Primary | ICD-10-CM

## 2023-10-31 PROCEDURE — 73562 X-RAY EXAM OF KNEE 3: CPT

## 2023-10-31 PROCEDURE — 73630 X-RAY EXAM OF FOOT: CPT

## 2023-10-31 PROCEDURE — 73130 X-RAY EXAM OF HAND: CPT

## 2023-11-24 ENCOUNTER — TRANSCRIBE ORDERS (OUTPATIENT)
Dept: ADMINISTRATIVE | Facility: HOSPITAL | Age: 43
End: 2023-11-24
Payer: COMMERCIAL

## 2023-11-24 DIAGNOSIS — R53.1 WEAKNESS OF LEFT SIDE OF BODY: ICD-10-CM

## 2023-11-24 DIAGNOSIS — R20.2 TINGLING: ICD-10-CM

## 2023-11-24 DIAGNOSIS — R20.0 NUMBNESS: Primary | ICD-10-CM

## 2023-11-27 ENCOUNTER — TRANSCRIBE ORDERS (OUTPATIENT)
Dept: ADMINISTRATIVE | Facility: HOSPITAL | Age: 43
End: 2023-11-27
Payer: COMMERCIAL

## 2023-11-27 DIAGNOSIS — R20.2 NUMBNESS AND TINGLING: Primary | ICD-10-CM

## 2023-11-27 DIAGNOSIS — R53.1 LEFT-SIDED WEAKNESS: ICD-10-CM

## 2023-11-27 DIAGNOSIS — R20.0 NUMBNESS AND TINGLING: Primary | ICD-10-CM

## 2023-12-05 ENCOUNTER — OFFICE VISIT (OUTPATIENT)
Dept: PULMONOLOGY | Age: 43
End: 2023-12-05
Payer: COMMERCIAL

## 2023-12-05 VITALS
BODY MASS INDEX: 33.82 KG/M2 | OXYGEN SATURATION: 99 % | WEIGHT: 203 LBS | TEMPERATURE: 97 F | HEART RATE: 89 BPM | HEIGHT: 65 IN | SYSTOLIC BLOOD PRESSURE: 138 MMHG | DIASTOLIC BLOOD PRESSURE: 90 MMHG

## 2023-12-05 DIAGNOSIS — R06.09 DOE (DYSPNEA ON EXERTION): ICD-10-CM

## 2023-12-05 DIAGNOSIS — J98.6 ELEVATED HEMIDIAPHRAGM: ICD-10-CM

## 2023-12-05 DIAGNOSIS — E66.9 OBESITY (BMI 35.0-39.9 WITHOUT COMORBIDITY): ICD-10-CM

## 2023-12-05 DIAGNOSIS — G47.33 OSA (OBSTRUCTIVE SLEEP APNEA): Primary | ICD-10-CM

## 2023-12-05 DIAGNOSIS — K21.9 GASTROESOPHAGEAL REFLUX DISEASE WITHOUT ESOPHAGITIS: ICD-10-CM

## 2023-12-05 PROCEDURE — 1036F TOBACCO NON-USER: CPT | Performed by: INTERNAL MEDICINE

## 2023-12-05 PROCEDURE — G8484 FLU IMMUNIZE NO ADMIN: HCPCS | Performed by: INTERNAL MEDICINE

## 2023-12-05 PROCEDURE — 99214 OFFICE O/P EST MOD 30 MIN: CPT | Performed by: INTERNAL MEDICINE

## 2023-12-05 PROCEDURE — G8417 CALC BMI ABV UP PARAM F/U: HCPCS | Performed by: INTERNAL MEDICINE

## 2023-12-05 PROCEDURE — G8427 DOCREV CUR MEDS BY ELIG CLIN: HCPCS | Performed by: INTERNAL MEDICINE

## 2023-12-05 PROCEDURE — 3080F DIAST BP >= 90 MM HG: CPT | Performed by: INTERNAL MEDICINE

## 2023-12-05 PROCEDURE — 3075F SYST BP GE 130 - 139MM HG: CPT | Performed by: INTERNAL MEDICINE

## 2023-12-05 ASSESSMENT — ENCOUNTER SYMPTOMS
ABDOMINAL PAIN: 0
WHEEZING: 0
APNEA: 1
ANAL BLEEDING: 0
ABDOMINAL DISTENTION: 0
RHINORRHEA: 0
SHORTNESS OF BREATH: 1
CHEST TIGHTNESS: 0
COUGH: 0
BACK PAIN: 0

## 2023-12-05 NOTE — PROGRESS NOTES
Pulmonary and Sleep Medicine    Milena See (:  1980) is a 37 y.o. female,Established patient, here for evaluation of the following chief complaint(s):  Follow-up (6 mo f/u SHYAM)      Referring physician:  No referring provider defined for this encounter. ASSESSMENT/PLAN:  1. SHYAM (obstructive sleep apnea) on CPAP  2. Gastroesophageal reflux disease without esophagitis  3. LÓPEZ (dyspnea on exertion)  4. 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 Obesity (BMI 35.0-39.9 without comorbidity)        Continue current management with the CPAP she is compliant with the CPAP she feels that CPAP helps. Blood pressure medications management per Dr. Unique Orozco. Jessika Florentino MD, [facility], [facility]    Return in about 6 months (around 2024). SUBJECTIVE/OBJECTIVE:  Patient is here for follow-up on obstructive sleep apnea. She is using the CPAP. Her CPAP compliance data was reviewed by myself. My interpretation of the data is that she is using the CPAP about 6 and half hours a night. Her apnea-hypopnea index is 1.3 events per hour. Denies any significant issues with the CPAP machine. We discussed her BP meds and regimen. She is concerned about fluctuations in her BP. We discussed keeping a diary of her BP checks. Prior to Visit Medications    Medication Sig Taking?  Authorizing Provider   Erenumab-aooe (AIMOVIG) 70 MG/ML SOAJ TAKE ONE INJECTION SUBCUTANEOUSLY MONTHLY Yes Cornell Landeros MD   metoprolol succinate (TOPROL XL) 25 MG extended release tablet Take 0.5 tablets by mouth daily Yes Howie Khanna MD   vitamin B-12 (CYANOCOBALAMIN) 500 MCG tablet Take 1 tablet by mouth daily Yes Provider, MD Mackenzie   lubiprostone (AMITIZA) 24 MCG capsule Take 1 capsule by mouth 2 times daily (with meals) Yes Odette Childers APRN   topiramate (TOPAMAX) 100 MG tablet Take 1 tablet by mouth 2 times daily Yes Daisy Khanna MD   gabapentin (NEURONTIN) 300 MG capsule Take 1 capsule by

## 2023-12-06 DIAGNOSIS — M85.80 OSTEOPENIA, UNSPECIFIED LOCATION: Primary | ICD-10-CM

## 2023-12-06 DIAGNOSIS — Z78.0 POSTMENOPAUSAL: ICD-10-CM

## 2023-12-08 ENCOUNTER — HOSPITAL ENCOUNTER (OUTPATIENT)
Dept: MRI IMAGING | Facility: HOSPITAL | Age: 43
Discharge: HOME | End: 2023-12-08
Payer: COMMERCIAL

## 2023-12-08 DIAGNOSIS — R53.1 WEAKNESS OF LEFT SIDE OF BODY: ICD-10-CM

## 2023-12-08 DIAGNOSIS — R20.2 TINGLING: ICD-10-CM

## 2023-12-08 DIAGNOSIS — R20.2 NUMBNESS AND TINGLING: ICD-10-CM

## 2023-12-08 DIAGNOSIS — R20.0 NUMBNESS AND TINGLING: ICD-10-CM

## 2023-12-08 DIAGNOSIS — R20.0 NUMBNESS: ICD-10-CM

## 2023-12-08 DIAGNOSIS — R53.1 LEFT-SIDED WEAKNESS: ICD-10-CM

## 2023-12-08 LAB — CREAT BLDA-MCNC: 1.1 MG/DL (ref 0.6–1.3)

## 2023-12-08 PROCEDURE — 82565 ASSAY OF CREATININE: CPT

## 2023-12-08 PROCEDURE — 0 GADOBENATE DIMEGLUMINE 529 MG/ML SOLUTION: Performed by: PSYCHIATRY & NEUROLOGY

## 2023-12-08 PROCEDURE — 72157 MRI CHEST SPINE W/O & W/DYE: CPT

## 2023-12-08 PROCEDURE — A9577 INJ MULTIHANCE: HCPCS | Performed by: PSYCHIATRY & NEUROLOGY

## 2023-12-08 PROCEDURE — 72156 MRI NECK SPINE W/O & W/DYE: CPT

## 2023-12-08 PROCEDURE — 70553 MRI BRAIN STEM W/O & W/DYE: CPT

## 2023-12-08 RX ADMIN — GADOBENATE DIMEGLUMINE 18 ML: 529 INJECTION, SOLUTION INTRAVENOUS at 13:41

## 2023-12-14 DIAGNOSIS — R20.0 NUMBNESS AND TINGLING: ICD-10-CM

## 2023-12-14 DIAGNOSIS — R20.2 NUMBNESS AND TINGLING: ICD-10-CM

## 2023-12-14 RX ORDER — GABAPENTIN 300 MG/1
300 CAPSULE ORAL 3 TIMES DAILY
Qty: 90 CAPSULE | Refills: 2 | Status: SHIPPED | OUTPATIENT
Start: 2023-12-14 | End: 2024-03-13

## 2023-12-14 NOTE — TELEPHONE ENCOUNTER
Provider needs to review PDMP    PDMP Monitoring:    Last PDMP Ara Ends as Reviewed [facility]):  Review User Review Instant Review Result   Costa Shahram 7/22/2023  5:12 PM Reviewed PDMP [1]     Urine Drug Screenings (1 yr)       POCT Rapid Drug Screen  Collected: 3/14/2022 (Final result)              POCT Rapid Drug Screen  Collected: 3/11/2021  1:39 PM (Final result)              POCT Rapid Drug Screen  Collected: 2/12/2020 10:27 AM (Final result)              POCT Rapid Drug Screen  Collected: 8/3/2018  2:47 PM (Final result)                  Medication Contract and Consent for Opioid Use Documents Filed       Patient Documents       Type of Document Status Date Received Received By Description    Medication Contract Signed 8/9/2019  3:11 PM AVEL RILEY     Medication Contract Signed 8/12/2020 12:03 PM Edgar Ortiz     Medication Contract Received 3/22/2023  5:28 PM Brandan Diaz med agreement  3-22-23

## 2024-01-10 ASSESSMENT — PATIENT HEALTH QUESTIONNAIRE - PHQ9
5. POOR APPETITE OR OVEREATING: 0
6. FEELING BAD ABOUT YOURSELF - OR THAT YOU ARE A FAILURE OR HAVE LET YOURSELF OR YOUR FAMILY DOWN: 1
SUM OF ALL RESPONSES TO PHQ QUESTIONS 1-9: 6
4. FEELING TIRED OR HAVING LITTLE ENERGY: SEVERAL DAYS
8. MOVING OR SPEAKING SO SLOWLY THAT OTHER PEOPLE COULD HAVE NOTICED. OR THE OPPOSITE, BEING SO FIGETY OR RESTLESS THAT YOU HAVE BEEN MOVING AROUND A LOT MORE THAN USUAL: 0
3. TROUBLE FALLING OR STAYING ASLEEP: 1
SUM OF ALL RESPONSES TO PHQ QUESTIONS 1-9: 6
SUM OF ALL RESPONSES TO PHQ QUESTIONS 1-9: 6
2. FEELING DOWN, DEPRESSED OR HOPELESS: 1
6. FEELING BAD ABOUT YOURSELF - OR THAT YOU ARE A FAILURE OR HAVE LET YOURSELF OR YOUR FAMILY DOWN: SEVERAL DAYS
9. THOUGHTS THAT YOU WOULD BE BETTER OFF DEAD, OR OF HURTING YOURSELF: NOT AT ALL
9. THOUGHTS THAT YOU WOULD BE BETTER OFF DEAD, OR OF HURTING YOURSELF: 0
8. MOVING OR SPEAKING SO SLOWLY THAT OTHER PEOPLE COULD HAVE NOTICED. OR THE OPPOSITE - BEING SO FIDGETY OR RESTLESS THAT YOU HAVE BEEN MOVING AROUND A LOT MORE THAN USUAL: NOT AT ALL
7. TROUBLE CONCENTRATING ON THINGS, SUCH AS READING THE NEWSPAPER OR WATCHING TELEVISION: 1
SUM OF ALL RESPONSES TO PHQ QUESTIONS 1-9: 6
10. IF YOU CHECKED OFF ANY PROBLEMS, HOW DIFFICULT HAVE THESE PROBLEMS MADE IT FOR YOU TO DO YOUR WORK, TAKE CARE OF THINGS AT HOME, OR GET ALONG WITH OTHER PEOPLE: 1
5. POOR APPETITE OR OVEREATING: NOT AT ALL
SUM OF ALL RESPONSES TO PHQ QUESTIONS 1-9: 6
7. TROUBLE CONCENTRATING ON THINGS, SUCH AS READING THE NEWSPAPER OR WATCHING TELEVISION: SEVERAL DAYS
3. TROUBLE FALLING OR STAYING ASLEEP: SEVERAL DAYS
10. IF YOU CHECKED OFF ANY PROBLEMS, HOW DIFFICULT HAVE THESE PROBLEMS MADE IT FOR YOU TO DO YOUR WORK, TAKE CARE OF THINGS AT HOME, OR GET ALONG WITH OTHER PEOPLE: SOMEWHAT DIFFICULT
SUM OF ALL RESPONSES TO PHQ9 QUESTIONS 1 & 2: 2
2. FEELING DOWN, DEPRESSED OR HOPELESS: SEVERAL DAYS
1. LITTLE INTEREST OR PLEASURE IN DOING THINGS: 1
1. LITTLE INTEREST OR PLEASURE IN DOING THINGS: SEVERAL DAYS
4. FEELING TIRED OR HAVING LITTLE ENERGY: 1

## 2024-01-11 ENCOUNTER — OFFICE VISIT (OUTPATIENT)
Dept: PRIMARY CARE CLINIC | Age: 44
End: 2024-01-11

## 2024-01-11 VITALS
BODY MASS INDEX: 33.05 KG/M2 | WEIGHT: 198.4 LBS | TEMPERATURE: 97.4 F | SYSTOLIC BLOOD PRESSURE: 164 MMHG | HEIGHT: 65 IN | OXYGEN SATURATION: 99 % | HEART RATE: 74 BPM | RESPIRATION RATE: 18 BRPM | DIASTOLIC BLOOD PRESSURE: 92 MMHG

## 2024-01-11 DIAGNOSIS — F44.7 FUNCTIONAL NEUROLOGICAL SYMPTOM DISORDER WITH MIXED SYMPTOMS: Primary | ICD-10-CM

## 2024-01-11 DIAGNOSIS — G83.4 CAUDA EQUINA SYNDROME (HCC): ICD-10-CM

## 2024-01-11 DIAGNOSIS — R03.0 ELEVATED BLOOD PRESSURE READING IN OFFICE WITHOUT DIAGNOSIS OF HYPERTENSION: ICD-10-CM

## 2024-01-11 DIAGNOSIS — G90.A POTS (POSTURAL ORTHOSTATIC TACHYCARDIA SYNDROME): ICD-10-CM

## 2024-01-11 PROBLEM — R53.1 WEAKNESS OF LEFT SIDE OF BODY: Status: ACTIVE | Noted: 2023-11-21

## 2024-01-11 PROBLEM — G43.009 COMMON MIGRAINE WITHOUT INTRACTABILITY: Status: ACTIVE | Noted: 2023-11-21

## 2024-01-13 ASSESSMENT — ENCOUNTER SYMPTOMS
CONSTIPATION: 1
WHEEZING: 0
SHORTNESS OF BREATH: 0
BACK PAIN: 1

## 2024-01-13 NOTE — PROGRESS NOTES
Physical Exam  Vitals and nursing note reviewed.   Constitutional:       General: She is not in acute distress.     Appearance: Normal appearance. She is well-developed.   HENT:      Head: Normocephalic.      Right Ear: Tympanic membrane, ear canal and external ear normal.      Left Ear: Tympanic membrane, ear canal and external ear normal.      Nose: Nose normal. No rhinorrhea.      Mouth/Throat:      Mouth: Mucous membranes are moist.      Pharynx: No posterior oropharyngeal erythema.   Eyes:      Extraocular Movements: Extraocular movements intact.      Conjunctiva/sclera: Conjunctivae normal.      Pupils: Pupils are equal, round, and reactive to light.   Neck:      Vascular: No carotid bruit.   Cardiovascular:      Rate and Rhythm: Normal rate and regular rhythm.      Heart sounds: Normal heart sounds. No murmur heard.  Pulmonary:      Effort: Pulmonary effort is normal. No respiratory distress.      Breath sounds: Normal breath sounds.   Abdominal:      General: Bowel sounds are normal.      Palpations: Abdomen is soft.      Tenderness: There is no abdominal tenderness.   Musculoskeletal:         General: No swelling. Normal range of motion.      Cervical back: Normal range of motion and neck supple.      Comments: She is walking with a cane   Lymphadenopathy:      Cervical: No cervical adenopathy.   Skin:     General: Skin is warm and dry.   Neurological:      Mental Status: She is alert and oriented to person, place, and time.   Psychiatric:         Mood and Affect: Mood normal.         Speech: Speech normal.         Behavior: Behavior normal.         Thought Content: Thought content normal.         Judgment: Judgment normal.      Comments: Patient very tearful, trying to wrap her head around this diagnosis.         ASSESSMENT:    1. Functional neurological symptom disorder with mixed symptoms    2. POTS (postural orthostatic tachycardia syndrome)    3. Elevated blood pressure reading in office without

## 2024-01-15 DIAGNOSIS — R20.0 NUMBNESS AND TINGLING: ICD-10-CM

## 2024-01-15 DIAGNOSIS — R20.2 NUMBNESS AND TINGLING: ICD-10-CM

## 2024-01-15 RX ORDER — GABAPENTIN 300 MG/1
300 CAPSULE ORAL 3 TIMES DAILY
Qty: 90 CAPSULE | Refills: 2 | Status: SHIPPED | OUTPATIENT
Start: 2024-01-15 | End: 2024-04-14

## 2024-01-17 DIAGNOSIS — F44.4 FUNCTIONAL NEUROLOGICAL SYMPTOM DISORDER WITH ABNORMAL MOVEMENT: ICD-10-CM

## 2024-01-17 DIAGNOSIS — G90.A POTS (POSTURAL ORTHOSTATIC TACHYCARDIA SYNDROME): Primary | ICD-10-CM

## 2024-01-17 DIAGNOSIS — G83.4 CAUDA EQUINA SYNDROME (HCC): ICD-10-CM

## 2024-01-17 DIAGNOSIS — M54.16 LUMBAR RADICULOPATHY: ICD-10-CM

## 2024-01-29 ENCOUNTER — TRANSCRIBE ORDERS (OUTPATIENT)
Dept: ADMINISTRATIVE | Facility: HOSPITAL | Age: 44
End: 2024-01-29
Payer: COMMERCIAL

## 2024-01-29 ENCOUNTER — HOSPITAL ENCOUNTER (OUTPATIENT)
Dept: GENERAL RADIOLOGY | Facility: HOSPITAL | Age: 44
Discharge: HOME | End: 2024-01-29
Admitting: PHYSICIAN ASSISTANT
Payer: COMMERCIAL

## 2024-01-29 DIAGNOSIS — M25.50 ARTHRALGIA OF MULTIPLE JOINTS: Primary | ICD-10-CM

## 2024-01-29 DIAGNOSIS — M25.50 ARTHRALGIA OF MULTIPLE JOINTS: ICD-10-CM

## 2024-01-29 PROCEDURE — 72200 X-RAY EXAM SI JOINTS: CPT

## 2024-02-26 ENCOUNTER — HOSPITAL ENCOUNTER (OUTPATIENT)
Dept: WOMENS IMAGING | Age: 44
Discharge: HOME | End: 2024-02-26
Attending: NURSE PRACTITIONER
Payer: COMMERCIAL

## 2024-02-26 DIAGNOSIS — Z12.31 ENCOUNTER FOR SCREENING MAMMOGRAM FOR MALIGNANT NEOPLASM OF BREAST: ICD-10-CM

## 2024-02-26 DIAGNOSIS — M85.80 OSTEOPENIA, UNSPECIFIED LOCATION: ICD-10-CM

## 2024-02-26 DIAGNOSIS — Z78.0 POSTMENOPAUSAL: ICD-10-CM

## 2024-02-26 PROCEDURE — 77080 DXA BONE DENSITY AXIAL: CPT

## 2024-02-26 PROCEDURE — 77063 BREAST TOMOSYNTHESIS BI: CPT

## 2024-03-11 ENCOUNTER — OFFICE VISIT (OUTPATIENT)
Dept: OBGYN CLINIC | Age: 44
End: 2024-03-11
Payer: COMMERCIAL

## 2024-03-11 VITALS
BODY MASS INDEX: 33.12 KG/M2 | HEART RATE: 91 BPM | WEIGHT: 199 LBS | DIASTOLIC BLOOD PRESSURE: 82 MMHG | SYSTOLIC BLOOD PRESSURE: 131 MMHG

## 2024-03-11 DIAGNOSIS — Z87.42 HX OF ENDOMETRIOSIS: ICD-10-CM

## 2024-03-11 DIAGNOSIS — Z12.39 ENCOUNTER FOR SCREENING BREAST EXAMINATION: ICD-10-CM

## 2024-03-11 DIAGNOSIS — G35 MS (MULTIPLE SCLEROSIS) (HCC): ICD-10-CM

## 2024-03-11 DIAGNOSIS — Z12.31 ENCOUNTER FOR SCREENING MAMMOGRAM FOR MALIGNANT NEOPLASM OF BREAST: ICD-10-CM

## 2024-03-11 DIAGNOSIS — R30.0 BURNING WITH URINATION: ICD-10-CM

## 2024-03-11 DIAGNOSIS — Z01.419 WELL WOMAN EXAM WITH ROUTINE GYNECOLOGICAL EXAM: Primary | ICD-10-CM

## 2024-03-11 DIAGNOSIS — R10.31 COLICKY RLQ ABDOMINAL PAIN: ICD-10-CM

## 2024-03-11 DIAGNOSIS — Z82.62 FAMILY HX OSTEOPOROSIS: ICD-10-CM

## 2024-03-11 DIAGNOSIS — Z90.710 HISTORY OF TOTAL HYSTERECTOMY: ICD-10-CM

## 2024-03-11 LAB
BACTERIA URNS QL MICRO: NEGATIVE /HPF
BILIRUB UR QL STRIP: NEGATIVE
CLARITY UR: CLEAR
COLOR UR: YELLOW
CRYSTALS URNS MICRO: NORMAL /HPF
EPI CELLS #/AREA URNS AUTO: 1 /HPF (ref 0–5)
GLUCOSE UR STRIP.AUTO-MCNC: NEGATIVE MG/DL
HGB UR STRIP.AUTO-MCNC: NEGATIVE MG/L
HYALINE CASTS #/AREA URNS AUTO: 0 /HPF (ref 0–8)
KETONES UR STRIP.AUTO-MCNC: NEGATIVE MG/DL
LEUKOCYTE ESTERASE UR QL STRIP.AUTO: ABNORMAL
NITRITE UR QL STRIP.AUTO: NEGATIVE
PH UR STRIP.AUTO: 5 [PH] (ref 5–8)
PROT UR STRIP.AUTO-MCNC: NEGATIVE MG/DL
RBC #/AREA URNS AUTO: 2 /HPF (ref 0–4)
SP GR UR STRIP.AUTO: 1.01 (ref 1–1.03)
UROBILINOGEN UR STRIP.AUTO-MCNC: 0.2 E.U./DL
WBC #/AREA URNS AUTO: 3 /HPF (ref 0–5)

## 2024-03-11 PROCEDURE — 3079F DIAST BP 80-89 MM HG: CPT | Performed by: NURSE PRACTITIONER

## 2024-03-11 PROCEDURE — 3075F SYST BP GE 130 - 139MM HG: CPT | Performed by: NURSE PRACTITIONER

## 2024-03-11 PROCEDURE — 99396 PREV VISIT EST AGE 40-64: CPT | Performed by: NURSE PRACTITIONER

## 2024-03-11 PROCEDURE — G8484 FLU IMMUNIZE NO ADMIN: HCPCS | Performed by: NURSE PRACTITIONER

## 2024-03-11 RX ORDER — PHENAZOPYRIDINE HYDROCHLORIDE 100 MG/1
TABLET, FILM COATED ORAL
COMMUNITY
Start: 2024-02-25

## 2024-03-11 RX ORDER — DULOXETIN HYDROCHLORIDE 30 MG/1
30 CAPSULE, DELAYED RELEASE ORAL DAILY
COMMUNITY
Start: 2024-02-23

## 2024-03-11 RX ORDER — CALCIUM CITRATE/VITAMIN D3 200MG-6.25
TABLET ORAL
COMMUNITY
Start: 2024-02-25

## 2024-03-11 NOTE — PROGRESS NOTES
[facility]  CNM Office Note    Nolvia Zavala is a 43 y.o. female who presents today for her medical conditions/ complaints as noted below.  Chief Complaint   Patient presents with    Annual Exam         HPI  Pt presents today for pelvic and breast exam.  She also complains of burning when she urinated with pain and frequent urination. This is ongoing and has been to urologist and nothing abnormal found.    She also reports some RLQ intermittent pain but can't relate it to any activity and not frequent occurrence, maybe a couple times per month.          Last mammogram:  2024  Last pap smear:    Contraception:  TLH due to endometriosis   :  1  Para:  1  AB:  0  Last bone density:  2024  Last colonoscopy:   2022    Patient Active Problem List   Diagnosis    Complicated migraine    Hematuria    Depression    Nerve damage    Cauda equina syndrome (HCC)    Elevated cholesterol    POTS (postural orthostatic tachycardia syndrome)    Vitamin B12 deficiency (non anemic)    Pelvic floor dysfunction    Myofascial pain    Hypertension    Insomnia    Right kidney stone    Trigeminal neuralgia    Urinary incontinence    Vitamin D deficiency    RUQ abdominal pain    Elevated hemidiaphragm    LÓPEZ (dyspnea on exertion)    Snoring    Orthopnea    Non-restorative sleep    SHYAM (obstructive sleep apnea)    Obesity (BMI 35.0-39.9 without comorbidity)    Pleurisy    Neurogenic bladder    Radial styloid tenosynovitis    Degeneration of cervical intervertebral disc    Stress incontinence    Gastroesophageal reflux disease without esophagitis    Change in bowel habits    Constipation    Chronic bilateral lower abdominal pain    Epigastric pain    Dysphagia    Lumbar radiculopathy    Common migraine without intractability    Functional neurological symptom disorder with mixed symptoms    Weakness of left side of body       No LMP recorded. Patient has had a hysterectomy.      Past Medical

## 2024-03-12 RX ORDER — METOPROLOL SUCCINATE 25 MG/1
TABLET, EXTENDED RELEASE ORAL
Qty: 90 TABLET | Refills: 2 | Status: SHIPPED | OUTPATIENT
Start: 2024-03-12

## 2024-03-13 LAB — BACTERIA UR CULT: NORMAL

## 2024-03-14 ENCOUNTER — HOSPITAL ENCOUNTER (OUTPATIENT)
Dept: GENERAL RADIOLOGY | Age: 44
Discharge: HOME | End: 2024-03-14
Attending: NURSE PRACTITIONER
Payer: COMMERCIAL

## 2024-03-14 DIAGNOSIS — R10.31 COLICKY RLQ ABDOMINAL PAIN: ICD-10-CM

## 2024-03-14 PROCEDURE — 76830 TRANSVAGINAL US NON-OB: CPT

## 2024-03-20 ENCOUNTER — OFFICE VISIT (OUTPATIENT)
Dept: PRIMARY CARE CLINIC | Age: 44
End: 2024-03-20

## 2024-03-20 VITALS
OXYGEN SATURATION: 98 % | SYSTOLIC BLOOD PRESSURE: 140 MMHG | DIASTOLIC BLOOD PRESSURE: 88 MMHG | HEART RATE: 71 BPM | WEIGHT: 192.8 LBS | TEMPERATURE: 97.3 F | BODY MASS INDEX: 32.12 KG/M2 | HEIGHT: 65 IN | RESPIRATION RATE: 18 BRPM

## 2024-03-20 DIAGNOSIS — G43.709 CHRONIC MIGRAINE WITHOUT AURA WITHOUT STATUS MIGRAINOSUS, NOT INTRACTABLE: ICD-10-CM

## 2024-03-20 DIAGNOSIS — I10 ELEVATED BLOOD PRESSURE READING WITH DIAGNOSIS OF HYPERTENSION: ICD-10-CM

## 2024-03-20 DIAGNOSIS — R15.9 INCONTINENCE OF FECES, UNSPECIFIED FECAL INCONTINENCE TYPE: Primary | ICD-10-CM

## 2024-03-20 RX ORDER — RIMEGEPANT SULFATE 75 MG/75MG
TABLET, ORALLY DISINTEGRATING ORAL
Qty: 8 TABLET | Refills: 0 | Status: SHIPPED | COMMUNITY
Start: 2024-03-20

## 2024-03-25 DIAGNOSIS — R20.0 NUMBNESS AND TINGLING: ICD-10-CM

## 2024-03-25 DIAGNOSIS — R20.2 NUMBNESS AND TINGLING: ICD-10-CM

## 2024-03-25 RX ORDER — ERENUMAB-AOOE 70 MG/ML
INJECTION SUBCUTANEOUS
Qty: 1 ML | Refills: 2 | Status: SHIPPED | OUTPATIENT
Start: 2024-03-25

## 2024-03-25 RX ORDER — GABAPENTIN 300 MG/1
300 CAPSULE ORAL 3 TIMES DAILY
Qty: 90 CAPSULE | Refills: 2 | Status: SHIPPED | OUTPATIENT
Start: 2024-03-25 | End: 2024-06-23

## 2024-03-25 NOTE — TELEPHONE ENCOUNTER
Requested Prescriptions     Pending Prescriptions Disp Refills    gabapentin (NEURONTIN) 300 MG capsule [Pharmacy Med Name: gabapentin 300 mg capsule] 90 capsule 2     Sig: Take 1 capsule by mouth 3 times daily.     Signed Prescriptions Disp Refills    Erenumab-aooe (AIMOVIG) 70 MG/ML SOAJ 1 mL 2     Sig: INJECT 70mg (ONE PEN) UNDER THE SKIN ONCE A MONTH     Authorizing Provider: EDWAR ARIZA     Ordering User: SABINO LLOYD

## 2024-03-28 ENCOUNTER — OFFICE VISIT (OUTPATIENT)
Dept: GASTROENTEROLOGY | Age: 44
End: 2024-03-28
Payer: COMMERCIAL

## 2024-03-28 VITALS
SYSTOLIC BLOOD PRESSURE: 120 MMHG | WEIGHT: 192 LBS | OXYGEN SATURATION: 98 % | BODY MASS INDEX: 31.99 KG/M2 | DIASTOLIC BLOOD PRESSURE: 80 MMHG | HEIGHT: 65 IN | HEART RATE: 71 BPM

## 2024-03-28 DIAGNOSIS — K21.9 GASTROESOPHAGEAL REFLUX DISEASE, UNSPECIFIED WHETHER ESOPHAGITIS PRESENT: Primary | ICD-10-CM

## 2024-03-28 DIAGNOSIS — K58.1 IRRITABLE BOWEL SYNDROME WITH CONSTIPATION: ICD-10-CM

## 2024-03-28 PROCEDURE — 3079F DIAST BP 80-89 MM HG: CPT | Performed by: NURSE PRACTITIONER

## 2024-03-28 PROCEDURE — 3074F SYST BP LT 130 MM HG: CPT | Performed by: NURSE PRACTITIONER

## 2024-03-28 PROCEDURE — G8484 FLU IMMUNIZE NO ADMIN: HCPCS | Performed by: NURSE PRACTITIONER

## 2024-03-28 PROCEDURE — 1036F TOBACCO NON-USER: CPT | Performed by: NURSE PRACTITIONER

## 2024-03-28 PROCEDURE — G8417 CALC BMI ABV UP PARAM F/U: HCPCS | Performed by: NURSE PRACTITIONER

## 2024-03-28 PROCEDURE — 99214 OFFICE O/P EST MOD 30 MIN: CPT | Performed by: NURSE PRACTITIONER

## 2024-03-28 PROCEDURE — G8427 DOCREV CUR MEDS BY ELIG CLIN: HCPCS | Performed by: NURSE PRACTITIONER

## 2024-03-28 RX ORDER — OMEPRAZOLE 20 MG/1
20 CAPSULE, DELAYED RELEASE ORAL DAILY
Qty: 30 CAPSULE | Refills: 11 | Status: SHIPPED | OUTPATIENT
Start: 2024-03-28

## 2024-03-28 NOTE — PROGRESS NOTES
sounds: Normal breath sounds. No wheezing or rales.   Abdominal:      General: Bowel sounds are normal. There is no distension.      Palpations: Abdomen is soft. There is no mass.      Tenderness: There is no abdominal tenderness. There is no guarding or rebound.   Musculoskeletal:         General: Normal range of motion.      Cervical back: Normal range of motion and neck supple.   Skin:     General: Skin is warm and dry.      Coloration: Skin is not pale.   Neurological:      Mental Status: She is alert and oriented to person, place, and time.   Psychiatric:         Behavior: Behavior normal.        Nostril Rim Text: The closure involved the nostril rim.

## 2024-03-29 PROBLEM — R52 PAIN AND TENDERNESS: Status: ACTIVE | Noted: 2024-03-18

## 2024-03-29 PROBLEM — M62.838 MUSCLE SPASMS OF NECK: Status: ACTIVE | Noted: 2024-03-18

## 2024-03-29 PROBLEM — E66.09 OTHER OBESITY DUE TO EXCESS CALORIES: Status: ACTIVE | Noted: 2018-05-01

## 2024-03-29 PROBLEM — R53.83 FATIGUE: Status: ACTIVE | Noted: 2024-03-18

## 2024-03-29 PROBLEM — R20.2 PINS AND NEEDLES SENSATION: Status: ACTIVE | Noted: 2024-03-18

## 2024-03-29 PROBLEM — M85.80 OSTEOPENIA: Status: ACTIVE | Noted: 2024-03-18

## 2024-03-29 ASSESSMENT — ENCOUNTER SYMPTOMS
RESPIRATORY NEGATIVE: 1
DIARRHEA: 1

## 2024-03-30 NOTE — PROGRESS NOTES
Behavior normal.         Thought Content: Thought content normal.         Judgment: Judgment normal.         ASSESSMENT:    1. Incontinence of feces, unspecified fecal incontinence type    2. Chronic migraine without aura without status migrainosus, not intractable    3. Elevated blood pressure reading with diagnosis of hypertension          PLAN:  1. Incontinence of feces, unspecified fecal incontinence type  2. Chronic migraine without aura without status migrainosus, not intractable  -     rimegepant sulfate (NURTEC) 75 MG TBDP; 1 tablet at onset of migraine, Disp-8 tablet, R-0Sample  3. Elevated blood pressure reading with diagnosis of hypertension     I will refill the Aimovig when needed.  She has seen [facility] in the past.  If she continues to have fecal incontinence we may need to consider a colorectal specialist that could do manometry pressures.    Blood pressure is elevated.  She may be stressed but we need to monitor this.  If it remains elevated she is to call.  She is to continue Toprol-XL 25 mg 1 daily.  Follow-up:  Return if symptoms worsen or fail to improve.    PATIENT INSTRUCTIONS:  Patient Instructions   We are committed to providing you with the best care possible.   In order to help us achieve these goals please remember to bring all medications, herbal products, and over the counter supplements with you to each visit.     If your provider has ordered testing for you, please be sure to follow up with our office if you have not received results within 7 days after the testing took place.     *If you receive a survey after visiting one of our offices, please take time to share your experience concerning your physician office visit. These surveys are confidential and no health information about you is shared.  We are eager to improve for you and we are counting on your feedback to help make that happen.     EMR Dragon/transcription disclaimer:  Much of this encounter note is electronic

## 2024-04-01 RX ORDER — ATORVASTATIN CALCIUM 20 MG/1
TABLET, FILM COATED ORAL
Qty: 90 TABLET | Refills: 3 | Status: SHIPPED | OUTPATIENT
Start: 2024-04-01

## 2024-04-02 DIAGNOSIS — M62.838 MUSCLE SPASMS OF NECK: ICD-10-CM

## 2024-04-02 DIAGNOSIS — M79.18 MYOFASCIAL PAIN: ICD-10-CM

## 2024-04-02 DIAGNOSIS — R53.1 WEAKNESS OF LEFT SIDE OF BODY: ICD-10-CM

## 2024-04-02 DIAGNOSIS — F44.4 FUNCTIONAL NEUROLOGICAL SYMPTOM DISORDER WITH WEAKNESS OR PARALYSIS: ICD-10-CM

## 2024-04-02 DIAGNOSIS — M54.16 LUMBAR RADICULOPATHY: Primary | ICD-10-CM

## 2024-04-04 ASSESSMENT — ENCOUNTER SYMPTOMS
RECTAL PAIN: 0
SHORTNESS OF BREATH: 0
ABDOMINAL DISTENTION: 0
COUGH: 0
NAUSEA: 0
CHOKING: 0
BLOOD IN STOOL: 0
ANAL BLEEDING: 0
TROUBLE SWALLOWING: 0
ABDOMINAL PAIN: 0
VOMITING: 0
DIARRHEA: 0
CONSTIPATION: 1

## 2024-04-11 NOTE — TELEPHONE ENCOUNTER
From: Wilber Kirby  To: Leesa Le MD  Sent: 7/22/2021 12:37 PM CDT  Subject: Non-Urgent Medical Question    I'm pretty sure I have shingles on my back. The ones in the first picture just came up today. My upper back is hurting pretty bad with a deep burning pain no matter if I'm touching it with clothes or not. The patch looks small in the picture but it's at least the size of a half dollar.
No.

## 2024-04-25 DIAGNOSIS — G43.709 CHRONIC MIGRAINE WITHOUT AURA WITHOUT STATUS MIGRAINOSUS, NOT INTRACTABLE: Primary | ICD-10-CM

## 2024-04-25 RX ORDER — RIMEGEPANT SULFATE 75 MG/75MG
TABLET, ORALLY DISINTEGRATING ORAL
Qty: 45 TABLET | Refills: 3 | Status: SHIPPED | OUTPATIENT
Start: 2024-04-25

## 2024-05-09 ENCOUNTER — OFFICE VISIT (OUTPATIENT)
Dept: GASTROENTEROLOGY | Age: 44
End: 2024-05-09
Payer: COMMERCIAL

## 2024-05-09 VITALS
WEIGHT: 194 LBS | BODY MASS INDEX: 32.32 KG/M2 | HEIGHT: 65 IN | SYSTOLIC BLOOD PRESSURE: 124 MMHG | HEART RATE: 81 BPM | DIASTOLIC BLOOD PRESSURE: 80 MMHG | OXYGEN SATURATION: 96 %

## 2024-05-09 DIAGNOSIS — K92.1 BLOOD IN STOOL: ICD-10-CM

## 2024-05-09 DIAGNOSIS — R19.5 INCREASED MUCUS IN STOOL: ICD-10-CM

## 2024-05-09 DIAGNOSIS — R13.10 DYSPHAGIA, UNSPECIFIED TYPE: ICD-10-CM

## 2024-05-09 DIAGNOSIS — K58.1 IRRITABLE BOWEL SYNDROME WITH CONSTIPATION: Primary | ICD-10-CM

## 2024-05-09 PROCEDURE — 99214 OFFICE O/P EST MOD 30 MIN: CPT | Performed by: NURSE PRACTITIONER

## 2024-05-09 PROCEDURE — G8417 CALC BMI ABV UP PARAM F/U: HCPCS | Performed by: NURSE PRACTITIONER

## 2024-05-09 PROCEDURE — G8427 DOCREV CUR MEDS BY ELIG CLIN: HCPCS | Performed by: NURSE PRACTITIONER

## 2024-05-09 PROCEDURE — 3074F SYST BP LT 130 MM HG: CPT | Performed by: NURSE PRACTITIONER

## 2024-05-09 PROCEDURE — 3079F DIAST BP 80-89 MM HG: CPT | Performed by: NURSE PRACTITIONER

## 2024-05-09 PROCEDURE — 1036F TOBACCO NON-USER: CPT | Performed by: NURSE PRACTITIONER

## 2024-05-09 NOTE — PROGRESS NOTES
Appearance: She is well-developed.   HENT:      Head: Normocephalic and atraumatic.      Right Ear: External ear normal.      Left Ear: External ear normal.      Nose: Nose normal.   Eyes:      General: No scleral icterus.        Right eye: No discharge.         Left eye: No discharge.      Conjunctiva/sclera: Conjunctivae normal.      Pupils: Pupils are equal, round, and reactive to light.   Cardiovascular:      Rate and Rhythm: Normal rate and regular rhythm.      Heart sounds: Normal heart sounds. No murmur heard.  Pulmonary:      Effort: Pulmonary effort is normal. No respiratory distress.      Breath sounds: Normal breath sounds. No wheezing or rales.   Abdominal:      General: Bowel sounds are normal. There is no distension.      Palpations: Abdomen is soft. There is no mass.      Tenderness: There is no abdominal tenderness. There is no guarding or rebound.   Musculoskeletal:         General: Normal range of motion.      Cervical back: Normal range of motion and neck supple.   Skin:     General: Skin is warm and dry.      Coloration: Skin is not pale.   Neurological:      Mental Status: She is alert and oriented to person, place, and time.   Psychiatric:         Behavior: Behavior normal.

## 2024-05-15 DIAGNOSIS — G43.709 CHRONIC MIGRAINE WITHOUT AURA WITHOUT STATUS MIGRAINOSUS, NOT INTRACTABLE: ICD-10-CM

## 2024-05-15 RX ORDER — TIZANIDINE 4 MG/1
TABLET ORAL
Qty: 270 TABLET | Refills: 3 | Status: SHIPPED | OUTPATIENT
Start: 2024-05-15

## 2024-05-15 RX ORDER — RIMEGEPANT SULFATE 75 MG/75MG
TABLET, ORALLY DISINTEGRATING ORAL
Qty: 45 TABLET | Refills: 3 | Status: SHIPPED | OUTPATIENT
Start: 2024-05-15

## 2024-05-15 ASSESSMENT — ENCOUNTER SYMPTOMS
COUGH: 0
DIARRHEA: 0
CONSTIPATION: 0
BLOOD IN STOOL: 1
SHORTNESS OF BREATH: 0
CHOKING: 0
ABDOMINAL PAIN: 0
VOMITING: 0
ABDOMINAL DISTENTION: 1
NAUSEA: 0
TROUBLE SWALLOWING: 1
ANAL BLEEDING: 0

## 2024-05-23 ENCOUNTER — OFFICE VISIT (OUTPATIENT)
Dept: PRIMARY CARE CLINIC | Age: 44
End: 2024-05-23

## 2024-05-23 VITALS
HEART RATE: 69 BPM | WEIGHT: 192.38 LBS | BODY MASS INDEX: 32.05 KG/M2 | HEIGHT: 65 IN | DIASTOLIC BLOOD PRESSURE: 78 MMHG | TEMPERATURE: 96.9 F | OXYGEN SATURATION: 97 % | SYSTOLIC BLOOD PRESSURE: 130 MMHG

## 2024-05-23 DIAGNOSIS — R30.0 DYSURIA: Primary | ICD-10-CM

## 2024-05-23 LAB
BILIRUBIN, POC: NORMAL
BLOOD URINE, POC: NORMAL
CLARITY, POC: NORMAL
COLOR, POC: NORMAL
GLUCOSE URINE, POC: NORMAL
KETONES, POC: 15
LEUKOCYTE EST, POC: NORMAL
NITRITE, POC: NORMAL
PH, POC: 5.5
PROTEIN, POC: 30
SPECIFIC GRAVITY, POC: 1.03
UROBILINOGEN, POC: 1

## 2024-05-23 RX ORDER — NITROFURANTOIN 25; 75 MG/1; MG/1
100 CAPSULE ORAL 2 TIMES DAILY
Qty: 10 CAPSULE | Refills: 0 | Status: SHIPPED | OUTPATIENT
Start: 2024-05-23 | End: 2024-05-28

## 2024-05-23 ASSESSMENT — ENCOUNTER SYMPTOMS
VOMITING: 0
NAUSEA: 0
ABDOMINAL PAIN: 1

## 2024-05-23 NOTE — PROGRESS NOTES
[facility]  [facility] [address]  [address]  [place] [address]  Dept: [phone]  Dept Fax: [phone]  Loc: [phone]    Nolvia Zavala is a 43 y.o. female who presents today for her medical conditions/complaints as noted below.  Nolvia Zavala is c/o of Urinary Tract Infection        HPI:     Nolvia Zavala presents with complaints of dysuria, urgency, decreased urine output and abdominal pressure. Denies any fever but has had some body aches. Denies any OTC treatment. Denies any recent antibiotic or steroid administration.    Past Medical History:   Diagnosis Date    B12 deficiency     Cauda equina syndrome (HCC)     Complicated migraine     Depression     GERD (gastroesophageal reflux disease)     Hematuria     wih abdominal pain    Hyperlipidemia     Hypertension     MS (multiple sclerosis) (HCC)     Myofascial pain     Nerve damage     Neutropenia (HCC) 10/21/2015    Obesity     Pelvic floor dysfunction     Postmenopausal HRT (hormone replacement therapy)     POTS (postural orthostatic tachycardia syndrome)     Sleep apnea 2021     Past Surgical History:   Procedure Laterality Date    APPENDECTOMY      BACK SURGERY      CHOLECYSTECTOMY, LAPAROSCOPIC      COLONOSCOPY  11/16/2015    Dr Streeter-Internal hemorrhoids, AP, 5 yr recall    COLONOSCOPY N/A 08/13/2018    Dr NORMA Abdul-[facility]--10 yr recall    COLONOSCOPY  02/03/2010    Dr Streeter-Normal, prn    COLONOSCOPY N/A 09/12/2022    Dr NORMA Abdul-Tamela, 6 yr (age 48) recall    EGD      HYSTERECTOMY (CERVIX STATUS UNKNOWN)      HYSTERECTOMY, TOTAL ABDOMINAL (CERVIX REMOVED)  2004    OVARY REMOVAL      IA EGD TRANSORAL BIOPSY SINGLE/MULTIPLE N/A 01/29/2018    Dr NORMA Abdul-Active duodenitis, gastritis/gastropathy    UPPER GASTROINTESTINAL ENDOSCOPY  09/12/2022    Dr NORMA Abdul-Gastritis    UPPER GASTROINTESTINAL ENDOSCOPY  11/15/2016    Dr Ness-Erosive gastritis, no celiac    UPPER GASTROINTESTINAL

## 2024-05-23 NOTE — PATIENT INSTRUCTIONS
- Unable to provide enough urine for culture, will send home with supplies to collect. Instructed to return sample to [facility] outpatient lab.  - Macrobid sent to the pharmacy, advised to start after collecting urine sample for culture.  - OTC AZO as needed for pain, do not exceed 3 days of administration.  - May use daily cranberry supplement or juice to aide in bladder health.  - Alternate Tylenol/Motrin as needed.  - Increase fluid intake, especially water over the next 2-3 days.  - Avoid drinks that are carbonated or have caffeine. They can irritate the bladder.  - Urinate often. Try to empty your bladder each time.  - Avoid baths or hot tubs, especially bubble baths.   - Perform proper perineal hygiene by wiping front to back.  - Return to the clinic or follow up with PCP if symptoms worsen or fail to improve.

## 2024-05-25 LAB — BACTERIA UR CULT: NORMAL

## 2024-06-05 ENCOUNTER — OFFICE VISIT (OUTPATIENT)
Dept: PULMONOLOGY | Age: 44
End: 2024-06-05
Payer: COMMERCIAL

## 2024-06-05 VITALS
HEART RATE: 80 BPM | OXYGEN SATURATION: 97 % | SYSTOLIC BLOOD PRESSURE: 197 MMHG | HEIGHT: 65 IN | BODY MASS INDEX: 31.65 KG/M2 | TEMPERATURE: 97.8 F | DIASTOLIC BLOOD PRESSURE: 49 MMHG | WEIGHT: 190 LBS

## 2024-06-05 DIAGNOSIS — E66.9 OBESITY (BMI 35.0-39.9 WITHOUT COMORBIDITY): ICD-10-CM

## 2024-06-05 DIAGNOSIS — K21.9 GASTROESOPHAGEAL REFLUX DISEASE WITHOUT ESOPHAGITIS: ICD-10-CM

## 2024-06-05 DIAGNOSIS — R09.82 POST-NASAL DRAINAGE: ICD-10-CM

## 2024-06-05 DIAGNOSIS — R06.09 DOE (DYSPNEA ON EXERTION): ICD-10-CM

## 2024-06-05 DIAGNOSIS — G47.33 OSA (OBSTRUCTIVE SLEEP APNEA): Primary | ICD-10-CM

## 2024-06-05 DIAGNOSIS — J98.6 ELEVATED HEMIDIAPHRAGM: ICD-10-CM

## 2024-06-05 PROCEDURE — G8427 DOCREV CUR MEDS BY ELIG CLIN: HCPCS | Performed by: INTERNAL MEDICINE

## 2024-06-05 PROCEDURE — 3078F DIAST BP <80 MM HG: CPT | Performed by: INTERNAL MEDICINE

## 2024-06-05 PROCEDURE — 3077F SYST BP >= 140 MM HG: CPT | Performed by: INTERNAL MEDICINE

## 2024-06-05 PROCEDURE — 99214 OFFICE O/P EST MOD 30 MIN: CPT | Performed by: INTERNAL MEDICINE

## 2024-06-05 PROCEDURE — G8417 CALC BMI ABV UP PARAM F/U: HCPCS | Performed by: INTERNAL MEDICINE

## 2024-06-05 PROCEDURE — 1036F TOBACCO NON-USER: CPT | Performed by: INTERNAL MEDICINE

## 2024-06-05 RX ORDER — FLUTICASONE PROPIONATE 50 MCG
2 SPRAY, SUSPENSION (ML) NASAL DAILY
Qty: 16 G | Refills: 5 | Status: SHIPPED | OUTPATIENT
Start: 2024-06-05

## 2024-06-05 RX ORDER — FEXOFENADINE HCL 180 MG/1
180 TABLET ORAL DAILY
Qty: 30 TABLET | Refills: 5 | Status: SHIPPED | OUTPATIENT
Start: 2024-06-05 | End: 2024-07-05

## 2024-06-05 ASSESSMENT — ENCOUNTER SYMPTOMS
SHORTNESS OF BREATH: 1
CHEST TIGHTNESS: 0
COUGH: 0
APNEA: 1
RHINORRHEA: 0
ABDOMINAL DISTENTION: 0
BACK PAIN: 0
ANAL BLEEDING: 0
WHEEZING: 0
ABDOMINAL PAIN: 0

## 2024-06-05 NOTE — PROGRESS NOTES
Pulmonary and Sleep Medicine    Nolvia Zavala (:  1980) is a 43 y.o. female,Established patient, here for evaluation of the following chief complaint(s):  Follow-up (Sleep apnea)      Referring physician:  No referring provider defined for this encounter.     ASSESSMENT/PLAN:  1. SHYAM (obstructive sleep apnea) on CPAP  2. Obesity (BMI 35.0-39.9 without comorbidity)  3. LÓPEZ (dyspnea on exertion)  4. 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 Gastroesophageal reflux disease without esophagitis  6. Post-nasal drainage  -     fexofenadine (ALLEGRA) 180 MG tablet; Take 1 tablet by mouth daily, Disp-30 tablet, R-5Normal  -     fluticasone (FLONASE) 50 MCG/ACT nasal spray; 2 sprays by Each Nostril route daily, Disp-16 g, R-5Normal        Start on fexofenadine and fluticasone nasal spray for the postnasal drainage.  This could definitely be triggering her cough.    Also agree with proton pump inhibitor.  She is scheduled to have an upper endoscopy with GI.       Saurabh Sneed MD, [facility], [facility]    Return in about 3 months (around 2024).    SUBJECTIVE/OBJECTIVE:        She is here for follow-up on obstructive sleep apnea.  Her CPAP download was reviewed.  She is using the CPAP on average about 5 hours a night.  Her apnea-hypopnea index while onCPAP is 1.5 events per hour.  She continues to have daytime symptoms.  She does take naps during the day.  She does not use the CPAP during the naps.    She continues to have cough predominantly when she lays down to sleep.  She was started on proton pump inhibitor for reflux.  She also reports a significant postnasal drainage.  She is not on any treatment         Prior to Visit Medications    Medication Sig Taking? Authorizing Provider   rimegepant sulfate (NURTEC) 75 MG TBDP 1 tablet by mouth every other day for chronic migraines  Patient taking differently: every other day Yes Daisy Khanna MD   linaclotide (LINZESS) 290 MCG CAPS capsule Take 1

## 2024-06-07 DIAGNOSIS — G43.709 CHRONIC MIGRAINE WITHOUT AURA WITHOUT STATUS MIGRAINOSUS, NOT INTRACTABLE: ICD-10-CM

## 2024-06-07 RX ORDER — RIMEGEPANT SULFATE 75 MG/75MG
TABLET, ORALLY DISINTEGRATING ORAL
Qty: 45 TABLET | Refills: 3 | Status: SHIPPED | OUTPATIENT
Start: 2024-06-07

## 2024-06-17 RX ORDER — ERENUMAB-AOOE 70 MG/ML
INJECTION SUBCUTANEOUS
Qty: 1 ML | Refills: 2 | Status: SHIPPED | OUTPATIENT
Start: 2024-06-17 | End: 2024-06-20 | Stop reason: SDUPTHER

## 2024-06-20 RX ORDER — ERENUMAB-AOOE 70 MG/ML
INJECTION SUBCUTANEOUS
Qty: 1 ML | Refills: 2 | Status: SHIPPED | OUTPATIENT
Start: 2024-06-20

## 2024-06-24 ENCOUNTER — APPOINTMENT (OUTPATIENT)
Dept: OPERATING ROOM | Age: 44
End: 2024-06-24
Attending: INTERNAL MEDICINE

## 2024-06-24 ENCOUNTER — HOSPITAL ENCOUNTER (OUTPATIENT)
Age: 44
Setting detail: SPECIMEN
Discharge: HOME | End: 2024-06-24
Payer: COMMERCIAL

## 2024-06-24 ENCOUNTER — ANESTHESIA EVENT (OUTPATIENT)
Dept: OPERATING ROOM | Age: 44
End: 2024-06-24

## 2024-06-24 ENCOUNTER — ANESTHESIA (OUTPATIENT)
Dept: OPERATING ROOM | Age: 44
End: 2024-06-24

## 2024-06-24 ENCOUNTER — HOSPITAL ENCOUNTER (OUTPATIENT)
Age: 44
Setting detail: OUTPATIENT SURGERY
Discharge: HOME | End: 2024-06-24
Attending: INTERNAL MEDICINE | Admitting: INTERNAL MEDICINE

## 2024-06-24 VITALS
RESPIRATION RATE: 18 BRPM | TEMPERATURE: 97.5 F | WEIGHT: 186 LBS | HEART RATE: 68 BPM | HEIGHT: 65 IN | OXYGEN SATURATION: 98 % | SYSTOLIC BLOOD PRESSURE: 145 MMHG | DIASTOLIC BLOOD PRESSURE: 88 MMHG | BODY MASS INDEX: 30.99 KG/M2

## 2024-06-24 PROCEDURE — G0121 COLON CA SCRN NOT HI RSK IND: HCPCS

## 2024-06-24 PROCEDURE — 43248 EGD GUIDE WIRE INSERTION: CPT

## 2024-06-24 PROCEDURE — 88305 TISSUE EXAM BY PATHOLOGIST: CPT

## 2024-06-24 PROCEDURE — G8918 PT W/O PREOP ORDER IV AB PRO: HCPCS

## 2024-06-24 PROCEDURE — G8907 PT DOC NO EVENTS ON DISCHARG: HCPCS

## 2024-06-24 PROCEDURE — 88342 IMHCHEM/IMCYTCHM 1ST ANTB: CPT

## 2024-06-24 PROCEDURE — 43239 EGD BIOPSY SINGLE/MULTIPLE: CPT

## 2024-06-24 RX ORDER — LIDOCAINE HYDROCHLORIDE 10 MG/ML
INJECTION, SOLUTION INFILTRATION; PERINEURAL PRN
Status: DISCONTINUED | OUTPATIENT
Start: 2024-06-24 | End: 2024-06-24 | Stop reason: SDUPTHER

## 2024-06-24 RX ORDER — FENTANYL CITRATE 50 UG/ML
INJECTION, SOLUTION INTRAMUSCULAR; INTRAVENOUS PRN
Status: DISCONTINUED | OUTPATIENT
Start: 2024-06-24 | End: 2024-06-24 | Stop reason: SDUPTHER

## 2024-06-24 RX ORDER — SODIUM CHLORIDE, SODIUM LACTATE, POTASSIUM CHLORIDE, CALCIUM CHLORIDE 600; 310; 30; 20 MG/100ML; MG/100ML; MG/100ML; MG/100ML
INJECTION, SOLUTION INTRAVENOUS CONTINUOUS PRN
Status: DISCONTINUED | OUTPATIENT
Start: 2024-06-24 | End: 2024-06-24 | Stop reason: SDUPTHER

## 2024-06-24 RX ORDER — PRUCALOPRIDE 1 MG/1
1 TABLET, FILM COATED ORAL DAILY
Qty: 30 TABLET | Refills: 3 | Status: SHIPPED | OUTPATIENT
Start: 2024-06-24

## 2024-06-24 RX ORDER — SODIUM CHLORIDE, SODIUM LACTATE, POTASSIUM CHLORIDE, CALCIUM CHLORIDE 600; 310; 30; 20 MG/100ML; MG/100ML; MG/100ML; MG/100ML
INJECTION, SOLUTION INTRAVENOUS CONTINUOUS
Status: DISCONTINUED | OUTPATIENT
Start: 2024-06-24 | End: 2024-06-24 | Stop reason: HOSPADM

## 2024-06-24 RX ORDER — ONDANSETRON 2 MG/ML
INJECTION INTRAMUSCULAR; INTRAVENOUS PRN
Status: DISCONTINUED | OUTPATIENT
Start: 2024-06-24 | End: 2024-06-24 | Stop reason: SDUPTHER

## 2024-06-24 RX ORDER — PROPOFOL 10 MG/ML
INJECTION, EMULSION INTRAVENOUS PRN
Status: DISCONTINUED | OUTPATIENT
Start: 2024-06-24 | End: 2024-06-24 | Stop reason: SDUPTHER

## 2024-06-24 RX ADMIN — FENTANYL CITRATE 50 MCG: 50 INJECTION, SOLUTION INTRAMUSCULAR; INTRAVENOUS at 07:55

## 2024-06-24 RX ADMIN — SODIUM CHLORIDE, SODIUM LACTATE, POTASSIUM CHLORIDE, CALCIUM CHLORIDE: 600; 310; 30; 20 INJECTION, SOLUTION INTRAVENOUS at 06:56

## 2024-06-24 RX ADMIN — LIDOCAINE HYDROCHLORIDE 80 MG: 10 INJECTION, SOLUTION INFILTRATION; PERINEURAL at 07:41

## 2024-06-24 RX ADMIN — PROPOFOL 240 MG: 10 INJECTION, EMULSION INTRAVENOUS at 07:46

## 2024-06-24 RX ADMIN — ONDANSETRON 4 MG: 2 INJECTION INTRAMUSCULAR; INTRAVENOUS at 07:41

## 2024-06-24 RX ADMIN — FENTANYL CITRATE 50 MCG: 50 INJECTION, SOLUTION INTRAMUSCULAR; INTRAVENOUS at 07:41

## 2024-06-24 RX ADMIN — SODIUM CHLORIDE, SODIUM LACTATE, POTASSIUM CHLORIDE, CALCIUM CHLORIDE: 600; 310; 30; 20 INJECTION, SOLUTION INTRAVENOUS at 07:41

## 2024-06-24 ASSESSMENT — PAIN - FUNCTIONAL ASSESSMENT
PAIN_FUNCTIONAL_ASSESSMENT: NONE - DENIES PAIN

## 2024-06-24 ASSESSMENT — ENCOUNTER SYMPTOMS: SHORTNESS OF BREATH: 0

## 2024-06-24 NOTE — DISCHARGE INSTRUCTIONS
EGD RECOMMENDATIONS:    1.  Await path results  2.  Continue Prilosec  3.  Trial of Motegrity (Rx sent)  4.  Repeat dilation as needed  5.  Follow up OV with DEVEN Medeiros in 6-8 weeks.       Colonoscopy Recommendations:  1. Repeat colonoscopy: 10 years for screening colonoscopy.  2. Start Motegrity and wean off Linzess.   3. Schedule f/u OV in our office. If symptoms continue, we can consider further workup of Motility including Sitz marker study and/or motility referral for constipation.     POST-OP ORDERS: ENDOSCOPY & COLONOSCOPY:    1. Rest today.    2. DO NOT eat or drink until wide awake; eat your usual diet today in moderate amount only.    3. DO NOT drive today.    4. Call physician if you have severe pain, vomiting, fever, rectal bleeding or black bowel movements.    5.  If a biopsy was taken or a polyp removed, you should expect to hear results in about 7-10 days.  If you have heard nothing from your physician by then, call the office for results.    6.  Discharge home when patient awake, vitals signs stable and tolerating liquids.    7. Call with questions or concerns [phone].

## 2024-06-24 NOTE — OP NOTE
Endoscopic Procedure Note    Patient: Nolvia Zavala : 1980  [facility] Rec#: [id number] Acc#: [id number]     Primary Care Provider Daisy Khanna MD  Referring Provider: DEVEN Novak    Endoscopist: Reid Abdul MD    Date of Procedure:  2024    Procedure:   1. EGD with biopsy  2. EGD with wire-guided dilation- 54 F    Indications:   1. Dysphagia   2. Reflux   3. Bloating     Anesthesia:  Sedation was administered by anesthesia who monitored the patient during the procedure.    Estimated Blood Loss: minimal    Procedure:   After reviewing the patient's chart and obtaining informed consent, the patient was placed in the left lateral decubitus position.  A forward-viewing Olympus endoscope was lubricated and inserted through the mouth into the oropharynx. Under direct visualization, the upper esophagus was intubated. The scope was advanced to the level of the third portion of duodenum. Scope was slowly withdrawn with careful inspection of the mucosal surfaces. The scope was retroflexed for inspection of the gastric fundus and incisura. Findings and maneuvers are listed in impression below. The patient tolerated the procedure well. The scope was removed. There were no immediate complications.    Findings:   Esophagus: abnormal: there is a questionable benign stricture in the distal esophagus, dilated due to symptoms.  A guidewire was placed through the endoscope and the endoscope was removed.  A 54 F Spanish savory dilator was advanced down the length of the esophagus using a fixed-point wire technique. The dilator and guidewire were removed.  The patient tolerated the procedure well.        There is no hiatal hernia present.      Stomach:  Abnormal: Mild mucosal changes suggestive of gastritis noted -  Gastric biopsies were taken from the antrum and body to rule out Helicobacter pylori infection.      Duodenum: normal      IMPRESSION:  1. Gastritis- biopsied  2. S/p esophageal

## 2024-06-24 NOTE — ANESTHESIA PRE PROCEDURE
GI/Hepatic/Renal ROS  (+) GERD:          Endo/Other: Negative Endo/Other ROS                    Abdominal:   (+) obese          Vascular: negative vascular ROS.         Other Findings:         Anesthesia Plan      general and TIVA     ASA 3       Induction: intravenous.    MIPS: Postoperative opioids intended and Prophylactic antiemetics administered.  Anesthetic plan and risks discussed with patient.                      DEVEN Reese - CRNA   6/24/2024

## 2024-06-24 NOTE — OP NOTE
Patient: Nolvia Zavala : 1980  [facility] Rec#: [id number] Acc#: [id number]   Primary Care Provider Daisy Khanna MD    Date of Procedure:  2024    Endoscopist: Reid Abdul MD    Referring Provider: Daisy Khanna MD, DEVEN Novak    Operation Performed: Colonoscopy     Indications: change in bowel habits, blood in stool, abdominal cramping     Anesthesia:  Sedation was administered by anesthesia who monitored the patient during the procedure.    I met with Nolvia Zavala prior to procedure. We discussed the procedure itself, and I have discussed the risks of endoscopy (including-- but not limited to-- pain, discomfort, bleeding potentially requiring second endoscopic procedure and/or blood transfusion, organ perforation requiring operative repair, damage to organs near the colon, infection, aspiration, cardiopulmonary/allergic reaction), benefits, indications to endoscopy. Additionally, we discussed options other than colonoscopy. The patient expressed understanding. All questions answered. The patient decided to proceed with the procedure.  Signed informed consent was placed on the chart.    Blood Loss: minimal    Withdrawal time: n/a  Bowel Prep: adequate     Complications: no immediate complications    DESCRIPTION OF PROCEDURE:     A time out was performed. After written informed consent was obtained, the patient was placed in the left lateral position.     The perianal area was inspected, and a digital rectal exam was performed. A rectal exam was performed: normal tone, no palpable lesions. At this point, a forward viewing Olympus colonoscope was inserted into the anus and carefully advanced to the cecum.  The cecum was identified by the ileocecal valve and the appendiceal orifice. The colonoscope was then slowly withdrawn with careful inspection of the mucosa in a linear and circumferential fashion. The scope was retroflexed in the rectum. Suction was utilized

## 2024-06-24 NOTE — H&P
chronic migraines 6/7/24   Daisy Khanna MD   fexofenadine (ALLEGRA) 180 MG tablet Take 1 tablet by mouth daily 6/5/24 7/5/24  Saurabh Sneed MD   fluticasone (FLONASE) 50 MCG/ACT nasal spray 2 sprays by Each Nostril route daily 6/5/24   Saurabh Sneed MD   tiZANidine (ZANAFLEX) 4 MG tablet TAKE 1 TABLET EVERY 8 HOURS AS NEEDED FOR MUSCLE SPASMS 5/15/24   Daisy Khanna MD   linaclotide (LINZESS) 290 MCG CAPS capsule Take 1 capsule by mouth every morning (before breakfast) 5/9/24   Odette Alonso APRN   atorvastatin (LIPITOR) 20 MG tablet TAKE 1 TABLET DAILY FOR CHOLESTEROL 4/1/24   Daisy Khanna MD   omeprazole (PRILOSEC) 20 MG delayed release capsule Take 1 capsule by mouth Daily Take first thing in the morning on an empty stomach. 3/28/24   Odette Alonso APRN   gabapentin (NEURONTIN) 300 MG capsule Take 1 capsule by mouth 3 times daily for 90 days. Max Daily Amount: 900 mg 3/25/24 6/23/24  Daisy Khanna MD   metoprolol succinate (TOPROL XL) 25 MG extended release tablet 1 tablet by mouth once a day 3/12/24   Daisy Khanna MD   Cranberry 500 MG TABS  2/25/24   Mackenzie Marks MD   DULoxetine (CYMBALTA) 30 MG extended release capsule Take 1 capsule by mouth daily 2/23/24   Mackenzie Marks MD   phenazopyridine (PYRIDIUM) 100 MG tablet  2/25/24   Mackenzie Mraks MD   vitamin B-12 (CYANOCOBALAMIN) 500 MCG tablet Take 1 tablet by mouth daily    Mackenzie Marks MD   topiramate (TOPAMAX) 100 MG tablet Take 1 tablet by mouth 2 times daily 7/13/23   Daisy Khanna MD   albuterol sulfate HFA (VENTOLIN HFA) 108 (90 Base) MCG/ACT inhaler Inhale 2 puffs into the lungs 4 times daily as needed for Wheezing 6/21/22   Saurabh Sneed MD   SUMAtriptan (IMITREX) 6 MG/0.5ML SOLN injection Inject 0.5 mLs into the skin once as needed for Migraine 3/14/22 5/9/24  Daisy Khanna MD   conjugated estrogens (PREMARIN) 0.625 MG/GM vaginal cream Place 0.5 g

## 2024-06-24 NOTE — ANESTHESIA POSTPROCEDURE EVALUATION
Department of Anesthesiology  Postprocedure Note    Patient: Nolvia Zavala  MRN: [id number]  YOB: 1980  Date of evaluation: 6/24/2024    Procedure Summary       Date: 06/24/24 Room / Location: [address]    Anesthesia Start: 0741 Anesthesia Stop: 0806    Procedures:       ESOPHAGOGASTRODUODENOSCOPY BIOPSY (Esophagus)      COLONOSCOPY DIAGNOSTIC (Abdomen)      ESOPHAGOGASTRODUODENOSCOPY DILATION SAVORY (Esophagus) Diagnosis:       Dysphagia, unspecified type      Bloating      Gastroesophageal reflux disease, unspecified whether esophagitis present      Increased mucus in stool      Abdominal cramping      (Dysphagia, unspecified type [R13.10])      (Bloating [R14.0])      (Gastroesophageal reflux disease, unspecified whether esophagitis present [K21.9])      (Increased mucus in stool [R19.5])      (Abdominal cramping [R10.9])    Surgeons: Reid Abdul MD Responsible Provider: Brandyn Kenney APRN - CRNA    Anesthesia Type: general, TIVA ASA Status: 3            Anesthesia Type: No value filed.    Rebecca Phase I:      Rebecca Phase II:      Anesthesia Post Evaluation    Patient location during evaluation: bedside  Patient participation: complete - patient participated  Level of consciousness: sleepy but conscious  Pain score: 0  Airway patency: patent  Nausea & Vomiting: no nausea and no vomiting  Cardiovascular status: hemodynamically stable and blood pressure returned to baseline  Respiratory status: acceptable, spontaneous ventilation, nonlabored ventilation and room air  Hydration status: stable  Pain management: adequate    No notable events documented.

## 2024-06-26 DIAGNOSIS — R20.2 NUMBNESS AND TINGLING: ICD-10-CM

## 2024-06-26 DIAGNOSIS — R20.0 NUMBNESS AND TINGLING: ICD-10-CM

## 2024-06-26 RX ORDER — GABAPENTIN 300 MG/1
300 CAPSULE ORAL 3 TIMES DAILY
Qty: 90 CAPSULE | Refills: 2 | Status: SHIPPED | OUTPATIENT
Start: 2024-06-26 | End: 2024-09-24

## 2024-06-26 NOTE — TELEPHONE ENCOUNTER
Provider needs to review PDMP    PDMP Monitoring:    Last PDMP David as Reviewed ([place]):  Review User Review Instant Review Result   EDWAR ARIZA 12/14/2023  5:34 PM Reviewed PDMP [1]     Urine Drug Screenings (1 yr)       POCT Rapid Drug Screen  Collected: 3/14/2022 (Final result)              POCT Rapid Drug Screen  Collected: 3/11/2021  1:39 PM (Final result)              POCT Rapid Drug Screen  Collected: 2/12/2020 10:27 AM (Final result)              POCT Rapid Drug Screen  Collected: 8/3/2018  2:47 PM (Final result)                  Medication Contract and Consent for Opioid Use Documents Filed       Patient Documents       Type of Document Status Date Received Received By Description    Medication Contract Signed 8/9/2019  3:11 PM AVEL RILEY     Medication Contract Signed 8/12/2020 12:03 PM MINI DORSEY     Medication Contract Received 3/22/2023  5:28 PM RONALDO HERNANDES med agreement  3-22-23

## 2024-07-01 ENCOUNTER — TELEPHONE (OUTPATIENT)
Dept: GASTROENTEROLOGY | Age: 44
End: 2024-07-01

## 2024-07-01 RX ORDER — TOPIRAMATE 100 MG/1
100 TABLET, FILM COATED ORAL 2 TIMES DAILY
Qty: 180 TABLET | Refills: 3 | Status: SHIPPED | OUTPATIENT
Start: 2024-07-01

## 2024-07-01 NOTE — TELEPHONE ENCOUNTER
07- Called [facility] spoke with Shila  Notified that the patient sent my chart that her Motegrity is not covered.     She stated that the message was that the Product service not covered     She will send a cover my med-notified that we will try a PA to see what it comes back     ID [id number] BIN [id number] Group [id number]      PA submitted and patient notified

## 2024-07-08 DIAGNOSIS — G43.709 CHRONIC MIGRAINE WITHOUT AURA WITHOUT STATUS MIGRAINOSUS, NOT INTRACTABLE: ICD-10-CM

## 2024-07-08 RX ORDER — ERENUMAB-AOOE 70 MG/ML
INJECTION SUBCUTANEOUS
Qty: 1 ML | Refills: 2 | Status: SHIPPED | OUTPATIENT
Start: 2024-07-08

## 2024-07-08 RX ORDER — RIMEGEPANT SULFATE 75 MG/75MG
TABLET, ORALLY DISINTEGRATING ORAL
Qty: 45 TABLET | Refills: 3 | Status: SHIPPED | OUTPATIENT
Start: 2024-07-08

## 2024-07-18 RX ORDER — PRUCALOPRIDE 1 MG/1
1 TABLET, FILM COATED ORAL DAILY
Qty: 90 TABLET | Refills: 3 | Status: SHIPPED | OUTPATIENT
Start: 2024-07-18

## 2024-07-29 DIAGNOSIS — R20.0 NUMBNESS AND TINGLING: ICD-10-CM

## 2024-07-29 DIAGNOSIS — R20.2 NUMBNESS AND TINGLING: ICD-10-CM

## 2024-07-29 RX ORDER — GABAPENTIN 300 MG/1
300 CAPSULE ORAL 3 TIMES DAILY
Qty: 90 CAPSULE | Refills: 2 | Status: SHIPPED | OUTPATIENT
Start: 2024-07-29 | End: 2024-10-27

## 2024-07-29 NOTE — TELEPHONE ENCOUNTER
PDMP Monitoring:    Last PDMP David as Reviewed ([place]):  Review User Review Instant Review Result   EDWAR ARIZA 6/26/2024 11:12 AM Reviewed PDMP [1]     Urine Drug Screenings (1 yr)       POCT Rapid Drug Screen  Collected: 3/14/2022 (Final result)              POCT Rapid Drug Screen  Collected: 3/11/2021  1:39 PM (Final result)              POCT Rapid Drug Screen  Collected: 2/12/2020 10:27 AM (Final result)              POCT Rapid Drug Screen  Collected: 8/3/2018  2:47 PM (Final result)                  Medication Contract and Consent for Opioid Use Documents Filed       Patient Documents       Type of Document Status Date Received Received By Description    Medication Contract Signed 8/9/2019  3:11 PM AVEL RILEY     Medication Contract Signed 8/12/2020 12:03 PM MINI DORSEY     Medication Contract Received 3/22/2023  5:28 PM RONALDO HERNANDES med agreement  3-22-23

## 2024-08-07 ENCOUNTER — OFFICE VISIT (OUTPATIENT)
Dept: PRIMARY CARE CLINIC | Age: 44
End: 2024-08-07
Payer: COMMERCIAL

## 2024-08-07 VITALS
DIASTOLIC BLOOD PRESSURE: 82 MMHG | OXYGEN SATURATION: 97 % | SYSTOLIC BLOOD PRESSURE: 122 MMHG | WEIGHT: 197 LBS | HEART RATE: 75 BPM | BODY MASS INDEX: 32.78 KG/M2 | TEMPERATURE: 98 F

## 2024-08-07 DIAGNOSIS — R30.0 DYSURIA: ICD-10-CM

## 2024-08-07 DIAGNOSIS — R10.9 FLANK PAIN: Primary | ICD-10-CM

## 2024-08-07 LAB
APPEARANCE FLUID: CLEAR
BILIRUBIN, POC: NORMAL
BLOOD URINE, POC: NORMAL
CLARITY, POC: CLEAR
COLOR, POC: NORMAL
GLUCOSE URINE, POC: NORMAL
KETONES, POC: NORMAL
LEUKOCYTE EST, POC: NORMAL
NITRITE, POC: NORMAL
PH, POC: 7
PROTEIN, POC: NORMAL
SPECIFIC GRAVITY, POC: 1.02
UROBILINOGEN, POC: 1

## 2024-08-07 PROCEDURE — 99213 OFFICE O/P EST LOW 20 MIN: CPT | Performed by: NURSE PRACTITIONER

## 2024-08-07 PROCEDURE — G8417 CALC BMI ABV UP PARAM F/U: HCPCS | Performed by: NURSE PRACTITIONER

## 2024-08-07 PROCEDURE — 3074F SYST BP LT 130 MM HG: CPT | Performed by: NURSE PRACTITIONER

## 2024-08-07 PROCEDURE — 81002 URINALYSIS NONAUTO W/O SCOPE: CPT | Performed by: NURSE PRACTITIONER

## 2024-08-07 PROCEDURE — 3079F DIAST BP 80-89 MM HG: CPT | Performed by: NURSE PRACTITIONER

## 2024-08-07 PROCEDURE — G8427 DOCREV CUR MEDS BY ELIG CLIN: HCPCS | Performed by: NURSE PRACTITIONER

## 2024-08-07 PROCEDURE — 1036F TOBACCO NON-USER: CPT | Performed by: NURSE PRACTITIONER

## 2024-08-07 RX ORDER — METAXALONE 800 MG/1
TABLET ORAL
COMMUNITY
Start: 2024-06-02

## 2024-08-07 RX ORDER — DULOXETIN HYDROCHLORIDE 60 MG/1
60 CAPSULE, DELAYED RELEASE ORAL DAILY
COMMUNITY
Start: 2024-07-11

## 2024-08-07 ASSESSMENT — ENCOUNTER SYMPTOMS
CONSTIPATION: 0
COUGH: 0
ABDOMINAL PAIN: 0
RHINORRHEA: 0
EYE ITCHING: 0
EYE DISCHARGE: 0
SORE THROAT: 0
NAUSEA: 0
WHEEZING: 0
DIARRHEA: 0
COLOR CHANGE: 0
SINUS PRESSURE: 0
BLOOD IN STOOL: 0
SHORTNESS OF BREATH: 0
VOMITING: 0

## 2024-08-07 NOTE — PATIENT INSTRUCTIONS
- Increase water intake  - Avoid baths or hot tubs  - We will call with urine culture results once received  - The patient is to follow up with PCP or return to clinic if symptoms worsen/fail to improve.

## 2024-08-07 NOTE — PROGRESS NOTES
[facility]  [facility] [address]  [address]  [place] [address]  Dept: [phone]  Dept Fax: [phone]  Loc: [phone]    Nolvia Zavala is a 43 y.o. female who presents today for her medical conditions/complaints as noted below.  Nolvia Zavala is complaining of Urinary Urgency and Flank Pain        HPI:   Flank Pain  This is a new problem. The current episode started yesterday. The problem occurs intermittently. The problem has been waxing and waning since onset. The quality of the pain is described as aching. The pain does not radiate. The pain is mild. Associated symptoms include dysuria. Pertinent negatives include no abdominal pain, chest pain, fever, headaches or pelvic pain. She has tried nothing for the symptoms.   History of kidney stones and UTIs.    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     GERD (gastroesophageal reflux disease)     Hematuria     wih abdominal pain    Hyperlipidemia     Hypertension     MS (multiple sclerosis) (HCC)     Myofascial pain     Nerve damage     Neutropenia (HCC) 10/21/2015    Obesity     Pelvic floor dysfunction     Postmenopausal HRT (hormone replacement therapy)     POTS (postural orthostatic tachycardia syndrome)     Sleep apnea 2021    a pap       Past Surgical History:   Procedure Laterality Date    APPENDECTOMY      BACK SURGERY      CHOLECYSTECTOMY, LAPAROSCOPIC      COLONOSCOPY  11/16/2015    Dr Streeter-Internal hemorrhoids, AP, 5 yr recall    COLONOSCOPY N/A 08/13/2018    Dr NORMA Abdul-[facility]--10 yr recall    COLONOSCOPY  02/03/2010    Dr Streeter-Tamela, prn    COLONOSCOPY N/A 09/12/2022    Dr NORMA Abdul-Tamela, 6 yr (age 48) recall    COLONOSCOPY N/A 06/24/2024    Dr NORMA Naqvi, 10 yr recall    EGD      HYSTERECTOMY (CERVIX STATUS UNKNOWN)

## 2024-08-09 DIAGNOSIS — N39.0 URINARY TRACT INFECTION WITHOUT HEMATURIA, SITE UNSPECIFIED: Primary | ICD-10-CM

## 2024-08-09 DIAGNOSIS — B37.31 RECURRENT CANDIDIASIS OF VAGINA: ICD-10-CM

## 2024-08-09 LAB
BACTERIA UR CULT: ABNORMAL
BACTERIA UR CULT: ABNORMAL
ORGANISM: ABNORMAL

## 2024-08-09 RX ORDER — CEFDINIR 300 MG/1
300 CAPSULE ORAL 2 TIMES DAILY
Qty: 20 CAPSULE | Refills: 0 | Status: SHIPPED | OUTPATIENT
Start: 2024-08-09 | End: 2024-08-19

## 2024-08-09 RX ORDER — FLUCONAZOLE 150 MG/1
150 TABLET ORAL
Qty: 2 TABLET | Refills: 0 | Status: SHIPPED | OUTPATIENT
Start: 2024-08-09 | End: 2024-08-15

## 2024-08-15 ENCOUNTER — OFFICE VISIT (OUTPATIENT)
Dept: GASTROENTEROLOGY | Age: 44
End: 2024-08-15
Payer: COMMERCIAL

## 2024-08-15 VITALS
SYSTOLIC BLOOD PRESSURE: 130 MMHG | WEIGHT: 197 LBS | DIASTOLIC BLOOD PRESSURE: 70 MMHG | OXYGEN SATURATION: 96 % | BODY MASS INDEX: 32.82 KG/M2 | HEIGHT: 65 IN | HEART RATE: 76 BPM

## 2024-08-15 DIAGNOSIS — R09.A2 GLOBUS SENSATION: ICD-10-CM

## 2024-08-15 DIAGNOSIS — K58.1 IRRITABLE BOWEL SYNDROME WITH CONSTIPATION: ICD-10-CM

## 2024-08-15 DIAGNOSIS — R13.10 DYSPHAGIA, UNSPECIFIED TYPE: Primary | ICD-10-CM

## 2024-08-15 PROCEDURE — G8417 CALC BMI ABV UP PARAM F/U: HCPCS | Performed by: NURSE PRACTITIONER

## 2024-08-15 PROCEDURE — 3075F SYST BP GE 130 - 139MM HG: CPT | Performed by: NURSE PRACTITIONER

## 2024-08-15 PROCEDURE — G8427 DOCREV CUR MEDS BY ELIG CLIN: HCPCS | Performed by: NURSE PRACTITIONER

## 2024-08-15 PROCEDURE — 3078F DIAST BP <80 MM HG: CPT | Performed by: NURSE PRACTITIONER

## 2024-08-15 PROCEDURE — 1036F TOBACCO NON-USER: CPT | Performed by: NURSE PRACTITIONER

## 2024-08-15 RX ORDER — PRUCALOPRIDE 2 MG/1
2 TABLET, FILM COATED ORAL DAILY
Qty: 30 TABLET | Refills: 5 | Status: SHIPPED | OUTPATIENT
Start: 2024-08-15

## 2024-08-15 RX ORDER — PRUCALOPRIDE 2 MG/1
2 TABLET, FILM COATED ORAL DAILY
Qty: 30 TABLET | Refills: 5 | Status: SHIPPED | OUTPATIENT
Start: 2024-08-15 | End: 2024-08-15 | Stop reason: SDUPTHER

## 2024-08-15 ASSESSMENT — ENCOUNTER SYMPTOMS
SHORTNESS OF BREATH: 0
DIARRHEA: 0
NAUSEA: 0
VOMITING: 0
ABDOMINAL PAIN: 0
BLOOD IN STOOL: 0
CONSTIPATION: 1
ABDOMINAL DISTENTION: 0
RECTAL PAIN: 0
COUGH: 0
ANAL BLEEDING: 0
CHOKING: 0
TROUBLE SWALLOWING: 1

## 2024-08-15 NOTE — PROGRESS NOTES
Subjective:     Patient ID: Nolvia Zavala is a 43 y.o. female  PCP: Daisy Khanna MD  Referring Provider: No ref. 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 We decided to do a barium swallow to see if this gave give us more information on her swallowing issues.     Constipation continues despite motegrity states it is slightly better than Linzess. We will increase her motegrity does and see if this helps her       Assessment:     1. Dysphagia, unspecified type  -     FL MODIFIED BARIUM SWALLOW W VIDEO; Future  2. Globus sensation  3. Irritable bowel syndrome with constipation       Review of Systems   Constitutional:  Negative for activity change, appetite change, fatigue, fever and unexpected weight change.   HENT:  Positive for trouble swallowing.    Respiratory:  Negative for cough, choking and shortness of breath.    Cardiovascular:  Negative for chest pain.   Gastrointestinal:  Positive for constipation. Negative for abdominal distention, abdominal pain, anal bleeding, blood in stool, diarrhea, nausea, rectal pain and vomiting.   Allergic/Immunologic: Negative for food allergies.   All other systems reviewed and are negative.      Plan:   Increase motegrity

## 2024-08-20 ENCOUNTER — TELEPHONE (OUTPATIENT)
Dept: GASTROENTEROLOGY | Age: 44
End: 2024-08-20

## 2024-08-23 RX ORDER — METOPROLOL SUCCINATE 25 MG/1
TABLET, EXTENDED RELEASE ORAL
Qty: 90 TABLET | Refills: 2 | Status: SHIPPED | OUTPATIENT
Start: 2024-08-23

## 2024-08-26 ENCOUNTER — OFFICE VISIT (OUTPATIENT)
Dept: PRIMARY CARE CLINIC | Age: 44
End: 2024-08-26
Payer: COMMERCIAL

## 2024-08-26 VITALS
DIASTOLIC BLOOD PRESSURE: 82 MMHG | RESPIRATION RATE: 18 BRPM | HEART RATE: 75 BPM | HEIGHT: 65 IN | BODY MASS INDEX: 32.55 KG/M2 | WEIGHT: 195.4 LBS | SYSTOLIC BLOOD PRESSURE: 126 MMHG | OXYGEN SATURATION: 99 % | TEMPERATURE: 97 F

## 2024-08-26 DIAGNOSIS — H66.90 ACUTE OTITIS MEDIA, UNSPECIFIED OTITIS MEDIA TYPE: Primary | ICD-10-CM

## 2024-08-26 DIAGNOSIS — J02.9 SORE THROAT: ICD-10-CM

## 2024-08-26 LAB — S PYO AG THROAT QL: NORMAL

## 2024-08-26 PROCEDURE — 3079F DIAST BP 80-89 MM HG: CPT | Performed by: NURSE PRACTITIONER

## 2024-08-26 PROCEDURE — G8417 CALC BMI ABV UP PARAM F/U: HCPCS | Performed by: NURSE PRACTITIONER

## 2024-08-26 PROCEDURE — 3074F SYST BP LT 130 MM HG: CPT | Performed by: NURSE PRACTITIONER

## 2024-08-26 PROCEDURE — G8427 DOCREV CUR MEDS BY ELIG CLIN: HCPCS | Performed by: NURSE PRACTITIONER

## 2024-08-26 PROCEDURE — 1036F TOBACCO NON-USER: CPT | Performed by: NURSE PRACTITIONER

## 2024-08-26 PROCEDURE — 99213 OFFICE O/P EST LOW 20 MIN: CPT | Performed by: NURSE PRACTITIONER

## 2024-08-26 PROCEDURE — 87880 STREP A ASSAY W/OPTIC: CPT | Performed by: NURSE PRACTITIONER

## 2024-08-26 RX ORDER — FEXOFENADINE HCL 180 MG/1
180 TABLET ORAL DAILY
COMMUNITY
Start: 2024-07-08 | End: 2024-08-26

## 2024-08-26 RX ORDER — DEXTROMETHORPHAN HYDROBROMIDE AND PROMETHAZINE HYDROCHLORIDE 15; 6.25 MG/5ML; MG/5ML
SYRUP ORAL
Qty: 120 ML | Refills: 0 | Status: SHIPPED | OUTPATIENT
Start: 2024-08-26 | End: 2024-09-02

## 2024-08-26 RX ORDER — BISACODYL 5 MG
TABLET, DELAYED RELEASE (ENTERIC COATED) ORAL
COMMUNITY
Start: 2024-06-14 | End: 2024-08-26

## 2024-08-26 SDOH — ECONOMIC STABILITY: FOOD INSECURITY: WITHIN THE PAST 12 MONTHS, THE FOOD YOU BOUGHT JUST DIDN'T LAST AND YOU DIDN'T HAVE MONEY TO GET MORE.: NEVER TRUE

## 2024-08-26 SDOH — ECONOMIC STABILITY: FOOD INSECURITY: WITHIN THE PAST 12 MONTHS, YOU WORRIED THAT YOUR FOOD WOULD RUN OUT BEFORE YOU GOT MONEY TO BUY MORE.: NEVER TRUE

## 2024-08-26 SDOH — ECONOMIC STABILITY: INCOME INSECURITY: HOW HARD IS IT FOR YOU TO PAY FOR THE VERY BASICS LIKE FOOD, HOUSING, MEDICAL CARE, AND HEATING?: NOT HARD AT ALL

## 2024-08-26 SDOH — ECONOMIC STABILITY: TRANSPORTATION INSECURITY
IN THE PAST 12 MONTHS, HAS LACK OF TRANSPORTATION KEPT YOU FROM MEETINGS, WORK, OR FROM GETTING THINGS NEEDED FOR DAILY LIVING?: NO

## 2024-08-26 ASSESSMENT — ENCOUNTER SYMPTOMS
CONSTIPATION: 0
WHEEZING: 0
ABDOMINAL DISTENTION: 0
NAUSEA: 0
EYES NEGATIVE: 1
COUGH: 1
SORE THROAT: 1
ALLERGIC/IMMUNOLOGIC NEGATIVE: 1
GASTROINTESTINAL NEGATIVE: 1
RHINORRHEA: 1
ABDOMINAL PAIN: 0
DIARRHEA: 0
VOMITING: 0
SHORTNESS OF BREATH: 0

## 2024-08-26 NOTE — PROGRESS NOTES
[facility]  [place] [id number] [facility] [place] [address]  Dept: [phone]  Dept Fax: [phone]  Loc: [phone]    Nolvia Zavala is a 43 y.o. female who presents today for her medical conditions/complaints as noted below.  Nolvia Zavala is c/o of Cough (Pt states she started feeling sick Friday night. She states it is wet but not productive. ), Congestion (Pt states she has head congestion and feels like it is in her chest as well. ), Otalgia (Pt states both her ears are hurting, but her right ear is hurting the most. Denies drainage from her ears. ), Fatigue (Pt states she has had terrible fatigue. ), and Sore Throat (Pt states her throat has been hurting her as well. Pt denies fever. )        HPI:     HPI this 43-year-old female presents today for cough and congestion.  States she has been having ear pain on both sides but it seems rotate from 1 side to the other.  States she is has extreme fatigue and sore throat.  States that she does have a cough but it does seem to be worse at night.  She denies any fever diarrhea or abdominal pain.  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  Also that her daughter is in nursing school  Chief Complaint   Patient presents with    Cough     Pt states she started feeling sick Friday night. She states it is wet but not productive.     Congestion     Pt states she has head congestion and feels like it is in her chest as well.     Otalgia     Pt states both her ears are hurting, but her right ear is hurting the most. Denies drainage from her ears.     Fatigue     Pt states she has had terrible fatigue.     Sore Throat     Pt states her throat has been hurting her as well. Pt denies fever.      Past Medical History:   Diagnosis Date    B12 deficiency     Cauda equina syndrome (HCC)     Complicated migraine     Depression     Functional neurological symptom disorder with weakness  lozenges or sprays. Hot tea with honey may also be soothing to the throat. Using a daily antihistamine such as Claritin, Zyrtec or Allegra can help with upper respiratory symptoms. Benadryl can be sedating but is helpful at drying secretions and may be taken at night. For earaches, microwave a wet washcloth for 2 mins then using tongs (do not touch as it may scald you ) place cloth in ceramic cup and hold up to ear. The moist heat can be soothing to the ear.     Call if you have a fever greater than 102 F or if symptoms do not improve. Your provider may also send you in prescription medications depending on your symptoms and their severity. Take all medications as directed on package unless specifically told otherwise.  Antibiotic Therapy  Take your antibiotic as prescribed.  Take all of your antibiotics. You should not have any left over.  Many antibiotics may cause diarrhea.. you can start an OTC probiotic to support normal gut bacteria.  If you are on birth control, you need to use an alternative method of contraceptive for one month as antibiotics can reduce the effectiveness of oral BC.    Always monitor for signs of allergic reaction such as itching, hives or any difficulty swallowing or breathing STOP THE MEDICATION IMMEDIATELY.  If difficulty breathing, call 911 and go to the ER.  If hives and itching, contact provider through MyChart or phone for alternative antibiotics or be seen if necessary.   Electronically signed by DEVEN Kelly NP on 8/26/2024 at 10:28 AM    EMR Dragon/transcription disclaimer:  Much of thisencounter note is electronic transcription/translation of spoken language to printed texts.  The electronic translation of spoken language may be erroneous, or at times, nonsensical words or phrases may be inadvertentlytranscribed.  Although I have reviewed the note for such errors, some may still exist.

## 2024-08-26 NOTE — PATIENT INSTRUCTIONS
Viral syndrome   Generally, I recommend Flonase daily for 14 days along with a potent antihistamine such as Allegra D, Zyrtec D or Claritin D for 14 days.   If you have Blood pressure problems you may not be able to tolerate the D version.    Other OTC nasal sprays such as saline spray, Vicks or Naso radha can also be helpful.      People with high blood pressure may use Coricidin HBP or Benadryl for sinus   symptoms.     Oscillococcinum may be helpful for flu or flu-like symptoms.     Supplement with Vit D3 5000 units daily, Vit C 1000 mg daily and Zinc 50 mg daily.     Many illnesses are caused by viruses. These conditions usually run their course in 7-14 days.  Antibiotics do not help fight viral infections and are not needed at this time. Viral syndromes are treated with symptomatic support. You may take tylenol or ibuprofen for fever or aches and pains. Stay hydrated by taking sips of water or non caffeinated, noncarbonated, and nonalcoholic beverages throughout the day. For sore throat, you may gargle with warm salt water, use lozenges or sprays. Hot tea with honey may also be soothing to the throat. Using a daily antihistamine such as Claritin, Zyrtec or Allegra can help with upper respiratory symptoms. Benadryl can be sedating but is helpful at drying secretions and may be taken at night. For earaches, microwave a wet washcloth for 2 mins then using tongs (do not touch as it may scald you ) place cloth in ceramic cup and hold up to ear. The moist heat can be soothing to the ear.     Call if you have a fever greater than 102 F or if symptoms do not improve. Your provider may also send you in prescription medications depending on your symptoms and their severity. Take all medications as directed on package unless specifically told otherwise.  Antibiotic Therapy  Take your antibiotic as prescribed.  Take all of your antibiotics. You should not have any left over.  Many antibiotics may cause diarrhea.. you can  start an OTC probiotic to support normal gut bacteria.  If you are on birth control, you need to use an alternative method of contraceptive for one month as antibiotics can reduce the effectiveness of oral BC.    Always monitor for signs of allergic reaction such as itching, hives or any difficulty swallowing or breathing STOP THE MEDICATION IMMEDIATELY.  If difficulty breathing, call 911 and go to the ER.  If hives and itching, contact provider through MyChart or phone for alternative antibiotics or be seen if necessary.

## 2024-09-05 ENCOUNTER — HOSPITAL ENCOUNTER (OUTPATIENT)
Dept: SPEECH THERAPY | Age: 44
Setting detail: THERAPIES SERIES
Discharge: HOME | End: 2024-09-05
Payer: COMMERCIAL

## 2024-09-05 ENCOUNTER — HOSPITAL ENCOUNTER (OUTPATIENT)
Dept: GENERAL RADIOLOGY | Age: 44
Discharge: HOME | End: 2024-09-05
Payer: COMMERCIAL

## 2024-09-05 ENCOUNTER — OFFICE VISIT (OUTPATIENT)
Dept: PRIMARY CARE CLINIC | Age: 44
End: 2024-09-05

## 2024-09-05 VITALS
SYSTOLIC BLOOD PRESSURE: 110 MMHG | TEMPERATURE: 98.3 F | OXYGEN SATURATION: 98 % | HEIGHT: 65 IN | BODY MASS INDEX: 32.96 KG/M2 | WEIGHT: 197.8 LBS | RESPIRATION RATE: 16 BRPM | HEART RATE: 77 BPM | DIASTOLIC BLOOD PRESSURE: 82 MMHG

## 2024-09-05 DIAGNOSIS — J39.8 CONGESTION OF UPPER RESPIRATORY TRACT: ICD-10-CM

## 2024-09-05 DIAGNOSIS — H92.03 OTALGIA OF BOTH EARS: ICD-10-CM

## 2024-09-05 DIAGNOSIS — R13.10 DYSPHAGIA, UNSPECIFIED TYPE: ICD-10-CM

## 2024-09-05 DIAGNOSIS — R53.83 FATIGUE, UNSPECIFIED TYPE: Primary | ICD-10-CM

## 2024-09-05 PROCEDURE — 92611 MOTION FLUOROSCOPY/SWALLOW: CPT

## 2024-09-05 PROCEDURE — 74230 X-RAY XM SWLNG FUNCJ C+: CPT

## 2024-09-05 RX ORDER — AZELASTINE 1 MG/ML
2 SPRAY, METERED NASAL 2 TIMES DAILY
Qty: 30 ML | Refills: 1 | Status: SHIPPED | OUTPATIENT
Start: 2024-09-05

## 2024-09-05 ASSESSMENT — ENCOUNTER SYMPTOMS
EYES NEGATIVE: 1
GASTROINTESTINAL NEGATIVE: 1
DIARRHEA: 0
VOMITING: 0
COUGH: 1
ALLERGIC/IMMUNOLOGIC NEGATIVE: 1
RHINORRHEA: 0
SORE THROAT: 0
NAUSEA: 0

## 2024-09-05 NOTE — PROCEDURES
hx of GERD without esophagitis, epigastric pain, dysphagia, multiple sclerosis, weakness of left side of the body. Trigeminal neuralgia.    Today she reports worsening pharyngeal dysphagia over the past few months. She reports she can have difficulty with any type of food at any time. She has had difficulty with liquids, foods, medications, and saliva. She reports weekly episodes of dysphagia which she has noticed occurs more in the afternoons and evenings. She complains of frequent globus sensation. She has been clearing her throat frequently. She has been provided with inhalers and reflux medications but she has not felt any relief. She has utilized additional liquid wash and dry swallows, however, these are not consistently effective. She reported using additional effort just to complete a swallow. She reports hx of constipation and poor peristalsis. She reports elevated diaphragm. She wears a CPAP. She also reports forceful/loud coughing episodes at night. These are so loud she has to leave her room to avoid waking her .     She is currently consuming a regular diet with thin/regular liquids. She is taking medications whole (one at a time) with water. She denies any recent episodes of pneumonia.        Behavior/Cognition/Vision/Hearing:  Behavior/Cognition: Alert;Cooperative;Pleasant mood    Oral Mechanism Examination:  Natural dentition, no lingual deviation, no labial asymmetry      Impressions:  No aspiration was observed during today's study. Trace silent laryngeal penetration was observed with thin liquids via straw which was cleared from the laryngeal vestibule with swallow completion. No significant pharyngeal residue was observed. Difficulty with initiation of the pharyngeal swallow response was noted. Difficulty with clearing the bolus from the oral cavity was noted. Lingual and base of tongue residue were observed. A prominent cricopharyngeus was identified. This did not prevent the bolus from

## 2024-09-05 NOTE — PROGRESS NOTES
[facility]  [place] [id number] [facility] [place] [address]  Dept: [phone]  Dept Fax: [phone]  Loc: [phone]    Nolvia Zavala is a 43 y.o. female who presents today for her medical conditions/complaints as noted below.  Nolvia Zavala is c/o of Otalgia (Bilateral ear pain, still has congestion and feels very run down and just feels bad in general)        HPI:     HPI this 43-year-old female presents today for ongoing issues with ear pain bilaterally as well as head and chest congestion.  Patient states that she just feels rundown.  States that she has had significant fatigue.  She denies any GI upset diarrhea or nausea.  But she states her other symptoms been going on now for over 2 weeks.  She does state that she took all of her antibiotics as directed and states she is using the Flonase twice a day.  Does state that she ran out of her antihistamine and does need to get that refilled.  Chief Complaint   Patient presents with    Otalgia     Bilateral ear pain, still has congestion and feels very run down and just feels bad in general     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     GERD (gastroesophageal reflux disease)     Hematuria     wih abdominal pain    Hyperlipidemia     Hypertension     MS (multiple sclerosis) (HCC)     Myofascial pain     Nerve damage     Neutropenia (HCC) 10/21/2015    Obesity     Pelvic floor dysfunction     Postmenopausal HRT (hormone replacement therapy)     POTS (postural orthostatic tachycardia syndrome)     Sleep apnea 2021    a pap      Past Surgical History:   Procedure Laterality Date    APPENDECTOMY      BACK SURGERY      CHOLECYSTECTOMY, LAPAROSCOPIC      COLONOSCOPY  11/16/2015    Dr Streeter-Internal hemorrhoids, AP, 5 yr recall    COLONOSCOPY N/A 08/13/2018

## 2024-09-09 RX ORDER — ERENUMAB-AOOE 70 MG/ML
INJECTION SUBCUTANEOUS
Qty: 1 ML | Refills: 2 | Status: SHIPPED | OUTPATIENT
Start: 2024-09-09

## 2024-09-22 SDOH — HEALTH STABILITY: PHYSICAL HEALTH: ON AVERAGE, HOW MANY DAYS PER WEEK DO YOU ENGAGE IN MODERATE TO STRENUOUS EXERCISE (LIKE A BRISK WALK)?: 0 DAYS

## 2024-09-22 ASSESSMENT — LIFESTYLE VARIABLES
HOW OFTEN DO YOU HAVE SIX OR MORE DRINKS ON ONE OCCASION: 1
HOW MANY STANDARD DRINKS CONTAINING ALCOHOL DO YOU HAVE ON A TYPICAL DAY: PATIENT DOES NOT DRINK
HOW OFTEN DO YOU HAVE A DRINK CONTAINING ALCOHOL: 1
HOW MANY STANDARD DRINKS CONTAINING ALCOHOL DO YOU HAVE ON A TYPICAL DAY: 0
HOW OFTEN DO YOU HAVE A DRINK CONTAINING ALCOHOL: NEVER
HOW MANY STANDARD DRINKS CONTAINING ALCOHOL DO YOU HAVE ON A TYPICAL DAY: PATIENT DOES NOT DRINK
HOW OFTEN DO YOU HAVE A DRINK CONTAINING ALCOHOL: NEVER

## 2024-09-22 ASSESSMENT — PATIENT HEALTH QUESTIONNAIRE - PHQ9
2. FEELING DOWN, DEPRESSED OR HOPELESS: SEVERAL DAYS
SUM OF ALL RESPONSES TO PHQ QUESTIONS 1-9: 1
SUM OF ALL RESPONSES TO PHQ9 QUESTIONS 1 & 2: 1
SUM OF ALL RESPONSES TO PHQ QUESTIONS 1-9: 1
1. LITTLE INTEREST OR PLEASURE IN DOING THINGS: NOT AT ALL
SUM OF ALL RESPONSES TO PHQ QUESTIONS 1-9: 1
2. FEELING DOWN, DEPRESSED OR HOPELESS: SEVERAL DAYS
SUM OF ALL RESPONSES TO PHQ QUESTIONS 1-9: 1
1. LITTLE INTEREST OR PLEASURE IN DOING THINGS: NOT AT ALL
SUM OF ALL RESPONSES TO PHQ QUESTIONS 1-9: 1
SUM OF ALL RESPONSES TO PHQ9 QUESTIONS 1 & 2: 1

## 2024-09-25 ENCOUNTER — OFFICE VISIT (OUTPATIENT)
Dept: PRIMARY CARE CLINIC | Age: 44
End: 2024-09-25

## 2024-09-25 VITALS
DIASTOLIC BLOOD PRESSURE: 90 MMHG | BODY MASS INDEX: 33.02 KG/M2 | SYSTOLIC BLOOD PRESSURE: 130 MMHG | TEMPERATURE: 96.9 F | HEART RATE: 83 BPM | OXYGEN SATURATION: 96 % | HEIGHT: 65 IN | RESPIRATION RATE: 18 BRPM | WEIGHT: 198.2 LBS

## 2024-09-25 DIAGNOSIS — R30.0 BURNING WITH URINATION: ICD-10-CM

## 2024-09-25 DIAGNOSIS — Z23 NEED FOR INFLUENZA VACCINATION: ICD-10-CM

## 2024-09-25 DIAGNOSIS — R20.2 NUMBNESS AND TINGLING: ICD-10-CM

## 2024-09-25 DIAGNOSIS — Z00.00 WELL FEMALE EXAM WITHOUT GYNECOLOGICAL EXAM: Primary | ICD-10-CM

## 2024-09-25 DIAGNOSIS — Z79.899 MEDICATION MANAGEMENT: ICD-10-CM

## 2024-09-25 DIAGNOSIS — R20.0 NUMBNESS AND TINGLING: ICD-10-CM

## 2024-09-25 DIAGNOSIS — M54.2 CHRONIC NECK PAIN: ICD-10-CM

## 2024-09-25 DIAGNOSIS — G89.29 CHRONIC NECK PAIN: ICD-10-CM

## 2024-09-25 DIAGNOSIS — L74.9 SWEATING ABNORMALITY: ICD-10-CM

## 2024-09-25 LAB
ALBUMIN SERPL-MCNC: 4.9 G/DL (ref 3.5–5.2)
ALCOHOL URINE: NORMAL
ALP SERPL-CCNC: 83 U/L (ref 35–104)
ALT SERPL-CCNC: 12 U/L (ref 5–33)
AMPHETAMINE SCREEN URINE: NORMAL
ANION GAP SERPL CALCULATED.3IONS-SCNC: 15 MMOL/L (ref 7–19)
APPEARANCE FLUID: CLEAR
AST SERPL-CCNC: 17 U/L (ref 5–32)
BARBITURATE SCREEN URINE: NORMAL
BENZODIAZEPINE SCREEN, URINE: NORMAL
BILIRUB SERPL-MCNC: 0.5 MG/DL (ref 0.2–1.2)
BILIRUBIN, POC: NORMAL
BLOOD URINE, POC: NORMAL
BUN SERPL-MCNC: 18 MG/DL (ref 6–20)
BUPRENORPHINE URINE: NORMAL
CALCIUM SERPL-MCNC: 10 MG/DL (ref 8.6–10)
CHLORIDE SERPL-SCNC: 105 MMOL/L (ref 98–111)
CHOLEST SERPL-MCNC: 228 MG/DL (ref 0–199)
CLARITY, POC: CLEAR
CO2 SERPL-SCNC: 22 MMOL/L (ref 22–29)
COCAINE METABOLITE SCREEN URINE: NORMAL
COLOR, POC: YELLOW
CREAT SERPL-MCNC: 1.1 MG/DL (ref 0.5–0.9)
ERYTHROCYTE [DISTWIDTH] IN BLOOD BY AUTOMATED COUNT: 13 % (ref 11.5–14.5)
FENTANYL SCREEN, URINE: NORMAL
GABAPENTIN SCREEN, URINE: NORMAL
GLUCOSE SERPL-MCNC: 97 MG/DL (ref 70–99)
GLUCOSE URINE, POC: NORMAL MG/DL
HCT VFR BLD AUTO: 40.6 % (ref 37–47)
HDLC SERPL-MCNC: 60 MG/DL (ref 40–60)
HGB BLD-MCNC: 13.2 G/DL (ref 12–16)
KETONES, POC: NORMAL MG/DL
LDLC SERPL CALC-MCNC: 136 MG/DL
LEUKOCYTE EST, POC: NORMAL
MCH RBC QN AUTO: 30.8 PG (ref 27–31)
MCHC RBC AUTO-ENTMCNC: 32.5 G/DL (ref 33–37)
MCV RBC AUTO: 94.9 FL (ref 81–99)
MDMA, URINE: NORMAL
METHADONE SCREEN, URINE: NORMAL
METHAMPHETAMINE, URINE: NORMAL
NITRITE, POC: NORMAL
OPIATE SCREEN URINE: NORMAL
OXYCODONE SCREEN URINE: NORMAL
PH, POC: NORMAL
PHENCYCLIDINE SCREEN URINE: NORMAL
PLATELET # BLD AUTO: 324 K/UL (ref 130–400)
PMV BLD AUTO: 9.8 FL (ref 9.4–12.3)
POTASSIUM SERPL-SCNC: 4.4 MMOL/L (ref 3.5–5)
PROPOXYPHENE SCREEN, URINE: NORMAL
PROT SERPL-MCNC: 8.1 G/DL (ref 6.4–8.3)
PROTEIN, POC: NORMAL MG/DL
RBC # BLD AUTO: 4.28 M/UL (ref 4.2–5.4)
SODIUM SERPL-SCNC: 142 MMOL/L (ref 136–145)
SPECIFIC GRAVITY, POC: NORMAL
SYNTHETIC CANNABINOIDS(K2) SCREEN, URINE: NORMAL
THC SCREEN, URINE: NORMAL
TRAMADOL SCREEN URINE: NORMAL
TRICYCLIC ANTIDEPRESSANTS, UR: NORMAL
TRIGL SERPL-MCNC: 159 MG/DL (ref 0–149)
UROBILINOGEN, POC: NORMAL MG/DL
WBC # BLD AUTO: 7 K/UL (ref 4.8–10.8)

## 2024-09-25 RX ORDER — GINSENG 100 MG
CAPSULE ORAL
COMMUNITY
Start: 2024-08-30

## 2024-09-25 RX ORDER — GABAPENTIN 300 MG/1
300 CAPSULE ORAL 3 TIMES DAILY
Qty: 90 CAPSULE | Refills: 2 | Status: SHIPPED | OUTPATIENT
Start: 2024-09-25 | End: 2024-12-24

## 2024-09-27 DIAGNOSIS — R20.0 NUMBNESS AND TINGLING: ICD-10-CM

## 2024-09-27 DIAGNOSIS — R20.2 NUMBNESS AND TINGLING: ICD-10-CM

## 2024-09-27 RX ORDER — GABAPENTIN 300 MG/1
300 CAPSULE ORAL 3 TIMES DAILY
Qty: 90 CAPSULE | Refills: 0 | OUTPATIENT
Start: 2024-09-27 | End: 2024-10-27

## 2024-09-30 RX ORDER — ESTRADIOL 0.5 MG/1
TABLET ORAL
Qty: 30 TABLET | Refills: 2 | Status: SHIPPED | OUTPATIENT
Start: 2024-09-30

## 2024-10-11 ENCOUNTER — OFFICE VISIT (OUTPATIENT)
Dept: PRIMARY CARE CLINIC | Age: 44
End: 2024-10-11
Payer: COMMERCIAL

## 2024-10-11 VITALS
SYSTOLIC BLOOD PRESSURE: 138 MMHG | HEIGHT: 65 IN | BODY MASS INDEX: 33.32 KG/M2 | WEIGHT: 200 LBS | OXYGEN SATURATION: 99 % | TEMPERATURE: 97.7 F | RESPIRATION RATE: 18 BRPM | HEART RATE: 73 BPM | DIASTOLIC BLOOD PRESSURE: 84 MMHG

## 2024-10-11 DIAGNOSIS — M54.9 BACK PAIN, UNSPECIFIED BACK LOCATION, UNSPECIFIED BACK PAIN LATERALITY, UNSPECIFIED CHRONICITY: ICD-10-CM

## 2024-10-11 DIAGNOSIS — R31.9 HEMATURIA, UNSPECIFIED TYPE: Primary | ICD-10-CM

## 2024-10-11 LAB
ALBUMIN SERPL-MCNC: 4.5 G/DL (ref 3.5–5.2)
ALP SERPL-CCNC: 75 U/L (ref 35–104)
ALT SERPL-CCNC: 11 U/L (ref 5–33)
ANION GAP SERPL CALCULATED.3IONS-SCNC: 11 MMOL/L (ref 7–19)
APPEARANCE FLUID: ABNORMAL
AST SERPL-CCNC: 16 U/L (ref 5–32)
BASOPHILS # BLD: 0.1 K/UL (ref 0–0.2)
BASOPHILS NFR BLD: 1.1 % (ref 0–1)
BILIRUB SERPL-MCNC: 0.4 MG/DL (ref 0.2–1.2)
BILIRUBIN, POC: ABNORMAL
BLOOD URINE, POC: ABNORMAL
BUN SERPL-MCNC: 14 MG/DL (ref 6–20)
CALCIUM SERPL-MCNC: 8.7 MG/DL (ref 8.6–10)
CHLORIDE SERPL-SCNC: 106 MMOL/L (ref 98–111)
CLARITY, POC: ABNORMAL
CO2 SERPL-SCNC: 24 MMOL/L (ref 22–29)
COLOR, POC: ABNORMAL
CREAT SERPL-MCNC: 1 MG/DL (ref 0.5–0.9)
EOSINOPHIL # BLD: 0.4 K/UL (ref 0–0.6)
EOSINOPHIL NFR BLD: 5.9 % (ref 0–5)
ERYTHROCYTE [DISTWIDTH] IN BLOOD BY AUTOMATED COUNT: 12.6 % (ref 11.5–14.5)
GLUCOSE SERPL-MCNC: 96 MG/DL (ref 70–99)
GLUCOSE URINE, POC: ABNORMAL MG/DL
HCT VFR BLD AUTO: 38.8 % (ref 37–47)
HGB BLD-MCNC: 12.4 G/DL (ref 12–16)
IMM GRANULOCYTES # BLD: 0 K/UL
KETONES, POC: ABNORMAL MG/DL
LEUKOCYTE EST, POC: ABNORMAL
LYMPHOCYTES # BLD: 1.3 K/UL (ref 1.1–4.5)
LYMPHOCYTES NFR BLD: 20.1 % (ref 20–40)
MCH RBC QN AUTO: 31.3 PG (ref 27–31)
MCHC RBC AUTO-ENTMCNC: 32 G/DL (ref 33–37)
MCV RBC AUTO: 98 FL (ref 81–99)
MONOCYTES # BLD: 0.5 K/UL (ref 0–0.9)
MONOCYTES NFR BLD: 7.3 % (ref 0–10)
NEUTROPHILS # BLD: 4.1 K/UL (ref 1.5–7.5)
NEUTS SEG NFR BLD: 65.3 % (ref 50–65)
NITRITE, POC: ABNORMAL
PH, POC: 7
PLATELET # BLD AUTO: 295 K/UL (ref 130–400)
PMV BLD AUTO: 10.2 FL (ref 9.4–12.3)
POTASSIUM SERPL-SCNC: 4.4 MMOL/L (ref 3.5–5)
PROT SERPL-MCNC: 7.3 G/DL (ref 6.4–8.3)
PROTEIN, POC: ABNORMAL MG/DL
RBC # BLD AUTO: 3.96 M/UL (ref 4.2–5.4)
SODIUM SERPL-SCNC: 141 MMOL/L (ref 136–145)
SPECIFIC GRAVITY, POC: 1.02
UROBILINOGEN, POC: 0.2 MG/DL
WBC # BLD AUTO: 6.3 K/UL (ref 4.8–10.8)

## 2024-10-11 PROCEDURE — G8417 CALC BMI ABV UP PARAM F/U: HCPCS | Performed by: NURSE PRACTITIONER

## 2024-10-11 PROCEDURE — 3079F DIAST BP 80-89 MM HG: CPT | Performed by: NURSE PRACTITIONER

## 2024-10-11 PROCEDURE — 1036F TOBACCO NON-USER: CPT | Performed by: NURSE PRACTITIONER

## 2024-10-11 PROCEDURE — 99214 OFFICE O/P EST MOD 30 MIN: CPT | Performed by: NURSE PRACTITIONER

## 2024-10-11 PROCEDURE — 81002 URINALYSIS NONAUTO W/O SCOPE: CPT | Performed by: NURSE PRACTITIONER

## 2024-10-11 PROCEDURE — G8427 DOCREV CUR MEDS BY ELIG CLIN: HCPCS | Performed by: NURSE PRACTITIONER

## 2024-10-11 PROCEDURE — 3075F SYST BP GE 130 - 139MM HG: CPT | Performed by: NURSE PRACTITIONER

## 2024-10-11 PROCEDURE — G8484 FLU IMMUNIZE NO ADMIN: HCPCS | Performed by: NURSE PRACTITIONER

## 2024-10-11 RX ORDER — NITROFURANTOIN 25; 75 MG/1; MG/1
CAPSULE ORAL
COMMUNITY
Start: 2024-10-09

## 2024-10-11 RX ORDER — TRAMADOL HYDROCHLORIDE 50 MG/1
50 TABLET ORAL EVERY 6 HOURS PRN
Qty: 12 TABLET | Refills: 0 | Status: SHIPPED | OUTPATIENT
Start: 2024-10-11 | End: 2024-10-14

## 2024-10-11 RX ORDER — IBUPROFEN 800 MG/1
800 TABLET, FILM COATED ORAL
Qty: 270 TABLET | Refills: 1 | Status: SHIPPED | OUTPATIENT
Start: 2024-10-11

## 2024-10-11 ASSESSMENT — ENCOUNTER SYMPTOMS
NAUSEA: 0
COUGH: 0
ABDOMINAL PAIN: 1
ABDOMINAL DISTENTION: 1
ALLERGIC/IMMUNOLOGIC NEGATIVE: 1
VOMITING: 0
EYES NEGATIVE: 1
BLOOD IN STOOL: 0
SHORTNESS OF BREATH: 0
ANAL BLEEDING: 0
DIARRHEA: 0
RECTAL PAIN: 0
WHEEZING: 0
BACK PAIN: 1
CONSTIPATION: 1
RESPIRATORY NEGATIVE: 1

## 2024-10-11 NOTE — PATIENT INSTRUCTIONS
Magnesium glycinate 200- 400 mg nightly     Miralax Bowel Cleanse    Take Miralax every two hours for a total of three doses.   Drink at least 8 Oz of Gatorade with each dose.  After 3 doses, drink one bottle of magnesium citrate, epsom's salt laxative cleanse, or 2 ducolax.      May continue to use Miralax and colace daily or twice a day as needed to maintain regular bowel movements.

## 2024-10-11 NOTE — PROGRESS NOTES
[facility]  [place] [id number] [facility] [place] [address]  Dept: [phone]  Dept Fax: [phone]  Loc: [phone]    Nolvia Zavala is a 43 y.o. female who presents today for her medical conditions/complaints as noted below.  Nolvia Zavala is c/o of Hematuria (Pt states on Wednesday they were at [place] and went to urinate and it was bright red. Pt states she had dull aching from the waist down, but not horrible. Pt states since, she has had some pressure in her back. They drove back yesterday and felt very uncomfortable. )        HPI:     HPI this 43-year-old female presents today for blood in her urine.  She states that they were on vacation in [place] and that she went to the bathroom and noticed that the water was completely red.  States that she has had an uncomfortable feeling down low for her bladder areas and also up around her right flank and back.  Denies any fever but does state that they went to the urgent care there in [place] and was given a Rocephin shot and started on Macrobid.  States that they told her that if she developed a fever that she needed to go to an ER.  Patient states his pain that she is having does not feel like a normal kidney stone.  States that she has chronic issues with constipation that it can be several days before she goes.  States she is currently taking Motegrity to help with this.  She denies taking any other stool softeners or laxatives with that.  States it has been several days since she has had a bowel movement.  Chief Complaint   Patient presents with    Hematuria     Pt states on Wednesday they were at [place] and went to urinate and it was bright red. Pt states she had dull aching from the waist down, but not horrible. Pt states since, she has had some pressure in her back. They drove back yesterday and felt very uncomfortable.      Past Medical History:   Diagnosis Date    B12 deficiency     Cauda

## 2024-10-13 LAB — BACTERIA UR CULT: NORMAL

## 2024-10-15 DIAGNOSIS — Z98.890 HX OF LITHOTRIPSY: Primary | ICD-10-CM

## 2024-10-15 DIAGNOSIS — N20.0 NEPHROLITHIASIS: ICD-10-CM

## 2024-10-15 DIAGNOSIS — N20.0 KIDNEY STONE: Primary | ICD-10-CM

## 2024-10-15 RX ORDER — TAMSULOSIN HYDROCHLORIDE 0.4 MG/1
0.4 CAPSULE ORAL DAILY
Qty: 30 CAPSULE | Refills: 0 | Status: SHIPPED | OUTPATIENT
Start: 2024-10-15 | End: 2024-10-16 | Stop reason: SDUPTHER
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
## 2024-10-16 ENCOUNTER — OFFICE VISIT (OUTPATIENT)
Dept: GASTROENTEROLOGY | Age: 44
End: 2024-10-16
Payer: COMMERCIAL

## 2024-10-16 VITALS
HEART RATE: 80 BPM | OXYGEN SATURATION: 97 % | HEIGHT: 65 IN | DIASTOLIC BLOOD PRESSURE: 72 MMHG | WEIGHT: 200 LBS | SYSTOLIC BLOOD PRESSURE: 126 MMHG | BODY MASS INDEX: 33.32 KG/M2

## 2024-10-16 DIAGNOSIS — K58.1 IRRITABLE BOWEL SYNDROME WITH CONSTIPATION: ICD-10-CM

## 2024-10-16 DIAGNOSIS — R13.10 DYSPHAGIA, UNSPECIFIED TYPE: Primary | ICD-10-CM

## 2024-10-16 PROCEDURE — 99214 OFFICE O/P EST MOD 30 MIN: CPT | Performed by: NURSE PRACTITIONER

## 2024-10-16 PROCEDURE — G8417 CALC BMI ABV UP PARAM F/U: HCPCS | Performed by: NURSE PRACTITIONER

## 2024-10-16 PROCEDURE — 3078F DIAST BP <80 MM HG: CPT | Performed by: NURSE PRACTITIONER

## 2024-10-16 PROCEDURE — G8427 DOCREV CUR MEDS BY ELIG CLIN: HCPCS | Performed by: NURSE PRACTITIONER

## 2024-10-16 PROCEDURE — G8484 FLU IMMUNIZE NO ADMIN: HCPCS | Performed by: NURSE PRACTITIONER

## 2024-10-16 PROCEDURE — 1036F TOBACCO NON-USER: CPT | Performed by: NURSE PRACTITIONER

## 2024-10-16 PROCEDURE — 3074F SYST BP LT 130 MM HG: CPT | Performed by: NURSE PRACTITIONER

## 2024-10-16 RX ORDER — TAMSULOSIN HYDROCHLORIDE 0.4 MG/1
0.4 CAPSULE ORAL DAILY
Qty: 30 CAPSULE | Refills: 0 | OUTPATIENT
Start: 2024-10-16

## 2024-10-16 RX ORDER — LACTULOSE 10 G/15ML
10 SOLUTION ORAL 2 TIMES DAILY
Qty: 900 ML | Refills: 5 | Status: SHIPPED | OUTPATIENT
Start: 2024-10-16 | End: 2025-04-14

## 2024-10-16 RX ORDER — TAMSULOSIN HYDROCHLORIDE 0.4 MG/1
0.4 CAPSULE ORAL DAILY
Qty: 30 CAPSULE | Refills: 0 | Status: SHIPPED | OUTPATIENT
Start: 2024-10-16

## 2024-10-17 ENCOUNTER — OFFICE VISIT (OUTPATIENT)
Dept: UROLOGY | Facility: CLINIC | Age: 44
End: 2024-10-17
Payer: COMMERCIAL

## 2024-10-17 ENCOUNTER — HOSPITAL ENCOUNTER (OUTPATIENT)
Dept: GENERAL RADIOLOGY | Facility: HOSPITAL | Age: 44
Discharge: HOME | End: 2024-10-17
Payer: COMMERCIAL

## 2024-10-17 VITALS — TEMPERATURE: 97.7 F | HEIGHT: 65 IN | BODY MASS INDEX: 33.49 KG/M2 | WEIGHT: 201 LBS

## 2024-10-17 DIAGNOSIS — N20.0 KIDNEY STONE: ICD-10-CM

## 2024-10-17 DIAGNOSIS — R31.0 GROSS HEMATURIA: Primary | ICD-10-CM

## 2024-10-17 LAB
BILIRUB BLD-MCNC: NEGATIVE MG/DL
CLARITY, POC: CLEAR
COLOR UR: YELLOW
GLUCOSE UR STRIP-MCNC: NEGATIVE MG/DL
KETONES UR QL: ABNORMAL
LEUKOCYTE EST, POC: NEGATIVE
NITRITE UR-MCNC: NEGATIVE MG/ML
PH UR: 7 [PH] (ref 5–8)
PROT UR STRIP-MCNC: NEGATIVE MG/DL
RBC # UR STRIP: ABNORMAL /UL
SP GR UR: 1.02 (ref 1–1.03)
UROBILINOGEN UR QL: ABNORMAL

## 2024-10-17 PROCEDURE — 74018 RADEX ABDOMEN 1 VIEW: CPT

## 2024-10-17 RX ORDER — GINSENG 100 MG
CAPSULE ORAL
COMMUNITY
Start: 2024-08-30

## 2024-10-17 RX ORDER — METAXALONE 800 MG/1
TABLET ORAL
COMMUNITY
Start: 2024-06-02

## 2024-10-17 RX ORDER — IBUPROFEN 800 MG/1
800 TABLET, FILM COATED ORAL
COMMUNITY
Start: 2024-10-11

## 2024-10-17 RX ORDER — TRAMADOL HYDROCHLORIDE 50 MG/1
50 TABLET ORAL
COMMUNITY
Start: 2024-10-15 | End: 2024-10-23

## 2024-10-17 RX ORDER — ESTRADIOL 0.5 MG/1
0.5 TABLET ORAL DAILY
COMMUNITY

## 2024-10-17 RX ORDER — DULOXETIN HYDROCHLORIDE 60 MG/1
1 CAPSULE, DELAYED RELEASE ORAL DAILY
COMMUNITY
Start: 2024-07-11

## 2024-10-17 RX ORDER — TAMSULOSIN HYDROCHLORIDE 0.4 MG/1
1 CAPSULE ORAL DAILY
COMMUNITY
Start: 2024-10-16

## 2024-10-17 RX ORDER — RIMEGEPANT SULFATE 75 MG/75MG
TABLET, ORALLY DISINTEGRATING ORAL
COMMUNITY
Start: 2024-07-08

## 2024-10-17 RX ORDER — PRUCALOPRIDE 1 MG/1
TABLET, FILM COATED ORAL
COMMUNITY
Start: 2024-07-18

## 2024-10-17 RX ORDER — LACTULOSE 10 G/15ML
10 SOLUTION ORAL
COMMUNITY
Start: 2024-10-16 | End: 2025-04-15

## 2024-10-22 ENCOUNTER — OFFICE VISIT (OUTPATIENT)
Dept: PULMONOLOGY | Age: 44
End: 2024-10-22
Payer: COMMERCIAL

## 2024-10-22 VITALS
BODY MASS INDEX: 33.95 KG/M2 | OXYGEN SATURATION: 97 % | SYSTOLIC BLOOD PRESSURE: 144 MMHG | HEART RATE: 106 BPM | RESPIRATION RATE: 18 BRPM | HEIGHT: 65 IN | DIASTOLIC BLOOD PRESSURE: 78 MMHG | TEMPERATURE: 96.9 F | WEIGHT: 203.8 LBS

## 2024-10-22 DIAGNOSIS — G47.33 OSA (OBSTRUCTIVE SLEEP APNEA): Primary | ICD-10-CM

## 2024-10-22 DIAGNOSIS — J98.6 ELEVATED HEMIDIAPHRAGM: ICD-10-CM

## 2024-10-22 DIAGNOSIS — E66.9 OBESITY (BMI 35.0-39.9 WITHOUT COMORBIDITY): ICD-10-CM

## 2024-10-22 DIAGNOSIS — R06.09 DOE (DYSPNEA ON EXERTION): ICD-10-CM

## 2024-10-22 DIAGNOSIS — K21.9 GASTROESOPHAGEAL REFLUX DISEASE WITHOUT ESOPHAGITIS: ICD-10-CM

## 2024-10-22 PROCEDURE — 99214 OFFICE O/P EST MOD 30 MIN: CPT | Performed by: INTERNAL MEDICINE

## 2024-10-22 PROCEDURE — G8427 DOCREV CUR MEDS BY ELIG CLIN: HCPCS | Performed by: INTERNAL MEDICINE

## 2024-10-22 PROCEDURE — G8417 CALC BMI ABV UP PARAM F/U: HCPCS | Performed by: INTERNAL MEDICINE

## 2024-10-22 PROCEDURE — 3078F DIAST BP <80 MM HG: CPT | Performed by: INTERNAL MEDICINE

## 2024-10-22 PROCEDURE — 1036F TOBACCO NON-USER: CPT | Performed by: INTERNAL MEDICINE

## 2024-10-22 PROCEDURE — 3077F SYST BP >= 140 MM HG: CPT | Performed by: INTERNAL MEDICINE

## 2024-10-22 PROCEDURE — G8484 FLU IMMUNIZE NO ADMIN: HCPCS | Performed by: INTERNAL MEDICINE

## 2024-10-22 ASSESSMENT — ENCOUNTER SYMPTOMS
SHORTNESS OF BREATH: 0
RHINORRHEA: 0
ANAL BLEEDING: 0
COUGH: 1
CHEST TIGHTNESS: 0
WHEEZING: 0
APNEA: 1
ABDOMINAL PAIN: 0
BACK PAIN: 0
ABDOMINAL DISTENTION: 0

## 2024-10-22 NOTE — PROGRESS NOTES
inhaler Inhale 2 puffs into the lungs 4 times daily as needed for Wheezing Yes Saurabh Sneed MD   Probiotic Product (PROBIOTIC DAILY PO) Take by mouth daily Yes Provider, MD Mackenzie   bethanechol (URECHOLINE) 25 MG tablet Take 1 tablet by mouth 3 times daily. Yes Daisy Khanna MD   azelastine (ASTELIN) 0.1 % nasal spray 2 sprays by Nasal route 2 times daily Use in each nostril as directed  Patient not taking: Reported on 10/11/2024  Abi Levi, APRN - NP   metaxalone (SKELAXIN) 800 MG tablet   Provider, MD Mackenzie   SUMAtriptan (IMITREX) 6 MG/0.5ML SOLN injection Inject 0.5 mLs into the skin once as needed for Migraine  Patient not taking: Reported on 8/26/2024  Daisy Khanna MD        Review of Systems   Constitutional:  Negative for activity change, appetite change, chills, diaphoresis and fatigue.   HENT:  Negative for congestion, dental problem, drooling, ear discharge, postnasal drip and rhinorrhea.    Eyes:  Negative for visual disturbance.   Respiratory:  Positive for apnea and cough. Negative for chest tightness, shortness of breath and wheezing.    Gastrointestinal:  Negative for abdominal distention, abdominal pain and anal bleeding.   Endocrine: Negative for cold intolerance, heat intolerance and polydipsia.   Genitourinary:  Negative for difficulty urinating, dysuria, enuresis and flank pain.   Musculoskeletal:  Negative for arthralgias, back pain and gait problem.   Allergic/Immunologic: Negative for environmental allergies.   Neurological:  Negative for dizziness, facial asymmetry, light-headedness and headaches.       Vitals:    10/22/24 1358   BP: (!) 144/78   Pulse: (!) 106   Resp: 18   Temp: 96.9 °F (36.1 °C)   SpO2: 97%   Weight: 92.4 kg (203 lb 12.8 oz)   Height: 1.651 m (5' 5\")      BMI Readings from Last 1 Encounters:   10/22/24 33.91 kg/m²         Physical Exam  Vitals reviewed.   Constitutional:       Appearance: Normal appearance.   HENT:      Head: Normocephalic

## 2024-10-23 ASSESSMENT — ENCOUNTER SYMPTOMS
SHORTNESS OF BREATH: 0
TROUBLE SWALLOWING: 1
CONSTIPATION: 1
DIARRHEA: 0
COUGH: 0
NAUSEA: 0
VOMITING: 0
ABDOMINAL DISTENTION: 0
RECTAL PAIN: 0
ANAL BLEEDING: 0
CHOKING: 0
ABDOMINAL PAIN: 0
BLOOD IN STOOL: 0

## 2024-10-28 ENCOUNTER — HOSPITAL ENCOUNTER (OUTPATIENT)
Dept: CT IMAGING | Facility: HOSPITAL | Age: 44
Discharge: HOME | End: 2024-10-28
Payer: COMMERCIAL

## 2024-10-28 DIAGNOSIS — R31.0 GROSS HEMATURIA: ICD-10-CM

## 2024-10-28 PROCEDURE — 25510000001 IOPAMIDOL PER 1 ML

## 2024-10-28 PROCEDURE — 74178 CT ABD&PLV WO CNTR FLWD CNTR: CPT

## 2024-10-28 RX ORDER — IOPAMIDOL 755 MG/ML
100 INJECTION, SOLUTION INTRAVASCULAR
Status: COMPLETED | OUTPATIENT
Start: 2024-10-28 | End: 2024-10-28

## 2024-10-28 RX ADMIN — IOPAMIDOL 100 ML: 755 INJECTION, SOLUTION INTRAVENOUS at 13:54

## 2024-10-30 ENCOUNTER — HOSPITAL ENCOUNTER (OUTPATIENT)
Dept: GENERAL RADIOLOGY | Facility: HOSPITAL | Age: 44
Discharge: HOME | End: 2024-10-30
Payer: COMMERCIAL

## 2024-10-30 ENCOUNTER — OFFICE VISIT (OUTPATIENT)
Dept: UROLOGY | Facility: CLINIC | Age: 44
End: 2024-10-30
Payer: COMMERCIAL

## 2024-10-30 ENCOUNTER — TELEPHONE (OUTPATIENT)
Dept: UROLOGY | Facility: CLINIC | Age: 44
End: 2024-10-30
Payer: COMMERCIAL

## 2024-10-30 ENCOUNTER — PREP FOR SURGERY (OUTPATIENT)
Dept: OTHER | Facility: HOSPITAL | Age: 44
End: 2024-10-30
Payer: COMMERCIAL

## 2024-10-30 VITALS — WEIGHT: 201 LBS | HEIGHT: 65 IN | BODY MASS INDEX: 33.49 KG/M2 | TEMPERATURE: 97.8 F

## 2024-10-30 DIAGNOSIS — R31.0 GROSS HEMATURIA: ICD-10-CM

## 2024-10-30 DIAGNOSIS — N20.0 KIDNEY STONE: Primary | ICD-10-CM

## 2024-10-30 DIAGNOSIS — R31.0 GROSS HEMATURIA: Primary | ICD-10-CM

## 2024-10-30 LAB
BILIRUB BLD-MCNC: NEGATIVE MG/DL
CLARITY, POC: ABNORMAL
COLOR UR: ABNORMAL
GLUCOSE UR STRIP-MCNC: NEGATIVE MG/DL
KETONES UR QL: NEGATIVE
LEUKOCYTE EST, POC: NEGATIVE
NITRITE UR-MCNC: NEGATIVE MG/ML
PH UR: 6 [PH] (ref 5–8)
PROT UR STRIP-MCNC: ABNORMAL MG/DL
RBC # UR STRIP: ABNORMAL /UL
SP GR UR: 1.02 (ref 1–1.03)
UROBILINOGEN UR QL: ABNORMAL

## 2024-10-30 PROCEDURE — 74018 RADEX ABDOMEN 1 VIEW: CPT

## 2024-10-30 RX ORDER — FLUTICASONE PROPIONATE 50 MCG
SPRAY, SUSPENSION (ML) NASAL
COMMUNITY
Start: 2024-10-30

## 2024-10-30 NOTE — TELEPHONE ENCOUNTER
I was notified patient is having gross hematuria and was found on imaging to have bilateral kidney stones.  KUB x-ray today showed apparent migration of her left lower pole kidney stone toward the renal pelvis measuring 7 to 8 mm concerning for possible cause of the blood in your urine.  Her 2 to 3 mm right lower pole kidney stone is also visible.  I called and discussed options with the patient including office cystoscopy, ureteroscopy laser lithotripsy stent placement, or extracorporeal shock with lithotripsy.  Patient is having intermittent dull achy right sided flank pain.  She would like to schedule bilateral extracorporeal shock with lithotripsy with concomitant cystoscopy in operating to rule out bladder cancer to complete her hematuria workup.  We discussed risks of ESWL including but not limited to infection, bleeding, need for additional procedures, failure to break up/pass any/all stone, complications of anesthesia.  She voices understanding and provided informed consent to proceed with cystoscopy and bilateral ESWL.  I will ask my assistant to call her and get her scheduled she would like to proceed 11/15/2024.
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
## 2024-10-31 PROBLEM — N20.0 KIDNEY STONE: Status: ACTIVE | Noted: 2024-10-30

## 2024-10-31 PROBLEM — R31.0 GROSS HEMATURIA: Status: ACTIVE | Noted: 2024-10-30

## 2024-11-04 ENCOUNTER — ANCILLARY PROCEDURE (OUTPATIENT)
Dept: PRIMARY CARE CLINIC | Age: 44
End: 2024-11-04
Payer: COMMERCIAL

## 2024-11-04 ENCOUNTER — OFFICE VISIT (OUTPATIENT)
Dept: PRIMARY CARE CLINIC | Age: 44
End: 2024-11-04
Payer: COMMERCIAL

## 2024-11-04 VITALS
OXYGEN SATURATION: 99 % | HEART RATE: 77 BPM | SYSTOLIC BLOOD PRESSURE: 154 MMHG | DIASTOLIC BLOOD PRESSURE: 90 MMHG | TEMPERATURE: 97.7 F | BODY MASS INDEX: 32.99 KG/M2 | HEIGHT: 65 IN | WEIGHT: 198 LBS | RESPIRATION RATE: 18 BRPM

## 2024-11-04 DIAGNOSIS — R05.1 ACUTE COUGH: ICD-10-CM

## 2024-11-04 DIAGNOSIS — J39.8 CONGESTION OF UPPER RESPIRATORY TRACT: Primary | ICD-10-CM

## 2024-11-04 DIAGNOSIS — I10 HYPERTENSION, UNSPECIFIED TYPE: ICD-10-CM

## 2024-11-04 PROCEDURE — 3080F DIAST BP >= 90 MM HG: CPT | Performed by: NURSE PRACTITIONER

## 2024-11-04 PROCEDURE — 99214 OFFICE O/P EST MOD 30 MIN: CPT | Performed by: NURSE PRACTITIONER

## 2024-11-04 PROCEDURE — G8484 FLU IMMUNIZE NO ADMIN: HCPCS | Performed by: NURSE PRACTITIONER

## 2024-11-04 PROCEDURE — 1036F TOBACCO NON-USER: CPT | Performed by: NURSE PRACTITIONER

## 2024-11-04 PROCEDURE — G8417 CALC BMI ABV UP PARAM F/U: HCPCS | Performed by: NURSE PRACTITIONER

## 2024-11-04 PROCEDURE — G8427 DOCREV CUR MEDS BY ELIG CLIN: HCPCS | Performed by: NURSE PRACTITIONER

## 2024-11-04 PROCEDURE — 3077F SYST BP >= 140 MM HG: CPT | Performed by: NURSE PRACTITIONER

## 2024-11-04 PROCEDURE — 71046 X-RAY EXAM CHEST 2 VIEWS: CPT | Performed by: NURSE PRACTITIONER

## 2024-11-04 RX ORDER — METOPROLOL SUCCINATE 50 MG/1
50 TABLET, EXTENDED RELEASE ORAL DAILY
Qty: 30 TABLET | Refills: 0 | Status: SHIPPED | OUTPATIENT
Start: 2024-11-04

## 2024-11-04 ASSESSMENT — ENCOUNTER SYMPTOMS
ALLERGIC/IMMUNOLOGIC NEGATIVE: 1
EYES NEGATIVE: 1
COUGH: 1
GASTROINTESTINAL NEGATIVE: 1

## 2024-11-04 NOTE — PROGRESS NOTES
[facility]  [address] [id number] [facility] [place] [address]  Dept: [phone]  Dept Fax: [phone]  Loc: [phone]    Nolvia Zavala is a 43 y.o. female who presents today for her medical conditions/complaints as noted below.  Nolvia Zavala is c/o of Chest Congestion (Pt is here for chest congestion that she has had for 2 weeks that will not go away.)        HPI:     HPI   This 43-year-old female presents today for ongoing congestion and cough.  States has been going on for weeks.  She states she is doing all the things.  States she has been taking Mucinex, nasal sprays, VIcks, cough drops .  She denies any fever.  She just feels like there is mucus in her lungs that she cannot cough it up.  States that she does have an upcoming procedure to have lithotripsy and was told that if she had any illness that they would not be able to proceed.  She is concerned  Chief Complaint   Patient presents with    Chest Congestion     Pt is here for chest congestion that she has had for 2 weeks that will not go away.     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     GERD (gastroesophageal reflux disease)     Hematuria     wih abdominal pain    Hyperlipidemia     Hypertension     MS (multiple sclerosis) (HCC)     Myofascial pain     Nerve damage     Neutropenia (HCC) 10/21/2015    Obesity     Pelvic floor dysfunction     Postmenopausal HRT (hormone replacement therapy)     POTS (postural orthostatic tachycardia syndrome)     Sleep apnea 2021    a pap      Past Surgical History:   Procedure Laterality Date    APPENDECTOMY      BACK SURGERY      CHOLECYSTECTOMY, LAPAROSCOPIC      COLONOSCOPY  11/16/2015    Dr Streeter-Internal hemorrhoids, AP, 5 yr recall    COLONOSCOPY N/A 08/13/2018    Dr NORMA Abdul-[facility]--10 yr recall

## 2024-11-04 NOTE — PATIENT INSTRUCTIONS
Monitor BP at home twice a day.  Keep a journal and bring with you to your follow up appointment.

## 2024-11-05 ENCOUNTER — TELEPHONE (OUTPATIENT)
Dept: UROLOGY | Facility: CLINIC | Age: 44
End: 2024-11-05
Payer: COMMERCIAL

## 2024-11-05 NOTE — TELEPHONE ENCOUNTER
----- Message from Sarbjit Curry sent at 11/5/2024  1:50 PM CST -----  She should remain as scheduled for this friday  ----- Message -----  From: Adenike Jones [facility]  Sent: 11/5/2024  10:04 AM CST  To: Sarbjit Curry MD    Pt called in that she is now having gross hematuria and her dull side pain has continued, she said Dr. Curry told her to call if this happened. Please advise on what pt needs to do.

## 2024-11-05 NOTE — TELEPHONE ENCOUNTER
Pt called in that she is now having gross hematuria and her dull side pain has continued, she said Dr. Curry told her to call if this happened. Please advise on what pt needs to do.

## 2024-11-08 ENCOUNTER — PRE-ADMISSION TESTING (OUTPATIENT)
Dept: PREADMISSION TESTING | Facility: HOSPITAL | Age: 44
End: 2024-11-08
Payer: COMMERCIAL

## 2024-11-08 VITALS
SYSTOLIC BLOOD PRESSURE: 150 MMHG | RESPIRATION RATE: 16 BRPM | OXYGEN SATURATION: 94 % | HEIGHT: 66 IN | DIASTOLIC BLOOD PRESSURE: 86 MMHG | WEIGHT: 201.5 LBS | BODY MASS INDEX: 32.38 KG/M2 | HEART RATE: 76 BPM

## 2024-11-08 DIAGNOSIS — R31.0 GROSS HEMATURIA: ICD-10-CM

## 2024-11-08 DIAGNOSIS — N20.0 KIDNEY STONE: ICD-10-CM

## 2024-11-08 LAB
ANION GAP SERPL CALCULATED.3IONS-SCNC: 9 MMOL/L (ref 5–15)
BUN SERPL-MCNC: 19 MG/DL (ref 6–20)
BUN/CREAT SERPL: 22.4 (ref 7–25)
CALCIUM SPEC-SCNC: 8.7 MG/DL (ref 8.6–10.5)
CHLORIDE SERPL-SCNC: 108 MMOL/L (ref 98–107)
CO2 SERPL-SCNC: 23 MMOL/L (ref 22–29)
CREAT SERPL-MCNC: 0.85 MG/DL (ref 0.57–1)
DEPRECATED RDW RBC AUTO: 41.1 FL (ref 37–54)
EGFRCR SERPLBLD CKD-EPI 2021: 86.2 ML/MIN/1.73
ERYTHROCYTE [DISTWIDTH] IN BLOOD BY AUTOMATED COUNT: 12.3 % (ref 12.3–15.4)
GLUCOSE SERPL-MCNC: 88 MG/DL (ref 65–99)
HCT VFR BLD AUTO: 36.2 % (ref 34–46.6)
HGB BLD-MCNC: 12.1 G/DL (ref 12–15.9)
MCH RBC QN AUTO: 30.6 PG (ref 26.6–33)
MCHC RBC AUTO-ENTMCNC: 33.4 G/DL (ref 31.5–35.7)
MCV RBC AUTO: 91.6 FL (ref 79–97)
PLATELET # BLD AUTO: 257 10*3/MM3 (ref 140–450)
PMV BLD AUTO: 10 FL (ref 6–12)
POTASSIUM SERPL-SCNC: 4.2 MMOL/L (ref 3.5–5.2)
QT INTERVAL: 390 MS
RBC # BLD AUTO: 3.95 10*6/MM3 (ref 3.77–5.28)
SODIUM SERPL-SCNC: 140 MMOL/L (ref 136–145)
WBC NRBC COR # BLD AUTO: 6.16 10*3/MM3 (ref 3.4–10.8)

## 2024-11-08 PROCEDURE — 36415 COLL VENOUS BLD VENIPUNCTURE: CPT

## 2024-11-08 PROCEDURE — 93005 ELECTROCARDIOGRAM TRACING: CPT

## 2024-11-08 PROCEDURE — 85027 COMPLETE CBC AUTOMATED: CPT

## 2024-11-08 PROCEDURE — 80048 BASIC METABOLIC PNL TOTAL CA: CPT

## 2024-11-08 RX ORDER — TRAMADOL HYDROCHLORIDE 50 MG/1
50 TABLET ORAL NIGHTLY PRN
COMMUNITY

## 2024-11-08 NOTE — DISCHARGE INSTRUCTIONS

## 2024-11-12 ENCOUNTER — HOSPITAL ENCOUNTER (EMERGENCY)
Facility: HOSPITAL | Age: 44
Discharge: HOME | End: 2024-11-12
Attending: INTERNAL MEDICINE | Admitting: INTERNAL MEDICINE
Payer: COMMERCIAL

## 2024-11-12 ENCOUNTER — APPOINTMENT (OUTPATIENT)
Dept: CT IMAGING | Facility: HOSPITAL | Age: 44
End: 2024-11-12
Payer: COMMERCIAL

## 2024-11-12 VITALS
OXYGEN SATURATION: 98 % | BODY MASS INDEX: 31.98 KG/M2 | TEMPERATURE: 98.7 F | HEIGHT: 66 IN | RESPIRATION RATE: 18 BRPM | WEIGHT: 199 LBS | HEART RATE: 79 BPM | SYSTOLIC BLOOD PRESSURE: 133 MMHG | DIASTOLIC BLOOD PRESSURE: 78 MMHG

## 2024-11-12 DIAGNOSIS — N20.1 URETERAL STONE: Primary | ICD-10-CM

## 2024-11-12 LAB
ALBUMIN SERPL-MCNC: 4.3 G/DL (ref 3.5–5.2)
ALBUMIN/GLOB SERPL: 1.5 G/DL
ALP SERPL-CCNC: 89 U/L (ref 39–117)
ALT SERPL W P-5'-P-CCNC: 10 U/L (ref 1–33)
ANION GAP SERPL CALCULATED.3IONS-SCNC: 9 MMOL/L (ref 5–15)
AST SERPL-CCNC: 15 U/L (ref 1–32)
BACTERIA UR QL AUTO: ABNORMAL /HPF
BASOPHILS # BLD AUTO: 0.05 10*3/MM3 (ref 0–0.2)
BASOPHILS NFR BLD AUTO: 0.5 % (ref 0–1.5)
BILIRUB SERPL-MCNC: 0.6 MG/DL (ref 0–1.2)
BILIRUB UR QL STRIP: NEGATIVE
BUN SERPL-MCNC: 22 MG/DL (ref 6–20)
BUN/CREAT SERPL: 14.4 (ref 7–25)
CALCIUM SPEC-SCNC: 8.6 MG/DL (ref 8.6–10.5)
CHLORIDE SERPL-SCNC: 105 MMOL/L (ref 98–107)
CLARITY UR: CLEAR
CO2 SERPL-SCNC: 25 MMOL/L (ref 22–29)
COLOR UR: ABNORMAL
CREAT SERPL-MCNC: 1.53 MG/DL (ref 0.57–1)
DEPRECATED RDW RBC AUTO: 41.1 FL (ref 37–54)
EGFRCR SERPLBLD CKD-EPI 2021: 42.6 ML/MIN/1.73
EOSINOPHIL # BLD AUTO: 0.25 10*3/MM3 (ref 0–0.4)
EOSINOPHIL NFR BLD AUTO: 2.4 % (ref 0.3–6.2)
ERYTHROCYTE [DISTWIDTH] IN BLOOD BY AUTOMATED COUNT: 12.1 % (ref 12.3–15.4)
GLOBULIN UR ELPH-MCNC: 2.8 GM/DL
GLUCOSE SERPL-MCNC: 96 MG/DL (ref 65–99)
GLUCOSE UR STRIP-MCNC: NEGATIVE MG/DL
HCT VFR BLD AUTO: 36.6 % (ref 34–46.6)
HGB BLD-MCNC: 12.4 G/DL (ref 12–15.9)
HGB UR QL STRIP.AUTO: ABNORMAL
HYALINE CASTS UR QL AUTO: ABNORMAL /LPF
IMM GRANULOCYTES # BLD AUTO: 0.03 10*3/MM3 (ref 0–0.05)
IMM GRANULOCYTES NFR BLD AUTO: 0.3 % (ref 0–0.5)
KETONES UR QL STRIP: ABNORMAL
LEUKOCYTE ESTERASE UR QL STRIP.AUTO: ABNORMAL
LYMPHOCYTES # BLD AUTO: 1.02 10*3/MM3 (ref 0.7–3.1)
LYMPHOCYTES NFR BLD AUTO: 9.9 % (ref 19.6–45.3)
MAGNESIUM SERPL-MCNC: 2 MG/DL (ref 1.6–2.6)
MCH RBC QN AUTO: 31.2 PG (ref 26.6–33)
MCHC RBC AUTO-ENTMCNC: 33.9 G/DL (ref 31.5–35.7)
MCV RBC AUTO: 92.2 FL (ref 79–97)
MONOCYTES # BLD AUTO: 0.98 10*3/MM3 (ref 0.1–0.9)
MONOCYTES NFR BLD AUTO: 9.6 % (ref 5–12)
NEUTROPHILS NFR BLD AUTO: 7.93 10*3/MM3 (ref 1.7–7)
NEUTROPHILS NFR BLD AUTO: 77.3 % (ref 42.7–76)
NITRITE UR QL STRIP: NEGATIVE
NRBC BLD AUTO-RTO: 0 /100 WBC (ref 0–0.2)
PH UR STRIP.AUTO: 6.5 [PH] (ref 5–8)
PLATELET # BLD AUTO: 244 10*3/MM3 (ref 140–450)
PMV BLD AUTO: 9.9 FL (ref 6–12)
POTASSIUM SERPL-SCNC: 3.9 MMOL/L (ref 3.5–5.2)
PROT SERPL-MCNC: 7.1 G/DL (ref 6–8.5)
PROT UR QL STRIP: ABNORMAL
RBC # BLD AUTO: 3.97 10*6/MM3 (ref 3.77–5.28)
RBC # UR STRIP: ABNORMAL /HPF
REF LAB TEST METHOD: ABNORMAL
SODIUM SERPL-SCNC: 139 MMOL/L (ref 136–145)
SP GR UR STRIP: 1.03 (ref 1–1.03)
SQUAMOUS #/AREA URNS HPF: ABNORMAL /HPF
TSH SERPL DL<=0.05 MIU/L-ACNC: 2.43 UIU/ML (ref 0.27–4.2)
UROBILINOGEN UR QL STRIP: ABNORMAL
WBC # UR STRIP: ABNORMAL /HPF
WBC NRBC COR # BLD AUTO: 10.26 10*3/MM3 (ref 3.4–10.8)

## 2024-11-12 PROCEDURE — 25510000001 IOPAMIDOL 61 % SOLUTION: Performed by: INTERNAL MEDICINE

## 2024-11-12 PROCEDURE — 99285 EMERGENCY DEPT VISIT HI MDM: CPT

## 2024-11-12 PROCEDURE — 83735 ASSAY OF MAGNESIUM: CPT

## 2024-11-12 PROCEDURE — 74177 CT ABD & PELVIS W/CONTRAST: CPT

## 2024-11-12 PROCEDURE — 80050 GENERAL HEALTH PANEL: CPT

## 2024-11-12 PROCEDURE — 25010000002 ONDANSETRON PER 1 MG: Performed by: INTERNAL MEDICINE

## 2024-11-12 PROCEDURE — 96375 TX/PRO/DX INJ NEW DRUG ADDON: CPT

## 2024-11-12 PROCEDURE — 96374 THER/PROPH/DIAG INJ IV PUSH: CPT

## 2024-11-12 PROCEDURE — 25810000003 SODIUM CHLORIDE 0.9 % SOLUTION: Performed by: INTERNAL MEDICINE

## 2024-11-12 PROCEDURE — 25010000002 KETOROLAC TROMETHAMINE PER 15 MG

## 2024-11-12 PROCEDURE — 25010000002 MORPHINE PER 10 MG: Performed by: INTERNAL MEDICINE

## 2024-11-12 PROCEDURE — 81001 URINALYSIS AUTO W/SCOPE: CPT

## 2024-11-12 RX ORDER — OXYCODONE AND ACETAMINOPHEN 5; 325 MG/1; MG/1
1 TABLET ORAL EVERY 6 HOURS PRN
Qty: 8 TABLET | Refills: 0 | Status: SHIPPED | OUTPATIENT
Start: 2024-11-12

## 2024-11-12 RX ORDER — OXYCODONE AND ACETAMINOPHEN 7.5; 325 MG/1; MG/1
1 TABLET ORAL ONCE
Status: COMPLETED | OUTPATIENT
Start: 2024-11-12 | End: 2024-11-12

## 2024-11-12 RX ORDER — ONDANSETRON 2 MG/ML
4 INJECTION INTRAMUSCULAR; INTRAVENOUS ONCE
Status: COMPLETED | OUTPATIENT
Start: 2024-11-12 | End: 2024-11-12

## 2024-11-12 RX ORDER — OXYCODONE AND ACETAMINOPHEN 7.5; 325 MG/1; MG/1
1 TABLET ORAL ONCE
Qty: 1 TABLET | Refills: 0 | Status: SHIPPED | OUTPATIENT
Start: 2024-11-12 | End: 2024-11-12

## 2024-11-12 RX ORDER — KETOROLAC TROMETHAMINE 30 MG/ML
30 INJECTION, SOLUTION INTRAMUSCULAR; INTRAVENOUS ONCE
Status: COMPLETED | OUTPATIENT
Start: 2024-11-12 | End: 2024-11-12

## 2024-11-12 RX ORDER — SODIUM CHLORIDE 0.9 % (FLUSH) 0.9 %
10 SYRINGE (ML) INJECTION AS NEEDED
Status: DISCONTINUED | OUTPATIENT
Start: 2024-11-12 | End: 2024-11-13 | Stop reason: HOSPADM

## 2024-11-12 RX ORDER — IOPAMIDOL 612 MG/ML
100 INJECTION, SOLUTION INTRAVASCULAR
Status: COMPLETED | OUTPATIENT
Start: 2024-11-12 | End: 2024-11-12

## 2024-11-12 RX ADMIN — SODIUM CHLORIDE 1000 ML: 9 INJECTION, SOLUTION INTRAVENOUS at 19:10

## 2024-11-12 RX ADMIN — MORPHINE SULFATE 4 MG: 4 INJECTION, SOLUTION INTRAMUSCULAR; INTRAVENOUS at 19:03

## 2024-11-12 RX ADMIN — IOPAMIDOL 100 ML: 612 INJECTION, SOLUTION INTRAVENOUS at 19:43

## 2024-11-12 RX ADMIN — KETOROLAC TROMETHAMINE 30 MG: 30 INJECTION, SOLUTION INTRAMUSCULAR; INTRAVENOUS at 20:16

## 2024-11-12 RX ADMIN — OXYCODONE HYDROCHLORIDE AND ACETAMINOPHEN 1 TABLET: 7.5; 325 TABLET ORAL at 22:23

## 2024-11-12 RX ADMIN — ONDANSETRON 4 MG: 2 INJECTION INTRAMUSCULAR; INTRAVENOUS at 19:02

## 2024-11-13 ENCOUNTER — TELEPHONE (OUTPATIENT)
Dept: UROLOGY | Facility: CLINIC | Age: 44
End: 2024-11-13
Payer: COMMERCIAL

## 2024-11-13 ENCOUNTER — OFFICE VISIT (OUTPATIENT)
Dept: PRIMARY CARE CLINIC | Age: 44
End: 2024-11-13
Payer: COMMERCIAL

## 2024-11-13 VITALS
SYSTOLIC BLOOD PRESSURE: 124 MMHG | BODY MASS INDEX: 33.71 KG/M2 | TEMPERATURE: 96.8 F | OXYGEN SATURATION: 98 % | WEIGHT: 202.6 LBS | DIASTOLIC BLOOD PRESSURE: 78 MMHG | HEART RATE: 77 BPM

## 2024-11-13 DIAGNOSIS — R79.89 ELEVATED SERUM CREATININE: Primary | ICD-10-CM

## 2024-11-13 DIAGNOSIS — Z87.442 HISTORY OF KIDNEY STONES: ICD-10-CM

## 2024-11-13 LAB
ANION GAP SERPL CALCULATED.3IONS-SCNC: 13 MMOL/L (ref 7–19)
BUN SERPL-MCNC: 21 MG/DL (ref 6–20)
CALCIUM SERPL-MCNC: 8.1 MG/DL (ref 8.6–10)
CHLORIDE SERPL-SCNC: 106 MMOL/L (ref 98–111)
CO2 SERPL-SCNC: 22 MMOL/L (ref 22–29)
CREAT SERPL-MCNC: 1.7 MG/DL (ref 0.5–0.9)
GLUCOSE SERPL-MCNC: 92 MG/DL (ref 70–99)
POTASSIUM SERPL-SCNC: 3.5 MMOL/L (ref 3.5–5)
SODIUM SERPL-SCNC: 141 MMOL/L (ref 136–145)

## 2024-11-13 PROCEDURE — 99213 OFFICE O/P EST LOW 20 MIN: CPT | Performed by: NURSE PRACTITIONER

## 2024-11-13 PROCEDURE — 3074F SYST BP LT 130 MM HG: CPT | Performed by: NURSE PRACTITIONER

## 2024-11-13 PROCEDURE — 3078F DIAST BP <80 MM HG: CPT | Performed by: NURSE PRACTITIONER

## 2024-11-13 PROCEDURE — G8427 DOCREV CUR MEDS BY ELIG CLIN: HCPCS | Performed by: NURSE PRACTITIONER

## 2024-11-13 PROCEDURE — G8484 FLU IMMUNIZE NO ADMIN: HCPCS | Performed by: NURSE PRACTITIONER

## 2024-11-13 PROCEDURE — G8417 CALC BMI ABV UP PARAM F/U: HCPCS | Performed by: NURSE PRACTITIONER

## 2024-11-13 PROCEDURE — 1036F TOBACCO NON-USER: CPT | Performed by: NURSE PRACTITIONER

## 2024-11-13 RX ORDER — TRAMADOL HYDROCHLORIDE 50 MG/1
50 TABLET ORAL NIGHTLY PRN
COMMUNITY

## 2024-11-13 NOTE — PATIENT INSTRUCTIONS
We will repeat her creatinine and if it is higher or not improving I will call Dr. Wall and see about getting her admitted as he is her urologist that is going to do her surgery on the 15th

## 2024-11-13 NOTE — DISCHARGE INSTRUCTIONS
Your creatinine today was found to be 1.53,   Previously it was 0.8.  I would like for you to eat and hydrate at home.  I would like for you to see your PCP tomorrow for lab work.  I would like for you to get a BMP to continue to follow your creatinine.  If your creatinine worsens, I would like for you to come to the emergency department for admission.  Make sure you continue to produce urine.  Please keep your scheduled appointment with Dr. Curry on Friday.  Return to the emergency department if symptoms worsen or you have further concerns, or your creatinine continues to worsen.
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
## 2024-11-13 NOTE — PROGRESS NOTES
[facility]  [place] [id number] [facility] [place] [address]  Dept: [phone]  Dept Fax: [phone]  Loc: [phone]    Nolvia Zavala is a 43 y.o. female who presents today for her medical conditions/complaints as noted below.  Nolvia Zavala is c/o of Follow-Up from Hospital (C/o bladder incontinence overnight and elevated creatinine in ER yesterday. Has been fasting for labs again today )        HPI:     HPI   Chief Complaint   Patient presents with    Follow-Up from Hospital     C/o bladder incontinence overnight and elevated creatinine in ER yesterday. Has been fasting for labs again today    She is scheduled to see Dr. Wall on Friday she has a known kidney stone.  Her creatinine was found to be 1.53 yesterday previously been 0.08 she is here to have labs repeated.  She has been staying very hydrated.  She was sick with a respiratory virus last week but not any nausea vomiting or diarrhea.  She has not been running a fever.  She has not been having any back pain.  What led her to go to the emergency room was the low blood pressure but she does have a known POTS diagnosis    History of Present Illness  Patient is a 43-year-old female who presents emergency department with complaints of left flank pain. Reports that she has a known kidney stone and has surgery coming up on 11/15/2024 with Dr. Wall. Patient reports that her pain has been coming and going, but intensified today and has been constant. She is having pain to her left flank that radiates to her abdomen and pelvis area. Reports clear urine. No hematuria. Patient reports that the pain was so bad earlier that she felt like she could have passed out. States that she took her blood pressure and it was 70 systolic. Patient with past medical history of MS, urinary incontinence, cauda equina s/p back surgery, hypertension, hyperlipidemia, GERD. Patient reports that she had emergent surgery for cauda

## 2024-11-13 NOTE — TELEPHONE ENCOUNTER
Called patient to remind them to arrive at patient registration on 11-15-24 at 630am for the procedure with Dr. Curry. Spoke with patient. Told patient if they had any questions to please contact our office at [phone].

## 2024-11-14 ENCOUNTER — TELEPHONE (OUTPATIENT)
Dept: UROLOGY | Facility: CLINIC | Age: 44
End: 2024-11-14
Payer: COMMERCIAL

## 2024-11-14 NOTE — TELEPHONE ENCOUNTER
"Provider: DR WAGNER    Caller: Nurys Iniguez \"Sukumar\"    Relationship to Patient: Self    Reason for Call: PT CALLED TO ADVISED THAT HER PCP IS SHOWING HER KIDNEY FUNCTION IS HALF IT NORMAL LEVEL.    PT WOULD LIKE A CALLBACK TO DISCUSS WHAT IS NEEDED.  THERE IS ANOTHER MESSAGE IN THE PATIENT'S CHART WHERE HER PCP CALLED IN.    PT IS IN EXTREME PAIN ON THE LEFT SIDE, AND PAIN MED IS ONLY HELPING EVERY 4 HRS.  PT IS SICK TO HER STOMACH AND UNABLE TO EAT.  SHE IS NOT WELL    When was the patient last seen: 10/30/24    When did it start: 11/13/24    Where is it located: BACK LEFT SIDE, KIDNEY    Characteristics of symptom/severity: 9    Timing- Is it constant or intermittent: CONSTANT - NO RELIEF    What makes it worse: NA    What makes it better: NA    What therapies/medications have you tried: PAIN MED, DRINKING AS MUCH WATER AS POSSIBLE. HEAT AND TOOK SHOWER TO HELP WITH PAIN.    UNABLE TO WARM ZHAO TO CLINICAL    "

## 2024-11-14 NOTE — TELEPHONE ENCOUNTER
"Nadira with pt PCP called concerned about pt kidney function. She states \"its half of what it should be\" they are requesting a call back to discuss at [phone]  "

## 2024-11-15 ENCOUNTER — APPOINTMENT (OUTPATIENT)
Dept: GENERAL RADIOLOGY | Facility: HOSPITAL | Age: 44
End: 2024-11-15
Payer: COMMERCIAL

## 2024-11-15 ENCOUNTER — ANESTHESIA (OUTPATIENT)
Dept: PERIOP | Facility: HOSPITAL | Age: 44
End: 2024-11-15
Payer: COMMERCIAL

## 2024-11-15 ENCOUNTER — ANESTHESIA EVENT (OUTPATIENT)
Dept: PERIOP | Facility: HOSPITAL | Age: 44
End: 2024-11-15
Payer: COMMERCIAL

## 2024-11-15 ENCOUNTER — HOSPITAL ENCOUNTER (OUTPATIENT)
Facility: HOSPITAL | Age: 44
Setting detail: HOSPITAL OUTPATIENT SURGERY
Discharge: HOME | End: 2024-11-15
Attending: UROLOGY | Admitting: UROLOGY
Payer: COMMERCIAL

## 2024-11-15 VITALS
DIASTOLIC BLOOD PRESSURE: 89 MMHG | TEMPERATURE: 98.1 F | HEART RATE: 72 BPM | RESPIRATION RATE: 16 BRPM | SYSTOLIC BLOOD PRESSURE: 135 MMHG | OXYGEN SATURATION: 97 %

## 2024-11-15 DIAGNOSIS — R31.0 GROSS HEMATURIA: ICD-10-CM

## 2024-11-15 DIAGNOSIS — N20.0 KIDNEY STONE: ICD-10-CM

## 2024-11-15 PROCEDURE — 25010000002 LIDOCAINE PF 2% 2 % SOLUTION: Performed by: NURSE ANESTHETIST, CERTIFIED REGISTERED

## 2024-11-15 PROCEDURE — 50590 FRAGMENTING OF KIDNEY STONE: CPT | Performed by: UROLOGY

## 2024-11-15 PROCEDURE — 25010000002 PROPOFOL 10 MG/ML EMULSION: Performed by: NURSE ANESTHETIST, CERTIFIED REGISTERED

## 2024-11-15 PROCEDURE — 25010000002 CEFAZOLIN PER 500 MG: Performed by: UROLOGY

## 2024-11-15 PROCEDURE — 25010000002 MIDAZOLAM PER 1MG: Performed by: ANESTHESIOLOGY

## 2024-11-15 PROCEDURE — 25010000002 DEXAMETHASONE PER 1 MG: Performed by: ANESTHESIOLOGY

## 2024-11-15 PROCEDURE — 25010000002 DEXAMETHASONE PER 1 MG: Performed by: NURSE ANESTHETIST, CERTIFIED REGISTERED

## 2024-11-15 PROCEDURE — 25010000002 FUROSEMIDE PER 20 MG: Performed by: NURSE ANESTHETIST, CERTIFIED REGISTERED

## 2024-11-15 PROCEDURE — 25010000002 ONDANSETRON PER 1 MG: Performed by: NURSE ANESTHETIST, CERTIFIED REGISTERED

## 2024-11-15 PROCEDURE — 25810000003 LACTATED RINGERS PER 1000 ML: Performed by: UROLOGY

## 2024-11-15 PROCEDURE — 52000 CYSTOURETHROSCOPY: CPT | Performed by: UROLOGY

## 2024-11-15 PROCEDURE — 25010000002 FENTANYL CITRATE (PF) 50 MCG/ML SOLUTION: Performed by: ANESTHESIOLOGY

## 2024-11-15 PROCEDURE — 74018 RADEX ABDOMEN 1 VIEW: CPT

## 2024-11-15 RX ORDER — MAGNESIUM HYDROXIDE 1200 MG/15ML
LIQUID ORAL AS NEEDED
Status: DISCONTINUED | OUTPATIENT
Start: 2024-11-15 | End: 2024-11-15 | Stop reason: HOSPADM

## 2024-11-15 RX ORDER — LABETALOL HYDROCHLORIDE 5 MG/ML
5 INJECTION, SOLUTION INTRAVENOUS
Status: DISCONTINUED | OUTPATIENT
Start: 2024-11-15 | End: 2024-11-15 | Stop reason: HOSPADM

## 2024-11-15 RX ORDER — HYDROCODONE BITARTRATE AND ACETAMINOPHEN 10; 325 MG/1; MG/1
1 TABLET ORAL EVERY 4 HOURS PRN
Status: DISCONTINUED | OUTPATIENT
Start: 2024-11-15 | End: 2024-11-15 | Stop reason: HOSPADM

## 2024-11-15 RX ORDER — TAMSULOSIN HYDROCHLORIDE 0.4 MG/1
1 CAPSULE ORAL DAILY
Qty: 30 CAPSULE | Refills: 1 | Status: SHIPPED | OUTPATIENT
Start: 2024-11-15

## 2024-11-15 RX ORDER — NALOXONE HCL 0.4 MG/ML
0.4 VIAL (ML) INJECTION AS NEEDED
Status: DISCONTINUED | OUTPATIENT
Start: 2024-11-15 | End: 2024-11-15 | Stop reason: HOSPADM

## 2024-11-15 RX ORDER — SODIUM CHLORIDE 0.9 % (FLUSH) 0.9 %
10 SYRINGE (ML) INJECTION EVERY 12 HOURS SCHEDULED
Status: DISCONTINUED | OUTPATIENT
Start: 2024-11-15 | End: 2024-11-15 | Stop reason: HOSPADM

## 2024-11-15 RX ORDER — DEXAMETHASONE SODIUM PHOSPHATE 4 MG/ML
INJECTION, SOLUTION INTRA-ARTICULAR; INTRALESIONAL; INTRAMUSCULAR; INTRAVENOUS; SOFT TISSUE AS NEEDED
Status: DISCONTINUED | OUTPATIENT
Start: 2024-11-15 | End: 2024-11-15 | Stop reason: SURG

## 2024-11-15 RX ORDER — PROPOFOL 10 MG/ML
VIAL (ML) INTRAVENOUS AS NEEDED
Status: DISCONTINUED | OUTPATIENT
Start: 2024-11-15 | End: 2024-11-15 | Stop reason: SURG

## 2024-11-15 RX ORDER — LIDOCAINE HYDROCHLORIDE 20 MG/ML
INJECTION, SOLUTION EPIDURAL; INFILTRATION; INTRACAUDAL; PERINEURAL AS NEEDED
Status: DISCONTINUED | OUTPATIENT
Start: 2024-11-15 | End: 2024-11-15 | Stop reason: SURG

## 2024-11-15 RX ORDER — FUROSEMIDE 10 MG/ML
INJECTION INTRAMUSCULAR; INTRAVENOUS AS NEEDED
Status: DISCONTINUED | OUTPATIENT
Start: 2024-11-15 | End: 2024-11-15 | Stop reason: SURG

## 2024-11-15 RX ORDER — MIDAZOLAM HYDROCHLORIDE 2 MG/2ML
1 INJECTION, SOLUTION INTRAMUSCULAR; INTRAVENOUS
Status: DISCONTINUED | OUTPATIENT
Start: 2024-11-15 | End: 2024-11-15 | Stop reason: HOSPADM

## 2024-11-15 RX ORDER — ONDANSETRON 2 MG/ML
4 INJECTION INTRAMUSCULAR; INTRAVENOUS ONCE AS NEEDED
Status: DISCONTINUED | OUTPATIENT
Start: 2024-11-15 | End: 2024-11-15 | Stop reason: HOSPADM

## 2024-11-15 RX ORDER — DOCUSATE SODIUM 100 MG/1
100 CAPSULE, LIQUID FILLED ORAL 2 TIMES DAILY
Qty: 60 CAPSULE | Refills: 1 | Status: SHIPPED | OUTPATIENT
Start: 2024-11-15

## 2024-11-15 RX ORDER — FENTANYL CITRATE 50 UG/ML
25 INJECTION, SOLUTION INTRAMUSCULAR; INTRAVENOUS
Status: DISCONTINUED | OUTPATIENT
Start: 2024-11-15 | End: 2024-11-15 | Stop reason: HOSPADM

## 2024-11-15 RX ORDER — FLUMAZENIL 0.1 MG/ML
0.2 INJECTION INTRAVENOUS AS NEEDED
Status: DISCONTINUED | OUTPATIENT
Start: 2024-11-15 | End: 2024-11-15 | Stop reason: HOSPADM

## 2024-11-15 RX ORDER — SCOLOPAMINE TRANSDERMAL SYSTEM 1 MG/1
1 PATCH, EXTENDED RELEASE TRANSDERMAL ONCE
Status: DISCONTINUED | OUTPATIENT
Start: 2024-11-15 | End: 2024-11-15 | Stop reason: HOSPADM

## 2024-11-15 RX ORDER — DEXAMETHASONE SODIUM PHOSPHATE 4 MG/ML
4 INJECTION, SOLUTION INTRA-ARTICULAR; INTRALESIONAL; INTRAMUSCULAR; INTRAVENOUS; SOFT TISSUE ONCE AS NEEDED
Status: COMPLETED | OUTPATIENT
Start: 2024-11-15 | End: 2024-11-15

## 2024-11-15 RX ORDER — ROCURONIUM BROMIDE 10 MG/ML
INJECTION, SOLUTION INTRAVENOUS AS NEEDED
Status: DISCONTINUED | OUTPATIENT
Start: 2024-11-15 | End: 2024-11-15 | Stop reason: SURG

## 2024-11-15 RX ORDER — HYDROCODONE BITARTRATE AND ACETAMINOPHEN 5; 325 MG/1; MG/1
1 TABLET ORAL EVERY 4 HOURS PRN
Status: DISCONTINUED | OUTPATIENT
Start: 2024-11-15 | End: 2024-11-15 | Stop reason: HOSPADM

## 2024-11-15 RX ORDER — ONDANSETRON 2 MG/ML
INJECTION INTRAMUSCULAR; INTRAVENOUS AS NEEDED
Status: DISCONTINUED | OUTPATIENT
Start: 2024-11-15 | End: 2024-11-15 | Stop reason: SURG

## 2024-11-15 RX ORDER — ONDANSETRON 4 MG/1
4 TABLET, ORALLY DISINTEGRATING ORAL EVERY 6 HOURS PRN
Qty: 10 TABLET | Refills: 1 | Status: SHIPPED | OUTPATIENT
Start: 2024-11-15

## 2024-11-15 RX ORDER — FENTANYL CITRATE 50 UG/ML
50 INJECTION, SOLUTION INTRAMUSCULAR; INTRAVENOUS
Status: DISCONTINUED | OUTPATIENT
Start: 2024-11-15 | End: 2024-11-15 | Stop reason: HOSPADM

## 2024-11-15 RX ORDER — SODIUM CHLORIDE, SODIUM LACTATE, POTASSIUM CHLORIDE, CALCIUM CHLORIDE 600; 310; 30; 20 MG/100ML; MG/100ML; MG/100ML; MG/100ML
1000 INJECTION, SOLUTION INTRAVENOUS CONTINUOUS
Status: DISCONTINUED | OUTPATIENT
Start: 2024-11-15 | End: 2024-11-15 | Stop reason: HOSPADM

## 2024-11-15 RX ORDER — DEXTROSE MONOHYDRATE 25 G/50ML
12.5 INJECTION, SOLUTION INTRAVENOUS AS NEEDED
Status: DISCONTINUED | OUTPATIENT
Start: 2024-11-15 | End: 2024-11-15 | Stop reason: HOSPADM

## 2024-11-15 RX ORDER — MIDAZOLAM HYDROCHLORIDE 2 MG/2ML
2 INJECTION, SOLUTION INTRAMUSCULAR; INTRAVENOUS ONCE
Status: COMPLETED | OUTPATIENT
Start: 2024-11-15 | End: 2024-11-15

## 2024-11-15 RX ORDER — SODIUM CHLORIDE 0.9 % (FLUSH) 0.9 %
3 SYRINGE (ML) INJECTION AS NEEDED
Status: DISCONTINUED | OUTPATIENT
Start: 2024-11-15 | End: 2024-11-15 | Stop reason: HOSPADM

## 2024-11-15 RX ORDER — OXYCODONE AND ACETAMINOPHEN 5; 325 MG/1; MG/1
1 TABLET ORAL EVERY 6 HOURS PRN
Qty: 15 TABLET | Refills: 0 | Status: SHIPPED | OUTPATIENT
Start: 2024-11-15

## 2024-11-15 RX ORDER — OXYCODONE AND ACETAMINOPHEN 5; 325 MG/1; MG/1
1 TABLET ORAL ONCE AS NEEDED
Status: DISCONTINUED | OUTPATIENT
Start: 2024-11-15 | End: 2024-11-15 | Stop reason: HOSPADM

## 2024-11-15 RX ORDER — IBUPROFEN 600 MG/1
600 TABLET, FILM COATED ORAL EVERY 6 HOURS PRN
Status: DISCONTINUED | OUTPATIENT
Start: 2024-11-15 | End: 2024-11-15 | Stop reason: HOSPADM

## 2024-11-15 RX ORDER — LIDOCAINE HYDROCHLORIDE 10 MG/ML
0.5 INJECTION, SOLUTION EPIDURAL; INFILTRATION; INTRACAUDAL; PERINEURAL ONCE AS NEEDED
Status: DISCONTINUED | OUTPATIENT
Start: 2024-11-15 | End: 2024-11-15 | Stop reason: HOSPADM

## 2024-11-15 RX ORDER — SODIUM CHLORIDE 0.9 % (FLUSH) 0.9 %
10 SYRINGE (ML) INJECTION AS NEEDED
Status: DISCONTINUED | OUTPATIENT
Start: 2024-11-15 | End: 2024-11-15 | Stop reason: HOSPADM

## 2024-11-15 RX ADMIN — ROCURONIUM BROMIDE 30 MG: 10 INJECTION, SOLUTION INTRAVENOUS at 11:04

## 2024-11-15 RX ADMIN — SCOPALAMINE 1 PATCH: 1 PATCH, EXTENDED RELEASE TRANSDERMAL at 10:06

## 2024-11-15 RX ADMIN — PROPOFOL 200 MG: 10 INJECTION, EMULSION INTRAVENOUS at 11:04

## 2024-11-15 RX ADMIN — LIDOCAINE HYDROCHLORIDE 100 MG: 20 INJECTION, SOLUTION EPIDURAL; INFILTRATION; INTRACAUDAL; PERINEURAL at 11:04

## 2024-11-15 RX ADMIN — DEXAMETHASONE SODIUM PHOSPHATE 4 MG: 4 INJECTION INTRA-ARTICULAR; INTRALESIONAL; INTRAMUSCULAR; INTRAVENOUS; SOFT TISSUE at 11:10

## 2024-11-15 RX ADMIN — ONDANSETRON 4 MG: 2 INJECTION INTRAMUSCULAR; INTRAVENOUS at 12:18

## 2024-11-15 RX ADMIN — MIDAZOLAM HYDROCHLORIDE 2 MG: 1 INJECTION, SOLUTION INTRAMUSCULAR; INTRAVENOUS at 10:53

## 2024-11-15 RX ADMIN — CEFAZOLIN 2000 MG: 2 INJECTION, POWDER, FOR SOLUTION INTRAMUSCULAR; INTRAVENOUS at 11:09

## 2024-11-15 RX ADMIN — SODIUM CHLORIDE, POTASSIUM CHLORIDE, SODIUM LACTATE AND CALCIUM CHLORIDE 1000 ML: 600; 310; 30; 20 INJECTION, SOLUTION INTRAVENOUS at 08:55

## 2024-11-15 RX ADMIN — DEXAMETHASONE SODIUM PHOSPHATE 4 MG: 4 INJECTION, SOLUTION INTRA-ARTICULAR; INTRALESIONAL; INTRAMUSCULAR; INTRAVENOUS; SOFT TISSUE at 10:06

## 2024-11-15 RX ADMIN — FENTANYL CITRATE 50 MCG: 50 INJECTION, SOLUTION INTRAMUSCULAR; INTRAVENOUS at 13:42

## 2024-11-15 RX ADMIN — FUROSEMIDE 10 MG: 10 INJECTION, SOLUTION INTRAVENOUS at 11:24

## 2024-11-15 RX ADMIN — HYDROCODONE BITARTRATE AND ACETAMINOPHEN 1 TABLET: 10; 325 TABLET ORAL at 13:30

## 2024-11-15 NOTE — ANESTHESIA POSTPROCEDURE EVALUATION
Patient: Nurys Iniguez    Procedure Summary       Date: 11/15/24 Room / Location:  [facility] OR 08 /  [facility] OR    Anesthesia Start: 1103 Anesthesia Stop: 1251    Procedure: CYSTOSCOPY, BILATERAL EXTRACORPOREAL SHOCKWAVE LITHOTRIPSY, OTHER PROCEDURES AS CLINICALLY INDICATED (Bilateral: Urethra) Diagnosis:       Kidney stone      Gross hematuria      (Kidney stone [N20.0])      (Gross hematuria [R31.0])    Surgeons: Sarbjit Curry MD Provider: Nehemias Nascimento CRNA    Anesthesia Type: general ASA Status: 3            Anesthesia Type: general    Vitals  Vitals Value Taken Time   /77 11/15/24 1415   Temp 98.1 °F (36.7 °C) 11/15/24 1415   Pulse 67 11/15/24 1418   Resp 15 11/15/24 1415   SpO2 96 % 11/15/24 1418   Vitals shown include unfiled device data.        Post Anesthesia Care and Evaluation    Patient location during evaluation: PACU  Patient participation: complete - patient participated  Level of consciousness: awake and alert  Pain management: adequate    Airway patency: patent  Anesthetic complications: No anesthetic complications    Cardiovascular status: acceptable  Respiratory status: acceptable  Hydration status: acceptable    Comments: Blood pressure 131/82, pulse 70, temperature 98.1 °F (36.7 °C), temperature source Temporal, resp. rate 16, SpO2 97%, not currently breastfeeding.    Pt discharged from PACU based on moshe score >8

## 2024-11-15 NOTE — DISCHARGE INSTR - APPOINTMENTS
Go to the [facility] on December 17th at 8:30 AM for KUB xray prior to your follow up with Margo Briceno at 9:30 AM

## 2024-11-15 NOTE — ANESTHESIA PROCEDURE NOTES
Airway  Urgency: elective    Date/Time: 11/15/2024 11:06 AM  Airway not difficult    General Information and Staff    Patient location during procedure: OR  CRNA/CAA: Nehemias Nascimento CRNA    Indications and Patient Condition  Indications for airway management: airway protection    Preoxygenated: yes  Mask difficulty assessment: 1 - vent by mask    Final Airway Details  Final airway type: endotracheal airway      Successful airway: ETT  Cuffed: yes   Successful intubation technique: direct laryngoscopy  Facilitating devices/methods: intubating stylet  Endotracheal tube insertion site: oral  Blade: Calvert  Blade size: 3  ETT size (mm): 7.5  Cormack-Lehane Classification: grade IIa - partial view of glottis  Placement verified by: chest auscultation and capnometry   Cuff volume (mL): 8  Measured from: teeth  ETT/EBT  to teeth (cm): 21  Number of attempts at approach: 1  Assessment: lips, teeth, and gum same as pre-op and atraumatic intubation

## 2024-11-15 NOTE — ANESTHESIA PREPROCEDURE EVALUATION
Anesthesia Evaluation     Patient summary reviewed   history of anesthetic complications:  PONV  NPO Solid Status: > 8 hours             Airway   Mallampati: II  Dental      Pulmonary    (-) COPD, asthma, sleep apnea, not a smoker  Cardiovascular   Exercise tolerance: good (4-7 METS)    (+) hypertension, hyperlipidemia  (-) pacemaker, past MI, angina, cardiac stents      Neuro/Psych  (+) neuromuscular disease (multiple sclerosis), weakness (left side)  (-) seizures, TIA, CVA  GI/Hepatic/Renal/Endo    (+) obesity, GERD, renal disease- stones  (-) liver disease, diabetes    Musculoskeletal     Abdominal    Substance History      OB/GYN          Other                    Anesthesia Plan    ASA 3     general     intravenous induction     Anesthetic plan, risks, benefits, and alternatives have been provided, discussed and informed consent has been obtained with: patient.    CODE STATUS:

## 2024-11-15 NOTE — OP NOTE
OP NOTE  Nurys Iniguez    Date of Procedure: 11/15/2024    Pre-op Diagnosis:   Kidney stone [N20.0]  Gross hematuria [R31.0]    Post-op Diagnosis:     Post-Op Diagnosis Codes:     * Kidney stone [N20.0]     * Gross hematuria [R31.0]    Procedure/CPT® Codes:      Procedure(s):  CYSTOSCOPY, BILATERAL EXTRACORPOREAL SHOCKWAVE LITHOTRIPSY    Surgeon(s):  Sarbjit Curry MD    Anesthesia: General    Staff:   Circulator: Sloane Juarez RN  Scrub Person: José Luis Garcia; Juli James  Vendor Representative: Amador Negro    Indications:   43-year-old female with history of hematuria found to have 7 mm left proximal ureteral stone that failed to pass.  She also has 3 mm right lower pole kidney stone..   After discussion of options, patient has given informed consent to undergo bilateral ESWL.  She also wanted to proceed with concomitant cystoscopy to evaluate her hematuria.  All risks, benefits, and alternatives were discussed.    Operative Report: The patient is identified. The KUB is reviewed. After answering any questions, patient was brought to the operating room and placed on the patient table of the [facility].  General endotracheal anesthesia was administered.  Preoperative KUB was reviewed.   An [facility] c-arm fluoroscope was used to identify the stone.  The patient was first placed into the lithotomy position.  Her genitalia were prepped and draped in usual sterile fashion.  Antibiotics were administered.  A final timeout was formed.  Placed a 23 [place] rigid cystoscope with 70 degree lens in the bladder.  The bladder was systematically inspected the ureteral orifices were orthotopic bilaterally.  I can see no evidence for tumors.  The bladder was drained and the cystoscope was removed.  The patient was then placed back into the supine position and we began by first targeting the left-sided kidney stone.    The shock-head is brought into position.  This stone is brought into the F2 plane for  focus.  Treatment was initiated.  We gradually increased the energy level from 1 to 7.  This stone was treated at a rate of 60 for the first several 100 shocks until we could notice stone fragmentation.  We then transition to a rate of 90..   The stone was periodically checked to make sure that we were on it and getting good breakup.  We checked at 700, 1000, 1500, 2000, and 2500 shocks.  The stone did have good breakup.  I felt it was unnecessary to place a ureteral stent.  At the conclusion of 4000 shocks, we then turned attention to the right extracorporeal shock with lithotripsy.  The 3 mm right lower pole kidney stone was targeted using biplanar fluoroscopy.  We then delivered a total of 2000 shocks at a rate of 90 to the stone and good stone fragmentation was noted.  After the completion of 2000 shocks, the procedure terminated.    The patient was awakened from anesthesia and transferred to the recovery room in satisfactory condition.      Estimated Blood Loss: None    Specimens:                None      Drains: * No LDAs found *    Complications: none      Sarbjit Curry MD     Date: 11/15/2024  Time: 12:31 CST

## 2024-11-15 NOTE — INTERVAL H&P NOTE
H&P updated. The patient was examined and the following changes are noted:    KUB x-ray shows retained 7 mm left proximal ureteral stone.  She also has 3 to 4 mm nonobstructing right lower pole kidney stone she would like to proceed today as scheduled for bilateral extracorporeal shockwave lithotripsy.  She would also like concomitant cystoscopy given her history of hematuria.  We discussed risks of the procedure including but not limited to infection, bleeding, need for additional procedures, inability to break up/pass any/all stone, injury to kidney and/or adjacent structures, complications of anesthesia.  She voices understanding and provided informed consent to proceed

## 2024-11-20 DIAGNOSIS — N20.0 KIDNEY STONE: Primary | ICD-10-CM

## 2024-11-26 LAB
QT INTERVAL: 390 MS
QTC INTERVAL: 412 MS

## 2024-11-26 RX ORDER — ERENUMAB-AOOE 70 MG/ML
INJECTION SUBCUTANEOUS
Qty: 1 ML | Refills: 2 | Status: SHIPPED | OUTPATIENT
Start: 2024-11-26

## 2024-11-26 RX ORDER — ESTRADIOL 0.5 MG/1
TABLET ORAL
Qty: 30 TABLET | Refills: 2 | Status: SHIPPED | OUTPATIENT
Start: 2024-11-26

## 2024-12-02 ENCOUNTER — OFFICE VISIT (OUTPATIENT)
Dept: PRIMARY CARE CLINIC | Age: 44
End: 2024-12-02
Payer: COMMERCIAL

## 2024-12-02 ENCOUNTER — TELEPHONE (OUTPATIENT)
Dept: UROLOGY | Facility: CLINIC | Age: 44
End: 2024-12-02
Payer: COMMERCIAL

## 2024-12-02 VITALS
DIASTOLIC BLOOD PRESSURE: 74 MMHG | TEMPERATURE: 97.8 F | RESPIRATION RATE: 18 BRPM | HEART RATE: 71 BPM | BODY MASS INDEX: 31.98 KG/M2 | WEIGHT: 199 LBS | HEIGHT: 66 IN | OXYGEN SATURATION: 96 % | SYSTOLIC BLOOD PRESSURE: 119 MMHG

## 2024-12-02 DIAGNOSIS — N95.9 POSTMENOPAUSAL SYMPTOMS: ICD-10-CM

## 2024-12-02 DIAGNOSIS — G43.709 CHRONIC MIGRAINE WITHOUT AURA WITHOUT STATUS MIGRAINOSUS, NOT INTRACTABLE: Primary | ICD-10-CM

## 2024-12-02 PROBLEM — R31.0 GROSS HEMATURIA: Status: ACTIVE | Noted: 2024-10-30

## 2024-12-02 PROCEDURE — 3078F DIAST BP <80 MM HG: CPT | Performed by: FAMILY MEDICINE

## 2024-12-02 PROCEDURE — G8417 CALC BMI ABV UP PARAM F/U: HCPCS | Performed by: FAMILY MEDICINE

## 2024-12-02 PROCEDURE — 99213 OFFICE O/P EST LOW 20 MIN: CPT | Performed by: FAMILY MEDICINE

## 2024-12-02 PROCEDURE — 3074F SYST BP LT 130 MM HG: CPT | Performed by: FAMILY MEDICINE

## 2024-12-02 PROCEDURE — G8427 DOCREV CUR MEDS BY ELIG CLIN: HCPCS | Performed by: FAMILY MEDICINE

## 2024-12-02 PROCEDURE — 1036F TOBACCO NON-USER: CPT | Performed by: FAMILY MEDICINE

## 2024-12-02 PROCEDURE — G8484 FLU IMMUNIZE NO ADMIN: HCPCS | Performed by: FAMILY MEDICINE

## 2024-12-02 RX ORDER — ERENUMAB-AOOE 70 MG/ML
INJECTION SUBCUTANEOUS
Qty: 1 ML | Refills: 5 | Status: SHIPPED | OUTPATIENT
Start: 2024-12-02

## 2024-12-02 ASSESSMENT — ENCOUNTER SYMPTOMS
RESPIRATORY NEGATIVE: 1
GASTROINTESTINAL NEGATIVE: 1
BACK PAIN: 1

## 2024-12-02 NOTE — PATIENT INSTRUCTIONS
We are committed to providing you with the best care possible.   In order to help us achieve these goals please remember to bring all medications, herbal products, and over the counter supplements with you to each visit.     If your provider has ordered testing for you, please be sure to follow up with our office if you have not received results within 7 days after the testing took place.     *If you receive a survey after visiting one of our offices, please take time to share your experience concerning your physician office visit. These surveys are confidential and no health information about you is shared.  We are eager to improve for you and we are counting on your feedback to help make that happen.         Wt Readings from Last 3 Encounters:   12/02/24 90.3 kg (199 lb)   11/13/24 91.9 kg (202 lb 9.6 oz)   11/04/24 89.8 kg (198 lb)

## 2024-12-02 NOTE — PROGRESS NOTES
SUBJECTIVE:    Nolvia Zavala is 44 y.o.female who comes in complaining of Follow-up (estrogen)   .       HPI: Nolvia comes in today for follow-up of starting estrogen.  She is currently on Estrace 0.5 mg and she says this is definitely helped her hot flashes.  She thinks she feels a little better but she still deals with her chronic problems like POTS.  PJ had elevated her blood pressure medicine to 50 mg of Toprol XL 50 and this has helped.    Nurtec every other day and Aimovig are helping with her migraines although she still has them.  They did not worsen when she started the estrogen.  She does not smoke and there is no history of a blood clot.  She feels like the estrogen is helping.      Allergies   Allergen Reactions    Ropinirole Hcl      Other reaction(s): Mental Status Change    Zolpidem Other (See Comments)    Ropinirole Other (See Comments)     Crazy dreams; sleep walking    Simvastatin Other (See Comments)     Leg cramps       Social History     Socioeconomic History    Marital status:      Spouse name: Jomar    Number of children: 1    Years of education: None    Highest education level: None   Occupational History     Employer: [employer]     Employer: N/A   Tobacco Use    Smoking status: Never    Smokeless tobacco: Never   Vaping Use    Vaping status: Never Used   Substance and Sexual Activity    Alcohol use: No    Drug use: No    Sexual activity: Yes     Partners: Male     Social Determinants of Health     Financial Resource Strain: Low Risk  (8/26/2024)    Overall Financial Resource Strain (CARDIA)     Difficulty of Paying Living Expenses: Not hard at all   Food Insecurity: No Food Insecurity (8/26/2024)    Hunger Vital Sign     Worried About Running Out of Food in the Last Year: Never true     Ran Out of Food in the Last Year: Never true   Transportation Needs: Unknown (8/26/2024)    PRAPARE - Transportation     Lack of Transportation (Non-Medical): No   Physical

## 2024-12-02 NOTE — TELEPHONE ENCOUNTER
----- Message from Sarbjit Curry sent at 11/20/2024  9:34 AM CST -----  Regarding: FW: Needs repeat BMP prior to stone f/u appt with Margo  Please call patient and inform her that I have ordered blood work for her to have drawn at [facility] lab first week or 2 December prior to her appointment with Margo      Thank you  ----- Message -----  From: Sarbjit Curry MD  Sent: 11/20/2024  12:00 AM CST  To: Sarbjit Curry MD  Subject: Needs repeat BMP prior to stone f/u appt wit#

## 2024-12-05 DIAGNOSIS — I10 HYPERTENSION, UNSPECIFIED TYPE: ICD-10-CM

## 2024-12-05 RX ORDER — METOPROLOL SUCCINATE 50 MG/1
TABLET, EXTENDED RELEASE ORAL
Qty: 90 TABLET | Refills: 3 | Status: SHIPPED | OUTPATIENT
Start: 2024-12-05

## 2024-12-10 ENCOUNTER — TELEPHONE (OUTPATIENT)
Dept: UROLOGY | Facility: CLINIC | Age: 44
End: 2024-12-10
Payer: COMMERCIAL
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
## 2024-12-10 NOTE — TELEPHONE ENCOUNTER
Spoke with patient to let her know that Dr. Curry wanted her to get a Repeat BMP lab drawn prior to her appointment on 12/17/24. Patient verbalized understanding.

## 2024-12-17 ENCOUNTER — OFFICE VISIT (OUTPATIENT)
Dept: UROLOGY | Facility: CLINIC | Age: 44
End: 2024-12-17
Payer: COMMERCIAL

## 2024-12-17 VITALS — HEIGHT: 66 IN | BODY MASS INDEX: 31.98 KG/M2 | TEMPERATURE: 97.7 F | WEIGHT: 199 LBS

## 2024-12-17 DIAGNOSIS — N20.0 KIDNEY STONE: Primary | ICD-10-CM

## 2024-12-17 LAB
BILIRUB BLD-MCNC: NEGATIVE MG/DL
BUN SERPL-MCNC: 17 MG/DL (ref 6–20)
BUN/CREAT SERPL: 19.5 (ref 7–25)
CALCIUM SERPL-MCNC: 8.9 MG/DL (ref 8.6–10.5)
CHLORIDE SERPL-SCNC: 109 MMOL/L (ref 98–107)
CLARITY, POC: CLEAR
CO2 SERPL-SCNC: 23 MMOL/L (ref 22–29)
COLOR UR: YELLOW
CREAT SERPL-MCNC: 0.87 MG/DL (ref 0.57–1)
EGFRCR SERPLBLD CKD-EPI 2021: 83.3 ML/MIN/1.73
GLUCOSE SERPL-MCNC: 100 MG/DL (ref 65–99)
GLUCOSE UR STRIP-MCNC: NEGATIVE MG/DL
KETONES UR QL: NEGATIVE
LEUKOCYTE EST, POC: NEGATIVE
NITRITE UR-MCNC: NEGATIVE MG/ML
PH UR: 5.5 [PH] (ref 5–8)
POTASSIUM SERPL-SCNC: 4.1 MMOL/L (ref 3.5–5.2)
PROT UR STRIP-MCNC: ABNORMAL MG/DL
RBC # UR STRIP: NEGATIVE /UL
SODIUM SERPL-SCNC: 144 MMOL/L (ref 136–145)
SP GR UR: 1.03 (ref 1–1.03)
UROBILINOGEN UR QL: NORMAL

## 2024-12-23 DIAGNOSIS — R20.2 NUMBNESS AND TINGLING: ICD-10-CM

## 2024-12-23 DIAGNOSIS — R20.0 NUMBNESS AND TINGLING: ICD-10-CM

## 2024-12-23 RX ORDER — GABAPENTIN 300 MG/1
300 CAPSULE ORAL 3 TIMES DAILY
Qty: 90 CAPSULE | Refills: 2 | OUTPATIENT
Start: 2024-12-23 | End: 2025-03-23

## 2024-12-23 NOTE — TELEPHONE ENCOUNTER
Patient is to come in and sign a medication contract before we continue to prescribe controlled substances

## 2024-12-23 NOTE — TELEPHONE ENCOUNTER
PDMP Monitoring:    Last PDMP David as Reviewed ([place]):  Review User Review Instant Review Result   MERVAT JANSEN ARACELI 10/15/2024  4:12 PM Reviewed PDMP [1]     Urine Drug Screenings (1 yr)       POCT Rapid Drug Screen  Collected: 9/25/2024  5:10 PM (Final result)              POCT Rapid Drug Screen  Collected: 3/14/2022 (Final result)              POCT Rapid Drug Screen  Collected: 3/11/2021  1:39 PM (Final result)              POCT Rapid Drug Screen  Collected: 2/12/2020 10:27 AM (Final result)              POCT Rapid Drug Screen  Collected: 8/3/2018  2:47 PM (Final result)                  Medication Contract and Consent for Opioid Use Documents Filed       Patient Documents       Type of Document Status Date Received Received By Description    Medication Contract Signed 8/9/2019  3:11 PM AVEL RILEY     Medication Contract Signed 8/12/2020 12:03 PM MINI DORSEY     Medication Contract Received 3/22/2023  5:28 PM RONALDO HERNANDES med agreement  3-22-23

## 2025-01-16 ENCOUNTER — OFFICE VISIT (OUTPATIENT)
Dept: GASTROENTEROLOGY | Age: 45
End: 2025-01-16
Payer: COMMERCIAL

## 2025-01-16 VITALS
BODY MASS INDEX: 32.3 KG/M2 | HEART RATE: 69 BPM | HEIGHT: 66 IN | SYSTOLIC BLOOD PRESSURE: 120 MMHG | WEIGHT: 201 LBS | DIASTOLIC BLOOD PRESSURE: 70 MMHG | OXYGEN SATURATION: 96 %

## 2025-01-16 DIAGNOSIS — K21.9 GASTROESOPHAGEAL REFLUX DISEASE, UNSPECIFIED WHETHER ESOPHAGITIS PRESENT: ICD-10-CM

## 2025-01-16 DIAGNOSIS — R13.10 DYSPHAGIA, UNSPECIFIED TYPE: ICD-10-CM

## 2025-01-16 DIAGNOSIS — K58.1 IRRITABLE BOWEL SYNDROME WITH CONSTIPATION: Primary | ICD-10-CM

## 2025-01-16 PROCEDURE — G8427 DOCREV CUR MEDS BY ELIG CLIN: HCPCS | Performed by: NURSE PRACTITIONER

## 2025-01-16 PROCEDURE — G8417 CALC BMI ABV UP PARAM F/U: HCPCS | Performed by: NURSE PRACTITIONER

## 2025-01-16 PROCEDURE — 1036F TOBACCO NON-USER: CPT | Performed by: NURSE PRACTITIONER

## 2025-01-16 PROCEDURE — 3074F SYST BP LT 130 MM HG: CPT | Performed by: NURSE PRACTITIONER

## 2025-01-16 PROCEDURE — 3078F DIAST BP <80 MM HG: CPT | Performed by: NURSE PRACTITIONER

## 2025-01-16 PROCEDURE — 99214 OFFICE O/P EST MOD 30 MIN: CPT | Performed by: NURSE PRACTITIONER

## 2025-01-16 RX ORDER — BISACODYL 10 MG
10 SUPPOSITORY, RECTAL RECTAL DAILY PRN
COMMUNITY

## 2025-01-16 NOTE — PROGRESS NOTES
Subjective:     Patient ID: Nolvia Zavala is a 44 y.o. female  PCP: Daisy Khanna MD  Referring Provider: No ref. provider found    HPI  Patient presents to the office today with the following complaints: 3 Month Follow-Up      Patient seen in the office for follow up on her chronic constipation  Reports she has recently started using dulcolax suppositories along with her current regimen of probiotic, lactulose, and motegrity   States the suppositories have helped some she is using these nightly     States she will now be seeing [facility] for her FNS (functional neurologic symptom)  We again discussed motility studies and she states if she decided to do that she will talk to her physicians at [place] to arrange this     She does also report a soreness in her throat that has been bothering her few weeks, no fever chills or noted enlarged lymph nodes    We discussed increasing her omeprazole for a short time and she agrees to this, we will increase omeprazole to 40mg BID   Denies any trouble swallowing at this time     Assessment:     1. Irritable bowel syndrome with constipation  2. Gastroesophageal reflux disease, unspecified whether esophagitis present  -     omeprazole (PRILOSEC) 20 MG delayed release capsule; Take 1 capsule by mouth in the morning and at bedtime Take first thing in the morning on an empty stomach., Disp-60 capsule, R-11Normal  3. Dysphagia, unspecified type       Review of Systems   Constitutional:  Negative for activity change, appetite change, fatigue, fever and unexpected weight change.   HENT:  Negative for trouble swallowing.    Respiratory:  Negative for cough, choking and shortness of breath.    Cardiovascular:  Negative for chest pain.   Gastrointestinal:  Positive for constipation. Negative for abdominal distention, abdominal pain, anal bleeding, blood in stool, diarrhea, nausea, rectal pain and vomiting.        GERD   Allergic/Immunologic: Negative for

## 2025-01-17 ENCOUNTER — OFFICE VISIT (OUTPATIENT)
Dept: PRIMARY CARE CLINIC | Age: 45
End: 2025-01-17
Payer: COMMERCIAL

## 2025-01-17 VITALS
HEART RATE: 81 BPM | BODY MASS INDEX: 32.44 KG/M2 | DIASTOLIC BLOOD PRESSURE: 70 MMHG | TEMPERATURE: 98.3 F | WEIGHT: 201 LBS | SYSTOLIC BLOOD PRESSURE: 110 MMHG | OXYGEN SATURATION: 97 %

## 2025-01-17 DIAGNOSIS — B34.9 VIRAL ILLNESS: Primary | ICD-10-CM

## 2025-01-17 DIAGNOSIS — J02.9 SORE THROAT: ICD-10-CM

## 2025-01-17 LAB — S PYO AG THROAT QL: NORMAL

## 2025-01-17 PROCEDURE — 3078F DIAST BP <80 MM HG: CPT | Performed by: NURSE PRACTITIONER

## 2025-01-17 PROCEDURE — 99213 OFFICE O/P EST LOW 20 MIN: CPT | Performed by: NURSE PRACTITIONER

## 2025-01-17 PROCEDURE — G8427 DOCREV CUR MEDS BY ELIG CLIN: HCPCS | Performed by: NURSE PRACTITIONER

## 2025-01-17 PROCEDURE — 3074F SYST BP LT 130 MM HG: CPT | Performed by: NURSE PRACTITIONER

## 2025-01-17 PROCEDURE — 87880 STREP A ASSAY W/OPTIC: CPT | Performed by: NURSE PRACTITIONER

## 2025-01-17 PROCEDURE — 1036F TOBACCO NON-USER: CPT | Performed by: NURSE PRACTITIONER

## 2025-01-17 PROCEDURE — G8417 CALC BMI ABV UP PARAM F/U: HCPCS | Performed by: NURSE PRACTITIONER

## 2025-01-17 ASSESSMENT — ENCOUNTER SYMPTOMS
TROUBLE SWALLOWING: 0
COUGH: 0
DIARRHEA: 0
EYE PAIN: 0
EYE DISCHARGE: 0
TROUBLE SWALLOWING: 0
VOMITING: 0
BLOOD IN STOOL: 0
STRIDOR: 0
ABDOMINAL PAIN: 0
ABDOMINAL PAIN: 0
CONSTIPATION: 1
COLOR CHANGE: 0
RECTAL PAIN: 0
SINUS PRESSURE: 0
SHORTNESS OF BREATH: 0
WHEEZING: 0
NAUSEA: 0
ABDOMINAL DISTENTION: 0
SHORTNESS OF BREATH: 0
ANAL BLEEDING: 0
SORE THROAT: 1
CHEST TIGHTNESS: 0
ABDOMINAL DISTENTION: 0
CHOKING: 0
COUGH: 0

## 2025-01-17 NOTE — PROGRESS NOTES
[facility]  [facility] [address]  [address]  [place] [address]  Dept: [phone]  Dept Fax: [phone]  Loc: [phone]    Nolvia Zavala is a 44 y.o. female who presents today for her medical conditions/complaints as noted below.  Nolvia Zavala is complaining of Pharyngitis and Ear Pain (Right ear)        HPI:   Pharyngitis  Associated symptoms include a sore throat. Pertinent negatives include no abdominal pain, arthralgias, chest pain, chills, congestion, coughing, fatigue, fever, neck pain, numbness, rash or weakness.   Ear Pain   Associated symptoms include a sore throat. Pertinent negatives include no abdominal pain, coughing, neck pain or rash.       Nolvia presents to the office complaining of sore throat and right ear ache.  Symptoms started 3 days ago.  Denies fever and vomiting.  Denies recent abx.   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     GERD (gastroesophageal reflux disease)     Hematuria     wih abdominal pain    Hyperlipidemia     Hypertension     MS (multiple sclerosis) (HCC)     Myofascial pain     Nerve damage     Neutropenia (HCC) 10/21/2015    Obesity     Pelvic floor dysfunction     Postmenopausal HRT (hormone replacement therapy)     POTS (postural orthostatic tachycardia syndrome)     Sleep apnea 2021    a pap       Past Surgical History:   Procedure Laterality Date    APPENDECTOMY      BACK SURGERY      CHOLECYSTECTOMY, LAPAROSCOPIC      COLONOSCOPY  11/16/2015    Dr Streeter-Internal hemorrhoids, AP, 5 yr recall    COLONOSCOPY N/A 08/13/2018    Dr NORMA [facility]--10 yr recall    COLONOSCOPY  02/03/2010    Dr Streeter-T[facility]ela, prn    COLONOSCOPY N/A 09/12/2022    Dr NORMA Abdul-Tamela, 6 yr (age 48) recall    COLONOSCOPY N/A 06/24/2024    Dr NORMA Naqvi, 10 yr

## 2025-01-28 DIAGNOSIS — R07.0 THROAT PAIN: Primary | ICD-10-CM

## 2025-01-28 DIAGNOSIS — H92.09 OTALGIA, UNSPECIFIED LATERALITY: ICD-10-CM

## 2025-02-03 DIAGNOSIS — F44.6 FUNCTIONAL NEUROLOGICAL SYMPTOM DISORDER WITH ANESTHESIA OR SENSORY LOSS: Primary | ICD-10-CM

## 2025-02-03 DIAGNOSIS — R53.1 WEAKNESS: ICD-10-CM

## 2025-02-10 ENCOUNTER — OFFICE VISIT (OUTPATIENT)
Dept: ENT CLINIC | Age: 45
End: 2025-02-10
Payer: COMMERCIAL

## 2025-02-10 VITALS
WEIGHT: 202 LBS | BODY MASS INDEX: 32.47 KG/M2 | SYSTOLIC BLOOD PRESSURE: 138 MMHG | DIASTOLIC BLOOD PRESSURE: 94 MMHG | HEIGHT: 66 IN

## 2025-02-10 DIAGNOSIS — R13.19 ESOPHAGEAL DYSPHAGIA: Primary | ICD-10-CM

## 2025-02-10 PROCEDURE — 3075F SYST BP GE 130 - 139MM HG: CPT | Performed by: PHYSICIAN ASSISTANT

## 2025-02-10 PROCEDURE — G8427 DOCREV CUR MEDS BY ELIG CLIN: HCPCS | Performed by: PHYSICIAN ASSISTANT

## 2025-02-10 PROCEDURE — 3080F DIAST BP >= 90 MM HG: CPT | Performed by: PHYSICIAN ASSISTANT

## 2025-02-10 PROCEDURE — 1036F TOBACCO NON-USER: CPT | Performed by: PHYSICIAN ASSISTANT

## 2025-02-10 PROCEDURE — 31575 DIAGNOSTIC LARYNGOSCOPY: CPT | Performed by: PHYSICIAN ASSISTANT

## 2025-02-10 PROCEDURE — 99203 OFFICE O/P NEW LOW 30 MIN: CPT | Performed by: PHYSICIAN ASSISTANT

## 2025-02-10 PROCEDURE — G8417 CALC BMI ABV UP PARAM F/U: HCPCS | Performed by: PHYSICIAN ASSISTANT

## 2025-02-10 ASSESSMENT — ENCOUNTER SYMPTOMS
RHINORRHEA: 0
SINUS PRESSURE: 0
SINUS PAIN: 0
EYE PAIN: 0
FACIAL SWELLING: 0
TROUBLE SWALLOWING: 0
EYE DISCHARGE: 0
VOICE CHANGE: 0
SORE THROAT: 0

## 2025-02-10 NOTE — PROGRESS NOTES
[facility]  Nolvia is a pleasant 44-year-old  female that was referred by Odette Alonso due to problems with globus sensation with dysphagia.  She reports that this has been going on for at least 6 months.  She has had an upper GI endoscopy that was unremarkable.  Also treated for LPR/GERD with no change in symptoms.  She had a modified barium swallow back in September that demonstrated some mild penetration with thin liquids with no additional findings noted.  Since the study she reports her symptoms have worsened and she is getting choked on solid and liquids on a daily basis.  She denies the use of tobacco products and also denies any history of diabetes.        Allergies: Ropinirole hcl, Zolpidem, Ropinirole, and Simvastatin      Current Outpatient Medications   Medication Sig Dispense Refill    bisacodyl (DULCOLAX) 10 MG suppository Place 1 suppository rectally daily as needed for Constipation      omeprazole (PRILOSEC) 20 MG delayed release capsule Take 1 capsule by mouth in the morning and at bedtime Take first thing in the morning on an empty stomach. 60 capsule 11    metoprolol succinate (TOPROL XL) 50 MG extended release tablet 1 tablet by mouth once a day for high blood pressure and POTS 90 tablet 3    Erenumab-aooe (AIMOVIG) 70 MG/ML SOAJ INJECT 70mg (ONE PEN) UNDER THE SKIN ONCE A MONTH for migraine prevention 1 mL 5    estradiol (ESTRACE) 0.5 MG tablet TAKE ONE TABLET BY MOUTH DAILY 30 tablet 2    traMADol (ULTRAM) 50 MG tablet Take 1 tablet by mouth nightly as needed. (Patient not taking: Reported on 1/17/2025)      lactulose (CHRONULAC) 10 GM/15ML solution Take 15 mLs by mouth 2 times daily 900 mL 5    ibuprofen (ADVIL;MOTRIN) 800 MG tablet Take 1 tablet by mouth 3 times daily (with meals) 270 tablet 1    zinc 50 MG TABS tablet       gabapentin (NEURONTIN) 300 MG capsule Take 1 capsule by mouth 3 times daily for 90 days. Max Daily Amount: 900 mg 90 capsule 2

## 2025-02-10 NOTE — ASSESSMENT & PLAN NOTE
Dysphagia-suspicious for esophageal dysmotility based on history  Plan: I recommended an esophagram with a barium tablet swallow.  If this is noted be unremarkable, would consider getting a gastric emptying study for possible gastroparesis.  I will call her with results with further recommendations to follow.

## 2025-02-15 DIAGNOSIS — M54.9 BACK PAIN, UNSPECIFIED BACK LOCATION, UNSPECIFIED BACK PAIN LATERALITY, UNSPECIFIED CHRONICITY: ICD-10-CM

## 2025-02-17 DIAGNOSIS — G43.709 CHRONIC MIGRAINE WITHOUT AURA WITHOUT STATUS MIGRAINOSUS, NOT INTRACTABLE: ICD-10-CM

## 2025-02-17 RX ORDER — IBUPROFEN 800 MG/1
800 TABLET, FILM COATED ORAL
Qty: 270 TABLET | Refills: 1 | Status: SHIPPED | OUTPATIENT
Start: 2025-02-17

## 2025-02-17 RX ORDER — ERENUMAB-AOOE 70 MG/ML
INJECTION SUBCUTANEOUS
Qty: 1 ML | Refills: 2 | Status: SHIPPED | OUTPATIENT
Start: 2025-02-17

## 2025-02-17 RX ORDER — PRUCALOPRIDE 2 MG/1
2 TABLET ORAL DAILY
Qty: 30 TABLET | Refills: 11 | Status: SHIPPED | OUTPATIENT
Start: 2025-02-17 | End: 2025-02-18 | Stop reason: SDUPTHER

## 2025-02-17 RX ORDER — ESTRADIOL 0.5 MG/1
TABLET ORAL
Qty: 30 TABLET | Refills: 2 | Status: SHIPPED | OUTPATIENT
Start: 2025-02-17

## 2025-02-17 RX ORDER — PRUCALOPRIDE 2 MG/1
2 TABLET ORAL DAILY
Qty: 30 TABLET | Refills: 0 | OUTPATIENT
Start: 2025-02-17

## 2025-02-18 RX ORDER — PRUCALOPRIDE 2 MG/1
2 TABLET ORAL DAILY
Qty: 30 TABLET | Refills: 11 | Status: SHIPPED | OUTPATIENT
Start: 2025-02-18

## 2025-02-24 ENCOUNTER — HOSPITAL ENCOUNTER (OUTPATIENT)
Dept: GENERAL RADIOLOGY | Age: 45
Discharge: HOME | End: 2025-02-24
Payer: MEDICARE

## 2025-02-24 DIAGNOSIS — R13.19 ESOPHAGEAL DYSPHAGIA: ICD-10-CM

## 2025-02-24 PROCEDURE — 74220 X-RAY XM ESOPHAGUS 1CNTRST: CPT

## 2025-02-25 ENCOUNTER — TELEPHONE (OUTPATIENT)
Dept: ENT CLINIC | Age: 45
End: 2025-02-25

## 2025-02-25 DIAGNOSIS — R13.19 ESOPHAGEAL DYSPHAGIA: Primary | ICD-10-CM

## 2025-02-28 ENCOUNTER — OFFICE VISIT (OUTPATIENT)
Dept: GASTROENTEROLOGY | Age: 45
End: 2025-02-28
Payer: COMMERCIAL

## 2025-02-28 VITALS
OXYGEN SATURATION: 99 % | DIASTOLIC BLOOD PRESSURE: 70 MMHG | HEIGHT: 66 IN | SYSTOLIC BLOOD PRESSURE: 120 MMHG | BODY MASS INDEX: 32.14 KG/M2 | HEART RATE: 77 BPM | WEIGHT: 200 LBS

## 2025-02-28 DIAGNOSIS — R13.10 DYSPHAGIA, UNSPECIFIED TYPE: ICD-10-CM

## 2025-02-28 DIAGNOSIS — K21.9 GASTROESOPHAGEAL REFLUX DISEASE, UNSPECIFIED WHETHER ESOPHAGITIS PRESENT: Primary | ICD-10-CM

## 2025-02-28 PROCEDURE — G8428 CUR MEDS NOT DOCUMENT: HCPCS | Performed by: NURSE PRACTITIONER

## 2025-02-28 PROCEDURE — G8417 CALC BMI ABV UP PARAM F/U: HCPCS | Performed by: NURSE PRACTITIONER

## 2025-02-28 PROCEDURE — 3078F DIAST BP <80 MM HG: CPT | Performed by: NURSE PRACTITIONER

## 2025-02-28 PROCEDURE — 3074F SYST BP LT 130 MM HG: CPT | Performed by: NURSE PRACTITIONER

## 2025-02-28 PROCEDURE — 99214 OFFICE O/P EST MOD 30 MIN: CPT | Performed by: NURSE PRACTITIONER

## 2025-02-28 PROCEDURE — 1036F TOBACCO NON-USER: CPT | Performed by: NURSE PRACTITIONER

## 2025-02-28 NOTE — PROGRESS NOTES
orders of the defined types were placed in this encounter.    Medications  No orders of the defined types were placed in this encounter.        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     GERD (gastroesophageal reflux disease)     Hematuria     wih abdominal pain    Hyperlipidemia     Hypertension     MS (multiple sclerosis) (HCC)     Myofascial pain     Nerve damage     Neutropenia 10/21/2015    Obesity     Pelvic floor dysfunction     Postmenopausal HRT (hormone replacement therapy)     POTS (postural orthostatic tachycardia syndrome)     Sleep apnea 2021    a pap       Past Surgical History:   Procedure Laterality Date    APPENDECTOMY      BACK SURGERY      CHOLECYSTECTOMY, LAPAROSCOPIC      COLONOSCOPY  11/16/2015    Dr Streeter-Internal hemorrhoids, AP, 5 yr recall    COLONOSCOPY N/A 08/13/2018    Dr NORMA Abdul-[facility]--10 yr recall    COLONOSCOPY  02/03/2010    Dr Streeter-Normal, prn    COLONOSCOPY N/A 09/12/2022    Dr NORMA Abdul-Tamela, 6 yr (age 48) recall    COLONOSCOPY N/A 06/24/2024    Dr NORMA Abdul-Tamela, 10 yr recall    EGD      HYSTERECTOMY (CERVIX STATUS UNKNOWN)      HYSTERECTOMY, TOTAL ABDOMINAL (CERVIX REMOVED)  2004    OVARY REMOVAL      [place] EGD TRANSORAL BIOPSY SINGLE/MULTIPLE N/A 01/29/2018    Dr NORMA Abdul-Active duodenitis, gastritis/gastropathy    UPPER GASTROINTESTINAL ENDOSCOPY  09/12/2022    Dr NORMA Abdul-Gastritis    UPPER GASTROINTESTINAL ENDOSCOPY  11/15/2016    Dr Ness-Erosive gastritis, no celiac    UPPER GASTROINTESTINAL ENDOSCOPY  11/16/2015    Dr Streeter-Urease neg    UPPER GASTROINTESTINAL ENDOSCOPY N/A 09/12/2022    Dr NORMA Abdul-Gastritis, no h pylori    UPPER GASTROINTESTINAL ENDOSCOPY N/A 06/24/2024    Dr NORMA Abdul-[id number] benign stricture in the distal esophagus, gastritis, no h pylori    UPPER

## 2025-02-28 NOTE — PATIENT INSTRUCTIONS
scope down your esophagus into your stomach. The doctor also may look at the duodenum.     If your doctor wants to take a sample of tissue for a biopsy, he or she may use small surgical tools, which are put into the scope, to cut off some tissue. You will not feel a biopsy, if one is taken. The doctor also can use the tools to stop bleeding or to do other treatments, if needed.     You will stay at the hospital or surgery center for 1 to 2 hours until the medicine you were given wears off.   What happens after an upper GI endoscopy?   After the test, you may belch and feel bloated for a while.     You may have a tickling, dry throat or mouth. You may feel a bit hoarse, and you may have a mild sore throat. These symptoms may last several days. Throat lozenges and warm saltwater gargles can help relieve the throat symptoms.     Ask your doctor when you can drive again.     Your doctor will tell you when you can go back to your usual diet and activities.     Don't drink alcohol for 12 to 24 hours after the test.   When should you call your doctor?   You have questions or concerns.     You don't understand how to prepare for your procedure.     You become ill before the procedure (such as fever, flu, or a cold).     You need to reschedule or have changed your mind about having the procedure.   Where can you learn more?  Go to [email or web address] and enter P790 to learn more about \"Upper GI Endoscopy: Before Your Procedure.\"  Current as of: June 6, 2022               Content Version: 13.5  © 2006-2022 [employer].   Care instructions adapted under license by [employer]. If you have questions about a medical condition or this instruction, always ask your healthcare professional. [employer] disclaims any warranty or liability for your use of this information.

## 2025-03-04 ENCOUNTER — HOSPITAL ENCOUNTER (OUTPATIENT)
Dept: WOMENS IMAGING | Age: 45
Discharge: HOME | End: 2025-03-04
Payer: MEDICARE

## 2025-03-04 DIAGNOSIS — Z12.31 ENCOUNTER FOR SCREENING MAMMOGRAM FOR MALIGNANT NEOPLASM OF BREAST: ICD-10-CM

## 2025-03-04 DIAGNOSIS — Z12.39 ENCOUNTER FOR SCREENING BREAST EXAMINATION: Primary | ICD-10-CM

## 2025-03-04 DIAGNOSIS — Z12.39 ENCOUNTER FOR SCREENING BREAST EXAMINATION: ICD-10-CM

## 2025-03-04 PROCEDURE — 77063 BREAST TOMOSYNTHESIS BI: CPT

## 2025-03-06 ASSESSMENT — ENCOUNTER SYMPTOMS
NAUSEA: 0
VOMITING: 0
COUGH: 0
CONSTIPATION: 0
DIARRHEA: 0
ANAL BLEEDING: 0
CHOKING: 0
RECTAL PAIN: 0
TROUBLE SWALLOWING: 1
BLOOD IN STOOL: 0
SHORTNESS OF BREATH: 0
ABDOMINAL PAIN: 0
ABDOMINAL DISTENTION: 0

## 2025-03-11 DIAGNOSIS — G43.709 CHRONIC MIGRAINE WITHOUT AURA WITHOUT STATUS MIGRAINOSUS, NOT INTRACTABLE: ICD-10-CM

## 2025-03-11 RX ORDER — ERENUMAB-AOOE 70 MG/ML
INJECTION SUBCUTANEOUS
Qty: 1 ML | Refills: 2 | Status: SHIPPED | OUTPATIENT
Start: 2025-03-11

## 2025-03-17 DIAGNOSIS — G43.709 CHRONIC MIGRAINE WITHOUT AURA WITHOUT STATUS MIGRAINOSUS, NOT INTRACTABLE: ICD-10-CM

## 2025-03-17 DIAGNOSIS — R20.0 NUMBNESS AND TINGLING: ICD-10-CM

## 2025-03-17 DIAGNOSIS — R20.2 NUMBNESS AND TINGLING: ICD-10-CM

## 2025-03-17 RX ORDER — GABAPENTIN 300 MG/1
300 CAPSULE ORAL 3 TIMES DAILY
Qty: 90 CAPSULE | Refills: 2 | Status: SHIPPED | OUTPATIENT
Start: 2025-03-17 | End: 2025-06-15

## 2025-03-17 RX ORDER — RIMEGEPANT SULFATE 75 MG/75MG
1 TABLET, ORALLY DISINTEGRATING ORAL EVERY OTHER DAY
Qty: 45 TABLET | Refills: 3 | Status: SHIPPED | OUTPATIENT
Start: 2025-03-17

## 2025-03-17 NOTE — TELEPHONE ENCOUNTER
Provider needs to review PDMP  UDS 9/2024  Contract 2023  Has Appt in April 2025  PDMP Monitoring:    Last PDMP David as Reviewed ([place]):  Review User Review Instant Review Result   MERVAT JANSEN ARACELI 12/23/2024 12:56 PM Reviewed PDMP [1]     Urine Drug Screenings (1 yr)       POCT Rapid Drug Screen  Collected: 9/25/2024  5:10 PM (Final result)              POCT Rapid Drug Screen  Collected: 3/14/2022 (Final result)              POCT Rapid Drug Screen  Collected: 3/11/2021  1:39 PM (Final result)              POCT Rapid Drug Screen  Collected: 2/12/2020 10:27 AM (Final result)              POCT Rapid Drug Screen  Collected: 8/3/2018  2:47 PM (Final result)                  Medication Contract and Consent for Opioid Use Documents Filed       Patient Documents       Type of Document Status Date Received Received By Description    Medication Contract Signed 8/9/2019  3:11 PM AVEL RILEY     Medication Contract Signed 8/12/2020 12:03 PM MINI DORSEY     Medication Contract Received 3/22/2023  5:28 PM RONALDO HERNANDES med agreement  3-22-23

## 2025-03-20 ENCOUNTER — ANESTHESIA (OUTPATIENT)
Dept: OPERATING ROOM | Age: 45
End: 2025-03-20

## 2025-03-20 ENCOUNTER — HOSPITAL ENCOUNTER (OUTPATIENT)
Age: 45
Setting detail: OUTPATIENT SURGERY
Discharge: HOME | End: 2025-03-20
Attending: INTERNAL MEDICINE | Admitting: INTERNAL MEDICINE

## 2025-03-20 ENCOUNTER — HOSPITAL ENCOUNTER (OUTPATIENT)
Age: 45
Setting detail: SPECIMEN
Discharge: HOME | End: 2025-03-20
Payer: MEDICARE

## 2025-03-20 ENCOUNTER — ANESTHESIA EVENT (OUTPATIENT)
Dept: OPERATING ROOM | Age: 45
End: 2025-03-20

## 2025-03-20 ENCOUNTER — APPOINTMENT (OUTPATIENT)
Dept: OPERATING ROOM | Age: 45
End: 2025-03-20
Attending: INTERNAL MEDICINE

## 2025-03-20 VITALS
RESPIRATION RATE: 16 BRPM | DIASTOLIC BLOOD PRESSURE: 74 MMHG | HEIGHT: 66 IN | OXYGEN SATURATION: 98 % | BODY MASS INDEX: 31.34 KG/M2 | HEART RATE: 68 BPM | WEIGHT: 195 LBS | SYSTOLIC BLOOD PRESSURE: 122 MMHG | TEMPERATURE: 98.1 F

## 2025-03-20 PROCEDURE — 43239 EGD BIOPSY SINGLE/MULTIPLE: CPT

## 2025-03-20 PROCEDURE — 43248 EGD GUIDE WIRE INSERTION: CPT

## 2025-03-20 RX ORDER — MIDAZOLAM HYDROCHLORIDE 1 MG/ML
INJECTION, SOLUTION INTRAMUSCULAR; INTRAVENOUS
Status: DISCONTINUED | OUTPATIENT
Start: 2025-03-20 | End: 2025-03-20 | Stop reason: SDUPTHER

## 2025-03-20 RX ORDER — SODIUM CHLORIDE 9 MG/ML
INJECTION, SOLUTION INTRAVENOUS CONTINUOUS
Status: DISCONTINUED | OUTPATIENT
Start: 2025-03-20 | End: 2025-03-20 | Stop reason: HOSPADM

## 2025-03-20 RX ORDER — PROPOFOL 10 MG/ML
INJECTION, EMULSION INTRAVENOUS
Status: DISCONTINUED | OUTPATIENT
Start: 2025-03-20 | End: 2025-03-20 | Stop reason: SDUPTHER

## 2025-03-20 RX ORDER — LIDOCAINE HYDROCHLORIDE 10 MG/ML
INJECTION, SOLUTION EPIDURAL; INFILTRATION; INTRACAUDAL; PERINEURAL
Status: DISCONTINUED | OUTPATIENT
Start: 2025-03-20 | End: 2025-03-20 | Stop reason: SDUPTHER

## 2025-03-20 RX ADMIN — PROPOFOL 130 MG: 10 INJECTION, EMULSION INTRAVENOUS at 13:49

## 2025-03-20 RX ADMIN — LIDOCAINE HYDROCHLORIDE 50 MG: 10 INJECTION, SOLUTION EPIDURAL; INFILTRATION; INTRACAUDAL; PERINEURAL at 13:49

## 2025-03-20 RX ADMIN — MIDAZOLAM HYDROCHLORIDE 2 MG: 1 INJECTION, SOLUTION INTRAMUSCULAR; INTRAVENOUS at 13:38

## 2025-03-20 RX ADMIN — SODIUM CHLORIDE: 9 INJECTION, SOLUTION INTRAVENOUS at 13:05

## 2025-03-20 ASSESSMENT — PAIN - FUNCTIONAL ASSESSMENT
PAIN_FUNCTIONAL_ASSESSMENT: 0-10
PAIN_FUNCTIONAL_ASSESSMENT: NONE - DENIES PAIN
PAIN_FUNCTIONAL_ASSESSMENT: NONE - DENIES PAIN

## 2025-03-20 ASSESSMENT — ENCOUNTER SYMPTOMS: SHORTNESS OF BREATH: 0

## 2025-03-20 NOTE — ANESTHESIA POSTPROCEDURE EVALUATION
Department of Anesthesiology  Postprocedure Note    Patient: Nolvia Zavala  MRN: [id number]  YOB: 1980  Date of evaluation: 3/20/2025    Procedure Summary       Date: 03/20/25 Room / Location: [place] [address] / [facility]    Anesthesia Start: 1338 Anesthesia Stop: 1359    Procedure: ESOPHAGOGASTRODUODENOSCOPY BIOPSY (Esophagus) Diagnosis:       Epigastric pain      Dysphagia, unspecified type      Gastroesophageal reflux disease, unspecified whether esophagitis present      (Epigastric pain [R10.13])      (Dysphagia, unspecified type [R13.10])      (Gastroesophageal reflux disease, unspecified whether esophagitis present [K21.9])    Surgeons: Reid Abdul MD Responsible Provider: Lucy Andrade APRN - CRNA    Anesthesia Type: general, TIVA ASA Status: 3            Anesthesia Type: No value filed.    Rebecca Phase I:      Rebecca Phase II:      Anesthesia Post Evaluation    Patient location during evaluation: bedside  Patient participation: complete - patient participated  Level of consciousness: sleepy but conscious  Pain score: 0  Airway patency: patent  Nausea & Vomiting: no nausea and no vomiting  Cardiovascular status: hemodynamically stable  Respiratory status: acceptable  Hydration status: stable  Pain management: adequate    No notable events documented.

## 2025-03-20 NOTE — ANESTHESIA PRE PROCEDURE
results found for: \"HIV\", \"HEPCAB\"    COVID-19 Screening (If Applicable):   Lab Results   Component Value Date/Time    COVID19 DETECTED 01/30/2023 01:23 PM           Anesthesia Evaluation  Patient summary reviewed  Airway: Mallampati: II  TM distance: >3 FB   Neck ROM: full  Mouth opening: > = 3 FB   Dental: normal exam         Pulmonary:normal exam    (+)     sleep apnea:           (-) shortness of breath                           Cardiovascular:  Exercise tolerance: poor (<4 METS)  (+) hypertension:, orthopnea, LÓPEZ:         Beta Blocker:  Dose within 24 Hrs         Neuro/Psych:   Negative Neuro/Psych ROS  (+) neuromuscular disease:, headaches: migraine headaches, psychiatric history:depression/anxiety              ROS comment: Cauda equina syndrome GI/Hepatic/Renal: Neg GI/Hepatic/Renal ROS  (+) GERD:          Endo/Other: Negative Endo/Other ROS   (+) : arthritis:..                 Abdominal:   (+) obese          Vascular: negative vascular ROS.         Other Findings:             Anesthesia Plan      general and TIVA     ASA 3       Induction: intravenous.      Anesthetic plan and risks discussed with patient.      Plan discussed with proceduralist/surgeon.                    DEVEN Tovar - CRNA   3/20/2025

## 2025-03-20 NOTE — DISCHARGE INSTRUCTIONS
RECOMMENDATIONS:    1.  Await path results- if indicative of candida, treat with Diflucan x 2 weeks.   2.  Continue Prilosec  3.  Repeat dilation as needed  4.  Follow up OV with DEVEN Varma in 6-8 weeks.    POST-OP ORDERS: ENDOSCOPY & COLONOSCOPY:    1. Rest today.    2. DO NOT eat or drink until wide awake; eat your usual diet today in moderate amount only.    3. DO NOT drive today.    4. Call physician if you have severe pain, vomiting, fever, rectal bleeding or black bowel movements.    5.  If a biopsy was taken or a polyp removed, you should expect to hear results in about 7-10 days.  If you have heard nothing from your physician by then, call the office for results.    6.  Discharge home when patient awake, vitals signs stable and tolerating liquids.    7. Call with questions or concerns [phone].

## 2025-03-20 NOTE — OP NOTE
Endoscopic Procedure Note    Patient: Nolvia Zavala : 1980  [facility] Rec#: [id number] Acc#: [id number]     Primary Care Provider Daisy Khanna MD  Referring Provider: DEVEN Novak    Endoscopist: Reid Abdul MD    Date of Procedure:  3/20/2025    Procedure:   1. EGD with biopsy    Indications:   1. Epigastric pain  2. Dysphagia     Anesthesia:  Sedation was administered by anesthesia who monitored the patient during the procedure.    Estimated Blood Loss: minimal    Procedure:   After reviewing the patient's chart and obtaining informed consent, the patient was placed in the left lateral decubitus position.  A forward-viewing Olympus endoscope was lubricated and inserted through the mouth into the oropharynx. Under direct visualization, the upper esophagus was intubated. The scope was advanced to the level of the third portion of duodenum. Scope was slowly withdrawn with careful inspection of the mucosal surfaces. The scope was retroflexed for inspection of the gastric fundus and incisura. Findings and maneuvers are listed in impression below. The patient tolerated the procedure well. The scope was removed. There were no immediate complications.    Findings:   Esophagus: abnormal: there is white-plaquish material noted throughout the esophagus, brushings obtained to rule out candida.     There is a questionable benign appearing stricture in the distal esophagus, dilated due to previous response and symptoms.  A guidewire was placed through the endoscope and the endoscope was removed.  A 54 Jamaican savory dilator was advanced down the length of the esophagus using a fixed-point wire technique. The dilator and guidewire were removed.  The patient tolerated the procedure well.     There is no significant hiatal hernia present.      Stomach:  abnormal: Mild mucosal changes suggestive of gastritis noted -  Gastric biopsies were taken from the antrum and body to rule out Helicobacter pylori

## 2025-03-20 NOTE — H&P
Patient Name: Nolvia Zavala  : 1980  MRN: [id number]  DATE: 25    Allergies:   Allergies   Allergen Reactions    Ropinirole Hcl      Other reaction(s): Mental Status Change    Zolpidem Other (See Comments)    Ropinirole Other (See Comments)     Crazy dreams; sleep walking    Simvastatin Other (See Comments)     Leg cramps        ENDOSCOPY  History and Physical    Procedure:    [] Diagnostic Colonoscopy       [] Screening Colonoscopy  [x] EGD      [] ERCP      [] EUS       [] Other    [x] Previous office notes/History and Physical reviewed from the patients chart. Please see EMR for further details of HPI. I have examined the patient's status immediately prior to the procedure and:      Indications/HPI:    [x]Abdominal Pain   []Barretts  []Screening/Surveillance   []History of Polyps  [x]Dysphagia            [] +Cologard/DNA testing  []Abnormal Imaging              []EOE Hx              [] Family Hx of CRC/Polyps  []Anemia                            []Food Impaction       []Recent Poor Prep  []GI Bleed             []Lymphadenopathy  []History of Polyps  []Change in bowel habits [x]Heartburn/Reflux  []Cancer- GI/Lung  []Chest Pain - Non Cardiac []Heme (+) Stool []Ulcers  []Constipation  []Hemoptysis  []Incontinence    []Diarrhea  []Hypoxemia  []Rectal Bleed (BRBPR)  []Nausea/Vomiting   [] Varices  []Crohns/Colitis  []Pancreatic Cyst   [] Cirrhosis   []Pancreatitis    []Abnormal MRCP  []Elevated LFT [] Stent Removal, Previous ERCP  []Other:     Anesthesia:   [x] MAC [] Moderate Sedation   [] General   [] None     ROS: 12 pt Review of Symptoms was negative unless mentioned above    Medications:   Prior to Admission medications    Medication Sig Start Date End Date Taking? Authorizing Provider   gabapentin (NEURONTIN) 300 MG capsule Take 1 capsule by mouth 3 times daily for 90 days. Max Daily Amount: 900 mg 3/17/25 6/15/25 Yes Daisy Khanna MD   Prucalopride Succinate (MOTEGRITY) 2 MG

## 2025-03-23 SDOH — ECONOMIC STABILITY: INCOME INSECURITY: IN THE LAST 12 MONTHS, WAS THERE A TIME WHEN YOU WERE NOT ABLE TO PAY THE MORTGAGE OR RENT ON TIME?: NO

## 2025-03-23 SDOH — ECONOMIC STABILITY: FOOD INSECURITY: WITHIN THE PAST 12 MONTHS, THE FOOD YOU BOUGHT JUST DIDN'T LAST AND YOU DIDN'T HAVE MONEY TO GET MORE.: NEVER TRUE

## 2025-03-23 SDOH — ECONOMIC STABILITY: TRANSPORTATION INSECURITY
IN THE PAST 12 MONTHS, HAS THE LACK OF TRANSPORTATION KEPT YOU FROM MEDICAL APPOINTMENTS OR FROM GETTING MEDICATIONS?: NO

## 2025-03-23 SDOH — ECONOMIC STABILITY: FOOD INSECURITY: WITHIN THE PAST 12 MONTHS, YOU WORRIED THAT YOUR FOOD WOULD RUN OUT BEFORE YOU GOT MONEY TO BUY MORE.: NEVER TRUE

## 2025-03-23 ASSESSMENT — PATIENT HEALTH QUESTIONNAIRE - PHQ9
3. TROUBLE FALLING OR STAYING ASLEEP: MORE THAN HALF THE DAYS
7. TROUBLE CONCENTRATING ON THINGS, SUCH AS READING THE NEWSPAPER OR WATCHING TELEVISION: SEVERAL DAYS
5. POOR APPETITE OR OVEREATING: SEVERAL DAYS
6. FEELING BAD ABOUT YOURSELF - OR THAT YOU ARE A FAILURE OR HAVE LET YOURSELF OR YOUR FAMILY DOWN: NOT AT ALL
9. THOUGHTS THAT YOU WOULD BE BETTER OFF DEAD, OR OF HURTING YOURSELF: NOT AT ALL
7. TROUBLE CONCENTRATING ON THINGS, SUCH AS READING THE NEWSPAPER OR WATCHING TELEVISION: SEVERAL DAYS
10. IF YOU CHECKED OFF ANY PROBLEMS, HOW DIFFICULT HAVE THESE PROBLEMS MADE IT FOR YOU TO DO YOUR WORK, TAKE CARE OF THINGS AT HOME, OR GET ALONG WITH OTHER PEOPLE: NOT DIFFICULT AT ALL
1. LITTLE INTEREST OR PLEASURE IN DOING THINGS: NOT AT ALL
SUM OF ALL RESPONSES TO PHQ QUESTIONS 1-9: 8
6. FEELING BAD ABOUT YOURSELF - OR THAT YOU ARE A FAILURE OR HAVE LET YOURSELF OR YOUR FAMILY DOWN: NOT AT ALL
4. FEELING TIRED OR HAVING LITTLE ENERGY: MORE THAN HALF THE DAYS
9. THOUGHTS THAT YOU WOULD BE BETTER OFF DEAD, OR OF HURTING YOURSELF: NOT AT ALL
10. IF YOU CHECKED OFF ANY PROBLEMS, HOW DIFFICULT HAVE THESE PROBLEMS MADE IT FOR YOU TO DO YOUR WORK, TAKE CARE OF THINGS AT HOME, OR GET ALONG WITH OTHER PEOPLE: NOT DIFFICULT AT ALL
SUM OF ALL RESPONSES TO PHQ QUESTIONS 1-9: 8
8. MOVING OR SPEAKING SO SLOWLY THAT OTHER PEOPLE COULD HAVE NOTICED. OR THE OPPOSITE, BEING SO FIGETY OR RESTLESS THAT YOU HAVE BEEN MOVING AROUND A LOT MORE THAN USUAL: MORE THAN HALF THE DAYS
2. FEELING DOWN, DEPRESSED OR HOPELESS: NOT AT ALL
8. MOVING OR SPEAKING SO SLOWLY THAT OTHER PEOPLE COULD HAVE NOTICED. OR THE OPPOSITE - BEING SO FIDGETY OR RESTLESS THAT YOU HAVE BEEN MOVING AROUND A LOT MORE THAN USUAL: MORE THAN HALF THE DAYS
5. POOR APPETITE OR OVEREATING: SEVERAL DAYS
4. FEELING TIRED OR HAVING LITTLE ENERGY: MORE THAN HALF THE DAYS
SUM OF ALL RESPONSES TO PHQ QUESTIONS 1-9: 8
2. FEELING DOWN, DEPRESSED OR HOPELESS: NOT AT ALL
SUM OF ALL RESPONSES TO PHQ QUESTIONS 1-9: 8
1. LITTLE INTEREST OR PLEASURE IN DOING THINGS: NOT AT ALL
SUM OF ALL RESPONSES TO PHQ QUESTIONS 1-9: 8
3. TROUBLE FALLING OR STAYING ASLEEP: MORE THAN HALF THE DAYS

## 2025-03-24 ENCOUNTER — RESULTS FOLLOW-UP (OUTPATIENT)
Dept: GASTROENTEROLOGY | Age: 45
End: 2025-03-24

## 2025-03-26 ENCOUNTER — RESULTS FOLLOW-UP (OUTPATIENT)
Dept: PRIMARY CARE CLINIC | Age: 45
End: 2025-03-26

## 2025-03-26 ENCOUNTER — OFFICE VISIT (OUTPATIENT)
Dept: PRIMARY CARE CLINIC | Age: 45
End: 2025-03-26
Payer: MEDICARE

## 2025-03-26 VITALS
SYSTOLIC BLOOD PRESSURE: 122 MMHG | WEIGHT: 200 LBS | HEIGHT: 66 IN | DIASTOLIC BLOOD PRESSURE: 60 MMHG | HEART RATE: 94 BPM | TEMPERATURE: 97.3 F | OXYGEN SATURATION: 97 % | RESPIRATION RATE: 18 BRPM | BODY MASS INDEX: 32.14 KG/M2

## 2025-03-26 DIAGNOSIS — M79.18 MUSCULOSKELETAL PAIN: ICD-10-CM

## 2025-03-26 DIAGNOSIS — G83.4 CAUDA EQUINA SYNDROME (HCC): ICD-10-CM

## 2025-03-26 DIAGNOSIS — R53.82 CHRONIC FATIGUE: Primary | ICD-10-CM

## 2025-03-26 DIAGNOSIS — R53.1 WEAKNESS: ICD-10-CM

## 2025-03-26 LAB
ALBUMIN SERPL-MCNC: 4.3 G/DL (ref 3.5–5.2)
ALP SERPL-CCNC: 81 U/L (ref 35–104)
ALT SERPL-CCNC: 13 U/L (ref 10–35)
ANION GAP SERPL CALCULATED.3IONS-SCNC: 12 MMOL/L (ref 8–16)
AST SERPL-CCNC: 19 U/L (ref 10–35)
BASOPHILS # BLD: 0.1 K/UL (ref 0–0.2)
BASOPHILS NFR BLD: 1 % (ref 0–1)
BILIRUB SERPL-MCNC: 0.4 MG/DL (ref 0.2–1.2)
BUN SERPL-MCNC: 19 MG/DL (ref 6–20)
CALCIUM SERPL-MCNC: 9.5 MG/DL (ref 8.6–10)
CHLORIDE SERPL-SCNC: 108 MMOL/L (ref 98–107)
CO2 SERPL-SCNC: 20 MMOL/L (ref 22–29)
CREAT SERPL-MCNC: 0.9 MG/DL (ref 0.5–0.9)
CRP SERPL-MCNC: <3 MG/L (ref 0–5)
EOSINOPHIL # BLD: 0.2 K/UL (ref 0–0.6)
EOSINOPHIL NFR BLD: 3.3 % (ref 0–5)
ERYTHROCYTE [DISTWIDTH] IN BLOOD BY AUTOMATED COUNT: 12.6 % (ref 11.5–14.5)
ERYTHROCYTE [SEDIMENTATION RATE] IN BLOOD BY WESTERGREN METHOD: 12 MM/HR (ref 0–20)
GLUCOSE SERPL-MCNC: 99 MG/DL (ref 70–99)
HCT VFR BLD AUTO: 39.9 % (ref 37–47)
HGB BLD-MCNC: 13.6 G/DL (ref 12–16)
IMM GRANULOCYTES # BLD: 0 K/UL
LYMPHOCYTES # BLD: 1.2 K/UL (ref 1.1–4.5)
LYMPHOCYTES NFR BLD: 18 % (ref 20–40)
MCH RBC QN AUTO: 30.8 PG (ref 27–31)
MCHC RBC AUTO-ENTMCNC: 34.1 G/DL (ref 33–37)
MCV RBC AUTO: 90.5 FL (ref 81–99)
MONOCYTES # BLD: 0.4 K/UL (ref 0–0.9)
MONOCYTES NFR BLD: 5.6 % (ref 0–10)
NEUTROPHILS # BLD: 4.9 K/UL (ref 1.5–7.5)
NEUTS SEG NFR BLD: 72 % (ref 50–65)
PLATELET # BLD AUTO: 280 K/UL (ref 130–400)
PMV BLD AUTO: 9.8 FL (ref 9.4–12.3)
POTASSIUM SERPL-SCNC: 4.4 MMOL/L (ref 3.5–5.1)
PROT SERPL-MCNC: 7.3 G/DL (ref 6.4–8.3)
RBC # BLD AUTO: 4.41 M/UL (ref 4.2–5.4)
SODIUM SERPL-SCNC: 140 MMOL/L (ref 136–145)
WBC # BLD AUTO: 6.8 K/UL (ref 4.8–10.8)

## 2025-03-26 PROCEDURE — G8417 CALC BMI ABV UP PARAM F/U: HCPCS | Performed by: FAMILY MEDICINE

## 2025-03-26 PROCEDURE — 3078F DIAST BP <80 MM HG: CPT | Performed by: FAMILY MEDICINE

## 2025-03-26 PROCEDURE — G8427 DOCREV CUR MEDS BY ELIG CLIN: HCPCS | Performed by: FAMILY MEDICINE

## 2025-03-26 PROCEDURE — 3074F SYST BP LT 130 MM HG: CPT | Performed by: FAMILY MEDICINE

## 2025-03-26 PROCEDURE — 1036F TOBACCO NON-USER: CPT | Performed by: FAMILY MEDICINE

## 2025-03-26 PROCEDURE — 99213 OFFICE O/P EST LOW 20 MIN: CPT | Performed by: FAMILY MEDICINE

## 2025-03-26 SDOH — ECONOMIC STABILITY: FOOD INSECURITY: WITHIN THE PAST 12 MONTHS, YOU WORRIED THAT YOUR FOOD WOULD RUN OUT BEFORE YOU GOT MONEY TO BUY MORE.: NEVER TRUE

## 2025-03-26 SDOH — ECONOMIC STABILITY: FOOD INSECURITY: WITHIN THE PAST 12 MONTHS, THE FOOD YOU BOUGHT JUST DIDN'T LAST AND YOU DIDN'T HAVE MONEY TO GET MORE.: NEVER TRUE

## 2025-03-26 NOTE — PATIENT INSTRUCTIONS
Wt Readings from Last 3 Encounters:   03/26/25 90.7 kg (200 lb)   03/20/25 88.5 kg (195 lb)   02/28/25 90.7 kg (200 lb)            We are committed to providing you with the best care possible.   In order to help us achieve these goals please remember to bring all medications, herbal products, and over the counter supplements with you to each visit.     If your provider has ordered testing for you, please be sure to follow up with our office if you have not received results within 7 days after the testing took place.     *If you receive a survey after visiting one of our offices, please take time to share your experience concerning your physician office visit. These surveys are confidential and no health information about you is shared.  We are eager to improve for you and we are counting on your feedback to help make that happen.

## 2025-03-27 RX ORDER — ATORVASTATIN CALCIUM 20 MG/1
TABLET, FILM COATED ORAL
Qty: 90 TABLET | Refills: 3 | Status: SHIPPED | OUTPATIENT
Start: 2025-03-27

## 2025-03-29 ASSESSMENT — ENCOUNTER SYMPTOMS
GASTROINTESTINAL NEGATIVE: 1
RESPIRATORY NEGATIVE: 1
BACK PAIN: 1

## 2025-03-29 NOTE — PROGRESS NOTES
SUBJECTIVE:    Nolvia Zavala is 44 y.o.female who comes in complaining of Annual Exam (Re check lipids)   .       HPI: Reena is here today for 6-month med management.  She is on gabapentin 300 mg 1 tablet 3 times a day.  She is still dealing with her chronic pain.  She really does not know what to do next.  She also has been diagnosed with functional neurological disorder.    She recently had an endoscopy done by Dr. Reid Abdul.  She denies any rectal bleeding.    She is going to physical therapy regularly and says it might be helping her neck a little.  She has been dealing with pain and numbness for years.    She does have migraines but they are partially treated by Topamax.    She is not abusing her medications and they only partially control her symptoms      Allergies   Allergen Reactions    Ropinirole Hcl      Other reaction(s): Mental Status Change    Zolpidem Other (See Comments)    Ropinirole Other (See Comments)     Crazy dreams; sleep walking    Simvastatin Other (See Comments)     Leg cramps       Social History     Socioeconomic History    Marital status:      Spouse name: Jomar    Number of children: 1    Years of education: None    Highest education level: None   Occupational History     Employer: [employer]     Employer: N/A   Tobacco Use    Smoking status: Never    Smokeless tobacco: Never   Vaping Use    Vaping status: Never Used   Substance and Sexual Activity    Alcohol use: No    Drug use: No    Sexual activity: Yes     Partners: Male     Social Drivers of Health     Financial Resource Strain: Low Risk  (8/26/2024)    Overall Financial Resource Strain (CARDI)     Difficulty of Paying Living Expenses: Not hard at all   Food Insecurity: No Food Insecurity (3/26/2025)    Hunger Vital Sign     Worried About Running Out of Food in the Last Year: Never true     Ran Out of Food in the Last Year: Never true   Transportation Needs: No Transportation Needs (3/26/2025)

## 2025-03-31 ENCOUNTER — TELEPHONE (OUTPATIENT)
Dept: GASTROENTEROLOGY | Age: 45
End: 2025-03-31

## 2025-03-31 RX ORDER — FLUCONAZOLE 100 MG/1
100 TABLET ORAL DAILY
Qty: 14 TABLET | Refills: 0 | Status: SHIPPED | OUTPATIENT
Start: 2025-03-31 | End: 2025-04-14

## 2025-03-31 NOTE — PROGRESS NOTES
Patient was still having some difficulty swallowing.  I did discuss this with him Heriberto.  He and I both reviewed Dr. Abdul is report where he she had a thick whitish exudate on her esophagus and he thought she might have yeast.  However they only checked for H. pylori and no one said anything about yeast.  Since she still having symptoms I am going to treat with Diflucan 100 mg 1 daily for 2 weeks.  If she continues to have trouble swallowing we will discuss this further.

## 2025-03-31 NOTE — TELEPHONE ENCOUNTER
03- Called the patient   She stated still having mucous and a little bit of orange bright red blood. She denies any temp or rectal pain but is having 3-4 out of 10 abdominal pain with bloating, nausea. States that she has gotten choked twice since the stretching/EGD.  She is using her Omeprazole  bid and Phenergan.  Stated that she only has a couple phenergan left.     Advised will send message to Ct APRN for further recommendations.     Routed to CT APRN

## 2025-04-02 NOTE — TELEPHONE ENCOUNTER
04- Called and notified patient of recommends as per CT APRN     She stated that she is already seeing someone in [place] for spinal issues and would like to see about getting an apt there in [place] on the same day.  Explained that the referral would be faxed over and once reviewed then they will call her for an apt.  Advised that she just needed to let them know what day her Spinal apt was scheduled to see if they could accommodate for the same day.         Faxed referral to [facility]    [phone] Fax [phone]

## 2025-04-02 NOTE — TELEPHONE ENCOUNTER
We have discussed her going to a motility clinic in the past for her swallowing and bowel issues, I really feel this is what she needs at this point. The esophageal dilations are not helping her and she has severe chronic constipation

## 2025-04-09 DIAGNOSIS — G83.4 CAUDA EQUINA SYNDROME (HCC): Primary | ICD-10-CM

## 2025-04-09 DIAGNOSIS — G43.109 COMPLICATED MIGRAINE: ICD-10-CM

## 2025-04-09 DIAGNOSIS — F44.7 FUNCTIONAL NEUROLOGICAL SYMPTOM DISORDER WITH MIXED SYMPTOMS: ICD-10-CM

## 2025-04-09 DIAGNOSIS — M54.16 LUMBAR RADICULOPATHY: ICD-10-CM

## 2025-04-21 ENCOUNTER — OFFICE VISIT (OUTPATIENT)
Dept: PULMONOLOGY | Age: 45
End: 2025-04-21
Payer: COMMERCIAL

## 2025-04-21 VITALS
TEMPERATURE: 98.4 F | DIASTOLIC BLOOD PRESSURE: 86 MMHG | HEART RATE: 81 BPM | HEIGHT: 66 IN | WEIGHT: 204.6 LBS | BODY MASS INDEX: 32.88 KG/M2 | SYSTOLIC BLOOD PRESSURE: 134 MMHG | OXYGEN SATURATION: 96 %

## 2025-04-21 DIAGNOSIS — R06.09 DOE (DYSPNEA ON EXERTION): ICD-10-CM

## 2025-04-21 DIAGNOSIS — E66.1 CLASS 1 DRUG-INDUCED OBESITY WITH SERIOUS COMORBIDITY AND BODY MASS INDEX (BMI) OF 33.0 TO 33.9 IN ADULT: ICD-10-CM

## 2025-04-21 DIAGNOSIS — R09.82 POST-NASAL DRAINAGE: ICD-10-CM

## 2025-04-21 DIAGNOSIS — K21.9 GASTROESOPHAGEAL REFLUX DISEASE WITHOUT ESOPHAGITIS: ICD-10-CM

## 2025-04-21 DIAGNOSIS — E66.811 CLASS 1 DRUG-INDUCED OBESITY WITH SERIOUS COMORBIDITY AND BODY MASS INDEX (BMI) OF 33.0 TO 33.9 IN ADULT: ICD-10-CM

## 2025-04-21 DIAGNOSIS — J98.6 ELEVATED HEMIDIAPHRAGM: ICD-10-CM

## 2025-04-21 DIAGNOSIS — G47.33 OSA (OBSTRUCTIVE SLEEP APNEA): Primary | ICD-10-CM

## 2025-04-21 DIAGNOSIS — M79.7 FIBROMYALGIA: ICD-10-CM

## 2025-04-21 PROCEDURE — 99214 OFFICE O/P EST MOD 30 MIN: CPT | Performed by: NURSE PRACTITIONER

## 2025-04-21 PROCEDURE — 3075F SYST BP GE 130 - 139MM HG: CPT | Performed by: NURSE PRACTITIONER

## 2025-04-21 PROCEDURE — G8427 DOCREV CUR MEDS BY ELIG CLIN: HCPCS | Performed by: NURSE PRACTITIONER

## 2025-04-21 PROCEDURE — 1036F TOBACCO NON-USER: CPT | Performed by: NURSE PRACTITIONER

## 2025-04-21 PROCEDURE — G8417 CALC BMI ABV UP PARAM F/U: HCPCS | Performed by: NURSE PRACTITIONER

## 2025-04-21 PROCEDURE — 3079F DIAST BP 80-89 MM HG: CPT | Performed by: NURSE PRACTITIONER

## 2025-04-21 RX ORDER — FLUTICASONE PROPIONATE 50 MCG
2 SPRAY, SUSPENSION (ML) NASAL DAILY
Qty: 3 EACH | Refills: 3 | Status: SHIPPED | OUTPATIENT
Start: 2025-04-21 | End: 2025-07-20

## 2025-04-21 ASSESSMENT — ENCOUNTER SYMPTOMS
APNEA: 1
EYES NEGATIVE: 1
ALLERGIC/IMMUNOLOGIC NEGATIVE: 1
GASTROINTESTINAL NEGATIVE: 1

## 2025-04-21 NOTE — PROGRESS NOTES
Pulmonary and Sleep Medicine    Nolvia Zavala (:  1980) is a 44 y.o. female,Established patient, here for evaluation of the following chief complaint(s):  6 Month Follow-Up (SHYAM on CPAP/DME Report )      Referring physician:  No referring provider defined for this encounter.     ASSESSMENT/PLAN:  1. SHYAM (obstructive sleep apnea) on CPAP  2. Class 1 drug-induced obesity with serious comorbidity and body mass index (BMI) of 33.0 to 33.9 in adult  3. LÓPEZ (dyspnea on exertion)  4. 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 Gastroesophageal reflux disease without esophagitis  6. Post-nasal drainage  -     fluticasone (FLONASE) 50 MCG/ACT nasal spray; 2 sprays by Each Nostril route daily, Disp-3 each, R-3Normal  7. Fibromyalgia        Continue management with CPAP as she is compliant and feels it helps.        Return in about 6 months (around 10/21/2025).    SUBJECTIVE/OBJECTIVE:        HPI    Patient presents for follow up for obstructive sleep apnea. My interpretation of compliance download is that she is using CPAP an average of 6 hours and 9 minutes. Apnea-hypopnea index with CPAP is 1.2 events per hour.  She sleeps well with CPAP however, she does occasionally wake up and find that she has taken the mask off in her sleep. This happens once or twice per week. She has no issues with mask fit.     Continue the following medications as reported by the patient:    Prior to Visit Medications    Medication Sig Taking? Authorizing Provider   atorvastatin (LIPITOR) 20 MG tablet TAKE 1 TABLET DAILY FOR CHOLESTEROL Yes Daisy Khanna MD   gabapentin (NEURONTIN) 300 MG capsule Take 1 capsule by mouth 3 times daily for 90 days. Max Daily Amount: 900 mg Yes Daisy Khanna MD   NURTEC 75 MG TBDP TAKE ONE TABLET BY MOUTH EVERY OTHER DAY Yes Daisy Khanna MD   Erenumab-aooe (AIMOVIG) 70 MG/ML SOAJ INJECT 70mg (ONE PEN) UNDER THE SKIN ONCE A MONTH Yes Daisy Khanna MD   Prucalopride Succinate

## 2025-04-24 ENCOUNTER — OFFICE VISIT (OUTPATIENT)
Dept: GASTROENTEROLOGY | Age: 45
End: 2025-04-24
Payer: MEDICARE

## 2025-04-24 VITALS
HEART RATE: 81 BPM | OXYGEN SATURATION: 98 % | BODY MASS INDEX: 32.78 KG/M2 | DIASTOLIC BLOOD PRESSURE: 80 MMHG | SYSTOLIC BLOOD PRESSURE: 120 MMHG | WEIGHT: 204 LBS | HEIGHT: 66 IN

## 2025-04-24 DIAGNOSIS — J02.9 SORE THROAT: Primary | ICD-10-CM

## 2025-04-24 PROCEDURE — 99213 OFFICE O/P EST LOW 20 MIN: CPT | Performed by: NURSE PRACTITIONER

## 2025-04-24 PROCEDURE — 1036F TOBACCO NON-USER: CPT | Performed by: NURSE PRACTITIONER

## 2025-04-24 PROCEDURE — 3079F DIAST BP 80-89 MM HG: CPT | Performed by: NURSE PRACTITIONER

## 2025-04-24 PROCEDURE — 3074F SYST BP LT 130 MM HG: CPT | Performed by: NURSE PRACTITIONER

## 2025-04-24 PROCEDURE — G8427 DOCREV CUR MEDS BY ELIG CLIN: HCPCS | Performed by: NURSE PRACTITIONER

## 2025-04-24 PROCEDURE — G8417 CALC BMI ABV UP PARAM F/U: HCPCS | Performed by: NURSE PRACTITIONER

## 2025-04-24 RX ORDER — AZITHROMYCIN 250 MG/1
TABLET, FILM COATED ORAL
Qty: 5 TABLET | Refills: 0 | Status: SHIPPED | OUTPATIENT
Start: 2025-04-24

## 2025-04-24 RX ORDER — LACTULOSE 10 G/15ML
20 SOLUTION ORAL DAILY
COMMUNITY

## 2025-04-24 RX ORDER — PROMETHAZINE HYDROCHLORIDE 25 MG/1
25 TABLET ORAL EVERY 8 HOURS PRN
Qty: 30 TABLET | Refills: 2 | Status: SHIPPED | OUTPATIENT
Start: 2025-04-24 | End: 2025-05-24

## 2025-04-24 NOTE — PROGRESS NOTES
Substance and Sexual Activity    Alcohol use: No    Drug use: No    Sexual activity: Yes     Partners: Male     Social Drivers of Health     Financial Resource Strain: Low Risk  (8/26/2024)    Overall Financial Resource Strain (CARDIA)     Difficulty of Paying Living Expenses: Not hard at all   Food Insecurity: No Food Insecurity (3/26/2025)    Hunger Vital Sign     Worried About Running Out of Food in the Last Year: Never true     Ran Out of Food in the Last Year: Never true   Transportation Needs: No Transportation Needs (3/26/2025)    PRAPARE - Transportation     Lack of Transportation (Medical): No     Lack of Transportation (Non-Medical): No   Physical Activity: Unknown (9/22/2024)    Exercise Vital Sign     Days of Exercise per Week: 0 days    [facility]    Family and Community Support   Intimate Partner Violence: Not At Risk (11/12/2024)    Received from [facility]    Abuse Screen     Feels Unsafe at Home or Work/School: no     Feels Threatened by Someone: no     Does Anyone Try to Keep You From Having Contact with Others or Doing Things Outside Your Home?: no     Physical Signs of Abuse Present: no   Housing Stability: Low Risk  (3/26/2025)    Housing Stability Vital Sign     Unable to Pay for Housing in the Last Year: No     Number of Times Moved in the Last Year: 0     Homeless in the Last Year: No       Current Outpatient Medications   Medication Sig Dispense Refill    lactulose (CHRONULAC) 10 GM/15ML solution Take 30 mLs by mouth daily      azithromycin (ZITHROMAX) 250 MG tablet 2 tabs on day one, then 1 tab daily for 4 days 5 tablet 0    promethazine (PHENERGAN) 25 MG tablet Take 1 tablet by mouth every 8 hours as needed for Nausea 30 tablet 2    fluticasone (FLONASE) 50 MCG/ACT nasal spray 2 sprays by Each Nostril route daily 3 each 3    atorvastatin (LIPITOR) 20 MG tablet TAKE 1 TABLET DAILY FOR CHOLESTEROL 90 tablet 3

## 2025-05-15 DIAGNOSIS — R20.0 NUMBNESS AND TINGLING: ICD-10-CM

## 2025-05-15 DIAGNOSIS — R20.2 NUMBNESS AND TINGLING: ICD-10-CM

## 2025-05-15 DIAGNOSIS — G43.709 CHRONIC MIGRAINE WITHOUT AURA WITHOUT STATUS MIGRAINOSUS, NOT INTRACTABLE: ICD-10-CM

## 2025-05-15 RX ORDER — ESTRADIOL 0.5 MG/1
0.5 TABLET ORAL DAILY
Qty: 90 TABLET | Refills: 2 | Status: SHIPPED | OUTPATIENT
Start: 2025-05-15

## 2025-05-15 RX ORDER — GABAPENTIN 300 MG/1
300 CAPSULE ORAL 3 TIMES DAILY
Qty: 90 CAPSULE | Refills: 2 | Status: SHIPPED | OUTPATIENT
Start: 2025-05-15 | End: 2025-08-13

## 2025-05-15 RX ORDER — ERENUMAB-AOOE 70 MG/ML
INJECTION SUBCUTANEOUS
Qty: 1 ML | Refills: 2 | Status: SHIPPED | OUTPATIENT
Start: 2025-05-15

## 2025-05-21 ENCOUNTER — OFFICE VISIT (OUTPATIENT)
Dept: PRIMARY CARE CLINIC | Age: 45
End: 2025-05-21
Payer: MEDICARE

## 2025-05-21 VITALS
DIASTOLIC BLOOD PRESSURE: 100 MMHG | WEIGHT: 205.4 LBS | SYSTOLIC BLOOD PRESSURE: 142 MMHG | HEART RATE: 80 BPM | HEIGHT: 66 IN | RESPIRATION RATE: 18 BRPM | OXYGEN SATURATION: 97 % | BODY MASS INDEX: 33.01 KG/M2 | TEMPERATURE: 95 F

## 2025-05-21 DIAGNOSIS — M75.01 ADHESIVE CAPSULITIS OF RIGHT SHOULDER: Primary | ICD-10-CM

## 2025-05-21 PROCEDURE — G8427 DOCREV CUR MEDS BY ELIG CLIN: HCPCS | Performed by: FAMILY MEDICINE

## 2025-05-21 PROCEDURE — 3080F DIAST BP >= 90 MM HG: CPT | Performed by: FAMILY MEDICINE

## 2025-05-21 PROCEDURE — 1036F TOBACCO NON-USER: CPT | Performed by: FAMILY MEDICINE

## 2025-05-21 PROCEDURE — 99213 OFFICE O/P EST LOW 20 MIN: CPT | Performed by: FAMILY MEDICINE

## 2025-05-21 PROCEDURE — 3077F SYST BP >= 140 MM HG: CPT | Performed by: FAMILY MEDICINE

## 2025-05-21 PROCEDURE — G8417 CALC BMI ABV UP PARAM F/U: HCPCS | Performed by: FAMILY MEDICINE

## 2025-05-21 ASSESSMENT — ENCOUNTER SYMPTOMS
WHEEZING: 0
BLOOD IN STOOL: 0
SHORTNESS OF BREATH: 0
ABDOMINAL PAIN: 0
CHEST TIGHTNESS: 0

## 2025-05-21 NOTE — PROGRESS NOTES
ALKPHOS 81 03/26/2025    AST 19 03/26/2025    ALT 13 03/26/2025    LABGLOM 80 03/26/2025    GFRAA >59 08/22/2022    GLOB 2.4 01/16/2017        Lab Results   Component Value Date    WBC 6.8 03/26/2025    HGB 13.6 03/26/2025    HCT 39.9 03/26/2025    MCV 90.5 03/26/2025     03/26/2025                EMR Dragon/transcription disclaimer:  Much of this encounter note is electronic transcription/translation of spoken language toprinted texts.  The electronic translation of spoken language may be erroneous, or at times, nonsensical words or phrases may be inadvertently transcribed.  Although I have reviewed the note for such errors, some may stillexist.      An electronic signature was used to authenticate this note.    --Adán Gibson MD

## 2025-06-05 DIAGNOSIS — G43.709 CHRONIC MIGRAINE WITHOUT AURA WITHOUT STATUS MIGRAINOSUS, NOT INTRACTABLE: ICD-10-CM

## 2025-06-05 RX ORDER — ERENUMAB-AOOE 70 MG/ML
INJECTION SUBCUTANEOUS
Qty: 1 ML | Refills: 2 | Status: SHIPPED | OUTPATIENT
Start: 2025-06-05

## 2025-06-19 ENCOUNTER — OFFICE VISIT (OUTPATIENT)
Dept: PAIN MANAGEMENT | Age: 45
End: 2025-06-19

## 2025-06-19 VITALS
HEART RATE: 70 BPM | SYSTOLIC BLOOD PRESSURE: 120 MMHG | BODY MASS INDEX: 33.14 KG/M2 | WEIGHT: 206.2 LBS | HEIGHT: 66 IN | TEMPERATURE: 96.9 F | DIASTOLIC BLOOD PRESSURE: 79 MMHG | RESPIRATION RATE: 16 BRPM | OXYGEN SATURATION: 98 %

## 2025-06-19 DIAGNOSIS — F44.7 FUNCTIONAL NEUROLOGICAL SYMPTOM DISORDER WITH MIXED SYMPTOMS: ICD-10-CM

## 2025-06-19 DIAGNOSIS — M54.2 CHRONIC NECK PAIN: ICD-10-CM

## 2025-06-19 DIAGNOSIS — G89.4 CHRONIC PAIN SYNDROME: ICD-10-CM

## 2025-06-19 DIAGNOSIS — M54.50 CHRONIC BILATERAL LOW BACK PAIN WITHOUT SCIATICA: Primary | ICD-10-CM

## 2025-06-19 DIAGNOSIS — G89.29 CHRONIC BILATERAL LOW BACK PAIN WITHOUT SCIATICA: Primary | ICD-10-CM

## 2025-06-19 DIAGNOSIS — G89.29 CHRONIC NECK PAIN: ICD-10-CM

## 2025-06-19 RX ORDER — HYDROCODONE BITARTRATE AND ACETAMINOPHEN 10; 325 MG/1; MG/1
1 TABLET ORAL EVERY 8 HOURS PRN
COMMUNITY
Start: 2025-06-04 | End: 2025-06-19 | Stop reason: ALTCHOICE

## 2025-06-19 RX ORDER — METHOCARBAMOL 750 MG/1
750 TABLET, FILM COATED ORAL 3 TIMES DAILY
COMMUNITY
Start: 2025-05-23

## 2025-06-19 RX ORDER — TOPIRAMATE 100 MG/1
100 TABLET, FILM COATED ORAL 2 TIMES DAILY
Qty: 180 TABLET | Refills: 3 | Status: SHIPPED | OUTPATIENT
Start: 2025-06-19

## 2025-06-19 ASSESSMENT — PATIENT HEALTH QUESTIONNAIRE - PHQ9
5. POOR APPETITE OR OVEREATING: MORE THAN HALF THE DAYS
3. TROUBLE FALLING OR STAYING ASLEEP: MORE THAN HALF THE DAYS
1. LITTLE INTEREST OR PLEASURE IN DOING THINGS: NOT AT ALL
10. IF YOU CHECKED OFF ANY PROBLEMS, HOW DIFFICULT HAVE THESE PROBLEMS MADE IT FOR YOU TO DO YOUR WORK, TAKE CARE OF THINGS AT HOME, OR GET ALONG WITH OTHER PEOPLE: SOMEWHAT DIFFICULT
SUM OF ALL RESPONSES TO PHQ QUESTIONS 1-9: 10
4. FEELING TIRED OR HAVING LITTLE ENERGY: MORE THAN HALF THE DAYS
7. TROUBLE CONCENTRATING ON THINGS, SUCH AS READING THE NEWSPAPER OR WATCHING TELEVISION: SEVERAL DAYS
SUM OF ALL RESPONSES TO PHQ QUESTIONS 1-9: 10
SUM OF ALL RESPONSES TO PHQ QUESTIONS 1-9: 10
9. THOUGHTS THAT YOU WOULD BE BETTER OFF DEAD, OR OF HURTING YOURSELF: NOT AT ALL
2. FEELING DOWN, DEPRESSED OR HOPELESS: SEVERAL DAYS
8. MOVING OR SPEAKING SO SLOWLY THAT OTHER PEOPLE COULD HAVE NOTICED. OR THE OPPOSITE, BEING SO FIGETY OR RESTLESS THAT YOU HAVE BEEN MOVING AROUND A LOT MORE THAN USUAL: SEVERAL DAYS
SUM OF ALL RESPONSES TO PHQ QUESTIONS 1-9: 10
6. FEELING BAD ABOUT YOURSELF - OR THAT YOU ARE A FAILURE OR HAVE LET YOURSELF OR YOUR FAMILY DOWN: SEVERAL DAYS

## 2025-06-19 NOTE — PROGRESS NOTES
Primary Physician: Daisy Khanna MD    CHIEF COMPLAINT:diffuse pain    HISTORY OF PRESENT ILLNESS:  Ms. Nolvia Zavala is 44 y.o. female with past medical history of fibromyalgia, cauda equina syndrome status post L5/S1 laminectomy/fusion, migraines, POTS, neurogenic bladder, and FND. She follows with [facility] who last saw her Dec 2024. They have been implementing psychotherapy and cognitive reframing with CBT. They have also addressed her left leg weakness thought to be a result of her functional neurologic disorder. They have recommended that she continue gabapentin and cymbalta.  She  is seen in consultation at the request of Dr. Daisy Khanna MD for further pain control options.  The available medical record is reviewed (along with outside notes when available), the patient is interviewed and examined with the plan formulated by myself.        Though her pain is diffuse, her pain is worst in her neck and low back. She has also had left shoulder pain and was  previously diagnosed with adhesive capsulitis but now patient thinks it is less shoulder and moreso further down her arm. Back pain stays in the back and does not radiate. Laying down and sitting make it worse. Neck pain is not as consistent and tends to be more intermittent. She deals with a lot of spasms. She does not think the robaxin is helping. She was previously on both baclofen and tizanidine and had low BP associated with this regimen; however, patient thinks the low BP was more related to the tizanidine. She does not recall what dose of baclofen she was on. She has been on opioid medications previously. She was given a temporary fill of norco from Dr. Murillo most recently, but she reports that opioids have not significantly helped her.    She has an upcoming lumbar MRI on 7/1/25. We have requested records from Dr. Murillo's office who ordered the imaging.    Pain History:      Location: right low back, non-radiating,

## 2025-06-23 DIAGNOSIS — M54.2 CHRONIC NECK AND BACK PAIN: Primary | ICD-10-CM

## 2025-06-23 DIAGNOSIS — G89.29 CHRONIC NECK AND BACK PAIN: Primary | ICD-10-CM

## 2025-06-23 DIAGNOSIS — M54.9 CHRONIC NECK AND BACK PAIN: Primary | ICD-10-CM

## 2025-07-02 NOTE — TELEPHONE ENCOUNTER
We have not written for Methocarbamol, I went off her past fill from previous provider.  Please double check me. Thanks

## 2025-07-04 RX ORDER — METHOCARBAMOL 750 MG/1
750 TABLET, FILM COATED ORAL 3 TIMES DAILY PRN
Qty: 90 TABLET | Refills: 0 | Status: SHIPPED | OUTPATIENT
Start: 2025-07-04 | End: 2025-08-03

## 2025-07-16 ENCOUNTER — OFFICE VISIT (OUTPATIENT)
Dept: GASTROENTEROLOGY | Age: 45
End: 2025-07-16
Payer: MEDICARE

## 2025-07-16 VITALS
HEART RATE: 92 BPM | DIASTOLIC BLOOD PRESSURE: 70 MMHG | OXYGEN SATURATION: 97 % | WEIGHT: 206 LBS | BODY MASS INDEX: 33.11 KG/M2 | HEIGHT: 66 IN | SYSTOLIC BLOOD PRESSURE: 122 MMHG

## 2025-07-16 DIAGNOSIS — K21.9 GASTROESOPHAGEAL REFLUX DISEASE, UNSPECIFIED WHETHER ESOPHAGITIS PRESENT: Primary | ICD-10-CM

## 2025-07-16 DIAGNOSIS — K58.1 IRRITABLE BOWEL SYNDROME WITH CONSTIPATION: ICD-10-CM

## 2025-07-16 DIAGNOSIS — R13.10 DYSPHAGIA, UNSPECIFIED TYPE: ICD-10-CM

## 2025-07-16 PROCEDURE — 3078F DIAST BP <80 MM HG: CPT | Performed by: NURSE PRACTITIONER

## 2025-07-16 PROCEDURE — 99213 OFFICE O/P EST LOW 20 MIN: CPT | Performed by: NURSE PRACTITIONER

## 2025-07-16 PROCEDURE — 1036F TOBACCO NON-USER: CPT | Performed by: NURSE PRACTITIONER

## 2025-07-16 PROCEDURE — G8427 DOCREV CUR MEDS BY ELIG CLIN: HCPCS | Performed by: NURSE PRACTITIONER

## 2025-07-16 PROCEDURE — 3074F SYST BP LT 130 MM HG: CPT | Performed by: NURSE PRACTITIONER

## 2025-07-16 PROCEDURE — G8417 CALC BMI ABV UP PARAM F/U: HCPCS | Performed by: NURSE PRACTITIONER

## 2025-07-16 RX ORDER — SUCRALFATE 1 G/1
1 TABLET ORAL 4 TIMES DAILY
Qty: 120 TABLET | Refills: 0 | Status: SHIPPED | OUTPATIENT
Start: 2025-07-16

## 2025-07-23 ASSESSMENT — ENCOUNTER SYMPTOMS
ANAL BLEEDING: 0
ABDOMINAL PAIN: 0
CONSTIPATION: 0
COUGH: 0
NAUSEA: 0
SHORTNESS OF BREATH: 0
VOMITING: 0
BLOOD IN STOOL: 0
DIARRHEA: 0
RECTAL PAIN: 0
ABDOMINAL DISTENTION: 0
CHOKING: 0
TROUBLE SWALLOWING: 0

## 2025-07-24 NOTE — PROGRESS NOTES
Subjective:     Patient ID: Nolvia Zavala is a 44 y.o. female  PCP: Daisy Khanna MD  Referring Provider: No ref. provider found    HPI  History of Present Illness  Patient seen in the office today for follow up   Nolvia has chronic GERD, dysphagia and constipation    She reports experiencing abdominal pain, bloating, and gas after eating, which she suspects may be due to a recent stomach bug. These symptoms are absent when she refrains from eating. She is currently taking Prilosec 20 mg twice daily. She has been prescribed Motegrity, but the cost has increased from $15 to $50 per month due to the discontinuation of her discount card on 06/30/2025. Her niece, who works at the pharmacy, is attempting to resolve this issue.   She has an upcoming appointment with a motility specialist at [email or web address] in 11/2025. She was advised to take 2 senna tablets daily, which she has found to be somewhat beneficial along with her motegrity and lactulose        Results        Assessment:     Assessment & Plan  1. Abdominal pain:  - Reports abdominal pain, bloating, and gas after eating.  - Currently taking Prilosec 20 mg twice a day.  - Prescribed Carafate with instructions to dissolve the tablet in water and consume it an hour before meals and at bedtime for a week.  - Use Carafate as needed after the initial week to avoid constipation.  - Prescription sent to pharmacy.  - Contact the office if symptoms persist after a week of Carafate treatment.    2. Constipation:  - Taking senna tablets twice a day with some relief.  - Discussed the potential for Carafate to contribute to constipation.  - Monitor bowel movements and adjust the use of Carafate accordingly.    3. Medication management:  - Issues with the discount card for Motegrity, increasing the cost from $15 to $50 per month.  - Will contact the Motegrity representative to resolve the discount issue.  - Niece, who works at the pharmacy, will also look into

## 2025-08-04 DIAGNOSIS — R20.0 NUMBNESS AND TINGLING: ICD-10-CM

## 2025-08-04 DIAGNOSIS — G43.709 CHRONIC MIGRAINE WITHOUT AURA WITHOUT STATUS MIGRAINOSUS, NOT INTRACTABLE: ICD-10-CM

## 2025-08-04 DIAGNOSIS — R20.2 NUMBNESS AND TINGLING: ICD-10-CM

## 2025-08-04 RX ORDER — ERENUMAB-AOOE 70 MG/ML
INJECTION SUBCUTANEOUS
Qty: 1 ML | Refills: 2 | Status: SHIPPED | OUTPATIENT
Start: 2025-08-04

## 2025-08-04 RX ORDER — GABAPENTIN 300 MG/1
300 CAPSULE ORAL 3 TIMES DAILY
Qty: 90 CAPSULE | Refills: 0 | Status: SHIPPED | OUTPATIENT
Start: 2025-08-04 | End: 2025-09-03

## 2025-08-14 ENCOUNTER — OFFICE VISIT (OUTPATIENT)
Dept: PAIN MANAGEMENT | Age: 45
End: 2025-08-14
Payer: MEDICARE

## 2025-08-14 VITALS
WEIGHT: 206.8 LBS | SYSTOLIC BLOOD PRESSURE: 118 MMHG | HEIGHT: 65 IN | DIASTOLIC BLOOD PRESSURE: 80 MMHG | RESPIRATION RATE: 16 BRPM | BODY MASS INDEX: 34.45 KG/M2 | HEART RATE: 80 BPM | TEMPERATURE: 97.6 F | OXYGEN SATURATION: 97 %

## 2025-08-14 DIAGNOSIS — M79.7 FIBROMYALGIA: ICD-10-CM

## 2025-08-14 DIAGNOSIS — F44.7 FUNCTIONAL NEUROLOGICAL SYMPTOM DISORDER WITH MIXED SYMPTOMS: ICD-10-CM

## 2025-08-14 DIAGNOSIS — M96.1 FAILED BACK SYNDROME: Primary | ICD-10-CM

## 2025-08-14 PROCEDURE — G8427 DOCREV CUR MEDS BY ELIG CLIN: HCPCS | Performed by: STUDENT IN AN ORGANIZED HEALTH CARE EDUCATION/TRAINING PROGRAM

## 2025-08-14 PROCEDURE — 3074F SYST BP LT 130 MM HG: CPT | Performed by: STUDENT IN AN ORGANIZED HEALTH CARE EDUCATION/TRAINING PROGRAM

## 2025-08-14 PROCEDURE — 1036F TOBACCO NON-USER: CPT | Performed by: STUDENT IN AN ORGANIZED HEALTH CARE EDUCATION/TRAINING PROGRAM

## 2025-08-14 PROCEDURE — 3079F DIAST BP 80-89 MM HG: CPT | Performed by: STUDENT IN AN ORGANIZED HEALTH CARE EDUCATION/TRAINING PROGRAM

## 2025-08-14 PROCEDURE — 99214 OFFICE O/P EST MOD 30 MIN: CPT | Performed by: STUDENT IN AN ORGANIZED HEALTH CARE EDUCATION/TRAINING PROGRAM

## 2025-08-14 PROCEDURE — G8417 CALC BMI ABV UP PARAM F/U: HCPCS | Performed by: STUDENT IN AN ORGANIZED HEALTH CARE EDUCATION/TRAINING PROGRAM

## 2025-08-25 DIAGNOSIS — G43.709 CHRONIC MIGRAINE WITHOUT AURA WITHOUT STATUS MIGRAINOSUS, NOT INTRACTABLE: ICD-10-CM

## 2025-08-25 RX ORDER — ERENUMAB-AOOE 70 MG/ML
INJECTION SUBCUTANEOUS
Qty: 1 ML | Refills: 2 | Status: SHIPPED | OUTPATIENT
Start: 2025-08-25

## (undated) DEVICE — SINGLE PORT MANIFOLD: Brand: NEPTUNE 2

## (undated) DEVICE — ADAPTER CLEANING PORPOISE CLEANING

## (undated) DEVICE — COLON KIT WITH 1.1 OZ ORCA HYDRA SEAL 2 GOWN

## (undated) DEVICE — CANNULA NSL AD L7FT DIV O2 CO2 W/ M LUERLOCK TRMPT CONN

## (undated) DEVICE — SUPPLEMENT DIGESTIVE H2O SOL GI-EASE

## (undated) DEVICE — PK CYSTO 30

## (undated) DEVICE — CLEANING BRUSH - SINGLE USE: Brand: SAFEGUIDE®

## (undated) DEVICE — CLEANING SPONGE: Brand: KOALA™

## (undated) DEVICE — GUIDEWIRE ENDO L210CM MRK SPR TIP SAFEGUIDE

## (undated) DEVICE — ENDO KIT,LOURDES HOSPITAL: Brand: MEDLINE INDUSTRIES, INC.

## (undated) DEVICE — BRUSH ENDOSCP 2 END CHN HEDGEHOG

## (undated) DEVICE — FORCEPS BX 240CM 2.4MM L NDL RAD JAW 4 M00513334

## (undated) DEVICE — FORCEPS BX L240CM DIA2.4MM L NDL RAD JAW 4 133340

## (undated) DEVICE — BITE BLOCK ENDOSCP AD 60 FR W/ ADJ STRP PLAS GRN BLOX

## (undated) DEVICE — ENDO KIT: Brand: MEDLINE INDUSTRIES, INC.

## (undated) DEVICE — FORCEPS BX L240CM JAW DIA2.4MM ORNG L CAP W/ NDL DISP RAD

## (undated) DEVICE — BAPTIST TURNOVER KIT: Brand: MEDLINE INDUSTRIES, INC.

## (undated) DEVICE — GLV SURG BIOGEL M LTX PF 7 1/2